# Patient Record
Sex: FEMALE | Race: WHITE | NOT HISPANIC OR LATINO | Employment: FULL TIME | URBAN - METROPOLITAN AREA
[De-identification: names, ages, dates, MRNs, and addresses within clinical notes are randomized per-mention and may not be internally consistent; named-entity substitution may affect disease eponyms.]

---

## 2018-08-01 ENCOUNTER — TRANSCRIBE ORDERS (OUTPATIENT)
Dept: OCCUPATIONAL MEDICINE | Facility: CLINIC | Age: 33
End: 2018-08-01

## 2018-08-01 ENCOUNTER — APPOINTMENT (OUTPATIENT)
Dept: LAB | Facility: HOSPITAL | Age: 33
End: 2018-08-01
Attending: PREVENTIVE MEDICINE

## 2018-08-01 DIAGNOSIS — Z02.1 PRE-EMPLOYMENT HEALTH SCREENING EXAMINATION: Primary | ICD-10-CM

## 2018-08-01 DIAGNOSIS — Z02.1 PRE-EMPLOYMENT HEALTH SCREENING EXAMINATION: ICD-10-CM

## 2018-08-01 PROCEDURE — 86706 HEP B SURFACE ANTIBODY: CPT

## 2018-08-01 PROCEDURE — 86480 TB TEST CELL IMMUN MEASURE: CPT

## 2018-08-01 PROCEDURE — 36415 COLL VENOUS BLD VENIPUNCTURE: CPT

## 2018-08-02 LAB — HBV SURFACE AB SER-ACNC: 122.75 MIU/ML

## 2018-08-03 LAB
ANNOTATION COMMENT IMP: NORMAL
GAMMA INTERFERON BACKGROUND BLD IA-ACNC: 0.06 IU/ML
M TB IFN-G BLD-IMP: NEGATIVE
M TB IFN-G CD4+ BCKGRND COR BLD-ACNC: 0.02 IU/ML
M TB IFN-G CD4+ T-CELLS BLD-ACNC: 0.08 IU/ML
MITOGEN IGNF BLD-ACNC: 7.66 IU/ML
QUANTIFERON-TB GOLD IN TUBE: NORMAL
SERVICE CMNT-IMP: NORMAL

## 2018-11-12 ENCOUNTER — HOSPITAL ENCOUNTER (EMERGENCY)
Facility: HOSPITAL | Age: 33
Discharge: HOME | End: 2018-11-12
Attending: EMERGENCY MEDICINE | Admitting: EMERGENCY MEDICINE
Payer: OTHER MISCELLANEOUS

## 2018-11-12 VITALS
HEIGHT: 66 IN | RESPIRATION RATE: 16 BRPM | OXYGEN SATURATION: 98 % | DIASTOLIC BLOOD PRESSURE: 64 MMHG | WEIGHT: 154 LBS | HEART RATE: 80 BPM | SYSTOLIC BLOOD PRESSURE: 120 MMHG | TEMPERATURE: 98.4 F | BODY MASS INDEX: 24.75 KG/M2

## 2018-11-12 DIAGNOSIS — Z77.21 EXPOSURE TO BLOOD OR BODY FLUID: Primary | ICD-10-CM

## 2018-11-12 LAB — ALT SERPL W P-5'-P-CCNC: 20 U/L (ref 12–78)

## 2018-11-12 PROCEDURE — 86803 HEPATITIS C AB TEST: CPT | Performed by: EMERGENCY MEDICINE

## 2018-11-12 PROCEDURE — 99283 EMERGENCY DEPT VISIT LOW MDM: CPT

## 2018-11-12 PROCEDURE — 87340 HEPATITIS B SURFACE AG IA: CPT | Performed by: EMERGENCY MEDICINE

## 2018-11-12 PROCEDURE — 87389 HIV-1 AG W/HIV-1&-2 AB AG IA: CPT | Performed by: EMERGENCY MEDICINE

## 2018-11-12 PROCEDURE — 36415 COLL VENOUS BLD VENIPUNCTURE: CPT | Performed by: EMERGENCY MEDICINE

## 2018-11-12 PROCEDURE — 84460 ALANINE AMINO (ALT) (SGPT): CPT | Performed by: EMERGENCY MEDICINE

## 2018-11-12 PROCEDURE — 86706 HEP B SURFACE ANTIBODY: CPT | Performed by: EMERGENCY MEDICINE

## 2018-11-13 LAB
HBV SURFACE AB SER-ACNC: 137.54 MIU/ML
HBV SURFACE AG SER QL: NORMAL
HCV AB SER QL: NORMAL

## 2018-11-13 NOTE — DISCHARGE INSTRUCTIONS
Body Substance Exposure   WHAT YOU NEED TO KNOW:   Body substance exposure is when you come in contact with another person's blood or body fluid that contains blood  Semen or vaginal fluid can also spread infection  Contact may place you at risk for hepatitis B virus (HBV), human immunodeficiency virus (HIV), or hepatitis C virus (HCV)  DISCHARGE INSTRUCTIONS:   Ways that body substance exposure can occur:   · A needle stick or a cut from a sharp object    · Contact with an open wound, such as a cut, chapped skin, or an abrasion     · Contact with the eyes or mucus membrane, such as the lining of the mouth or nose    · Human bite  What to do when exposed to a body substance:   · Clean the area immediately  Wash an open wound with soap and clean water  Flush your eyes with saline solution or water  Rinse mucus membranes with water or saline solution  · Contact your healthcare provider as soon as possible  He will ask how the exposure happened and where the blood or body fluid touched your body  If possible, tell your healthcare provider about the person's health status and history, such as vaccinations  Treatment works best if started as soon as possible  Treatment that may be given for body substance exposure:  Postexposure prophylaxis (PEP) is medical treatment that may protect a person from infection after exposure to another person's body fluids  PEP may be needed if the person whose fluids you were exposed to has a known infection  Do not donate blood, organs, tissues, or semen until your follow-up is completed at 6 months  · PEP for HBV  may include HBV vaccinations or medicine to prevent HVB  This treatment works best if started within 24 hours of exposure  · PEP for HIV  may include 2 or 3 types of medicine to prevent HIV  This treatment works best if started within 72 hours of exposure  Continue treatment for 4 weeks   Practice safe sex to prevent spreading HIV and to prevent pregnancy during the follow-up period  If you are breastfeeding, your healthcare provider may recommend that you stop  Ask your healthcare provider if you can breastfeed  · PEP for HCV  is not  available  You will need to be tested for HCV and treated if you were infected  Follow up with your healthcare provider as directed: You will need more blood tests  PEP for HIV often causes side effects  Talk with your healthcare provider about your symptoms  He will need to make sure you are taking the medicine correctly  Write down your questions so you remember to ask them during your visits  Prevent body substance exposure: If you care for another person who has HBV, HIV, or HCV, protect yourself and others from infection:  · Wash your hands thoroughly  before and after you provide medical care  · Use protective equipment  Gloves or a face mask may protect your hands, nose, and mouth from splashes of blood or body fluid  · Do not recap needles after use  Recapping needles increases your risk of a needle stick  · Throw away needles in a safe container  A hard container with a lid may prevent accidental needle sticks  Contact your healthcare provider if:   · You have a fever  · You have a rash  · You have weakness or muscle pain  · You have abdominal pain, nausea, or vomiting  · You have diarrhea  · You have a headache  · You have questions or concerns about your condition or care  © 2017 [employer]  Information is for End User's use only and may not be sold, redistributed or otherwise used for commercial purposes  All illustrations and images included in CareNotes® are the copyrighted property of [employer]  or [employer]  The above information is an  only  It is not intended as medical advice for individual conditions or treatments  Talk to your doctor, nurse or pharmacist before following any medical regimen to see if it is safe and effective for you
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
## 2018-11-14 LAB — HIV 1+2 AB+HIV1 P24 AG SERPL QL IA: NORMAL

## 2018-12-13 ENCOUNTER — TRANSCRIBE ORDERS (OUTPATIENT)
Dept: ADMINISTRATIVE | Facility: HOSPITAL | Age: 33
End: 2018-12-13

## 2018-12-13 DIAGNOSIS — R19.09 SUPRAPUBIC MASS: Primary | ICD-10-CM

## 2018-12-19 ENCOUNTER — HOSPITAL ENCOUNTER (OUTPATIENT)
Dept: ULTRASOUND IMAGING | Facility: HOSPITAL | Age: 33
Discharge: HOME | End: 2018-12-19
Payer: COMMERCIAL

## 2018-12-19 DIAGNOSIS — R19.09 SUPRAPUBIC MASS: ICD-10-CM

## 2018-12-19 PROCEDURE — 76705 ECHO EXAM OF ABDOMEN: CPT

## 2019-01-02 ENCOUNTER — APPOINTMENT (OUTPATIENT)
Dept: LAB | Facility: HOSPITAL | Age: 34
End: 2019-01-02

## 2019-01-02 ENCOUNTER — TRANSCRIBE ORDERS (OUTPATIENT)
Dept: ADMINISTRATIVE | Facility: HOSPITAL | Age: 34
End: 2019-01-02

## 2019-01-02 DIAGNOSIS — Z20.828 EXPOSURE TO SARS-ASSOCIATED CORONAVIRUS: ICD-10-CM

## 2019-01-02 DIAGNOSIS — Z20.828 EXPOSURE TO SARS-ASSOCIATED CORONAVIRUS: Primary | ICD-10-CM

## 2019-01-02 PROCEDURE — 87389 HIV-1 AG W/HIV-1&-2 AB AG IA: CPT

## 2019-01-02 PROCEDURE — 36415 COLL VENOUS BLD VENIPUNCTURE: CPT

## 2019-01-04 LAB — HIV 1+2 AB+HIV1 P24 AG SERPL QL IA: NORMAL

## 2019-02-20 ENCOUNTER — TRANSCRIBE ORDERS (OUTPATIENT)
Dept: ADMINISTRATIVE | Facility: HOSPITAL | Age: 34
End: 2019-02-20

## 2019-02-20 ENCOUNTER — APPOINTMENT (OUTPATIENT)
Dept: LAB | Facility: HOSPITAL | Age: 34
End: 2019-02-20

## 2019-02-20 DIAGNOSIS — Z20.828 CONTACT WITH OR EXPOSURE TO VIRAL DISEASE: ICD-10-CM

## 2019-02-20 DIAGNOSIS — Z20.828 CONTACT WITH OR EXPOSURE TO VIRAL DISEASE: Primary | ICD-10-CM

## 2019-02-20 PROCEDURE — 86803 HEPATITIS C AB TEST: CPT

## 2019-02-20 PROCEDURE — 36415 COLL VENOUS BLD VENIPUNCTURE: CPT

## 2019-02-20 PROCEDURE — 87389 HIV-1 AG W/HIV-1&-2 AB AG IA: CPT

## 2019-02-21 LAB
HCV AB SER QL: NORMAL
HIV 1+2 AB+HIV1 P24 AG SERPL QL IA: NORMAL

## 2019-06-24 ENCOUNTER — APPOINTMENT (OUTPATIENT)
Dept: LAB | Facility: HOSPITAL | Age: 34
End: 2019-06-24
Payer: COMMERCIAL

## 2019-06-24 ENCOUNTER — TRANSCRIBE ORDERS (OUTPATIENT)
Dept: ADMINISTRATIVE | Facility: HOSPITAL | Age: 34
End: 2019-06-24

## 2019-06-24 DIAGNOSIS — Z00.8 HEALTH EXAMINATION IN POPULATION SURVEY: ICD-10-CM

## 2019-06-24 DIAGNOSIS — Z77.21 PERSONAL HISTORY OF EXPOSURE TO POTENTIALLY HAZARDOUS BODY FLUIDS: ICD-10-CM

## 2019-06-24 DIAGNOSIS — Z00.8 HEALTH EXAMINATION IN POPULATION SURVEY: Primary | ICD-10-CM

## 2019-06-24 DIAGNOSIS — Z77.21 PERSONAL HISTORY OF EXPOSURE TO POTENTIALLY HAZARDOUS BODY FLUIDS: Primary | ICD-10-CM

## 2019-06-24 LAB
CHOLEST SERPL-MCNC: 176 MG/DL (ref 50–200)
HDLC SERPL-MCNC: 62 MG/DL (ref 40–60)
LDLC SERPL CALC-MCNC: 104 MG/DL (ref 0–100)
NONHDLC SERPL-MCNC: 114 MG/DL
TRIGL SERPL-MCNC: 52 MG/DL

## 2019-06-24 PROCEDURE — 83036 HEMOGLOBIN GLYCOSYLATED A1C: CPT

## 2019-06-24 PROCEDURE — 86803 HEPATITIS C AB TEST: CPT

## 2019-06-24 PROCEDURE — 36415 COLL VENOUS BLD VENIPUNCTURE: CPT

## 2019-06-24 PROCEDURE — 80061 LIPID PANEL: CPT

## 2019-06-24 PROCEDURE — 87389 HIV-1 AG W/HIV-1&-2 AB AG IA: CPT

## 2019-06-25 LAB
EST. AVERAGE GLUCOSE BLD GHB EST-MCNC: 105 MG/DL
HBA1C MFR BLD: 5.3 % (ref 4.2–6.3)
HCV AB SER QL: NORMAL

## 2019-06-26 LAB — HIV 1+2 AB+HIV1 P24 AG SERPL QL IA: NORMAL

## 2019-09-10 ENCOUNTER — OFFICE VISIT (OUTPATIENT)
Dept: FAMILY MEDICINE CLINIC | Facility: CLINIC | Age: 34
End: 2019-09-10
Payer: COMMERCIAL

## 2019-09-10 VITALS
BODY MASS INDEX: 25.17 KG/M2 | OXYGEN SATURATION: 96 % | DIASTOLIC BLOOD PRESSURE: 62 MMHG | RESPIRATION RATE: 18 BRPM | WEIGHT: 156.6 LBS | HEIGHT: 66 IN | SYSTOLIC BLOOD PRESSURE: 116 MMHG | TEMPERATURE: 96.2 F | HEART RATE: 74 BPM

## 2019-09-10 DIAGNOSIS — F33.1 MODERATE EPISODE OF RECURRENT MAJOR DEPRESSIVE DISORDER (HCC): Primary | ICD-10-CM

## 2019-09-10 DIAGNOSIS — N92.0 MENORRHAGIA WITH REGULAR CYCLE: ICD-10-CM

## 2019-09-10 DIAGNOSIS — Z01.419 ROUTINE GYNECOLOGICAL EXAMINATION: ICD-10-CM

## 2019-09-10 DIAGNOSIS — T14.8XXA BRUISING: ICD-10-CM

## 2019-09-10 PROCEDURE — 99203 OFFICE O/P NEW LOW 30 MIN: CPT | Performed by: NURSE PRACTITIONER

## 2019-09-10 RX ORDER — ESCITALOPRAM OXALATE 5 MG/1
5 TABLET ORAL DAILY
Qty: 30 TABLET | Refills: 1 | Status: SHIPPED | OUTPATIENT
Start: 2019-09-10 | End: 2019-10-04 | Stop reason: SDUPTHER
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
## 2019-09-16 ENCOUNTER — APPOINTMENT (OUTPATIENT)
Dept: LAB | Facility: HOSPITAL | Age: 34
End: 2019-09-16
Payer: COMMERCIAL

## 2019-09-16 DIAGNOSIS — N92.0 MENORRHAGIA WITH REGULAR CYCLE: ICD-10-CM

## 2019-09-16 DIAGNOSIS — T14.8XXA BRUISING: ICD-10-CM

## 2019-09-16 LAB
BASOPHILS # BLD AUTO: 0.05 THOUSANDS/ΜL (ref 0–0.1)
BASOPHILS NFR BLD AUTO: 1 % (ref 0–1)
EOSINOPHIL # BLD AUTO: 0.08 THOUSAND/ΜL (ref 0–0.61)
EOSINOPHIL NFR BLD AUTO: 1 % (ref 0–6)
ERYTHROCYTE [DISTWIDTH] IN BLOOD BY AUTOMATED COUNT: 12.8 % (ref 11.6–15.1)
HCT VFR BLD AUTO: 38.2 % (ref 34.8–46.1)
HGB BLD-MCNC: 12.4 G/DL (ref 11.5–15.4)
IMM GRANULOCYTES # BLD AUTO: 0.03 THOUSAND/UL (ref 0–0.2)
IMM GRANULOCYTES NFR BLD AUTO: 0 % (ref 0–2)
IRON SERPL-MCNC: 115 UG/DL (ref 50–170)
LYMPHOCYTES # BLD AUTO: 2.43 THOUSANDS/ΜL (ref 0.6–4.47)
LYMPHOCYTES NFR BLD AUTO: 26 % (ref 14–44)
MCH RBC QN AUTO: 29.9 PG (ref 26.8–34.3)
MCHC RBC AUTO-ENTMCNC: 32.5 G/DL (ref 31.4–37.4)
MCV RBC AUTO: 92 FL (ref 82–98)
MONOCYTES # BLD AUTO: 0.45 THOUSAND/ΜL (ref 0.17–1.22)
MONOCYTES NFR BLD AUTO: 5 % (ref 4–12)
NEUTROPHILS # BLD AUTO: 6.3 THOUSANDS/ΜL (ref 1.85–7.62)
NEUTS SEG NFR BLD AUTO: 67 % (ref 43–75)
NRBC BLD AUTO-RTO: 0 /100 WBCS
PLATELET # BLD AUTO: 285 THOUSANDS/UL (ref 149–390)
PMV BLD AUTO: 10.2 FL (ref 8.9–12.7)
RBC # BLD AUTO: 4.15 MILLION/UL (ref 3.81–5.12)
WBC # BLD AUTO: 9.34 THOUSAND/UL (ref 4.31–10.16)

## 2019-09-16 PROCEDURE — 85025 COMPLETE CBC W/AUTO DIFF WBC: CPT

## 2019-09-16 PROCEDURE — 36415 COLL VENOUS BLD VENIPUNCTURE: CPT

## 2019-09-16 PROCEDURE — 83540 ASSAY OF IRON: CPT

## 2019-09-17 DIAGNOSIS — R53.82 CHRONIC FATIGUE: Primary | ICD-10-CM

## 2019-10-04 ENCOUNTER — OFFICE VISIT (OUTPATIENT)
Dept: FAMILY MEDICINE CLINIC | Facility: CLINIC | Age: 34
End: 2019-10-04
Payer: COMMERCIAL

## 2019-10-04 VITALS
TEMPERATURE: 97.3 F | WEIGHT: 156.4 LBS | HEART RATE: 78 BPM | DIASTOLIC BLOOD PRESSURE: 68 MMHG | BODY MASS INDEX: 25.13 KG/M2 | SYSTOLIC BLOOD PRESSURE: 106 MMHG | HEIGHT: 66 IN | OXYGEN SATURATION: 98 %

## 2019-10-04 DIAGNOSIS — F33.1 MODERATE EPISODE OF RECURRENT MAJOR DEPRESSIVE DISORDER (HCC): Primary | ICD-10-CM

## 2019-10-04 DIAGNOSIS — F33.1 MODERATE EPISODE OF RECURRENT MAJOR DEPRESSIVE DISORDER (HCC): ICD-10-CM

## 2019-10-04 PROCEDURE — 99213 OFFICE O/P EST LOW 20 MIN: CPT | Performed by: NURSE PRACTITIONER

## 2019-10-04 PROCEDURE — 3008F BODY MASS INDEX DOCD: CPT | Performed by: NURSE PRACTITIONER

## 2019-10-04 RX ORDER — ESCITALOPRAM OXALATE 10 MG/1
10 TABLET ORAL DAILY
Qty: 30 TABLET | Refills: 2 | Status: SHIPPED | OUTPATIENT
Start: 2019-10-04 | End: 2019-11-15 | Stop reason: SDUPTHER
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
## 2019-10-17 ENCOUNTER — APPOINTMENT (OUTPATIENT)
Dept: LAB | Facility: HOSPITAL | Age: 34
End: 2019-10-17
Payer: COMMERCIAL

## 2019-10-17 DIAGNOSIS — R53.82 CHRONIC FATIGUE: ICD-10-CM

## 2019-10-17 PROCEDURE — 82306 VITAMIN D 25 HYDROXY: CPT

## 2019-10-17 PROCEDURE — 36415 COLL VENOUS BLD VENIPUNCTURE: CPT

## 2019-10-18 LAB — 25(OH)D3 SERPL-MCNC: 32.6 NG/ML (ref 30–100)

## 2019-11-11 ENCOUNTER — HOSPITAL ENCOUNTER (EMERGENCY)
Facility: HOSPITAL | Age: 34
Discharge: HOME | End: 2019-11-11
Attending: EMERGENCY MEDICINE | Admitting: EMERGENCY MEDICINE
Payer: OTHER MISCELLANEOUS

## 2019-11-11 ENCOUNTER — APPOINTMENT (EMERGENCY)
Dept: RADIOLOGY | Facility: HOSPITAL | Age: 34
End: 2019-11-11
Payer: OTHER MISCELLANEOUS

## 2019-11-11 VITALS
BODY MASS INDEX: 25.82 KG/M2 | DIASTOLIC BLOOD PRESSURE: 60 MMHG | SYSTOLIC BLOOD PRESSURE: 120 MMHG | RESPIRATION RATE: 16 BRPM | OXYGEN SATURATION: 97 % | HEART RATE: 83 BPM | WEIGHT: 160 LBS | TEMPERATURE: 98.8 F

## 2019-11-11 DIAGNOSIS — S63.92XA HAND SPRAIN, LEFT, INITIAL ENCOUNTER: Primary | ICD-10-CM

## 2019-11-11 PROCEDURE — 73130 X-RAY EXAM OF HAND: CPT

## 2019-11-11 PROCEDURE — 99283 EMERGENCY DEPT VISIT LOW MDM: CPT

## 2019-11-11 PROCEDURE — 99284 EMERGENCY DEPT VISIT MOD MDM: CPT | Performed by: EMERGENCY MEDICINE
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
## 2019-11-15 ENCOUNTER — OFFICE VISIT (OUTPATIENT)
Dept: FAMILY MEDICINE CLINIC | Facility: CLINIC | Age: 34
End: 2019-11-15
Payer: COMMERCIAL

## 2019-11-15 VITALS
HEIGHT: 66 IN | WEIGHT: 157.6 LBS | HEART RATE: 73 BPM | OXYGEN SATURATION: 97 % | DIASTOLIC BLOOD PRESSURE: 78 MMHG | TEMPERATURE: 97.8 F | BODY MASS INDEX: 25.33 KG/M2 | SYSTOLIC BLOOD PRESSURE: 122 MMHG

## 2019-11-15 DIAGNOSIS — F33.1 MODERATE EPISODE OF RECURRENT MAJOR DEPRESSIVE DISORDER (HCC): Primary | ICD-10-CM

## 2019-11-15 DIAGNOSIS — F33.1 MODERATE EPISODE OF RECURRENT MAJOR DEPRESSIVE DISORDER (HCC): ICD-10-CM

## 2019-11-15 PROCEDURE — 99213 OFFICE O/P EST LOW 20 MIN: CPT | Performed by: NURSE PRACTITIONER

## 2019-11-15 PROCEDURE — 1036F TOBACCO NON-USER: CPT | Performed by: NURSE PRACTITIONER

## 2019-11-15 RX ORDER — MELATONIN
1000 DAILY
COMMUNITY

## 2019-11-15 RX ORDER — MULTIVITAMIN
1 TABLET ORAL DAILY
COMMUNITY
End: 2021-07-30

## 2019-11-15 RX ORDER — BACLOFEN 20 MG
TABLET ORAL
COMMUNITY

## 2019-11-15 RX ORDER — ESCITALOPRAM OXALATE 20 MG/1
20 TABLET ORAL DAILY
Qty: 30 TABLET | Refills: 5 | Status: SHIPPED | OUTPATIENT
Start: 2019-11-15 | End: 2019-12-13 | Stop reason: SDUPTHER

## 2019-11-15 NOTE — PROGRESS NOTES
Assessment/Plan:    No problem-specific Assessment & Plan notes found for this encounter  Diagnoses and all orders for this visit:    Moderate episode of recurrent major depressive disorder ([facility] [address] )  Comments: Will increase the Lexapro 20 mg daily and follow up in 4 weeks  Orders:  -     escitalopram (LEXAPRO) 20 mg tablet; Take 1 tablet (20 mg total) by mouth daily    Moderate episode of recurrent major depressive disorder (HCC)  -     escitalopram (LEXAPRO) 20 mg tablet; Take 1 tablet (20 mg total) by mouth daily    Other orders  -     cholecalciferol (VITAMIN D3) 1,000 units tablet; Take 1,000 Units by mouth daily  -     Multiple Vitamin (MULTIVITAMIN) tablet; Take 1 tablet by mouth daily  -     Magnesium Oxide 500 MG TABS; Take by mouth          Subjective:      Patient ID: Kamala Way is a 29 y o  female  Patient here for follow up and reports that she is feeling more productive and more energy  Patient is still having troubles with sleep and having problems with at times not sleeping enough and being woken up during her sleep and also having problems with sleeping too much at times  Patient energy levels are low and having a hard time with getting things done  Patient appetite taking magnesium supplement and having problems with sugar craving  And also having problems with her concentration  PHQ-9 score 14       The following portions of the patient's history were reviewed and updated as appropriate:   She  has a past medical history of Asthma, Depression, Lumbar herniated disc, and UTI (urinary tract infection)  She   Patient Active Problem List    Diagnosis Date Noted    Routine gynecological examination 09/10/2019    Moderate episode of recurrent major depressive disorder ([facility] [address] ) 09/10/2019    Bruising 09/10/2019    Menorrhagia with regular cycle 09/10/2019     She  has a past surgical history that includes [place] tooth extraction    Her family history includes Breast cancer additional onset in her maternal grandmother and mother; Diabetes in her mother; Hypertension in her mother; Mental illness in her father; Prostate cancer in her maternal grandfather  She  reports that she has never smoked  She has never used smokeless tobacco  She reports that she drinks alcohol  She reports that she does not use drugs  She is allergic to sulfa antibiotics; levofloxacin; and tobramycin       Review of Systems   Constitutional: Negative for activity change, appetite change, chills, diaphoresis, fatigue, fever and unexpected weight change  HENT: Negative for congestion, ear pain, hearing loss, postnasal drip, sinus pressure, sinus pain, sneezing and sore throat  Eyes: Negative for pain, redness and visual disturbance  Respiratory: Negative for cough and shortness of breath  Cardiovascular: Negative for chest pain and leg swelling  Gastrointestinal: Negative for abdominal pain, diarrhea, nausea and vomiting  Endocrine: Negative  Genitourinary: Negative  Musculoskeletal: Negative for arthralgias  Skin: Negative  Allergic/Immunologic: Negative  Neurological: Negative for dizziness and light-headedness  Hematological: Negative  Psychiatric/Behavioral: Positive for sleep disturbance  Negative for behavioral problems and dysphoric mood  Objective:      /78   Pulse 73   Temp 97 8 °F (36 6 °C) (Tympanic)   Ht 5' 6" (1 676 m)   Wt 71 5 kg (157 lb 9 6 oz)   LMP 10/27/2019   SpO2 97%   BMI 25 44 kg/m²          Physical Exam   Constitutional: She is oriented to person, place, and time  Vital signs are normal  She appears well-developed and well-nourished  No distress  HENT:   Head: Normocephalic and atraumatic  Eyes: Pupils are equal, round, and reactive to light  Neck: Normal range of motion  No thyromegaly present  Cardiovascular: Normal rate, regular rhythm, normal heart sounds and intact distal pulses  No murmur heard    Pulmonary/Chest: Effort normal and breath sounds normal  No respiratory distress  She has no wheezes  Abdominal: Soft  Bowel sounds are normal    Musculoskeletal: Normal range of motion  Neurological: She is alert and oriented to person, place, and time  Skin: Skin is warm and dry  Psychiatric: She has a normal mood and affect  Her speech is normal and behavior is normal  Judgment and thought content normal  Cognition and memory are normal    Nursing note and vitals reviewed

## 2019-12-13 ENCOUNTER — OFFICE VISIT (OUTPATIENT)
Dept: FAMILY MEDICINE CLINIC | Facility: CLINIC | Age: 34
End: 2019-12-13
Payer: COMMERCIAL

## 2019-12-13 VITALS
HEART RATE: 58 BPM | SYSTOLIC BLOOD PRESSURE: 118 MMHG | BODY MASS INDEX: 25.58 KG/M2 | DIASTOLIC BLOOD PRESSURE: 74 MMHG | WEIGHT: 159.2 LBS | HEIGHT: 66 IN | OXYGEN SATURATION: 97 % | TEMPERATURE: 97.9 F

## 2019-12-13 DIAGNOSIS — G43.829 MENSTRUAL MIGRAINE WITHOUT STATUS MIGRAINOSUS, NOT INTRACTABLE: ICD-10-CM

## 2019-12-13 DIAGNOSIS — F33.1 MODERATE EPISODE OF RECURRENT MAJOR DEPRESSIVE DISORDER (HCC): Primary | ICD-10-CM

## 2019-12-13 DIAGNOSIS — F33.1 MODERATE EPISODE OF RECURRENT MAJOR DEPRESSIVE DISORDER (HCC): ICD-10-CM

## 2019-12-13 PROCEDURE — 3008F BODY MASS INDEX DOCD: CPT | Performed by: NURSE PRACTITIONER

## 2019-12-13 PROCEDURE — 99213 OFFICE O/P EST LOW 20 MIN: CPT | Performed by: NURSE PRACTITIONER

## 2019-12-13 PROCEDURE — 1036F TOBACCO NON-USER: CPT | Performed by: NURSE PRACTITIONER

## 2019-12-13 RX ORDER — EVENING PRIMROSE OIL 500 MG
CAPSULE ORAL
COMMUNITY
End: 2020-09-01 | Stop reason: ALTCHOICE

## 2019-12-13 RX ORDER — ESCITALOPRAM OXALATE 20 MG/1
20 TABLET ORAL DAILY
Qty: 90 TABLET | Refills: 1 | Status: SHIPPED | OUTPATIENT
Start: 2019-12-13 | End: 2020-07-31 | Stop reason: SDUPTHER

## 2019-12-13 NOTE — PROGRESS NOTES
Assessment/Plan:    No problem-specific Assessment & Plan notes found for this encounter  Diagnoses and all orders for this visit:    Moderate episode of recurrent major depressive disorder ([facility] [address] )  Comments:  Patient is doing well on the Lexapro 20 mg daily will continue with current dose   Orders:  -     escitalopram (LEXAPRO) 20 mg tablet; Take 1 tablet (20 mg total) by mouth daily    Moderate episode of recurrent major depressive disorder ([facility] [address] )  Comments: Will increase the Lexapro 20 mg daily and follow up in 4 weeks  Orders:  -     escitalopram (LEXAPRO) 20 mg tablet; Take 1 tablet (20 mg total) by mouth daily    Menstrual migraine without status migrainosus, not intractable    Other orders  -     Evening Primrose Oil 500 MG CAPS; Take by mouth          Subjective:      Patient ID: Marsha Yepez is a 29 y o  female  Patient here for follow up and reports that she has had some anxiety but is related to her holiday  Patient is doing well on the 20 mg dose of the Lexapro  Patient has tried to start doing more yoga  Patient is working well and doing better at work and home  Patient here as well and reports that she has headaches and happening primarily after her menstrual cycle and also triggers are lack of sleep alcohol and when she is dehydrated  Patient did have an aura with her last migraine and was sleepy and the following day had a right side of her head stabbing pain migraine lasting about 6 hours to > 24 hours and taking benadryl zofran tylenol and motrin caffeine and water  Patient reports that this is effective for her  Patient would like to see a headache specialist  Patient reports that her headaches have been getting more symptoms with her headaches such as photosensitivity       The following portions of the patient's history were reviewed and updated as appropriate:   She  has a past medical history of Asthma, Depression, Lumbar herniated disc, and UTI (urinary tract infection)    She Patient Active Problem List    Diagnosis Date Noted    Menstrual migraine without status migrainosus, not intractable 12/13/2019    Routine gynecological examination 09/10/2019    Moderate episode of recurrent major depressive disorder ([facility] [address] ) 09/10/2019    Bruising 09/10/2019    Menorrhagia with regular cycle 09/10/2019     She  has a past surgical history that includes [place] tooth extraction  Her family history includes Breast cancer additional onset in her maternal grandmother and mother; Diabetes in her mother; Hypertension in her mother; Mental illness in her father; Prostate cancer in her maternal grandfather  She  reports that she has never smoked  She has never used smokeless tobacco  She reports that she drinks alcohol  She reports that she does not use drugs  She is allergic to sulfa antibiotics; levofloxacin; and tobramycin       Review of Systems   Constitutional: Negative for activity change, appetite change, chills, diaphoresis, fatigue, fever and unexpected weight change  HENT: Negative for congestion, ear pain, hearing loss, postnasal drip, sinus pressure, sinus pain, sneezing and sore throat  Eyes: Negative for pain, redness and visual disturbance  Respiratory: Negative for cough and shortness of breath  Cardiovascular: Negative for chest pain and leg swelling  Gastrointestinal: Negative for abdominal pain, diarrhea, nausea and vomiting  Endocrine: Negative  Genitourinary: Negative  Musculoskeletal: Negative for arthralgias  Skin: Negative  Allergic/Immunologic: Negative  Neurological: Negative for dizziness and light-headedness  Hematological: Negative  Psychiatric/Behavioral: Negative for behavioral problems and dysphoric mood           Objective:      /74   Pulse 58   Temp 97 9 °F (36 6 °C) (Tympanic)   Ht 5' 6" (1 676 m)   Wt 72 2 kg (159 lb 3 2 oz)   SpO2 97%   BMI 25 70 kg/m²          Physical Exam   Constitutional: She is oriented to person, place, and time  Vital signs are normal  She appears well-developed and well-nourished  No distress  HENT:   Head: Normocephalic and atraumatic  Eyes: Pupils are equal, round, and reactive to light  Neck: Normal range of motion  No thyromegaly present  Cardiovascular: Normal rate, regular rhythm, normal heart sounds and intact distal pulses  No murmur heard  Pulmonary/Chest: Effort normal and breath sounds normal  No respiratory distress  She has no wheezes  Abdominal: Soft  Bowel sounds are normal    Musculoskeletal: Normal range of motion  Neurological: She is alert and oriented to person, place, and time  Skin: Skin is warm and dry  Psychiatric: She has a normal mood and affect  Nursing note and vitals reviewed

## 2020-03-17 ENCOUNTER — OFFICE VISIT (OUTPATIENT)
Dept: FAMILY MEDICINE CLINIC | Facility: CLINIC | Age: 35
End: 2020-03-17
Payer: COMMERCIAL

## 2020-03-17 VITALS
DIASTOLIC BLOOD PRESSURE: 66 MMHG | HEART RATE: 68 BPM | SYSTOLIC BLOOD PRESSURE: 120 MMHG | BODY MASS INDEX: 26.03 KG/M2 | HEIGHT: 66 IN | OXYGEN SATURATION: 98 % | WEIGHT: 162 LBS | TEMPERATURE: 96.4 F

## 2020-03-17 DIAGNOSIS — F33.1 MODERATE EPISODE OF RECURRENT MAJOR DEPRESSIVE DISORDER (HCC): Primary | ICD-10-CM

## 2020-03-17 PROBLEM — Z01.419 ROUTINE GYNECOLOGICAL EXAMINATION: Status: RESOLVED | Noted: 2019-09-10 | Resolved: 2020-03-17

## 2020-03-17 PROBLEM — T14.8XXA BRUISING: Status: RESOLVED | Noted: 2019-09-10 | Resolved: 2020-03-17

## 2020-03-17 PROCEDURE — 3008F BODY MASS INDEX DOCD: CPT | Performed by: NURSE PRACTITIONER

## 2020-03-17 PROCEDURE — 99213 OFFICE O/P EST LOW 20 MIN: CPT | Performed by: NURSE PRACTITIONER

## 2020-03-17 PROCEDURE — 1036F TOBACCO NON-USER: CPT | Performed by: NURSE PRACTITIONER

## 2020-03-17 NOTE — PROGRESS NOTES
Assessment/Plan:         Diagnoses and all orders for this visit:    Moderate episode of recurrent major depressive disorder ([facility] [address] )  Comments:  will continue with the Lexapro 20 mg daily           Subjective:      Patient ID: Byron Jefferson is a 29 y o  female  Patient here for follow up three months  Patient reports that she is under stress with COVID scares  Patient reports that she is sleeping poorly and having trouble with falling and staying asleep and taking Melatonin and just not helping  Patient is going to bed at 12 AM and waking up 10 AM and typically sleeping about 7-8 hours and just not getting restful sleep  Patient has also noticed some weight gain and has started with working out more and eating better and on a Mediterranean diet otherwise  The following portions of the patient's history were reviewed and updated as appropriate:   She  has a past medical history of Asthma, Depression, Lumbar herniated disc, and UTI (urinary tract infection)  She   Patient Active Problem List    Diagnosis Date Noted    Menstrual migraine without status migrainosus, not intractable 12/13/2019    Moderate episode of recurrent major depressive disorder ([facility] [address] ) 09/10/2019    Menorrhagia with regular cycle 09/10/2019     She  has a past surgical history that includes [place] tooth extraction  Her family history includes Breast cancer additional onset in her maternal grandmother and mother; Diabetes in her mother; Hypertension in her mother; Mental illness in her father; Prostate cancer in her maternal grandfather  She  reports that she has never smoked  She has never used smokeless tobacco  She reports that she drinks alcohol  She reports that she does not use drugs  She is allergic to sulfa antibiotics; levofloxacin; and tobramycin       Review of Systems   Constitutional: Negative for activity change, appetite change, chills, diaphoresis, fatigue, fever and unexpected weight change     HENT: Negative for congestion, ear pain, hearing loss, postnasal drip, sinus pressure, sinus pain, sneezing and sore throat  Eyes: Negative for pain, redness and visual disturbance  Respiratory: Negative for cough and shortness of breath  Cardiovascular: Negative for chest pain and leg swelling  Gastrointestinal: Negative for abdominal pain, diarrhea, nausea and vomiting  Endocrine: Negative  Genitourinary: Negative  Musculoskeletal: Negative for arthralgias  Skin: Negative  Allergic/Immunologic: Negative  Neurological: Negative for dizziness and light-headedness  Hematological: Negative  Psychiatric/Behavioral: Negative for behavioral problems and dysphoric mood  Objective:      /66   Pulse 68   Temp (!) 96 4 °F (35 8 °C)   Ht 5' 6" (1 676 m)   Wt 73 5 kg (162 lb)   SpO2 98%   BMI 26 15 kg/m²          Physical Exam   Constitutional: She is oriented to person, place, and time  Vital signs are normal  She appears well-developed and well-nourished  No distress  HENT:   Head: Normocephalic and atraumatic  Eyes: Pupils are equal, round, and reactive to light  Neck: Normal range of motion  No thyromegaly present  Cardiovascular: Normal rate, regular rhythm, normal heart sounds and intact distal pulses  No murmur heard  Pulmonary/Chest: Effort normal and breath sounds normal  No respiratory distress  She has no wheezes  Abdominal: Soft  Bowel sounds are normal    Musculoskeletal: Normal range of motion  Neurological: She is alert and oriented to person, place, and time  Skin: Skin is warm and dry  Psychiatric: She has a normal mood and affect  Nursing note and vitals reviewed

## 2020-04-12 ENCOUNTER — TELEPHONE (OUTPATIENT)
Dept: OTHER | Facility: OTHER | Age: 35
End: 2020-04-12

## 2020-04-12 ENCOUNTER — TELEMEDICINE (OUTPATIENT)
Dept: FAMILY MEDICINE CLINIC | Facility: CLINIC | Age: 35
End: 2020-04-12

## 2020-04-12 DIAGNOSIS — Z20.828 EXPOSURE TO SARS-ASSOCIATED CORONAVIRUS: Primary | ICD-10-CM

## 2020-04-12 PROCEDURE — 99442 PR PHYS/QHP TELEPHONE EVALUATION 11-20 MIN: CPT | Performed by: NURSE PRACTITIONER

## 2020-04-13 ENCOUNTER — DOCUMENTATION (OUTPATIENT)
Dept: URGENT CARE | Facility: MEDICAL CENTER | Age: 35
End: 2020-04-13

## 2020-04-13 ENCOUNTER — APPOINTMENT (OUTPATIENT)
Dept: URGENT CARE | Facility: MEDICAL CENTER | Age: 35
End: 2020-04-13
Payer: OTHER MISCELLANEOUS

## 2020-04-13 DIAGNOSIS — Z20.828 EXPOSURE TO SARS-ASSOCIATED CORONAVIRUS: Primary | ICD-10-CM

## 2020-04-13 PROCEDURE — 87635 SARS-COV-2 COVID-19 AMP PRB: CPT

## 2020-04-17 ENCOUNTER — TELEMEDICINE (OUTPATIENT)
Dept: FAMILY MEDICINE CLINIC | Facility: CLINIC | Age: 35
End: 2020-04-17
Payer: COMMERCIAL

## 2020-04-17 DIAGNOSIS — U07.1 COVID-19 VIRUS INFECTION: Primary | ICD-10-CM

## 2020-04-17 PROBLEM — Z20.828 EXPOSURE TO SARS-ASSOCIATED CORONAVIRUS: Status: RESOLVED | Noted: 2020-04-12 | Resolved: 2020-04-17

## 2020-04-17 LAB
INPATIENT: NORMAL
SARS-COV-2 RNA SPEC QL NAA+PROBE: DETECTED

## 2020-04-17 PROCEDURE — 99213 OFFICE O/P EST LOW 20 MIN: CPT | Performed by: NURSE PRACTITIONER

## 2020-04-23 ENCOUNTER — TELEMEDICINE (OUTPATIENT)
Dept: FAMILY MEDICINE CLINIC | Facility: CLINIC | Age: 35
End: 2020-04-23
Payer: COMMERCIAL

## 2020-04-23 VITALS — BODY MASS INDEX: 26.03 KG/M2 | HEIGHT: 66 IN | WEIGHT: 162 LBS

## 2020-04-23 DIAGNOSIS — U07.1 COVID-19 VIRUS INFECTION: Primary | ICD-10-CM

## 2020-04-23 PROCEDURE — 99213 OFFICE O/P EST LOW 20 MIN: CPT | Performed by: NURSE PRACTITIONER

## 2020-04-24 ENCOUNTER — APPOINTMENT (OUTPATIENT)
Dept: RADIOLOGY | Facility: CLINIC | Age: 35
End: 2020-04-24
Payer: OTHER MISCELLANEOUS

## 2020-04-24 ENCOUNTER — TELEMEDICINE (OUTPATIENT)
Dept: FAMILY MEDICINE CLINIC | Facility: CLINIC | Age: 35
End: 2020-04-24
Payer: COMMERCIAL

## 2020-04-24 DIAGNOSIS — R10.13 EPIGASTRIC ABDOMINAL PAIN: ICD-10-CM

## 2020-04-24 DIAGNOSIS — U07.1 COVID-19 VIRUS INFECTION: Primary | ICD-10-CM

## 2020-04-24 DIAGNOSIS — U07.1 COVID-19 VIRUS INFECTION: ICD-10-CM

## 2020-04-24 PROCEDURE — 99213 OFFICE O/P EST LOW 20 MIN: CPT | Performed by: PHYSICIAN ASSISTANT

## 2020-04-24 PROCEDURE — 71046 X-RAY EXAM CHEST 2 VIEWS: CPT

## 2020-04-27 ENCOUNTER — APPOINTMENT (OUTPATIENT)
Dept: LAB | Facility: CLINIC | Age: 35
End: 2020-04-27
Payer: COMMERCIAL

## 2020-04-27 DIAGNOSIS — U07.1 COVID-19 VIRUS INFECTION: ICD-10-CM

## 2020-04-27 DIAGNOSIS — R10.13 EPIGASTRIC ABDOMINAL PAIN: ICD-10-CM

## 2020-04-27 LAB
ALBUMIN SERPL BCP-MCNC: 3.8 G/DL (ref 3.5–5)
ALP SERPL-CCNC: 49 U/L (ref 46–116)
ALT SERPL W P-5'-P-CCNC: 23 U/L (ref 12–78)
ANION GAP SERPL CALCULATED.3IONS-SCNC: 4 MMOL/L (ref 4–13)
AST SERPL W P-5'-P-CCNC: 13 U/L (ref 5–45)
BASOPHILS # BLD AUTO: 0.05 THOUSANDS/ΜL (ref 0–0.1)
BASOPHILS NFR BLD AUTO: 1 % (ref 0–1)
BILIRUB SERPL-MCNC: 0.5 MG/DL (ref 0.2–1)
BUN SERPL-MCNC: 17 MG/DL (ref 5–25)
CALCIUM SERPL-MCNC: 9 MG/DL (ref 8.3–10.1)
CHLORIDE SERPL-SCNC: 108 MMOL/L (ref 100–108)
CO2 SERPL-SCNC: 28 MMOL/L (ref 21–32)
CREAT SERPL-MCNC: 0.8 MG/DL (ref 0.6–1.3)
EOSINOPHIL # BLD AUTO: 0.09 THOUSAND/ΜL (ref 0–0.61)
EOSINOPHIL NFR BLD AUTO: 1 % (ref 0–6)
ERYTHROCYTE [DISTWIDTH] IN BLOOD BY AUTOMATED COUNT: 12.6 % (ref 11.6–15.1)
GFR SERPL CREATININE-BSD FRML MDRD: 96 ML/MIN/1.73SQ M
GLUCOSE SERPL-MCNC: 86 MG/DL (ref 65–140)
HCT VFR BLD AUTO: 40.1 % (ref 34.8–46.1)
HGB BLD-MCNC: 13 G/DL (ref 11.5–15.4)
IMM GRANULOCYTES # BLD AUTO: 0.01 THOUSAND/UL (ref 0–0.2)
IMM GRANULOCYTES NFR BLD AUTO: 0 % (ref 0–2)
LYMPHOCYTES # BLD AUTO: 2.88 THOUSANDS/ΜL (ref 0.6–4.47)
LYMPHOCYTES NFR BLD AUTO: 38 % (ref 14–44)
MCH RBC QN AUTO: 30.6 PG (ref 26.8–34.3)
MCHC RBC AUTO-ENTMCNC: 32.4 G/DL (ref 31.4–37.4)
MCV RBC AUTO: 94 FL (ref 82–98)
MONOCYTES # BLD AUTO: 0.53 THOUSAND/ΜL (ref 0.17–1.22)
MONOCYTES NFR BLD AUTO: 7 % (ref 4–12)
NEUTROPHILS # BLD AUTO: 3.95 THOUSANDS/ΜL (ref 1.85–7.62)
NEUTS SEG NFR BLD AUTO: 53 % (ref 43–75)
NRBC BLD AUTO-RTO: 0 /100 WBCS
PLATELET # BLD AUTO: 296 THOUSANDS/UL (ref 149–390)
PMV BLD AUTO: 10.2 FL (ref 8.9–12.7)
POTASSIUM SERPL-SCNC: 4.6 MMOL/L (ref 3.5–5.3)
PROT SERPL-MCNC: 7 G/DL (ref 6.4–8.2)
RBC # BLD AUTO: 4.25 MILLION/UL (ref 3.81–5.12)
SODIUM SERPL-SCNC: 140 MMOL/L (ref 136–145)
WBC # BLD AUTO: 7.51 THOUSAND/UL (ref 4.31–10.16)

## 2020-04-27 PROCEDURE — 85025 COMPLETE CBC W/AUTO DIFF WBC: CPT

## 2020-04-27 PROCEDURE — 80053 COMPREHEN METABOLIC PANEL: CPT

## 2020-04-27 PROCEDURE — 36415 COLL VENOUS BLD VENIPUNCTURE: CPT

## 2020-04-28 ENCOUNTER — TELEMEDICINE (OUTPATIENT)
Dept: FAMILY MEDICINE CLINIC | Facility: CLINIC | Age: 35
End: 2020-04-28
Payer: COMMERCIAL

## 2020-04-28 DIAGNOSIS — U07.1 COVID-19 VIRUS INFECTION: Primary | ICD-10-CM

## 2020-04-28 DIAGNOSIS — R07.89 CHEST WALL PAIN: ICD-10-CM

## 2020-04-28 PROCEDURE — 99213 OFFICE O/P EST LOW 20 MIN: CPT | Performed by: NURSE PRACTITIONER

## 2020-04-28 RX ORDER — METHYLPREDNISOLONE 4 MG/1
TABLET ORAL
Qty: 21 EACH | Refills: 0 | Status: SHIPPED | OUTPATIENT
Start: 2020-04-28 | End: 2020-05-05 | Stop reason: ALTCHOICE

## 2020-05-04 ENCOUNTER — TELEPHONE (OUTPATIENT)
Dept: FAMILY MEDICINE CLINIC | Facility: CLINIC | Age: 35
End: 2020-05-04

## 2020-05-05 ENCOUNTER — TELEMEDICINE (OUTPATIENT)
Dept: FAMILY MEDICINE CLINIC | Facility: CLINIC | Age: 35
End: 2020-05-05
Payer: COMMERCIAL

## 2020-05-05 DIAGNOSIS — R05.9 COUGH: ICD-10-CM

## 2020-05-05 DIAGNOSIS — R07.89 CHEST WALL PAIN: ICD-10-CM

## 2020-05-05 DIAGNOSIS — U07.1 COVID-19 VIRUS INFECTION: Primary | ICD-10-CM

## 2020-05-05 PROBLEM — R10.13 EPIGASTRIC ABDOMINAL PAIN: Status: RESOLVED | Noted: 2020-04-24 | Resolved: 2020-05-05

## 2020-05-05 PROCEDURE — 99213 OFFICE O/P EST LOW 20 MIN: CPT | Performed by: NURSE PRACTITIONER

## 2020-05-05 RX ORDER — PREDNISONE 20 MG/1
TABLET ORAL
Qty: 18 TABLET | Refills: 0 | Status: SHIPPED | OUTPATIENT
Start: 2020-05-05 | End: 2020-07-31 | Stop reason: ALTCHOICE

## 2020-05-06 ENCOUNTER — TELEPHONE (OUTPATIENT)
Dept: FAMILY MEDICINE CLINIC | Facility: CLINIC | Age: 35
End: 2020-05-06

## 2020-05-11 ENCOUNTER — TELEMEDICINE (OUTPATIENT)
Dept: FAMILY MEDICINE CLINIC | Facility: CLINIC | Age: 35
End: 2020-05-11
Payer: COMMERCIAL

## 2020-05-11 VITALS — TEMPERATURE: 97.2 F | OXYGEN SATURATION: 97 % | RESPIRATION RATE: 14 BRPM | HEART RATE: 64 BPM

## 2020-05-11 DIAGNOSIS — R07.89 CHEST WALL PAIN: ICD-10-CM

## 2020-05-11 DIAGNOSIS — U07.1 COVID-19 VIRUS INFECTION: Primary | ICD-10-CM

## 2020-05-11 DIAGNOSIS — R05.9 COUGH: ICD-10-CM

## 2020-05-11 PROCEDURE — 99212 OFFICE O/P EST SF 10 MIN: CPT | Performed by: NURSE PRACTITIONER

## 2020-06-12 ENCOUNTER — TELEPHONE (OUTPATIENT)
Dept: FAMILY MEDICINE CLINIC | Facility: CLINIC | Age: 35
End: 2020-06-12

## 2020-06-14 DIAGNOSIS — R07.89 CHEST WALL PAIN: ICD-10-CM

## 2020-06-14 DIAGNOSIS — R05.9 COUGH: ICD-10-CM

## 2020-06-15 RX ORDER — PREDNISONE 20 MG/1
TABLET ORAL
Qty: 18 TABLET | Refills: 0 | OUTPATIENT
Start: 2020-06-15

## 2020-06-17 ENCOUNTER — TELEPHONE (OUTPATIENT)
Dept: OTHER | Facility: HOSPITAL | Age: 35
End: 2020-06-17

## 2020-07-31 ENCOUNTER — OFFICE VISIT (OUTPATIENT)
Dept: FAMILY MEDICINE CLINIC | Facility: CLINIC | Age: 35
End: 2020-07-31
Payer: COMMERCIAL

## 2020-07-31 VITALS
HEART RATE: 52 BPM | OXYGEN SATURATION: 97 % | HEIGHT: 66 IN | DIASTOLIC BLOOD PRESSURE: 74 MMHG | WEIGHT: 167 LBS | BODY MASS INDEX: 26.84 KG/M2 | SYSTOLIC BLOOD PRESSURE: 122 MMHG | TEMPERATURE: 97.8 F

## 2020-07-31 DIAGNOSIS — F33.1 MODERATE EPISODE OF RECURRENT MAJOR DEPRESSIVE DISORDER (HCC): ICD-10-CM

## 2020-07-31 DIAGNOSIS — G43.829 MENSTRUAL MIGRAINE WITHOUT STATUS MIGRAINOSUS, NOT INTRACTABLE: ICD-10-CM

## 2020-07-31 DIAGNOSIS — F33.1 MODERATE EPISODE OF RECURRENT MAJOR DEPRESSIVE DISORDER (HCC): Primary | ICD-10-CM

## 2020-07-31 PROBLEM — R05.9 COUGH: Status: RESOLVED | Noted: 2020-05-05 | Resolved: 2020-07-31

## 2020-07-31 PROBLEM — R07.89 CHEST WALL PAIN: Status: RESOLVED | Noted: 2020-04-28 | Resolved: 2020-07-31

## 2020-07-31 PROBLEM — U07.1 COVID-19 VIRUS INFECTION: Status: RESOLVED | Noted: 2020-04-17 | Resolved: 2020-07-31

## 2020-07-31 PROCEDURE — 1036F TOBACCO NON-USER: CPT | Performed by: NURSE PRACTITIONER

## 2020-07-31 PROCEDURE — 3008F BODY MASS INDEX DOCD: CPT | Performed by: NURSE PRACTITIONER

## 2020-07-31 PROCEDURE — 99213 OFFICE O/P EST LOW 20 MIN: CPT | Performed by: NURSE PRACTITIONER

## 2020-07-31 RX ORDER — PHENOL 1.4 %
600 AEROSOL, SPRAY (ML) MUCOUS MEMBRANE 2 TIMES DAILY WITH MEALS
COMMUNITY

## 2020-07-31 RX ORDER — ESCITALOPRAM OXALATE 20 MG/1
10 TABLET ORAL DAILY
Qty: 45 TABLET | Refills: 1
Start: 2020-07-31 | End: 2020-08-14 | Stop reason: ALTCHOICE

## 2020-07-31 NOTE — ASSESSMENT & PLAN NOTE
-patient has not been having migraines  Patient is currently taking 400 mg of Mg and has not had any migraines since

## 2020-07-31 NOTE — PROGRESS NOTES
Assessment/Plan:           Problem List Items Addressed This Visit        Cardiovascular and Mediastinum    Menstrual migraine without status migrainosus, not intractable     -patient has not been having migraines  Patient is currently taking 400 mg of Mg and has not had any migraines since  Relevant Medications    escitalopram (LEXAPRO) 20 mg tablet       Other    Moderate episode of recurrent major depressive disorder ([facility] [address] ) - Primary     - will decrease dose to 10 mg Lexapro, follow up in a month  - patient looking for therapist, will give referrals  Relevant Medications    escitalopram (LEXAPRO) 20 mg tablet    Other Relevant Orders    Ambulatory referral to behavioral health therapists            Subjective:      Patient ID: Rosalina Burroughs is a 28 y o  female  Patient presents to the office for a follow up  Patient states Lexapro is increasing her weight but her mood is much better  Patient states she not as irritable and is able to focus  Patient is still looking for a therapist  Her most recent one retired in march  Patient states that her eating is better and is trying to eat better  She is going for runs  Patient does have trouble falling sleep, she also works late shifts  Patient is taking melatonin and it helps sometimes  Patient looking to reduce medication  The following portions of the patient's history were reviewed and updated as appropriate: She  has a past medical history of Asthma, Depression, Exposure to SARS-associated coronavirus (4/12/2020), Lumbar herniated disc, and UTI (urinary tract infection)  She   Patient Active Problem List    Diagnosis Date Noted    Menstrual migraine without status migrainosus, not intractable 12/13/2019    Moderate episode of recurrent major depressive disorder ([facility] [address] ) 09/10/2019    Menorrhagia with regular cycle 09/10/2019     She  has a past surgical history that includes [place] tooth extraction    Her family history includes Breast cancer additional onset in her maternal grandmother and mother; Diabetes in her mother; Hypertension in her mother; Mental illness in her father; Prostate cancer in her maternal grandfather  She  reports that she has never smoked  She has never used smokeless tobacco  She reports that she drinks alcohol  She reports that she does not use drugs  Current Outpatient Medications   Medication Sig Dispense Refill    calcium carbonate (OS-BERENICE) 600 MG tablet Take 600 mg by mouth 2 (two) times a day with meals      cholecalciferol (VITAMIN D3) 1,000 units tablet Take 1,000 Units by mouth daily      escitalopram (LEXAPRO) 20 mg tablet Take 0 5 tablets (10 mg total) by mouth daily 45 tablet 1    magnesium oxide (MAG-OX) 400 mg Take 400 mg by mouth 2 (two) times a day      Multiple Vitamin (MULTIVITAMIN) tablet Take 1 tablet by mouth daily      Evening Primrose Oil 500 MG CAPS Take by mouth      Magnesium Oxide 500 MG TABS Take by mouth       No current facility-administered medications for this visit  She is allergic to sulfa antibiotics; levofloxacin; and tobramycin       Review of Systems   Constitutional: Negative for appetite change, chills, fever and unexpected weight change  HENT: Negative  Negative for congestion  Eyes: Negative  Respiratory: Negative  Cardiovascular: Negative  Gastrointestinal: Negative  Endocrine: Negative  Genitourinary: Negative  Musculoskeletal: Negative  Negative for arthralgias  Skin: Negative  Allergic/Immunologic: Negative  Neurological: Positive for headaches  Negative for dizziness and light-headedness  Hematological: Negative  Psychiatric/Behavioral: The patient is nervous/anxious  Objective:      /74   Pulse (!) 52   Temp 97 8 °F (36 6 °C) (Tympanic)   Ht 5' 6" (1 676 m)   Wt 75 8 kg (167 lb)   SpO2 97%   BMI 26 95 kg/m²          Physical Exam   Constitutional: She is oriented to person, place, and time   She appears well-developed and well-nourished  No distress  HENT:   Head: Normocephalic and atraumatic  Right Ear: External ear normal    Left Ear: External ear normal    Nose: Nose normal    Mouth/Throat: Oropharynx is clear and moist  No oropharyngeal exudate  Eyes: Conjunctivae are normal  No scleral icterus  Neck: Normal range of motion  Cardiovascular: Normal rate, regular rhythm and normal heart sounds  Exam reveals no gallop and no friction rub  No murmur heard  Pulmonary/Chest: Effort normal and breath sounds normal  No stridor  No respiratory distress  She has no wheezes  She has no rales  She exhibits no tenderness  Abdominal: Soft  Bowel sounds are normal  She exhibits no distension and no mass  There is no tenderness  There is no rebound and no guarding  No hernia  Musculoskeletal: Normal range of motion  She exhibits no edema, tenderness or deformity  Lymphadenopathy:     She has no cervical adenopathy  Neurological: She is alert and oriented to person, place, and time  Skin: Skin is warm  No rash noted  She is not diaphoretic  No erythema  No pallor  Psychiatric: She has a normal mood and affect  Vitals reviewed

## 2020-07-31 NOTE — ASSESSMENT & PLAN NOTE
- will decrease dose to 10 mg Lexapro, follow up in a month  - patient looking for therapist, will give referrals

## 2020-08-14 ENCOUNTER — TELEPHONE (OUTPATIENT)
Dept: FAMILY MEDICINE CLINIC | Facility: CLINIC | Age: 35
End: 2020-08-14

## 2020-08-14 DIAGNOSIS — F33.1 MODERATE EPISODE OF RECURRENT MAJOR DEPRESSIVE DISORDER (HCC): Primary | ICD-10-CM

## 2020-08-14 RX ORDER — SERTRALINE HYDROCHLORIDE 25 MG/1
25 TABLET, FILM COATED ORAL DAILY
Qty: 30 TABLET | Refills: 5 | Status: SHIPPED | OUTPATIENT
Start: 2020-08-14 | End: 2020-10-29 | Stop reason: ALTCHOICE

## 2020-08-14 NOTE — TELEPHONE ENCOUNTER
Pt calls ever since being on Lexapro she has noticed having restless leg at nigt  They twitch a lot  Its keeping her awake  She has already  the lexapro down to 10mg daily  Please erx something to ra/sheela      Call pt

## 2020-09-01 ENCOUNTER — OFFICE VISIT (OUTPATIENT)
Dept: FAMILY MEDICINE CLINIC | Facility: CLINIC | Age: 35
End: 2020-09-01
Payer: COMMERCIAL

## 2020-09-01 ENCOUNTER — APPOINTMENT (OUTPATIENT)
Dept: LAB | Facility: CLINIC | Age: 35
End: 2020-09-01
Payer: COMMERCIAL

## 2020-09-01 VITALS
WEIGHT: 167 LBS | SYSTOLIC BLOOD PRESSURE: 120 MMHG | DIASTOLIC BLOOD PRESSURE: 72 MMHG | TEMPERATURE: 98.1 F | BODY MASS INDEX: 26.84 KG/M2 | HEIGHT: 66 IN | OXYGEN SATURATION: 98 % | HEART RATE: 78 BPM

## 2020-09-01 DIAGNOSIS — G25.81 RESTLESS LEGS SYNDROME: ICD-10-CM

## 2020-09-01 DIAGNOSIS — F33.1 MODERATE EPISODE OF RECURRENT MAJOR DEPRESSIVE DISORDER (HCC): Primary | ICD-10-CM

## 2020-09-01 LAB
ALBUMIN SERPL BCP-MCNC: 3.8 G/DL (ref 3.5–5)
ALP SERPL-CCNC: 43 U/L (ref 46–116)
ALT SERPL W P-5'-P-CCNC: 15 U/L (ref 12–78)
ANION GAP SERPL CALCULATED.3IONS-SCNC: 4 MMOL/L (ref 4–13)
AST SERPL W P-5'-P-CCNC: 11 U/L (ref 5–45)
BASOPHILS # BLD AUTO: 0.06 THOUSANDS/ΜL (ref 0–0.1)
BASOPHILS NFR BLD AUTO: 1 % (ref 0–1)
BILIRUB SERPL-MCNC: 0.47 MG/DL (ref 0.2–1)
BUN SERPL-MCNC: 14 MG/DL (ref 5–25)
CALCIUM SERPL-MCNC: 9.1 MG/DL (ref 8.3–10.1)
CHLORIDE SERPL-SCNC: 110 MMOL/L (ref 100–108)
CO2 SERPL-SCNC: 27 MMOL/L (ref 21–32)
CREAT SERPL-MCNC: 0.9 MG/DL (ref 0.6–1.3)
EOSINOPHIL # BLD AUTO: 0.07 THOUSAND/ΜL (ref 0–0.61)
EOSINOPHIL NFR BLD AUTO: 1 % (ref 0–6)
ERYTHROCYTE [DISTWIDTH] IN BLOOD BY AUTOMATED COUNT: 13.2 % (ref 11.6–15.1)
FERRITIN SERPL-MCNC: 3 NG/ML (ref 8–388)
GFR SERPL CREATININE-BSD FRML MDRD: 83 ML/MIN/1.73SQ M
GLUCOSE SERPL-MCNC: 84 MG/DL (ref 65–140)
HCT VFR BLD AUTO: 38.7 % (ref 34.8–46.1)
HGB BLD-MCNC: 12.5 G/DL (ref 11.5–15.4)
IMM GRANULOCYTES # BLD AUTO: 0.01 THOUSAND/UL (ref 0–0.2)
IMM GRANULOCYTES NFR BLD AUTO: 0 % (ref 0–2)
IRON SATN MFR SERPL: 13 %
IRON SERPL-MCNC: 51 UG/DL (ref 50–170)
LYMPHOCYTES # BLD AUTO: 1.95 THOUSANDS/ΜL (ref 0.6–4.47)
LYMPHOCYTES NFR BLD AUTO: 35 % (ref 14–44)
MCH RBC QN AUTO: 29.8 PG (ref 26.8–34.3)
MCHC RBC AUTO-ENTMCNC: 32.3 G/DL (ref 31.4–37.4)
MCV RBC AUTO: 92 FL (ref 82–98)
MONOCYTES # BLD AUTO: 0.57 THOUSAND/ΜL (ref 0.17–1.22)
MONOCYTES NFR BLD AUTO: 10 % (ref 4–12)
NEUTROPHILS # BLD AUTO: 2.86 THOUSANDS/ΜL (ref 1.85–7.62)
NEUTS SEG NFR BLD AUTO: 53 % (ref 43–75)
NRBC BLD AUTO-RTO: 0 /100 WBCS
PLATELET # BLD AUTO: 297 THOUSANDS/UL (ref 149–390)
PMV BLD AUTO: 10 FL (ref 8.9–12.7)
POTASSIUM SERPL-SCNC: 4.8 MMOL/L (ref 3.5–5.3)
PROT SERPL-MCNC: 7 G/DL (ref 6.4–8.2)
RBC # BLD AUTO: 4.2 MILLION/UL (ref 3.81–5.12)
SODIUM SERPL-SCNC: 141 MMOL/L (ref 136–145)
TIBC SERPL-MCNC: 378 UG/DL (ref 250–450)
TSH SERPL DL<=0.05 MIU/L-ACNC: 1.08 UIU/ML (ref 0.36–3.74)
WBC # BLD AUTO: 5.52 THOUSAND/UL (ref 4.31–10.16)

## 2020-09-01 PROCEDURE — 84443 ASSAY THYROID STIM HORMONE: CPT

## 2020-09-01 PROCEDURE — 82728 ASSAY OF FERRITIN: CPT

## 2020-09-01 PROCEDURE — 83550 IRON BINDING TEST: CPT

## 2020-09-01 PROCEDURE — 83540 ASSAY OF IRON: CPT

## 2020-09-01 PROCEDURE — 80053 COMPREHEN METABOLIC PANEL: CPT

## 2020-09-01 PROCEDURE — 36415 COLL VENOUS BLD VENIPUNCTURE: CPT

## 2020-09-01 PROCEDURE — 99213 OFFICE O/P EST LOW 20 MIN: CPT | Performed by: NURSE PRACTITIONER

## 2020-09-01 PROCEDURE — 85025 COMPLETE CBC W/AUTO DIFF WBC: CPT

## 2020-09-01 NOTE — PROGRESS NOTES
Assessment/Plan:           Problem List Items Addressed This Visit        Other    Moderate episode of recurrent major depressive disorder ([facility] [address] ) - Primary    Restless legs syndrome     Patient will have her labs completed and discussed may be related to low iron vs the SSRI         Relevant Orders    Comprehensive metabolic panel    CBC and differential    Iron Panel (Includes Ferritin, Iron Sat%, Iron, and TIBC)    TSH, 3rd generation with Free T4 reflex            Subjective:      Patient ID: Carlyn English is a 28 y o  female  Patient here for follow up  Patient reports that she is currently taking the Sertraline 25 mg daily and has been on for the past two weeks  Patient reports that she is having RLS symptoms and happening when she is at night trying to sleep  Patient has noticed that the tremors are lower extremity and at night before sleep patient denies any additional tremor of upper extremities  Patient has tried changing positions and having hard time falling asleep because of the RLS  The following portions of the patient's history were reviewed and updated as appropriate:   She  has a past medical history of Asthma, Depression, Exposure to SARS-associated coronavirus (4/12/2020), Lumbar herniated disc, and UTI (urinary tract infection)  She   Patient Active Problem List    Diagnosis Date Noted    Restless legs syndrome 09/01/2020    Menstrual migraine without status migrainosus, not intractable 12/13/2019    Moderate episode of recurrent major depressive disorder ([facility] [address] ) 09/10/2019    Menorrhagia with regular cycle 09/10/2019     She  has a past surgical history that includes [place] tooth extraction  Her family history includes Breast cancer additional onset in her maternal grandmother and mother; Diabetes in her mother; Hypertension in her mother; Mental illness in her father; Prostate cancer in her maternal grandfather  She  reports that she has never smoked   She has never used smokeless tobacco  She reports current alcohol use  She reports that she does not use drugs  She is allergic to sulfa antibiotics; levofloxacin; and tobramycin       Review of Systems   Constitutional: Negative for activity change, appetite change, chills, diaphoresis, fatigue, fever and unexpected weight change  HENT: Negative for congestion, ear pain, hearing loss, postnasal drip, sinus pressure, sinus pain, sneezing and sore throat  Eyes: Negative for pain, redness and visual disturbance  Respiratory: Negative for cough and shortness of breath  Cardiovascular: Negative for chest pain and leg swelling  Gastrointestinal: Negative for abdominal pain, diarrhea, nausea and vomiting  Endocrine: Negative  Genitourinary: Negative  Musculoskeletal: Negative for arthralgias  Allergic/Immunologic: Negative  Neurological: Negative for dizziness and light-headedness  Hematological: Negative  Psychiatric/Behavioral: Positive for sleep disturbance  Negative for behavioral problems and dysphoric mood  Objective:      /72   Pulse 78   Temp 98 1 °F (36 7 °C)   Ht 5' 6" (1 676 m)   Wt 75 8 kg (167 lb)   SpO2 98%   BMI 26 95 kg/m²          Physical Exam  Vitals signs and nursing note reviewed  Constitutional:       General: She is not in acute distress  Appearance: She is well-developed  HENT:      Head: Normocephalic and atraumatic  Eyes:      Pupils: Pupils are equal, round, and reactive to light  Neck:      Musculoskeletal: Normal range of motion  Thyroid: No thyromegaly  Cardiovascular:      Rate and Rhythm: Normal rate and regular rhythm  Heart sounds: Normal heart sounds  No murmur  Pulmonary:      Effort: Pulmonary effort is normal  No respiratory distress  Breath sounds: Normal breath sounds  No wheezing  Abdominal:      General: Bowel sounds are normal       Palpations: Abdomen is soft  Musculoskeletal: Normal range of motion     Skin: General: Skin is warm and dry  Neurological:      Mental Status: She is alert and oriented to person, place, and time

## 2020-10-11 ENCOUNTER — APPOINTMENT (EMERGENCY)
Dept: CT IMAGING | Facility: HOSPITAL | Age: 35
End: 2020-10-11
Payer: COMMERCIAL

## 2020-10-11 ENCOUNTER — HOSPITAL ENCOUNTER (EMERGENCY)
Facility: HOSPITAL | Age: 35
Discharge: HOME | End: 2020-10-11
Attending: EMERGENCY MEDICINE | Admitting: EMERGENCY MEDICINE
Payer: COMMERCIAL

## 2020-10-11 VITALS
OXYGEN SATURATION: 100 % | HEART RATE: 90 BPM | SYSTOLIC BLOOD PRESSURE: 120 MMHG | DIASTOLIC BLOOD PRESSURE: 64 MMHG | RESPIRATION RATE: 16 BRPM | TEMPERATURE: 98.7 F

## 2020-10-11 DIAGNOSIS — S05.11XA PERIORBITAL CONTUSION OF RIGHT EYE: ICD-10-CM

## 2020-10-11 DIAGNOSIS — S00.81XA FACIAL ABRASION, INITIAL ENCOUNTER: Primary | ICD-10-CM

## 2020-10-11 PROCEDURE — G1004 CDSM NDSC: HCPCS

## 2020-10-11 PROCEDURE — 90471 IMMUNIZATION ADMIN: CPT

## 2020-10-11 PROCEDURE — 99283 EMERGENCY DEPT VISIT LOW MDM: CPT

## 2020-10-11 PROCEDURE — 70486 CT MAXILLOFACIAL W/O DYE: CPT

## 2020-10-11 PROCEDURE — 90715 TDAP VACCINE 7 YRS/> IM: CPT | Performed by: EMERGENCY MEDICINE

## 2020-10-11 PROCEDURE — 99284 EMERGENCY DEPT VISIT MOD MDM: CPT | Performed by: EMERGENCY MEDICINE

## 2020-10-11 RX ORDER — ONDANSETRON 4 MG/1
4 TABLET, ORALLY DISINTEGRATING ORAL EVERY 8 HOURS PRN
Qty: 14 TABLET | Refills: 0 | Status: SHIPPED | OUTPATIENT
Start: 2020-10-11 | End: 2020-10-29 | Stop reason: ALTCHOICE

## 2020-10-11 RX ADMIN — TETANUS TOXOID, REDUCED DIPHTHERIA TOXOID AND ACELLULAR PERTUSSIS VACCINE, ADSORBED 0.5 ML: 5; 2.5; 8; 8; 2.5 SUSPENSION INTRAMUSCULAR at 22:22

## 2020-10-29 ENCOUNTER — OFFICE VISIT (OUTPATIENT)
Dept: FAMILY MEDICINE CLINIC | Facility: CLINIC | Age: 35
End: 2020-10-29
Payer: COMMERCIAL

## 2020-10-29 VITALS
TEMPERATURE: 97.4 F | HEART RATE: 83 BPM | DIASTOLIC BLOOD PRESSURE: 82 MMHG | WEIGHT: 166.2 LBS | OXYGEN SATURATION: 98 % | SYSTOLIC BLOOD PRESSURE: 118 MMHG | HEIGHT: 66 IN | BODY MASS INDEX: 26.71 KG/M2

## 2020-10-29 DIAGNOSIS — N92.0 MENORRHAGIA WITH REGULAR CYCLE: ICD-10-CM

## 2020-10-29 DIAGNOSIS — F33.1 MODERATE EPISODE OF RECURRENT MAJOR DEPRESSIVE DISORDER (HCC): Primary | ICD-10-CM

## 2020-10-29 DIAGNOSIS — G25.81 RESTLESS LEGS SYNDROME: ICD-10-CM

## 2020-10-29 PROCEDURE — 99213 OFFICE O/P EST LOW 20 MIN: CPT | Performed by: NURSE PRACTITIONER

## 2020-10-29 RX ORDER — ESCITALOPRAM OXALATE 10 MG/1
10 TABLET ORAL DAILY
COMMUNITY
End: 2020-10-29 | Stop reason: SDUPTHER

## 2020-10-29 RX ORDER — ESCITALOPRAM OXALATE 10 MG/1
10 TABLET ORAL DAILY
Qty: 90 TABLET | Refills: 3 | Status: SHIPPED | OUTPATIENT
Start: 2020-10-29 | End: 2021-03-16 | Stop reason: SDUPTHER

## 2020-12-27 ENCOUNTER — IMMUNIZATIONS (OUTPATIENT)
Dept: FAMILY MEDICINE CLINIC | Facility: HOSPITAL | Age: 35
End: 2020-12-27
Payer: COMMERCIAL

## 2020-12-27 DIAGNOSIS — Z23 ENCOUNTER FOR IMMUNIZATION: ICD-10-CM

## 2020-12-27 PROCEDURE — 91301 SARS-COV-2 / COVID-19 MRNA VACCINE (MODERNA) 100 MCG: CPT

## 2020-12-27 PROCEDURE — 0011A SARS-COV-2 / COVID-19 MRNA VACCINE (MODERNA) 100 MCG: CPT

## 2021-01-25 ENCOUNTER — IMMUNIZATIONS (OUTPATIENT)
Dept: FAMILY MEDICINE CLINIC | Facility: HOSPITAL | Age: 36
End: 2021-01-25
Payer: COMMERCIAL

## 2021-01-25 DIAGNOSIS — Z23 ENCOUNTER FOR IMMUNIZATION: Primary | ICD-10-CM

## 2021-01-25 PROCEDURE — 0012A SARS-COV-2 / COVID-19 MRNA VACCINE (MODERNA) 100 MCG: CPT

## 2021-01-25 PROCEDURE — 91301 SARS-COV-2 / COVID-19 MRNA VACCINE (MODERNA) 100 MCG: CPT

## 2021-03-16 DIAGNOSIS — F33.1 MODERATE EPISODE OF RECURRENT MAJOR DEPRESSIVE DISORDER (HCC): ICD-10-CM

## 2021-03-16 RX ORDER — ESCITALOPRAM OXALATE 20 MG/1
20 TABLET ORAL DAILY
Qty: 90 TABLET | Refills: 3 | Status: SHIPPED | OUTPATIENT
Start: 2021-03-16 | End: 2021-06-23

## 2021-03-30 ENCOUNTER — HOSPITAL ENCOUNTER (EMERGENCY)
Facility: HOSPITAL | Age: 36
Discharge: HOME | End: 2021-03-30
Attending: EMERGENCY MEDICINE
Payer: OTHER MISCELLANEOUS

## 2021-03-30 VITALS
HEART RATE: 68 BPM | SYSTOLIC BLOOD PRESSURE: 125 MMHG | RESPIRATION RATE: 16 BRPM | DIASTOLIC BLOOD PRESSURE: 81 MMHG | TEMPERATURE: 98.4 F | OXYGEN SATURATION: 98 %

## 2021-03-30 DIAGNOSIS — S60.10XA SUBUNGUAL HEMATOMA OF DIGIT OF HAND, INITIAL ENCOUNTER: Primary | ICD-10-CM

## 2021-03-30 PROCEDURE — 99283 EMERGENCY DEPT VISIT LOW MDM: CPT

## 2021-03-30 PROCEDURE — 99282 EMERGENCY DEPT VISIT SF MDM: CPT | Performed by: EMERGENCY MEDICINE

## 2021-03-30 PROCEDURE — 11740 EVACUATION SUBUNGUAL HMTMA: CPT | Performed by: EMERGENCY MEDICINE

## 2021-04-01 NOTE — ED PROVIDER NOTES
History  Chief Complaint   Patient presents with    Finger Injury     Patient slammed L fourth digit into a door  44-year-old female patient presents emergency department for evaluation of subungual hematoma sustained here at work  Distal ring block was done, one adequately anesthetized a bur hole was placed an 18 gauge needle venting a large amount of blood and resolution of symptoms  History provided by:  Patient   used: No    Hand Pain  Severity:  Mild  Onset quality:  Sudden  Timing:  Constant  Chronicity:  New  Associated symptoms: no fever and no vomiting        Prior to Admission Medications   Prescriptions Last Dose Informant Patient Reported? Taking?    Ferrous Sulfate (RA IRON PO)   Yes No   Sig: Take by mouth   Magnesium Oxide 500 MG TABS   Yes No   Sig: Take by mouth   Multiple Vitamin (MULTIVITAMIN) tablet   Yes No   Sig: Take 1 tablet by mouth daily   calcium carbonate (OS-BERENICE) 600 MG tablet   Yes No   Sig: Take 600 mg by mouth 2 (two) times a day with meals   cholecalciferol (VITAMIN D3) 1,000 units tablet   Yes No   Sig: Take 1,000 Units by mouth daily   escitalopram (LEXAPRO) 20 mg tablet   No No   Sig: Take 1 tablet (20 mg total) by mouth daily   magnesium oxide (MAG-OX) 400 mg   Yes No   Sig: Take 400 mg by mouth 2 (two) times a day      Facility-Administered Medications: None       Past Medical History:   Diagnosis Date    Asthma     Depression     Exposure to SARS-associated coronavirus 4/12/2020    Lumbar herniated disc     UTI (urinary tract infection)        Past Surgical History:   Procedure Laterality Date    WISDOM TOOTH EXTRACTION         Family History   Problem Relation Age of Onset    Breast cancer additional onset Mother     Hypertension Mother     Diabetes Mother     Mental illness Father         family history    Breast cancer additional onset Maternal Grandmother     Prostate cancer Maternal Grandfather         lung     I have reviewed and agree with the history as documented  E-Cigarette/Vaping    E-Cigarette Use Never User      E-Cigarette/Vaping Substances     Social History     Tobacco Use    Smoking status: Never Smoker    Smokeless tobacco: Never Used   Substance Use Topics    Alcohol use: Yes     Comment: occasional    Drug use: No       Review of Systems   Constitutional: Negative for fever  Gastrointestinal: Negative for vomiting  All other systems reviewed and are negative  Physical Exam  Physical Exam  Vitals signs and nursing note reviewed  Constitutional:       Appearance: She is well-developed  HENT:      Head: Normocephalic and atraumatic  Right Ear: External ear normal       Left Ear: External ear normal    Eyes:      Conjunctiva/sclera: Conjunctivae normal    Neck:      Thyroid: No thyromegaly  Vascular: No JVD  Trachea: No tracheal deviation  Cardiovascular:      Rate and Rhythm: Normal rate  Pulmonary:      Effort: Pulmonary effort is normal       Breath sounds: Normal breath sounds  No stridor  Abdominal:      General: There is no distension  Palpations: Abdomen is soft  There is no mass  Tenderness: There is no abdominal tenderness  There is no guarding  Hernia: No hernia is present  Musculoskeletal: Normal range of motion  General: No tenderness or deformity  Lymphadenopathy:      Cervical: No cervical adenopathy  Skin:     General: Skin is warm  Coloration: Skin is not pale  Findings: No erythema or rash  Neurological:      Mental Status: She is alert and oriented to person, place, and time     Psychiatric:         Behavior: Behavior normal          Vital Signs  ED Triage Vitals [03/30/21 2004]   Temperature Pulse Respirations Blood Pressure SpO2   98 4 °F (36 9 °C) 68 16 125/81 98 %      Temp Source Heart Rate Source Patient Position - Orthostatic VS BP Location FiO2 (%)   Oral Monitor Lying Right arm --      Pain Score       --           Vitals: 03/30/21 2004   BP: 125/81   Pulse: 68   Patient Position - Orthostatic VS: Lying         Visual Acuity      ED Medications  Medications - No data to display    Diagnostic Studies  Results Reviewed     None                 No orders to display              Procedures  Procedures         ED Course                                           MDM  Number of Diagnoses or Management Options  Subungual hematoma of digit of hand, initial encounter: new and requires workup     Amount and/or Complexity of Data Reviewed  Decide to obtain previous medical records or to obtain history from someone other than the patient: yes  Review and summarize past medical records: yes    Patient Progress  Patient progress: stable      Disposition  Final diagnoses:   Subungual hematoma of digit of hand, initial encounter     Time reflects when diagnosis was documented in both MDM as applicable and the Disposition within this note     Time User Action Codes Description Comment    3/30/2021  8:13 PM Elfredia Ting Add [S60 10XA] Subungual hematoma of digit of hand, initial encounter       ED Disposition     ED Disposition Condition Date/Time Comment    Discharge Good Tue Mar 30, 2021  8:12 PM [address] [place] discharge to home/self care            Follow-up Information     Follow up With Specialties Details [address], [facility], Nurse Practitioner   [phone] [address] [facility] [address]  [phone]            Discharge Medication List as of 3/30/2021  8:13 PM      CONTINUE these medications which have NOT CHANGED    Details   calcium carbonate (OS-BERENICE) 600 MG tablet Take 600 mg by mouth 2 (two) times a day with meals, Historical Med      cholecalciferol (VITAMIN D3) 1,000 units tablet Take 1,000 Units by mouth daily, Historical Med      escitalopram (LEXAPRO) 20 mg tablet Take 1 tablet (20 mg total) by mouth daily, Starting Tue 3/16/2021, Until Sun 9/12/2021, Normal      Ferrous Sulfate (RA IRON PO) Take by mouth, Historical Med      !! magnesium oxide (MAG-OX) 400 mg Take 400 mg by mouth 2 (two) times a day, Historical Med      !! Magnesium Oxide 500 MG TABS Take by mouth, Historical Med      Multiple Vitamin (MULTIVITAMIN) tablet Take 1 tablet by mouth daily, Historical Med       !! - Potential duplicate medications found  Please discuss with provider  No discharge procedures on file      PDMP Review     None          ED Provider  Electronically Signed by           Cuauhtemoc Mckeon DO  04/01/21 7042

## 2021-06-23 ENCOUNTER — OFFICE VISIT (OUTPATIENT)
Dept: FAMILY MEDICINE CLINIC | Facility: CLINIC | Age: 36
End: 2021-06-23
Payer: COMMERCIAL

## 2021-06-23 VITALS
HEART RATE: 78 BPM | HEIGHT: 66 IN | TEMPERATURE: 98.4 F | SYSTOLIC BLOOD PRESSURE: 108 MMHG | BODY MASS INDEX: 28.61 KG/M2 | WEIGHT: 178 LBS | DIASTOLIC BLOOD PRESSURE: 78 MMHG | OXYGEN SATURATION: 100 %

## 2021-06-23 DIAGNOSIS — Z13.1 SCREENING FOR DIABETES MELLITUS: ICD-10-CM

## 2021-06-23 DIAGNOSIS — Z00.8 ENCOUNTER FOR BIOMETRIC SCREENING: ICD-10-CM

## 2021-06-23 DIAGNOSIS — Z13.220 SCREENING, LIPID: ICD-10-CM

## 2021-06-23 DIAGNOSIS — F33.1 MODERATE EPISODE OF RECURRENT MAJOR DEPRESSIVE DISORDER (HCC): Primary | ICD-10-CM

## 2021-06-23 PROBLEM — IMO0002 INTERVERTEBRAL DISC PROLAPSE: Status: ACTIVE | Noted: 2021-06-23

## 2021-06-23 PROCEDURE — 99214 OFFICE O/P EST MOD 30 MIN: CPT | Performed by: FAMILY MEDICINE

## 2021-06-23 RX ORDER — DULOXETIN HYDROCHLORIDE 30 MG/1
30 CAPSULE, DELAYED RELEASE ORAL DAILY
Qty: 30 CAPSULE | Refills: 1 | Status: SHIPPED | OUTPATIENT
Start: 2021-06-23 | End: 2021-07-30 | Stop reason: SDUPTHER

## 2021-06-23 NOTE — PROGRESS NOTES
Madelyn Rodriguez 1985 female MRN: [id number]      ASSESSMENT/PLAN  Problem List Items Addressed This Visit        Other    Moderate episode of recurrent major depressive disorder ([facility] [address] ) - Primary    Relevant Medications    DULoxetine (CYMBALTA) 30 mg delayed release capsule      Other Visit Diagnoses     Encounter for biometric screening        Relevant Orders    HEMOGLOBIN A1C W/ EAG ESTIMATION    Lipid panel    Screening for diabetes mellitus        Relevant Orders    HEMOGLOBIN A1C W/ EAG ESTIMATION    Screening, lipid        Relevant Orders    Lipid panel        Reviewed various options for management including therapy and medication  Pt agreeable to both  Refer to in office LCSW  Can stop Lexapro and start Cymbalta 30 mg daily  F/u in 4 weeks to monitor, to call if not tolerating prior  A1c and lipid panel ordered, pt insurance requirements  Future Appointments   Date Time Provider Adalberto Dhillon   7/16/2021  9:00 AM Quinton Nur   7/30/2021 11:20 AM DO JES[facility]          SUBJECTIVE  CC: Depression      HPI:  Madelyn Rodriguez is a 39 y o  female who presents for follow up of depression       Initiated on Lexapro in 2019 -- has previously tried to wean but it was a "disaster"   Over the past 1-2 months, she has had increased anxiety, irritability, PMDD (irritable or physical symptoms)   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 -- was "an absolute mess" during this   Zoned out, can't concentrate on conversations/tasks, can get stuff done but not as "grounded"   Having trouble falling asleep, wakes up with jaw pain due to clenching at night  Has gained 20-25 pounds -- has changed her diet, cut out alcohol significantly, exercises regularly, drinking plenty of water -- weighing on her confidence   Decreased libido   Has gone to therapy in the past, last in 03/2020 -- is looking for a new provider  Has done some research and is thinking about a trial of SNRI     Review of Systems   Constitutional: Positive for fatigue and unexpected weight change  Musculoskeletal: Positive for arthralgias (jaw)  Psychiatric/Behavioral: Positive for decreased concentration and sleep disturbance  The patient is nervous/anxious          Historical Information   The patient history was reviewed and updated as follows:    Past Medical History:   Diagnosis Date    Asthma     Depression     Exposure to SARS-associated coronavirus 4/12/2020    Lumbar herniated disc     UTI (urinary tract infection)      Past Surgical History:   Procedure Laterality Date    WISDOM TOOTH EXTRACTION       Family History   Problem Relation Age of Onset    Breast cancer additional onset Mother     Hypertension Mother     Diabetes Mother     Mental illness Father         family history    Breast cancer additional onset Maternal Grandmother     Prostate cancer Maternal Grandfather         lung      Social History   Social History     Substance and Sexual Activity   Alcohol Use Yes    Comment: occasional     Social History     Substance and Sexual Activity   Drug Use No     Social History     Tobacco Use   Smoking Status Never Smoker   Smokeless Tobacco Never Used       Medications:     Current Outpatient Medications:     calcium carbonate (OS-BERENICE) 600 MG tablet, Take 600 mg by mouth 2 (two) times a day with meals, Disp: , Rfl:     cholecalciferol (VITAMIN D3) 1,000 units tablet, Take 1,000 Units by mouth daily, Disp: , Rfl:     DULoxetine (CYMBALTA) 30 mg delayed release capsule, Take 1 capsule (30 mg total) by mouth daily, Disp: 30 capsule, Rfl: 1    Ferrous Sulfate (RA IRON PO), Take by mouth, Disp: , Rfl:     magnesium oxide (MAG-OX) 400 mg, Take 400 mg by mouth 2 (two) times a day, Disp: , Rfl:     Magnesium Oxide 500 MG TABS, Take by mouth, Disp: , Rfl:     Multiple Vitamin (MULTIVITAMIN) tablet, Take 1 tablet by mouth daily, Disp: , Rfl:   Allergies   Allergen Reactions    Sulfa Antibiotics Anaphylaxis    Levofloxacin      Other reaction(s): sensative    Tobramycin      Other reaction(s): sensative       OBJECTIVE    Vitals:   Vitals:    06/23/21 1332   BP: 108/78   Pulse: 78   Temp: 98 4 °F (36 9 °C)   SpO2: 100%   Weight: 80 7 kg (178 lb)   Height: 5' 6" (1 676 m)           Physical Exam  Vitals and nursing note reviewed  Constitutional:       General: She is not in acute distress  Appearance: Normal appearance  HENT:      Head: Normocephalic and atraumatic  Pulmonary:      Effort: Pulmonary effort is normal  No respiratory distress  Neurological:      General: No focal deficit present  Mental Status: She is alert     Psychiatric:         Mood and Affect: Mood normal                     Renee Anderson DO  [facility]   6/23/2021  3:14 PM

## 2021-06-28 ENCOUNTER — APPOINTMENT (OUTPATIENT)
Dept: LAB | Facility: HOSPITAL | Age: 36
End: 2021-06-28
Payer: COMMERCIAL

## 2021-06-28 DIAGNOSIS — Z00.8 ENCOUNTER FOR BIOMETRIC SCREENING: ICD-10-CM

## 2021-06-28 DIAGNOSIS — Z13.220 SCREENING, LIPID: ICD-10-CM

## 2021-06-28 DIAGNOSIS — Z13.1 SCREENING FOR DIABETES MELLITUS: ICD-10-CM

## 2021-06-28 LAB
CHOLEST SERPL-MCNC: 182 MG/DL (ref 50–200)
EST. AVERAGE GLUCOSE BLD GHB EST-MCNC: 94 MG/DL
HBA1C MFR BLD: 4.9 %
HDLC SERPL-MCNC: 59 MG/DL
LDLC SERPL CALC-MCNC: 111 MG/DL (ref 0–100)
NONHDLC SERPL-MCNC: 123 MG/DL
TRIGL SERPL-MCNC: 59 MG/DL

## 2021-06-28 PROCEDURE — 80061 LIPID PANEL: CPT

## 2021-06-28 PROCEDURE — 83036 HEMOGLOBIN GLYCOSYLATED A1C: CPT

## 2021-06-28 PROCEDURE — 36415 COLL VENOUS BLD VENIPUNCTURE: CPT

## 2021-07-07 ENCOUNTER — OFFICE VISIT (OUTPATIENT)
Dept: URGENT CARE | Facility: CLINIC | Age: 36
End: 2021-07-07
Payer: COMMERCIAL

## 2021-07-07 ENCOUNTER — TELEPHONE (OUTPATIENT)
Dept: DERMATOLOGY | Facility: CLINIC | Age: 36
End: 2021-07-07

## 2021-07-07 VITALS
HEIGHT: 66 IN | HEART RATE: 86 BPM | TEMPERATURE: 98.1 F | BODY MASS INDEX: 27.8 KG/M2 | WEIGHT: 173 LBS | RESPIRATION RATE: 18 BRPM | DIASTOLIC BLOOD PRESSURE: 75 MMHG | OXYGEN SATURATION: 98 % | SYSTOLIC BLOOD PRESSURE: 113 MMHG

## 2021-07-07 DIAGNOSIS — H92.02 LEFT EAR PAIN: Primary | ICD-10-CM

## 2021-07-07 PROCEDURE — G0382 LEV 3 HOSP TYPE B ED VISIT: HCPCS | Performed by: PHYSICIAN ASSISTANT

## 2021-07-07 RX ORDER — AMOXICILLIN 500 MG/1
500 TABLET, FILM COATED ORAL 2 TIMES DAILY
Qty: 14 TABLET | Refills: 0 | Status: SHIPPED | OUTPATIENT
Start: 2021-07-07 | End: 2021-07-14

## 2021-07-07 RX ORDER — PREDNISONE 10 MG/1
TABLET ORAL
Qty: 18 TABLET | Refills: 0 | Status: SHIPPED | OUTPATIENT
Start: 2021-07-07 | End: 2021-07-30

## 2021-07-07 NOTE — PROGRESS NOTES
[facility]        NAME: Radha Barakat is a 39 y o  female  : 1985    MRN: [id number]  DATE: 2021  TIME: 1:17 PM    Assessment and Plan   Left ear pain [H92 02]  1  Left ear pain  predniSONE 10 mg tablet    amoxicillin (AMOXIL) 500 MG tablet         Patient Instructions   Patient Instructions     Take the steroid as directed   antibiotic if not improved in 48 hours   Follow up with your PCP     Eustachian Tube Dysfunction   AMBULATORY CARE:   Eustachian tube dysfunction (ETD)  is a condition that prevents your eustachian tubes from opening properly  It can also cause them to become blocked  Eustachian tubes connect your middle ear to the back of your nose and throat  These tubes open and allow air to flow in and out when you sneeze, swallow, or yawn  Common signs and symptoms include the following:   · Fullness or pressure in your ears    · Muffled hearing, or a feeling you are hearing under water or have clogged ears    · Pain in one or both ears    · Ringing in your ears    · Popping, crackling, or clicking feeling in your ears    · Trouble keeping your balance    Call your doctor or otolaryngologist if:   · Your symptoms do not improve or get worse  · You have a fever  · You have any hearing loss  · You have questions or concerns about your condition or care  Treatment:  ETD may get better on its own without any treatment  If it continues, you may need any of the following:  · Swallow, yawn, or chew gum  to help open your eustachian tubes  Your healthcare provider may also recommend you blow with your mouth shut and your nostrils pinched closed  · Air pressure devices  push air into your nose and eustachian tubes to help relieve air pressure in your ear  · Treatment for allergies  such as decongestants, antihistamines, and nasal steroids may improve ETD  They may help decrease swelling of the eustachian tubes      · A myringotomy  is surgery to make a hole in your eardrum  The hole relieves pressure and lets fluid drain from your ear  A pressure equalizing (PE) tube may be used to keep the hole open and to help drain fluid  · Tuboplasty  is a procedure to widen your eustachian tubes  Follow up with your doctor or otolaryngologist as directed:  Write down your questions so you remember to ask them during your visits  © Copyright [address] Information is for End User's use only and may not be sold, redistributed or otherwise used for commercial purposes  All illustrations and images included in CareNotes® are the copyrighted property of A D A M , Inc  or [place]   The above information is an  only  It is not intended as medical advice for individual conditions or treatments  Talk to your doctor, nurse or pharmacist before following any medical regimen to see if it is safe and effective for you  Follow up with PCP in 3-5 days  Proceed to  ER if symptoms worsen  Chief Complaint     Chief Complaint   Patient presents with    Earache     Left ear pain started yesterdat afternoon         History of Present Illness       The pt is a 80-year-old female presenting with Left ear pain x 1 day  Admits it radiating down her neck  No prior episodes  Does have b/ TMJ  Review of Systems   Review of Systems   Constitutional: Negative for activity change, appetite change, chills, fatigue and fever  HENT: Positive for ear pain  Negative for congestion, rhinorrhea, sinus pressure, sinus pain and sore throat  Respiratory: Negative for cough, chest tightness and shortness of breath  Cardiovascular: Negative for chest pain and palpitations  Gastrointestinal: Negative for diarrhea, nausea and vomiting  Musculoskeletal: Negative for arthralgias and myalgias  Skin: Negative for color change and pallor  Neurological: Negative for headaches           Current Medications       Current Outpatient Medications:     calcium carbonate (OS-BERENICE) 600 MG tablet, Take 600 mg by mouth 2 (two) times a day with meals, Disp: , Rfl:     cholecalciferol (VITAMIN D3) 1,000 units tablet, Take 1,000 Units by mouth daily, Disp: , Rfl:     DULoxetine (CYMBALTA) 30 mg delayed release capsule, Take 1 capsule (30 mg total) by mouth daily, Disp: 30 capsule, Rfl: 1    Ferrous Sulfate (RA IRON PO), Take by mouth, Disp: , Rfl:     Magnesium Oxide 500 MG TABS, Take by mouth, Disp: , Rfl:     amoxicillin (AMOXIL) 500 MG tablet, Take 1 tablet (500 mg total) by mouth 2 (two) times a day for 7 days, Disp: 14 tablet, Rfl: 0    magnesium oxide (MAG-OX) 400 mg, Take 400 mg by mouth 2 (two) times a day, Disp: , Rfl:     Multiple Vitamin (MULTIVITAMIN) tablet, Take 1 tablet by mouth daily, Disp: , Rfl:     predniSONE 10 mg tablet, 3 tabs daily for 3 days, 2 tabs daily for 3 days, 1 tab daily for 3 days, Disp: 18 tablet, Rfl: 0    Current Allergies     Allergies as of 07/07/2021 - Reviewed 07/07/2021   Allergen Reaction Noted    Sulfa antibiotics Anaphylaxis 08/10/2017    Levofloxacin  08/10/2017    Tobramycin  08/10/2017            The following portions of the patient's history were reviewed and updated as appropriate: allergies, current medications, past family history, past medical history, past social history, past surgical history and problem list      Past Medical History:   Diagnosis Date    Asthma     Depression     Exposure to SARS-associated coronavirus 4/12/2020    Lumbar herniated disc     UTI (urinary tract infection)        Past Surgical History:   Procedure Laterality Date    WISDOM TOOTH EXTRACTION         Family History   Problem Relation Age of Onset    Breast cancer additional onset Mother     Hypertension Mother     Diabetes Mother     Mental illness Father         family history    Breast cancer additional onset Maternal Grandmother     Prostate cancer Maternal Grandfather         lung         Medications have been verified  Objective   /75   Pulse 86   Temp 98 1 °F (36 7 °C)   Resp 18   Ht 5' 6" (1 676 m)   Wt 78 5 kg (173 lb)   SpO2 98%   BMI 27 92 kg/m²        Physical Exam     Physical Exam  Vitals reviewed  Constitutional:       General: She is not in acute distress  Appearance: Normal appearance  She is normal weight  She is not ill-appearing, toxic-appearing or diaphoretic  HENT:      Head: Normocephalic and atraumatic  Right Ear: Tympanic membrane, ear canal and external ear normal  There is no impacted cerumen  Left Ear: Tympanic membrane, ear canal and external ear normal  There is no impacted cerumen  Nose: Nose normal  No congestion or rhinorrhea  Mouth/Throat:      Mouth: Mucous membranes are moist       Pharynx: Oropharynx is clear  No oropharyngeal exudate or posterior oropharyngeal erythema  Eyes:      General: No scleral icterus  Right eye: No discharge  Left eye: No discharge  Extraocular Movements: Extraocular movements intact  Conjunctiva/sclera: Conjunctivae normal       Pupils: Pupils are equal, round, and reactive to light  Cardiovascular:      Rate and Rhythm: Normal rate and regular rhythm  Heart sounds: Normal heart sounds  No murmur heard  No friction rub  No gallop  Pulmonary:      Effort: Pulmonary effort is normal  No respiratory distress  Breath sounds: Normal breath sounds  No stridor  No wheezing, rhonchi or rales  Chest:      Chest wall: No tenderness  Skin:     General: Skin is warm and dry  Capillary Refill: Capillary refill takes less than 2 seconds  Neurological:      Mental Status: She is alert

## 2021-07-07 NOTE — PATIENT INSTRUCTIONS
Take the steroid as directed   antibiotic if not improved in 48 hours   Follow up with your PCP     Eustachian Tube Dysfunction   AMBULATORY CARE:   Eustachian tube dysfunction (ETD)  is a condition that prevents your eustachian tubes from opening properly  It can also cause them to become blocked  Eustachian tubes connect your middle ear to the back of your nose and throat  These tubes open and allow air to flow in and out when you sneeze, swallow, or yawn  Common signs and symptoms include the following:   · Fullness or pressure in your ears    · Muffled hearing, or a feeling you are hearing under water or have clogged ears    · Pain in one or both ears    · Ringing in your ears    · Popping, crackling, or clicking feeling in your ears    · Trouble keeping your balance    Call your doctor or otolaryngologist if:   · Your symptoms do not improve or get worse  · You have a fever  · You have any hearing loss  · You have questions or concerns about your condition or care  Treatment:  ETD may get better on its own without any treatment  If it continues, you may need any of the following:  · Swallow, yawn, or chew gum  to help open your eustachian tubes  Your healthcare provider may also recommend you blow with your mouth shut and your nostrils pinched closed  · Air pressure devices  push air into your nose and eustachian tubes to help relieve air pressure in your ear  · Treatment for allergies  such as decongestants, antihistamines, and nasal steroids may improve ETD  They may help decrease swelling of the eustachian tubes  · A myringotomy  is surgery to make a hole in your eardrum  The hole relieves pressure and lets fluid drain from your ear  A pressure equalizing (PE) tube may be used to keep the hole open and to help drain fluid  · Tuboplasty  is a procedure to widen your eustachian tubes      Follow up with your doctor or otolaryngologist as directed:  Write down your questions so you remember to ask them during your visits  © Copyright [address] Information is for End User's use only and may not be sold, redistributed or otherwise used for commercial purposes  All illustrations and images included in CareNotes® are the copyrighted property of A D A M , Inc  or Mandeep Whitley  The above information is an  only  It is not intended as medical advice for individual conditions or treatments  Talk to your doctor, nurse or pharmacist before following any medical regimen to see if it is safe and effective for you

## 2021-07-15 ENCOUNTER — APPOINTMENT (OUTPATIENT)
Dept: PHYSICAL THERAPY | Facility: CLINIC | Age: 36
End: 2021-07-15

## 2021-07-15 PROCEDURE — 97530 THERAPEUTIC ACTIVITIES: CPT

## 2021-07-16 ENCOUNTER — SOCIAL WORK (OUTPATIENT)
Dept: BEHAVIORAL/MENTAL HEALTH CLINIC | Facility: CLINIC | Age: 36
End: 2021-07-16
Payer: COMMERCIAL

## 2021-07-16 DIAGNOSIS — F41.1 GAD (GENERALIZED ANXIETY DISORDER): Primary | ICD-10-CM

## 2021-07-16 DIAGNOSIS — F33.1 MDD (MAJOR DEPRESSIVE DISORDER), RECURRENT EPISODE, MODERATE (HCC): ICD-10-CM

## 2021-07-16 PROCEDURE — 90834 PSYTX W PT 45 MINUTES: CPT | Performed by: SOCIAL WORKER
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
## 2021-07-30 ENCOUNTER — SOCIAL WORK (OUTPATIENT)
Dept: BEHAVIORAL/MENTAL HEALTH CLINIC | Facility: CLINIC | Age: 36
End: 2021-07-30
Payer: COMMERCIAL

## 2021-07-30 ENCOUNTER — OFFICE VISIT (OUTPATIENT)
Dept: FAMILY MEDICINE CLINIC | Facility: CLINIC | Age: 36
End: 2021-07-30
Payer: COMMERCIAL

## 2021-07-30 VITALS
SYSTOLIC BLOOD PRESSURE: 120 MMHG | HEART RATE: 88 BPM | BODY MASS INDEX: 28.61 KG/M2 | WEIGHT: 178 LBS | OXYGEN SATURATION: 97 % | DIASTOLIC BLOOD PRESSURE: 78 MMHG | TEMPERATURE: 98.7 F | HEIGHT: 66 IN

## 2021-07-30 DIAGNOSIS — F33.1 MDD (MAJOR DEPRESSIVE DISORDER), RECURRENT EPISODE, MODERATE (HCC): Primary | ICD-10-CM

## 2021-07-30 DIAGNOSIS — F33.1 MODERATE EPISODE OF RECURRENT MAJOR DEPRESSIVE DISORDER (HCC): Primary | ICD-10-CM

## 2021-07-30 PROCEDURE — 90834 PSYTX W PT 45 MINUTES: CPT | Performed by: SOCIAL WORKER

## 2021-07-30 PROCEDURE — 99214 OFFICE O/P EST MOD 30 MIN: CPT | Performed by: FAMILY MEDICINE

## 2021-07-30 RX ORDER — DULOXETIN HYDROCHLORIDE 60 MG/1
60 CAPSULE, DELAYED RELEASE ORAL DAILY
Qty: 30 CAPSULE | Refills: 1 | Status: SHIPPED | OUTPATIENT
Start: 2021-07-30 | End: 2021-09-23 | Stop reason: SDUPTHER
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
## 2021-07-30 NOTE — PROGRESS NOTES
Carson Johnson 1985 female MRN: [id number]      ASSESSMENT/PLAN  Problem List Items Addressed This Visit        Other    Moderate episode of recurrent major depressive disorder ([facility] [address] ) - Primary    Relevant Medications    DULoxetine (CYMBALTA) 60 mg delayed release capsule        Will trial increase of Cymbalta to 60 mg daily  Encouraged continued self care activities  Continue follow up with in office LCSW  F/u in 4 weeks to monitor  Future Appointments   Date Time Provider Adalberto Dhillon   8/26/2021  9:20 AM DO JESSE Cheung  [facility]          SUBJECTIVE  CC: Follow-up (medication check patient unsatisfied with medication states it is not helping her )      HPI:  Carson Johnson is a 39 y o  female who presents for follow up of depression  Switched to Cymbalta -- did have restless legs when in combination with phenylephrine, but once she stopped that so did the symptoms  She does not feel much relief with new medication -- Getting more "scatter brained" "floaty" "irritable" has low energy and anxiety   Did got out with her friends one day and felt great afterwards but is struggling to find the motivation to do much of anything that she enjoys     Review of Systems   Gastrointestinal: Positive for abdominal pain (sometimes has an ache after eating, but tolerable)  Psychiatric/Behavioral: Positive for decreased concentration  Negative for sleep disturbance  The patient is nervous/anxious          Historical Information   The patient history was reviewed and updated as follows:    Past Medical History:   Diagnosis Date    Allergic     Anemia     Anxiety     Arthritis     Asthma     Depression     Exposure to SARS-associated coronavirus 4/12/2020    Headache(784 0)     Lumbar herniated disc     Pneumonia     Urinary tract infection     UTI (urinary tract infection)     Visual impairment      Past Surgical History:   Procedure Laterality Date    WISDOM TOOTH EXTRACTION       Family History   Problem Relation Age of Onset    Breast cancer additional onset Mother     Hypertension Mother     Diabetes Mother     Mental illness Father         family history    Alcohol abuse Father     Hypertension Father     Hyperlipidemia Father     Diabetes Father     Breast cancer additional onset Maternal Grandmother     Diabetes Maternal Grandmother     Prostate cancer Maternal Grandfather         lung    Cancer Maternal Grandfather     Completed Suicide  Maternal Grandfather       Social History   Social History     Substance and Sexual Activity   Alcohol Use Yes    Alcohol/week: 0 0 standard drinks    Comment: occasional     Social History     Substance and Sexual Activity   Drug Use No     Social History     Tobacco Use   Smoking Status Never Smoker   Smokeless Tobacco Never Used       Medications:     Current Outpatient Medications:     calcium carbonate (OS-BERENICE) 600 MG tablet, Take 600 mg by mouth 2 (two) times a day with meals, Disp: , Rfl:     cholecalciferol (VITAMIN D3) 1,000 units tablet, Take 1,000 Units by mouth daily, Disp: , Rfl:     DULoxetine (CYMBALTA) 60 mg delayed release capsule, Take 1 capsule (60 mg total) by mouth daily, Disp: 30 capsule, Rfl: 1    Ferrous Sulfate (RA IRON PO), Take by mouth, Disp: , Rfl:     Magnesium Oxide 500 MG TABS, Take by mouth, Disp: , Rfl:   Allergies   Allergen Reactions    Sulfa Antibiotics Anaphylaxis    Levofloxacin      Other reaction(s): sensative    Tobramycin      Other reaction(s): sensative       OBJECTIVE    Vitals:   Vitals:    07/30/21 1114   BP: 120/78   Pulse: 88   Temp: 98 7 °F (37 1 °C)   SpO2: 97%   Weight: 80 7 kg (178 lb)   Height: 5' 6" (1 676 m)           Physical Exam  Vitals and nursing note reviewed  Constitutional:       General: She is not in acute distress  Appearance: Normal appearance  HENT:      Head: Normocephalic and atraumatic     Pulmonary:      Effort: Pulmonary effort is normal  No respiratory distress  Neurological:      General: No focal deficit present  Mental Status: She is alert     Psychiatric:         Mood and Affect: Mood normal                     DO [facility]  [address] [facility]   7/30/2021  11:48 AM

## 2021-08-18 ENCOUNTER — SOCIAL WORK (OUTPATIENT)
Dept: BEHAVIORAL/MENTAL HEALTH CLINIC | Facility: CLINIC | Age: 36
End: 2021-08-18
Payer: COMMERCIAL

## 2021-08-18 DIAGNOSIS — F33.1 MDD (MAJOR DEPRESSIVE DISORDER), RECURRENT EPISODE, MODERATE (HCC): Primary | ICD-10-CM

## 2021-08-18 PROCEDURE — 90834 PSYTX W PT 45 MINUTES: CPT | Performed by: SOCIAL WORKER

## 2021-08-18 NOTE — PSYCH
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 for glaucoma  She states that her boyfriend is finally officially   She is hoping that they will move in together and she would like to have children  She went to bed yesterday when she received the diagnosis that she may have the glaucoma  Her depression is lessened and she is not as isolative  She states that her medication was increased and her focus is improved  She has lost two pounds which makes her happy and she is trying to lose weight, she gained 20 when she was on Lexapro and reports that she already needed to lose 10lbs  MICKI-7= 12 indicative of moderate anxiety          PHQ-2=  5      PHQ-9=14  She states that she had a depressive day yesterday and does not feel like she presented as well as she could have  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  The Cymbalta is making her tired during the day  She is appropriately dressed and adequately groomed  Her mood is euthymic and affect cannot be assessed due to Covid masking  Her thought process is logical and her speech is coherent  CBt was used to challenge the thought process which was dysfunctional about her anxiety and her worry and what she worries about  Patient ID: Guillermo Pacheco is a 39 y o  female  HPI    Review of Mario Sainz reports that she is taking her medications as prescribed  Objective:  Jovanni Rodriguez appears to be making progress as evidenced by her making small changes which are leading to positive outcomes  Her family and boyfriend is supportive       Physical Exam  Mental Health: Jovanni Rodriguez denies any SI/HI or AH/VH

## 2021-08-24 PROCEDURE — U0005 INFEC AGEN DETEC AMPLI PROBE: HCPCS | Performed by: FAMILY MEDICINE

## 2021-08-24 PROCEDURE — U0003 INFECTIOUS AGENT DETECTION BY NUCLEIC ACID (DNA OR RNA); SEVERE ACUTE RESPIRATORY SYNDROME CORONAVIRUS 2 (SARS-COV-2) (CORONAVIRUS DISEASE [COVID-19]), AMPLIFIED PROBE TECHNIQUE, MAKING USE OF HIGH THROUGHPUT TECHNOLOGIES AS DESCRIBED BY CMS-2020-01-R: HCPCS | Performed by: FAMILY MEDICINE

## 2021-08-26 ENCOUNTER — OFFICE VISIT (OUTPATIENT)
Dept: FAMILY MEDICINE CLINIC | Facility: CLINIC | Age: 36
End: 2021-08-26
Payer: COMMERCIAL

## 2021-08-26 VITALS
TEMPERATURE: 98.8 F | HEART RATE: 78 BPM | WEIGHT: 172 LBS | DIASTOLIC BLOOD PRESSURE: 82 MMHG | BODY MASS INDEX: 27.64 KG/M2 | OXYGEN SATURATION: 96 % | HEIGHT: 66 IN | SYSTOLIC BLOOD PRESSURE: 120 MMHG

## 2021-08-26 DIAGNOSIS — F33.1 MODERATE EPISODE OF RECURRENT MAJOR DEPRESSIVE DISORDER (HCC): Primary | ICD-10-CM

## 2021-08-26 PROBLEM — H40.002 GLAUCOMA SUSPECT OF LEFT EYE: Status: ACTIVE | Noted: 2021-08-17

## 2021-08-26 PROCEDURE — 99213 OFFICE O/P EST LOW 20 MIN: CPT | Performed by: FAMILY MEDICINE

## 2021-08-26 NOTE — PROGRESS NOTES
Zhanna Escalante 1985 female MRN: [id number]      ASSESSMENT/PLAN  Problem List Items Addressed This Visit        Other    Moderate episode of recurrent major depressive disorder ([facility] [address] ) - Primary        Continue current dosing and f/u in 4 weeks to re-evaluate  Could consider augmentation if symptoms persistent  Future Appointments   Date Time Provider Adalberto Dhillon   8/26/2021  9:20 AM DO JESSE Josehp  [facility]   9/7/2021  9:00 AM En[facility]          SUBJECTIVE  CC: Follow-up (cymbalta - patient states she noticed some improvement )      HPI:  Zhanna Ecsalante is a 39 y o  female who presents for follow up of depression  Increased Cymbalta to 60 mg daily  Improved concentration, her boyfriend states she is not as "snippy"  Anxiety is still a bit elevated, but she is finishing up a course and gotten some bad news recently as well  Still not sleeping well  Review of Systems   Psychiatric/Behavioral: Positive for sleep disturbance  The patient is nervous/anxious (improving)          Historical Information   The patient history was reviewed and updated as follows:    Past Medical History:   Diagnosis Date    Allergic     Anemia     Anxiety     Arthritis     Asthma     Depression     Exposure to SARS-associated coronavirus 4/12/2020    Headache(784 0)     Lumbar herniated disc     Pneumonia     Urinary tract infection     UTI (urinary tract infection)     Visual impairment      Past Surgical History:   Procedure Laterality Date    WISDOM TOOTH EXTRACTION       Family History   Problem Relation Age of Onset    Breast cancer additional onset Mother     Hypertension Mother     Diabetes Mother     Mental illness Father         family history    Alcohol abuse Father     Hypertension Father     Hyperlipidemia Father     Diabetes Father     Breast cancer additional onset Maternal Grandmother     Diabetes Maternal Grandmother     Prostate cancer Maternal Grandfather         lung    Cancer Maternal Grandfather     Completed Suicide  Maternal Grandfather       Social History   Social History     Substance and Sexual Activity   Alcohol Use Yes    Alcohol/week: 0 0 standard drinks    Comment: occasional     Social History     Substance and Sexual Activity   Drug Use No     Social History     Tobacco Use   Smoking Status Never Smoker   Smokeless Tobacco Never Used       Medications:     Current Outpatient Medications:     calcium carbonate (OS-BERENICE) 600 MG tablet, Take 600 mg by mouth 2 (two) times a day with meals, Disp: , Rfl:     cholecalciferol (VITAMIN D3) 1,000 units tablet, Take 1,000 Units by mouth daily, Disp: , Rfl:     DULoxetine (CYMBALTA) 60 mg delayed release capsule, Take 1 capsule (60 mg total) by mouth daily, Disp: 30 capsule, Rfl: 1    Ferrous Sulfate (RA IRON PO), Take by mouth, Disp: , Rfl:     Magnesium Oxide 500 MG TABS, Take by mouth, Disp: , Rfl:   Allergies   Allergen Reactions    Sulfa Antibiotics Anaphylaxis    Levofloxacin      Other reaction(s): sensative    Tobramycin      Other reaction(s): sensative       OBJECTIVE    Vitals:   Vitals:    08/26/21 0916   BP: 120/82   Pulse: 78   Temp: 98 8 °F (37 1 °C)   SpO2: 96%   Weight: 78 kg (172 lb)   Height: 5' 6" (1 676 m)           Physical Exam  Vitals and nursing note reviewed  Constitutional:       General: She is not in acute distress  Appearance: Normal appearance  HENT:      Head: Normocephalic and atraumatic  Pulmonary:      Effort: Pulmonary effort is normal  No respiratory distress  Neurological:      General: No focal deficit present  Mental Status: She is alert     Psychiatric:         Mood and Affect: Mood normal                     Renee Anderson DO  [facility]   8/26/2021  9:19 AM

## 2021-09-07 ENCOUNTER — TELEPHONE (OUTPATIENT)
Dept: BEHAVIORAL/MENTAL HEALTH CLINIC | Facility: CLINIC | Age: 36
End: 2021-09-07

## 2021-09-07 NOTE — TELEPHONE ENCOUNTER
Attempted contact with patient and no answer  Message was left inviting the client to call back and reschedule

## 2021-09-13 ENCOUNTER — TELEPHONE (OUTPATIENT)
Dept: BEHAVIORAL/MENTAL HEALTH CLINIC | Age: 36
End: 2021-09-13

## 2021-09-13 NOTE — TELEPHONE ENCOUNTER
Call placed to the client, no answer and message was left for her to please call any of the practices to reschedule  as this writer received a message that she wanted to reschedule

## 2021-09-23 ENCOUNTER — OFFICE VISIT (OUTPATIENT)
Dept: FAMILY MEDICINE CLINIC | Facility: CLINIC | Age: 36
End: 2021-09-23
Payer: COMMERCIAL

## 2021-09-23 VITALS
DIASTOLIC BLOOD PRESSURE: 74 MMHG | HEART RATE: 87 BPM | HEIGHT: 66 IN | SYSTOLIC BLOOD PRESSURE: 122 MMHG | TEMPERATURE: 97.5 F | WEIGHT: 172 LBS | OXYGEN SATURATION: 97 % | BODY MASS INDEX: 27.64 KG/M2

## 2021-09-23 DIAGNOSIS — F33.1 MODERATE EPISODE OF RECURRENT MAJOR DEPRESSIVE DISORDER (HCC): ICD-10-CM

## 2021-09-23 DIAGNOSIS — Z00.00 ANNUAL PHYSICAL EXAM: Primary | ICD-10-CM

## 2021-09-23 PROCEDURE — 99395 PREV VISIT EST AGE 18-39: CPT | Performed by: NURSE PRACTITIONER

## 2021-09-23 RX ORDER — DULOXETIN HYDROCHLORIDE 60 MG/1
60 CAPSULE, DELAYED RELEASE ORAL DAILY
Qty: 30 CAPSULE | Refills: 5 | Status: SHIPPED | OUTPATIENT
Start: 2021-09-23 | End: 2022-04-11 | Stop reason: SDUPTHER

## 2021-09-23 NOTE — PATIENT INSTRUCTIONS
Wellness Visit for Adults   AMBULATORY CARE:   A wellness visit  is when you see your healthcare provider to get screened for health problems  Your healthcare provider will also give you advice on how to stay healthy  Write down your questions so you remember to ask them  Ask your healthcare provider how often you should have a wellness visit  What happens at a wellness visit:  Your healthcare provider will ask about your health, and your family history of health problems  This includes high blood pressure, heart disease, and cancer  He or she will ask if you have symptoms that concern you, if you smoke, and about your mood  You may also be asked about your intake of medicines, supplements, food, and alcohol  Any of the following may be done:  · Your weight  will be checked  Your height may also be checked so your body mass index (BMI) can be calculated  Your BMI shows if you are at a healthy weight  · Your blood pressure  and heart rate will be checked  Your temperature may also be checked  · Blood and urine tests  may be done  Blood tests may be done to check your cholesterol levels  Abnormal cholesterol levels increase your risk for heart disease and stroke  You may also need a blood or urine test to check for diabetes if you are at increased risk  Urine tests may be done to look for signs of an infection or kidney disease  · A physical exam  includes checking your heartbeat and lungs with a stethoscope  Your healthcare provider may also check your skin to look for sun damage  · Screening tests  may be recommended  A screening test is done to check for diseases that may not cause symptoms  The screening tests you may need depend on your age, gender, family history, and lifestyle habits  For example, colorectal screening may be recommended if you are 48years old or older  Screening tests you need if you are a woman:   · A Pap smear  is used to screen for cervical cancer   Pap smears are usually who had chickenpox or have been exposed to the virus  How to eat healthy:  My Plate is a model for planning healthy meals  It shows the types and amounts of foods that should go on your plate  Fruits and vegetables make up about half of your plate, and grains and protein make up the other half  A serving of dairy is included on the side of your plate  The amount of calories and serving sizes you need depends on your age, gender, weight, and height  Examples of healthy foods are listed below:  · Eat a variety of vegetables  such as dark green, red, and orange vegetables  You can also include canned vegetables low in sodium (salt) and frozen vegetables without added butter or sauces  · Eat a variety of fresh fruits , canned fruit in 100% juice, frozen fruit, and dried fruit  · Include whole grains  At least half of the grains you eat should be whole grains  Examples include whole-wheat bread, wheat pasta, brown rice, and whole-grain cereals such as oatmeal     · Eat a variety of protein foods such as seafood (fish and shellfish), lean meat, and poultry without skin (turkey and chicken)  Examples of lean meats include pork leg, shoulder, or tenderloin, and beef round, sirloin, tenderloin, and extra lean ground beef  Other protein foods include eggs and egg substitutes, beans, peas, soy products, nuts, and seeds  · Choose low-fat dairy products such as skim or 1% milk or low-fat yogurt, cheese, and cottage cheese  · Limit unhealthy fats  such as butter, hard margarine, and shortening  Exercise:  Exercise at least 30 minutes per day on most days of the week  Some examples of exercise include walking, biking, dancing, and swimming  You can also fit in more physical activity by taking the stairs instead of the elevator or parking farther away from stores  Include muscle strengthening activities 2 days each week  Regular exercise provides many health benefits   It helps you manage your weight, and decreases your risk for type 2 diabetes, heart disease, stroke, and high blood pressure  Exercise can also help improve your mood  Ask your healthcare provider about the best exercise plan for you  General health and safety guidelines:   · Do not smoke  Nicotine and other chemicals in cigarettes and cigars can cause lung damage  Ask your healthcare provider for information if you currently smoke and need help to quit  E-cigarettes or smokeless tobacco still contain nicotine  Talk to your healthcare provider before you use these products  · Limit alcohol  A drink of alcohol is 12 ounces of beer, 5 ounces of wine, or 1½ ounces of liquor  · Lose weight, if needed  Being overweight increases your risk of certain health conditions  These include heart disease, high blood pressure, type 2 diabetes, and certain types of cancer  · Protect your skin  Do not sunbathe or use tanning beds  Use sunscreen with a SPF 15 or higher  Apply sunscreen at least 15 minutes before you go outside  Reapply sunscreen every 2 hours  Wear protective clothing, hats, and sunglasses when you are outside  · Drive safely  Always wear your seatbelt  Make sure everyone in your car wears a seatbelt  A seatbelt can save your life if you are in an accident  Do not use your cell phone when you are driving  This could distract you and cause an accident  Pull over if you need to make a call or send a text message  · Practice safe sex  Use latex condoms if are sexually active and have more than one partner  Your healthcare provider may recommend screening tests for sexually transmitted infections (STIs)  · Wear helmets, lifejackets, and protective gear  Always wear a helmet when you ride a bike or motorcycle, go skiing, or play sports that could cause a head injury  Wear protective equipment when you play sports  Wear a lifejacket when you are on a boat or doing water sports      © Copyright [employer] 2021 Information is for End User's use only and may not be sold, redistributed or otherwise used for commercial purposes  All illustrations and images included in CareNotes® are the copyrighted property of A D A M [employer] Inc  or Mandeep Whitley  The above information is an  only  It is not intended as medical advice for individual conditions or treatments  Talk to your doctor, nurse or pharmacist before following any medical regimen to see if it is safe and effective for you

## 2021-09-23 NOTE — PROGRESS NOTES
ADULT ANNUAL Kody Henderson    NAME: Minh Tsang  AGE: 39 y o  SEX: female  : 1985     DATE: 2021     Assessment and Plan:     Problem List Items Addressed This Visit        Other    Moderate episode of recurrent major depressive disorder ([facility] [address] )     Patient is taking the Cymbalta 60 mg daily and is doing well          Relevant Medications    DULoxetine (CYMBALTA) 60 mg delayed release capsule    Annual physical exam - Primary    Relevant Orders    Ambulatory referral to Gynecology          Immunizations and preventive care screenings were discussed with patient today  Appropriate education was printed on patient's after visit summary  Counseling:  Injury prevention: discussed safety/seat belts, safety helmets, smoke detectors, carbon dioxide detectors, and smoking near bedding or upholstery  · Exercise: the importance of regular exercise/physical activity was discussed  Recommend exercise 3-5 times per week for at least 30 minutes  BMI Counseling: Body mass index is 27 76 kg/m²  The BMI is above normal  Nutrition recommendations include decreasing portion sizes, encouraging healthy choices of fruits and vegetables, decreasing fast food intake, consuming healthier snacks, limiting drinks that contain sugar, moderation in carbohydrate intake, increasing intake of lean protein, reducing intake of saturated and trans fat and reducing intake of cholesterol  Exercise recommendations include moderate physical activity 150 minutes/week  Patient referred to PCP  Rationale for BMI follow-up plan is due to patient being overweight or obese  Return in 1 year (on 2022)  Chief Complaint:     Chief Complaint   Patient presents with    Follow-up     4 weeks      History of Present Illness:     Adult Annual Physical   Patient here for a comprehensive physical exam  The patient reports check up on her medications   Patient here for follow up on the medication and reports that she stopped working on the Lexapro and started the Cymbalta 60 mg for her anxiety patient has noticed improvement in her mood but at times feeling just under stress  Diet and Physical Activity  · Diet/Nutrition: well balanced diet  · Exercise: vigorous cardiovascular exercise, 3-4 times a week on average and 30-60 minutes on average  Depression Screening  PHQ-9 Depression Screening    PHQ-9:   Frequency of the following problems over the past two weeks:      Little interest or pleasure in doing things: 0 - not at all  Feeling down, depressed, or hopeless: 0 - not at all  Trouble falling or staying asleep, or sleeping too much: 0 - not at all  Feeling tired or having little energy: 0 - not at all  Poor appetite or overeatin - not at all  Feeling bad about yourself - or that you are a failure or have let yourself or your family down: 1 - several days  Trouble concentrating on things, such as reading the newspaper or watching television: 0 - not at all  Moving or speaking so slowly that other people could have noticed  Or the opposite - being so fidgety or restless that you have been moving around a lot more than usual: 0 - not at all  Thoughts that you would be better off dead, or of hurting yourself in some way: 0 - not at all  PHQ-2 Score: 0  PHQ-9 Score: 1     Depression Screening Follow-up Plan: Patient's depression screening was positive with a PHQ-2 score of 0  Their PHQ-9 score was 1  Patient assessed for underlying major depression  They have no active suicidal ideations  Brief counseling provided and recommend additional follow-up/re-evaluation next office visit  General Health  · Sleep: sleeps poorly and gets 4-6 hours of sleep on average   Melatonin 10 mg nightly   · Hearing: normal - bilateral   · Vision: goes for regular eye exams, most recent eye exam <1 year ago, wears glasses and enlarged optic nerve has an appointment with [facility] on 1/2022  · Dental: regular dental visits and brushes teeth twice daily  /GYN Health  · Last menstrual period: 9/22/2021  · Contraceptive method: none  · History of STDs?: no      Review of Systems:     Review of Systems   Constitutional: Negative for activity change, appetite change, chills, diaphoresis, fatigue, fever and unexpected weight change  HENT: Negative for congestion, ear pain, hearing loss, postnasal drip, sinus pressure, sinus pain, sneezing and sore throat  Eyes: Negative for pain, redness and visual disturbance  Respiratory: Negative for cough and shortness of breath  Cardiovascular: Negative for chest pain and leg swelling  Gastrointestinal: Negative for abdominal pain, diarrhea, nausea and vomiting  Musculoskeletal: Negative for arthralgias  Neurological: Negative for dizziness and light-headedness  Psychiatric/Behavioral: Negative for behavioral problems and dysphoric mood  Past Medical History:     Past Medical History:   Diagnosis Date    Allergic     Anemia     Anxiety     Arthritis     Asthma     Depression     Exposure to SARS-associated coronavirus 4/12/2020    Headache(784 0)     Lumbar herniated disc     Pneumonia     Urinary tract infection     UTI (urinary tract infection)     Visual impairment       Past Surgical History:     Past Surgical History:   Procedure Laterality Date    WISDOM TOOTH EXTRACTION        Social History:     Social History     Socioeconomic History    Marital status: Single     Spouse name: None    Number of children: None    Years of education: None    Highest education level: None   Occupational History    None   Tobacco Use    Smoking status: Never Smoker    Smokeless tobacco: Never Used   Vaping Use    Vaping Use: Never used   Substance and Sexual Activity    Alcohol use:  Yes     Alcohol/week: 0 0 standard drinks     Comment: occasional    Drug use: No    Sexual activity: Not Currently     Partners: Male     Birth control/protection: Condom Male   Other Topics Concern    None   Social History Narrative    None     Social Determinants of Health     Financial Resource Strain:     Difficulty of Paying Living Expenses:    Food Insecurity:     Worried About Running Out of Food in the Last Year:     Ran Out of Food in the Last Year:    Transportation Needs:     Lack of Transportation (Medical):      Lack of Transportation (Non-Medical):    Physical Activity:     Days of Exercise per Week:     Minutes of Exercise per Session:    Stress:     Feeling of Stress :    Social Connections:     Frequency of Communication with Friends and Family:     Frequency of Social Gatherings with Friends and Family:     Attends Restoration Services:     Active Member of Clubs or Organizations:     Attends Club or Organization Meetings:     Marital Status:    Intimate Partner Violence:     Fear of Current or Ex-Partner:     Emotionally Abused:     Physically Abused:     Sexually Abused:       Family History:     Family History   Problem Relation Age of Onset    Breast cancer additional onset Mother     Hypertension Mother     Diabetes Mother     Mental illness Father         family history    Alcohol abuse Father     Hypertension Father     Hyperlipidemia Father     Diabetes Father     Breast cancer additional onset Maternal Grandmother     Diabetes Maternal Grandmother     Prostate cancer Maternal Grandfather         lung    Cancer Maternal Grandfather     Completed Suicide  Maternal Grandfather       Current Medications:     Current Outpatient Medications   Medication Sig Dispense Refill    calcium carbonate (OS-BERENICE) 600 MG tablet Take 600 mg by mouth 2 (two) times a day with meals      cholecalciferol (VITAMIN D3) 1,000 units tablet Take 1,000 Units by mouth daily      DULoxetine (CYMBALTA) 60 mg delayed release capsule Take 1 capsule (60 mg total) by mouth daily 30 capsule 5    Ferrous Sulfate (RA IRON PO) Take by mouth      Magnesium Oxide 500 MG TABS Take by mouth       No current facility-administered medications for this visit  Allergies: Allergies   Allergen Reactions    Sulfa Antibiotics Anaphylaxis    Levofloxacin      Other reaction(s): sensative    Tobramycin      Other reaction(s): sensative      Physical Exam:     /74 (BP Location: Right arm, Patient Position: Sitting)   Pulse 87   Temp 97 5 °F (36 4 °C) (Temporal)   Ht 5' 6" (1 676 m)   Wt 78 kg (172 lb)   SpO2 97%   BMI 27 76 kg/m²     Physical Exam  Vitals and nursing note reviewed  Constitutional:       General: She is not in acute distress  Appearance: She is well-developed  HENT:      Head: Normocephalic and atraumatic  Eyes:      Conjunctiva/sclera: Conjunctivae normal    Cardiovascular:      Rate and Rhythm: Normal rate and regular rhythm  Heart sounds: No murmur heard  Pulmonary:      Effort: Pulmonary effort is normal  No respiratory distress  Breath sounds: Normal breath sounds  Abdominal:      Palpations: Abdomen is soft  Tenderness: There is no abdominal tenderness  Musculoskeletal:      Cervical back: Neck supple  Skin:     General: Skin is warm and dry  Neurological:      Mental Status: She is alert            Elbert Ortega

## 2021-10-22 ENCOUNTER — SOCIAL WORK (OUTPATIENT)
Dept: BEHAVIORAL/MENTAL HEALTH CLINIC | Facility: CLINIC | Age: 36
End: 2021-10-22
Payer: COMMERCIAL

## 2021-10-22 DIAGNOSIS — F33.1 MDD (MAJOR DEPRESSIVE DISORDER), RECURRENT EPISODE, MODERATE (HCC): Primary | ICD-10-CM

## 2021-10-22 PROCEDURE — 90834 PSYTX W PT 45 MINUTES: CPT | Performed by: SOCIAL WORKER

## 2021-11-03 ENCOUNTER — HOSPITAL ENCOUNTER (EMERGENCY)
Facility: HOSPITAL | Age: 36
Discharge: HOME | End: 2021-11-03
Attending: EMERGENCY MEDICINE
Payer: OTHER MISCELLANEOUS

## 2021-11-03 VITALS
TEMPERATURE: 98.2 F | HEART RATE: 78 BPM | RESPIRATION RATE: 16 BRPM | SYSTOLIC BLOOD PRESSURE: 128 MMHG | DIASTOLIC BLOOD PRESSURE: 67 MMHG | OXYGEN SATURATION: 98 %

## 2021-11-03 DIAGNOSIS — S89.92XA INJURY OF LEFT KNEE, INITIAL ENCOUNTER: Primary | ICD-10-CM

## 2021-11-03 DIAGNOSIS — S83.249A MEDIAL MENISCUS TEAR: ICD-10-CM

## 2021-11-03 PROCEDURE — 99283 EMERGENCY DEPT VISIT LOW MDM: CPT

## 2021-11-03 PROCEDURE — 99282 EMERGENCY DEPT VISIT SF MDM: CPT | Performed by: EMERGENCY MEDICINE

## 2021-11-04 ENCOUNTER — OCCMED (OUTPATIENT)
Dept: URGENT CARE | Facility: CLINIC | Age: 36
End: 2021-11-04
Payer: OTHER MISCELLANEOUS

## 2021-11-04 ENCOUNTER — APPOINTMENT (OUTPATIENT)
Dept: RADIOLOGY | Facility: CLINIC | Age: 36
End: 2021-11-04
Payer: OTHER MISCELLANEOUS

## 2021-11-04 DIAGNOSIS — T14.90XA OCCUPATIONAL INJURY: Primary | ICD-10-CM

## 2021-11-04 DIAGNOSIS — T14.90XA OCCUPATIONAL INJURY: ICD-10-CM

## 2021-11-04 PROCEDURE — 73564 X-RAY EXAM KNEE 4 OR MORE: CPT

## 2021-11-04 PROCEDURE — 99283 EMERGENCY DEPT VISIT LOW MDM: CPT | Performed by: PHYSICIAN ASSISTANT

## 2021-11-04 PROCEDURE — G0382 LEV 3 HOSP TYPE B ED VISIT: HCPCS | Performed by: PHYSICIAN ASSISTANT

## 2021-11-12 ENCOUNTER — APPOINTMENT (OUTPATIENT)
Dept: URGENT CARE | Facility: CLINIC | Age: 36
End: 2021-11-12
Payer: OTHER MISCELLANEOUS

## 2021-11-12 PROCEDURE — 99214 OFFICE O/P EST MOD 30 MIN: CPT

## 2021-11-18 ENCOUNTER — EVALUATION (OUTPATIENT)
Dept: PHYSICAL THERAPY | Facility: CLINIC | Age: 36
End: 2021-11-18
Payer: OTHER MISCELLANEOUS

## 2021-11-18 ENCOUNTER — SOCIAL WORK (OUTPATIENT)
Dept: BEHAVIORAL/MENTAL HEALTH CLINIC | Facility: CLINIC | Age: 36
End: 2021-11-18
Payer: COMMERCIAL

## 2021-11-18 DIAGNOSIS — F33.1 MDD (MAJOR DEPRESSIVE DISORDER), RECURRENT EPISODE, MODERATE (HCC): ICD-10-CM

## 2021-11-18 DIAGNOSIS — S83.412A SPRAIN OF MEDIAL COLLATERAL LIGAMENT OF LEFT KNEE, INITIAL ENCOUNTER: Primary | ICD-10-CM

## 2021-11-18 DIAGNOSIS — F41.9 ANXIETY: Primary | ICD-10-CM

## 2021-11-18 PROCEDURE — 97116 GAIT TRAINING THERAPY: CPT | Performed by: PHYSICAL THERAPIST

## 2021-11-18 PROCEDURE — 97161 PT EVAL LOW COMPLEX 20 MIN: CPT | Performed by: PHYSICAL THERAPIST

## 2021-11-18 PROCEDURE — 90834 PSYTX W PT 45 MINUTES: CPT | Performed by: SOCIAL WORKER

## 2021-11-18 PROCEDURE — 97140 MANUAL THERAPY 1/> REGIONS: CPT | Performed by: PHYSICAL THERAPIST

## 2021-11-18 PROCEDURE — 97110 THERAPEUTIC EXERCISES: CPT | Performed by: PHYSICAL THERAPIST

## 2021-11-19 ENCOUNTER — HOSPITAL ENCOUNTER (OUTPATIENT)
Dept: MRI IMAGING | Facility: CLINIC | Age: 36
Discharge: HOME | End: 2021-11-19
Payer: OTHER MISCELLANEOUS

## 2021-11-19 DIAGNOSIS — R52 PAIN: ICD-10-CM

## 2021-11-19 PROCEDURE — G1004 CDSM NDSC: HCPCS

## 2021-11-19 PROCEDURE — 73721 MRI JNT OF LWR EXTRE W/O DYE: CPT

## 2021-11-24 ENCOUNTER — OFFICE VISIT (OUTPATIENT)
Dept: PHYSICAL THERAPY | Facility: CLINIC | Age: 36
End: 2021-11-24
Payer: OTHER MISCELLANEOUS

## 2021-11-24 DIAGNOSIS — S83.412A SPRAIN OF MEDIAL COLLATERAL LIGAMENT OF LEFT KNEE, INITIAL ENCOUNTER: Primary | ICD-10-CM

## 2021-11-24 PROCEDURE — 97110 THERAPEUTIC EXERCISES: CPT | Performed by: PHYSICAL THERAPIST

## 2021-11-24 PROCEDURE — 97112 NEUROMUSCULAR REEDUCATION: CPT | Performed by: PHYSICAL THERAPIST

## 2021-11-24 PROCEDURE — 97140 MANUAL THERAPY 1/> REGIONS: CPT | Performed by: PHYSICAL THERAPIST

## 2021-11-26 ENCOUNTER — OFFICE VISIT (OUTPATIENT)
Dept: PHYSICAL THERAPY | Facility: CLINIC | Age: 36
End: 2021-11-26
Payer: OTHER MISCELLANEOUS

## 2021-11-26 DIAGNOSIS — S83.412A SPRAIN OF MEDIAL COLLATERAL LIGAMENT OF LEFT KNEE, INITIAL ENCOUNTER: Primary | ICD-10-CM

## 2021-11-26 PROCEDURE — 97112 NEUROMUSCULAR REEDUCATION: CPT

## 2021-11-26 PROCEDURE — 97110 THERAPEUTIC EXERCISES: CPT

## 2021-11-30 ENCOUNTER — OFFICE VISIT (OUTPATIENT)
Dept: PHYSICAL THERAPY | Facility: CLINIC | Age: 36
End: 2021-11-30
Payer: OTHER MISCELLANEOUS

## 2021-11-30 DIAGNOSIS — S83.412A SPRAIN OF MEDIAL COLLATERAL LIGAMENT OF LEFT KNEE, INITIAL ENCOUNTER: Primary | ICD-10-CM

## 2021-11-30 PROCEDURE — 97112 NEUROMUSCULAR REEDUCATION: CPT | Performed by: PHYSICAL THERAPIST

## 2021-11-30 PROCEDURE — 97110 THERAPEUTIC EXERCISES: CPT | Performed by: PHYSICAL THERAPIST

## 2021-11-30 PROCEDURE — 97140 MANUAL THERAPY 1/> REGIONS: CPT | Performed by: PHYSICAL THERAPIST

## 2021-12-02 ENCOUNTER — OFFICE VISIT (OUTPATIENT)
Dept: PHYSICAL THERAPY | Facility: CLINIC | Age: 36
End: 2021-12-02
Payer: OTHER MISCELLANEOUS

## 2021-12-02 DIAGNOSIS — S83.412A SPRAIN OF MEDIAL COLLATERAL LIGAMENT OF LEFT KNEE, INITIAL ENCOUNTER: Primary | ICD-10-CM

## 2021-12-02 PROCEDURE — 97110 THERAPEUTIC EXERCISES: CPT | Performed by: PHYSICAL THERAPIST

## 2021-12-02 PROCEDURE — 97140 MANUAL THERAPY 1/> REGIONS: CPT | Performed by: PHYSICAL THERAPIST

## 2021-12-03 ENCOUNTER — APPOINTMENT (OUTPATIENT)
Dept: URGENT CARE | Facility: CLINIC | Age: 36
End: 2021-12-03
Payer: OTHER MISCELLANEOUS

## 2021-12-03 PROCEDURE — 99213 OFFICE O/P EST LOW 20 MIN: CPT

## 2021-12-07 ENCOUNTER — OFFICE VISIT (OUTPATIENT)
Dept: PHYSICAL THERAPY | Facility: CLINIC | Age: 36
End: 2021-12-07
Payer: OTHER MISCELLANEOUS

## 2021-12-07 DIAGNOSIS — S83.412A SPRAIN OF MEDIAL COLLATERAL LIGAMENT OF LEFT KNEE, INITIAL ENCOUNTER: Primary | ICD-10-CM

## 2021-12-07 PROCEDURE — 97112 NEUROMUSCULAR REEDUCATION: CPT | Performed by: PHYSICAL THERAPIST

## 2021-12-07 PROCEDURE — 97110 THERAPEUTIC EXERCISES: CPT | Performed by: PHYSICAL THERAPIST

## 2021-12-07 PROCEDURE — 97140 MANUAL THERAPY 1/> REGIONS: CPT | Performed by: PHYSICAL THERAPIST

## 2021-12-09 ENCOUNTER — OFFICE VISIT (OUTPATIENT)
Dept: PHYSICAL THERAPY | Facility: CLINIC | Age: 36
End: 2021-12-09
Payer: OTHER MISCELLANEOUS

## 2021-12-09 DIAGNOSIS — S83.412A SPRAIN OF MEDIAL COLLATERAL LIGAMENT OF LEFT KNEE, INITIAL ENCOUNTER: Primary | ICD-10-CM

## 2021-12-09 PROCEDURE — 97140 MANUAL THERAPY 1/> REGIONS: CPT | Performed by: PHYSICAL THERAPIST

## 2021-12-10 ENCOUNTER — APPOINTMENT (OUTPATIENT)
Dept: URGENT CARE | Facility: CLINIC | Age: 36
End: 2021-12-10
Payer: OTHER MISCELLANEOUS

## 2021-12-10 PROCEDURE — 99214 OFFICE O/P EST MOD 30 MIN: CPT

## 2021-12-14 ENCOUNTER — OFFICE VISIT (OUTPATIENT)
Dept: PHYSICAL THERAPY | Facility: CLINIC | Age: 36
End: 2021-12-14
Payer: OTHER MISCELLANEOUS

## 2021-12-14 DIAGNOSIS — S83.412A SPRAIN OF MEDIAL COLLATERAL LIGAMENT OF LEFT KNEE, INITIAL ENCOUNTER: Primary | ICD-10-CM

## 2021-12-14 PROCEDURE — 97112 NEUROMUSCULAR REEDUCATION: CPT | Performed by: PHYSICAL THERAPIST

## 2021-12-14 PROCEDURE — 97110 THERAPEUTIC EXERCISES: CPT | Performed by: PHYSICAL THERAPIST

## 2021-12-14 PROCEDURE — 97140 MANUAL THERAPY 1/> REGIONS: CPT | Performed by: PHYSICAL THERAPIST

## 2021-12-16 ENCOUNTER — EVALUATION (OUTPATIENT)
Dept: PHYSICAL THERAPY | Facility: CLINIC | Age: 36
End: 2021-12-16
Payer: OTHER MISCELLANEOUS

## 2021-12-16 DIAGNOSIS — S83.412A SPRAIN OF MEDIAL COLLATERAL LIGAMENT OF LEFT KNEE, INITIAL ENCOUNTER: Primary | ICD-10-CM

## 2021-12-16 PROCEDURE — 97140 MANUAL THERAPY 1/> REGIONS: CPT | Performed by: PHYSICAL THERAPIST

## 2021-12-16 PROCEDURE — 97112 NEUROMUSCULAR REEDUCATION: CPT | Performed by: PHYSICAL THERAPIST

## 2021-12-16 PROCEDURE — 97110 THERAPEUTIC EXERCISES: CPT | Performed by: PHYSICAL THERAPIST

## 2021-12-21 ENCOUNTER — OFFICE VISIT (OUTPATIENT)
Dept: PHYSICAL THERAPY | Facility: CLINIC | Age: 36
End: 2021-12-21
Payer: OTHER MISCELLANEOUS

## 2021-12-21 DIAGNOSIS — S83.412A SPRAIN OF MEDIAL COLLATERAL LIGAMENT OF LEFT KNEE, INITIAL ENCOUNTER: Primary | ICD-10-CM

## 2021-12-21 PROCEDURE — 97140 MANUAL THERAPY 1/> REGIONS: CPT | Performed by: PHYSICAL THERAPIST

## 2021-12-21 PROCEDURE — 97110 THERAPEUTIC EXERCISES: CPT | Performed by: PHYSICAL THERAPIST

## 2021-12-21 PROCEDURE — 97112 NEUROMUSCULAR REEDUCATION: CPT | Performed by: PHYSICAL THERAPIST

## 2021-12-23 ENCOUNTER — OFFICE VISIT (OUTPATIENT)
Dept: PHYSICAL THERAPY | Facility: CLINIC | Age: 36
End: 2021-12-23
Payer: OTHER MISCELLANEOUS

## 2021-12-23 ENCOUNTER — SOCIAL WORK (OUTPATIENT)
Dept: BEHAVIORAL/MENTAL HEALTH CLINIC | Facility: CLINIC | Age: 36
End: 2021-12-23
Payer: COMMERCIAL

## 2021-12-23 ENCOUNTER — OFFICE VISIT (OUTPATIENT)
Dept: FAMILY MEDICINE CLINIC | Facility: CLINIC | Age: 36
End: 2021-12-23
Payer: COMMERCIAL

## 2021-12-23 VITALS
TEMPERATURE: 97.8 F | DIASTOLIC BLOOD PRESSURE: 78 MMHG | SYSTOLIC BLOOD PRESSURE: 124 MMHG | HEIGHT: 66 IN | BODY MASS INDEX: 27.83 KG/M2 | WEIGHT: 173.2 LBS | OXYGEN SATURATION: 98 % | HEART RATE: 80 BPM

## 2021-12-23 DIAGNOSIS — F33.1 MODERATE EPISODE OF RECURRENT MAJOR DEPRESSIVE DISORDER (HCC): Primary | ICD-10-CM

## 2021-12-23 DIAGNOSIS — S83.412A SPRAIN OF MEDIAL COLLATERAL LIGAMENT OF LEFT KNEE, INITIAL ENCOUNTER: Primary | ICD-10-CM

## 2021-12-23 DIAGNOSIS — F33.1 MDD (MAJOR DEPRESSIVE DISORDER), RECURRENT EPISODE, MODERATE (HCC): Primary | ICD-10-CM

## 2021-12-23 PROCEDURE — 97112 NEUROMUSCULAR REEDUCATION: CPT | Performed by: PHYSICAL THERAPIST

## 2021-12-23 PROCEDURE — 90834 PSYTX W PT 45 MINUTES: CPT | Performed by: SOCIAL WORKER

## 2021-12-23 PROCEDURE — 97140 MANUAL THERAPY 1/> REGIONS: CPT | Performed by: PHYSICAL THERAPIST

## 2021-12-23 PROCEDURE — 99213 OFFICE O/P EST LOW 20 MIN: CPT | Performed by: NURSE PRACTITIONER

## 2021-12-23 PROCEDURE — 97110 THERAPEUTIC EXERCISES: CPT | Performed by: PHYSICAL THERAPIST

## 2021-12-23 RX ORDER — BUPROPION HYDROCHLORIDE 150 MG/1
150 TABLET ORAL EVERY MORNING
Qty: 30 TABLET | Refills: 5 | Status: SHIPPED | OUTPATIENT
Start: 2021-12-23 | End: 2022-04-11 | Stop reason: SDUPTHER

## 2021-12-28 ENCOUNTER — APPOINTMENT (OUTPATIENT)
Dept: PHYSICAL THERAPY | Facility: CLINIC | Age: 36
End: 2021-12-28
Payer: OTHER MISCELLANEOUS

## 2021-12-28 ENCOUNTER — OFFICE VISIT (OUTPATIENT)
Dept: OBGYN CLINIC | Facility: CLINIC | Age: 36
End: 2021-12-28
Payer: OTHER MISCELLANEOUS

## 2021-12-28 VITALS
HEART RATE: 86 BPM | HEIGHT: 66 IN | SYSTOLIC BLOOD PRESSURE: 142 MMHG | WEIGHT: 174 LBS | BODY MASS INDEX: 27.97 KG/M2 | DIASTOLIC BLOOD PRESSURE: 82 MMHG

## 2021-12-28 DIAGNOSIS — S89.92XA INJURY OF LEFT KNEE, INITIAL ENCOUNTER: ICD-10-CM

## 2021-12-28 DIAGNOSIS — S83.412A SPRAIN OF MEDIAL COLLATERAL LIGAMENT OF LEFT KNEE, INITIAL ENCOUNTER: Primary | ICD-10-CM

## 2021-12-28 PROCEDURE — 20610 DRAIN/INJ JOINT/BURSA W/O US: CPT | Performed by: ORTHOPAEDIC SURGERY

## 2021-12-28 PROCEDURE — 99203 OFFICE O/P NEW LOW 30 MIN: CPT | Performed by: ORTHOPAEDIC SURGERY

## 2021-12-28 RX ADMIN — BUPIVACAINE HYDROCHLORIDE 2 ML: 2.5 INJECTION, SOLUTION INFILTRATION; PERINEURAL at 10:28

## 2021-12-28 RX ADMIN — METHYLPREDNISOLONE ACETATE 1 ML: 80 INJECTION, SUSPENSION INTRA-ARTICULAR; INTRALESIONAL; INTRAMUSCULAR; SOFT TISSUE at 10:28

## 2021-12-29 PROBLEM — S83.412A SPRAIN OF MEDIAL COLLATERAL LIGAMENT OF LEFT KNEE: Status: ACTIVE | Noted: 2021-12-29

## 2021-12-29 RX ORDER — BUPIVACAINE HYDROCHLORIDE 2.5 MG/ML
2 INJECTION, SOLUTION INFILTRATION; PERINEURAL
Status: COMPLETED | OUTPATIENT
Start: 2021-12-28 | End: 2021-12-28

## 2021-12-29 RX ORDER — METHYLPREDNISOLONE ACETATE 80 MG/ML
1 INJECTION, SUSPENSION INTRA-ARTICULAR; INTRALESIONAL; INTRAMUSCULAR; SOFT TISSUE
Status: COMPLETED | OUTPATIENT
Start: 2021-12-28 | End: 2021-12-28

## 2021-12-30 ENCOUNTER — OFFICE VISIT (OUTPATIENT)
Dept: PHYSICAL THERAPY | Facility: CLINIC | Age: 36
End: 2021-12-30
Payer: OTHER MISCELLANEOUS

## 2021-12-30 DIAGNOSIS — S83.412A SPRAIN OF MEDIAL COLLATERAL LIGAMENT OF LEFT KNEE, INITIAL ENCOUNTER: Primary | ICD-10-CM

## 2021-12-30 PROCEDURE — 97110 THERAPEUTIC EXERCISES: CPT | Performed by: PHYSICAL THERAPIST

## 2021-12-30 PROCEDURE — 97140 MANUAL THERAPY 1/> REGIONS: CPT | Performed by: PHYSICAL THERAPIST

## 2021-12-30 PROCEDURE — 97112 NEUROMUSCULAR REEDUCATION: CPT | Performed by: PHYSICAL THERAPIST

## 2022-01-04 ENCOUNTER — OFFICE VISIT (OUTPATIENT)
Dept: PHYSICAL THERAPY | Facility: CLINIC | Age: 37
End: 2022-01-04
Payer: OTHER MISCELLANEOUS

## 2022-01-04 DIAGNOSIS — S83.412A SPRAIN OF MEDIAL COLLATERAL LIGAMENT OF LEFT KNEE, INITIAL ENCOUNTER: Primary | ICD-10-CM

## 2022-01-04 PROCEDURE — 97110 THERAPEUTIC EXERCISES: CPT

## 2022-01-04 PROCEDURE — 97140 MANUAL THERAPY 1/> REGIONS: CPT

## 2022-01-04 PROCEDURE — 97112 NEUROMUSCULAR REEDUCATION: CPT

## 2022-01-04 NOTE — PROGRESS NOTES
Daily Note     Today's date: 2022  Patient name: Rosalina Burroughs  :   MRN: [id number]  Referring provider: Terrell Sheehan PA-C  Dx:   Encounter Diagnosis     ICD-10-CM    1  Sprain of medial collateral ligament of left knee, initial encounter  S83 197A                   Subjective: Pt reports mild improvement in pain levels since lv, but not much and she spent most of the weekend off of her feet  She didn't feel McConnel tape helped too much  She reports her brace gives her some increased stability and less pain  Objective: See treatment diary below      Assessment: Pt better able to straighten knee and able to resume some TE this visit with fair tolerance  Most irritability with quad set and SLR flexion, able to complete other exercises without issue  Good tolerance to Graston with anterior and posterior tissue restriction noted  Will plan to trial BFR nv  Pt would benefit from continued PT to improve current deficits and maximize function  Plan: Continue per plan of care  Progress treatment as tolerated  Precautions: none       Manuals    Patellar mobility  KB    KB        Ktape 3 I strips coming from tib tub  McConnel KB  KB          A/P & ER/IR mobs distraction KB distraction KB KB  KB pain        Meniscus MWM     KB  KB KB KB     graston ant & post knee  KB  SC    KB KB  KB   Re-assessment         KB    Neuro Re-Ed             Quad sets c holds 3s x15 3s x15 3s x15 HEP    3s x15 3s x15 3s x15   SAQ c holds  3s 2x10 P!    3s 2x10  3s 2x10   3s 2x10 3s 2x10   LAQ c holds  3x10    x15  x10   3x10   Bridges c holds  5" 2x 10 5"  2x15 3s 2x10    5" 2x 10     Clamshells holds     5" 2x 10   5" 2x 10   5" 2x 10 5" 2x 10 NV                             Ther Ex             Pt education on HEP & POC  15 min   x8 min x8 min    x10 min   Bike     x8 min         Heel slides  x20   x15 active  x20 c strap  x5 x10     SLR flexion  3x10  2x10 2x10  2x10  2x10 2x10 3x10   SLR abduction 3x10  3x10 2x10  2x10  2x10 2x10 3x10   SLR extension  3x10  3x10 2x10    2x10 2x10 3x10   Bridge c ham curl on PB           x10                Ther Activity             Heel toe gait education                           Gait Training                                       Modalities

## 2022-01-06 ENCOUNTER — OFFICE VISIT (OUTPATIENT)
Dept: PHYSICAL THERAPY | Facility: CLINIC | Age: 37
End: 2022-01-06
Payer: OTHER MISCELLANEOUS

## 2022-01-06 DIAGNOSIS — S83.412A SPRAIN OF MEDIAL COLLATERAL LIGAMENT OF LEFT KNEE, INITIAL ENCOUNTER: Primary | ICD-10-CM

## 2022-01-06 PROCEDURE — 97112 NEUROMUSCULAR REEDUCATION: CPT | Performed by: PHYSICAL THERAPIST

## 2022-01-06 NOTE — PROGRESS NOTES
Daily Note     Today's date: 2022  Patient name: Nancy Ellison  :   MRN: [id number]  Referring provider: Kristan Roberts PA-C  Dx:   Encounter Diagnosis     ICD-10-CM    1  Sprain of medial collateral ligament of left knee, initial encounter  S83 412A                   Subjective: Pt reports that that 90% sharp pain that she previously has been getting has decreased  Objective: See treatment diary below      Assessment: Pt was informed of risks and benefits of BFR and pt has given informed consent to perform manual therapy  She was able to integrate BFR this visit without increases in sx's  She was able to perform quad sets, SAQ, and LAQs without reproduction of pain this visit compared to previous session which LAQs/SAQ would reproduce her pain  Plan: Continue per plan of care  Progress treatment as tolerated  Precautions: none       Manuals    Patellar mobility  KB            Ktape 3 I strips coming from tib tub  McConnel KB           A/P & ER/IR mobs distraction KB distraction KB KB          Meniscus MWM      KB KB KB     graston ant & post knee  KB  SC    KB KB  KB   Re-assessment         KB    Neuro Re-Ed             Quad sets c holds 3s x15 3s x15 3s x15 BFR    3s x15 3s x15 3s x15   SAQ c holds  3s 2x10 P!    BFR  3s 2x10   3s 2x10 3s 2x10   LAQ c holds  3x10   BFR   x10   3x10   Bridges c holds  5" 2x 10 5"  2x15     5" 2x 10     Clamshells holds       5" 2x 10   5" 2x 10 5" 2x 10 NV                             Ther Ex             Pt education on HEP & POC  15 min    x8 min    x10 min   Bike              Heel slides  x20     x20 c strap  x5 x10     SLR flexion  3x10  2x10   2x10  2x10 2x10 3x10   SLR abduction 3x10  3x10   2x10  2x10 2x10 3x10   SLR extension  3x10  3x10     2x10 2x10 3x10   Bridge c ham curl on PB           x10                Ther Activity             Heel toe gait education                           Gait Training                                       Modalities

## 2022-01-11 ENCOUNTER — OFFICE VISIT (OUTPATIENT)
Dept: PHYSICAL THERAPY | Facility: CLINIC | Age: 37
End: 2022-01-11
Payer: OTHER MISCELLANEOUS

## 2022-01-11 DIAGNOSIS — S83.412A SPRAIN OF MEDIAL COLLATERAL LIGAMENT OF LEFT KNEE, INITIAL ENCOUNTER: Primary | ICD-10-CM

## 2022-01-11 PROCEDURE — 97112 NEUROMUSCULAR REEDUCATION: CPT | Performed by: PHYSICAL THERAPIST

## 2022-01-11 NOTE — PROGRESS NOTES
Daily Note     Today's date: 2022  Patient name: Fernando Howell  :   MRN: [id number]  Referring provider: Skyler Mendez PA-C  Dx:   Encounter Diagnosis     ICD-10-CM    1  Sprain of medial collateral ligament of left knee, initial encounter  S83 412A                   Subjective: Pt reports that she had medial pain after last session but the widespread pain she was having is decreased  She reports that she was able to bend over to  a kettle bell without any pain so she feels this is an improvement  Objective: See treatment diary below      Assessment: Pt was able to complete BFR exercises this visit with more fatigue during session compared to previous session  Despite fatigue she was able to complete additional exercises without major increases in sx's  Will continue to progress BFR exercises as able  Plan: Continue per plan of care  Progress treatment as tolerated  Precautions: none       Manuals    Patellar mobility  KB            Ktape 3 I strips coming from tib tub  McConnel KB           A/P & ER/IR mobs distraction KB distraction KB KB          Meniscus MWM        KB     graston ant & post knee  KB  SC     KB  KB   Re-assessment         KB    Neuro Re-Ed             Quad sets c holds 3s x15 3s x15 3s x15 BFR BFR   3s x15 3s x15 3s x15   SAQ c holds  3s 2x10 P!    BFR BFR    3s 2x10 3s 2x10   LAQ c holds  3x10   BFR BFR     3x10   Bridges c holds  5" 2x 10 5"  2x15  5"  2x15   5" 2x 10     Clamshells holds         5" 2x 10 5" 2x 10 NV                             Ther Ex             Pt education on HEP & POC  15 min        x10 min   Bike              Heel slides  x20       x10     SLR flexion  3x10  2x10  3x10   2x10 2x10 3x10   SLR abduction 3x10  3x10     2x10 2x10 3x10   SLR extension  3x10  3x10     2x10 2x10 3x10   Bridge c ham curl on PB           x10                Ther Activity             Heel toe gait education Gait Training                                       Modalities

## 2022-01-13 ENCOUNTER — OFFICE VISIT (OUTPATIENT)
Dept: PHYSICAL THERAPY | Facility: CLINIC | Age: 37
End: 2022-01-13
Payer: OTHER MISCELLANEOUS

## 2022-01-13 DIAGNOSIS — S83.412A SPRAIN OF MEDIAL COLLATERAL LIGAMENT OF LEFT KNEE, INITIAL ENCOUNTER: Primary | ICD-10-CM

## 2022-01-13 PROCEDURE — 97110 THERAPEUTIC EXERCISES: CPT | Performed by: PHYSICAL THERAPIST

## 2022-01-13 PROCEDURE — 97112 NEUROMUSCULAR REEDUCATION: CPT | Performed by: PHYSICAL THERAPIST

## 2022-01-13 NOTE — PROGRESS NOTES
Daily Note     Today's date: 2022  Patient name: Nancy Ellison  : [id number]  MRN: [id number]  Referring provider: Kristan Roberts PA-C  Dx:   Encounter Diagnosis     ICD-10-CM    1  Sprain of medial collateral ligament of left knee, initial encounter  S83 568A                   Subjective: Pt reports that she wasn't as sore after this last session of BFR as she was the last time  Objective: See treatment diary below      Assessment: Pt was able to intergrade SLR into BFR program this visit, this is an improvement as she has not been able to complete this amount into her program previously  Plan: Continue per plan of care  Progress treatment as tolerated  Precautions: none       Manuals    Patellar mobility  KB            Ktape 3 I strips coming from tib tub  McConnel KB           A/P & ER/IR mobs distraction KB distraction KB KB          Meniscus MWM        KB     graston ant & post knee  KB  SC     KB  KB   Re-assessment         KB    Neuro Re-Ed             Quad sets c holds 3s x15 3s x15 3s x15 BFR BFR BFR  3s x15 3s x15 3s x15   SAQ c holds  3s 2x10 P!    BFR BFR BFR   3s 2x10 3s 2x10   LAQ c holds  3x10   BFR BFR BFR    3x10   Bridges c holds  5" 2x 10 5"  2x15  5"  2x15   5" 2x 10     Clamshells holds         5" 2x 10 5" 2x 10 NV                             Ther Ex             Pt education on HEP & POC  15 min        x10 min   Bike              Heel slides  x20       x10     SLR flexion  3x10  2x10  3x10 BFR  2x10 2x10 3x10   SLR abduction 3x10  3x10     2x10 2x10 3x10   SLR extension  3x10  3x10     2x10 2x10 3x10   Bridge c ham curl on PB           x10                Ther Activity             Heel toe gait education                           Gait Training                                       Modalities

## 2022-01-18 ENCOUNTER — OFFICE VISIT (OUTPATIENT)
Dept: PHYSICAL THERAPY | Facility: CLINIC | Age: 37
End: 2022-01-18
Payer: OTHER MISCELLANEOUS

## 2022-01-18 DIAGNOSIS — S83.412A SPRAIN OF MEDIAL COLLATERAL LIGAMENT OF LEFT KNEE, INITIAL ENCOUNTER: Primary | ICD-10-CM

## 2022-01-18 PROCEDURE — 97112 NEUROMUSCULAR REEDUCATION: CPT | Performed by: PHYSICAL THERAPIST

## 2022-01-18 PROCEDURE — 97110 THERAPEUTIC EXERCISES: CPT | Performed by: PHYSICAL THERAPIST

## 2022-01-18 NOTE — PROGRESS NOTES
Daily Note     Today's date: 2022  Patient name: Trevor Irving  : [id number]  MRN: [id number]  Referring provider: Leida Medina PA-C  Dx:   Encounter Diagnosis     ICD-10-CM    1  Sprain of medial collateral ligament of left knee, initial encounter  S83 460A                   Subjective: Pt reports that she is getting more stabbing pain again in her knee  Objective: See treatment diary below      Assessment: Regressed exercises with BFR this visit as she got increased pain since last visit  [place] of gapping medial side with ktape to decrease sensitivity in the medial part of the knee  Plan: Continue per plan of care  Progress treatment as tolerated  Precautions: none       Manuals    Patellar mobility  KB            Ktape 3 I strips coming from tib tub  McConnel KB     gapping med knee KB      A/P & ER/IR mobs distraction KB distraction KB KB          Meniscus MWM             graston ant & post knee  KB  SC       KB   Re-assessment             Neuro Re-Ed             Quad sets c holds 3s x15 3s x15 3s x15 BFR BFR BFR BFR    3s x15   SAQ c holds  3s 2x10 P!    BFR BFR BFR    3s 2x10   LAQ c holds  3x10   BFR BFR BFR BFR   3x10   Bridges c holds  5" 2x 10 5"  2x15  5"  2x15        Clamshells holds           NV                             Ther Ex             Pt education on HEP & POC  15 min        x10 min   Bike              Heel slides  x20            SLR flexion  3x10  2x10  3x10 BFR    3x10   SLR abduction 3x10  3x10       3x10   SLR extension  3x10  3x10       3x10   Bridge c ham curl on PB           x10                Ther Activity             Heel toe gait education                           Gait Training                                       Modalities

## 2022-01-19 ENCOUNTER — TELEPHONE (OUTPATIENT)
Dept: FAMILY MEDICINE CLINIC | Facility: CLINIC | Age: 37
End: 2022-01-19

## 2022-01-19 DIAGNOSIS — Z11.59 SCREENING FOR VIRAL DISEASE: Primary | ICD-10-CM

## 2022-01-19 DIAGNOSIS — B34.9 VIRAL INFECTION, UNSPECIFIED: ICD-10-CM

## 2022-01-19 DIAGNOSIS — Z20.822 EXPOSURE TO COVID-19 VIRUS: ICD-10-CM

## 2022-01-19 PROCEDURE — U0003 INFECTIOUS AGENT DETECTION BY NUCLEIC ACID (DNA OR RNA); SEVERE ACUTE RESPIRATORY SYNDROME CORONAVIRUS 2 (SARS-COV-2) (CORONAVIRUS DISEASE [COVID-19]), AMPLIFIED PROBE TECHNIQUE, MAKING USE OF HIGH THROUGHPUT TECHNOLOGIES AS DESCRIBED BY CMS-2020-01-R: HCPCS | Performed by: NURSE PRACTITIONER

## 2022-01-19 PROCEDURE — U0005 INFEC AGEN DETEC AMPLI PROBE: HCPCS | Performed by: NURSE PRACTITIONER

## 2022-01-19 NOTE — TELEPHONE ENCOUNTER
Pt calls with c/o sore throat, dry cough, fatigue, congestion, post nasal drip, headaches, sneezing  Pt was exposed to covid at work  She works for [employer]  Had a rapid test done at work yesterday that was negative but she would like a PCR to confirm  Please order and pt will come to the tent this afternoon  No need to call, aware of hours  Thanks!

## 2022-01-20 ENCOUNTER — TELEMEDICINE (OUTPATIENT)
Dept: FAMILY MEDICINE CLINIC | Facility: CLINIC | Age: 37
End: 2022-01-20
Payer: COMMERCIAL

## 2022-01-20 ENCOUNTER — APPOINTMENT (OUTPATIENT)
Dept: PHYSICAL THERAPY | Facility: CLINIC | Age: 37
End: 2022-01-20
Payer: OTHER MISCELLANEOUS

## 2022-01-20 VITALS — TEMPERATURE: 98.6 F | HEIGHT: 66 IN | WEIGHT: 174 LBS | BODY MASS INDEX: 27.97 KG/M2

## 2022-01-20 DIAGNOSIS — U07.1 COVID-19: Primary | ICD-10-CM

## 2022-01-20 PROCEDURE — 99213 OFFICE O/P EST LOW 20 MIN: CPT | Performed by: NURSE PRACTITIONER

## 2022-01-20 NOTE — ASSESSMENT & PLAN NOTE
DW patient she is improving from her symptoms and is planning to quarantine 5 days and mask following

## 2022-01-20 NOTE — PROGRESS NOTES
COVID-19 Outpatient Progress Note    Assessment/Plan:    Problem List Items Addressed This Visit        Other    COVID-19 - Primary     DW patient she is improving from her symptoms and is planning to quarantine 5 days and mask following               Disposition:     Patient is fully vaccinated and I recommended self quarantine for 5 days followed by strict mask use for an additional 5 days  If patient were to develop symptoms, they should immediately self isolate and call our office for further guidance  I have spent 15 minutes directly with the patient  Greater than 50% of this time was spent in counseling/coordination of care regarding: diagnostic results, prognosis, risks and benefits of treatment options, instructions for management, patient and family education, importance of treatment compliance, risk factor reductions and impressions  Encounter provider JULIEN Wooten    Provider located at [place] [address]  [address] [place] [id number]    Recent Visits  Date Type Provider Dept   01/19/22 Telephone Jolly Mims Pg [address] [place] [address] recent visits within past 7 days and meeting all other requirements  Today's Visits  Date Type Provider Dept   01/20/22 Telemedicine JULIEN Wooten [facility]   Showing today's visits and meeting all other requirements  Future Appointments  No visits were found meeting these conditions  Showing future appointments within next 150 days and meeting all other requirements       Patient agrees to participate in a virtual check in via telephone or video visit instead of presenting to the office to address urgent/immediate medical needs  Patient is aware this is a billable service  After connecting through [facility], the patient was identified by name and date of birth   Sidney Tan was informed that this was a telemedicine visit and that the exam was being conducted confidentially over secure lines  My office door was closed  No one else was in the room  Audie Cam acknowledged consent and understanding of privacy and security of the telemedicine visit  I informed the patient that I have reviewed her record in Epic and presented the opportunity for her to ask any questions regarding the visit today  The patient agreed to participate  Verification of patient location:  Patient is located in the following state in which I hold an active license: [place]    Subjective:   Audie Cam is a 39 y o  female who is concerned about COVID-19  Patient's symptoms include fatigue, malaise, nasal congestion, sore throat, cough and headache  Patient denies fever, chills, rhinorrhea, anosmia, loss of taste, shortness of breath, chest tightness, abdominal pain, nausea, vomiting, diarrhea and myalgias  - Date of symptom onset: 1/17/2022  - Date of exposure: 1/15/2022      COVID-19 vaccination status: Fully vaccinated (primary series)    Exposure:   Contact with a person who is under investigation (PUI) for or who is positive for COVID-19 within the last 14 days?: Yes    Hospitalized recently for fever and/or lower respiratory symptoms?: No      Currently a healthcare worker that is involved in direct patient care?: Yes      Works in a special setting where the risk of COVID-19 transmission may be high? (this may include long-term care, correctional and assisted facilities; homeless shelters; assisted-living facilities and group homes ): No      Resident in a special setting where the risk of COVID-19 transmission may be high? 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 and did have her primary series and had covid back in April 2021       Lab Results   Component Value Date    SARSCOV2 Positive (A) 01/19/2022    SARSCOV2 Detected (A) 04/13/2020     Past Medical History:   Diagnosis Date    Allergic     Anemia     Anxiety     Arthritis     Asthma     Depression     Exposure to SARS-associated coronavirus 4/12/2020    Headache(784 0)     Lumbar herniated disc     Pneumonia     Urinary tract infection     UTI (urinary tract infection)     Visual impairment      Past Surgical History:   Procedure Laterality Date    WISDOM TOOTH EXTRACTION       Current Outpatient Medications   Medication Sig Dispense Refill    buPROPion (Wellbutrin XL) 150 mg 24 hr tablet Take 1 tablet (150 mg total) by mouth every morning 30 tablet 5    calcium carbonate (OS-BERENICE) 600 MG tablet Take 600 mg by mouth 2 (two) times a day with meals      cholecalciferol (VITAMIN D3) 1,000 units tablet Take 1,000 Units by mouth daily      DULoxetine (CYMBALTA) 60 mg delayed release capsule Take 1 capsule (60 mg total) by mouth daily 30 capsule 5    Ferrous Sulfate (RA IRON PO) Take by mouth      Magnesium Oxide 500 MG TABS Take by mouth       No current facility-administered medications for this visit  Allergies   Allergen Reactions    Sulfa Antibiotics Anaphylaxis    Levofloxacin      Other reaction(s): sensative    Tobramycin      Other reaction(s): sensative       Review of Systems   Constitutional: Positive for fatigue  Negative for chills and fever  HENT: Positive for congestion and sore throat  Negative for rhinorrhea  Respiratory: Positive for cough  Negative for chest tightness and shortness of breath  Gastrointestinal: Negative for abdominal pain, diarrhea, nausea and vomiting  Musculoskeletal: Negative for myalgias  Neurological: Positive for headaches       Objective:    Vitals:    01/20/22 1341   Temp: 98 6 °F (37 °C)   Weight: 78 9 kg (174 lb)   Height: 5' 6" (1 676 m)       Physical Exam  HENT:      Nose: Nose normal       Mouth/Throat:      Mouth: Mucous membranes are moist    Pulmonary:      Effort: Pulmonary effort is normal       Breath sounds: Normal breath sounds  Neurological:      Mental Status: She is alert and oriented to person, place, and time  Psychiatric:         Mood and Affect: Mood normal          Behavior: Behavior normal          Thought Content: Thought content normal          Judgment: Judgment normal          VIRTUAL VISIT DISCLAIMER    Michell Rc verbally agrees to participate in [facility]  Pt is aware that [facility] could be limited without vital signs or the ability to perform a full hands-on physical Shraddha Menjivar understands she or the provider may request at any time to terminate the video visit and request the patient to seek care or treatment in person

## 2022-01-24 ENCOUNTER — OFFICE VISIT (OUTPATIENT)
Dept: FAMILY MEDICINE CLINIC | Facility: CLINIC | Age: 37
End: 2022-01-24
Payer: COMMERCIAL

## 2022-01-24 VITALS
DIASTOLIC BLOOD PRESSURE: 82 MMHG | WEIGHT: 172 LBS | HEIGHT: 66 IN | SYSTOLIC BLOOD PRESSURE: 122 MMHG | BODY MASS INDEX: 27.64 KG/M2 | OXYGEN SATURATION: 98 % | HEART RATE: 86 BPM | TEMPERATURE: 97.4 F

## 2022-01-24 DIAGNOSIS — F33.1 MODERATE EPISODE OF RECURRENT MAJOR DEPRESSIVE DISORDER (HCC): Primary | ICD-10-CM

## 2022-01-24 DIAGNOSIS — U07.1 COVID-19: ICD-10-CM

## 2022-01-24 PROCEDURE — 99213 OFFICE O/P EST LOW 20 MIN: CPT | Performed by: NURSE PRACTITIONER

## 2022-01-24 NOTE — PROGRESS NOTES
Assessment/Plan:           Problem List Items Addressed This Visit        Other    Moderate episode of recurrent major depressive disorder ([address] ) - Primary     Patient will continue with Cymbalta 60 mg and Wellbutrin 150 mg daily          COVID-19     Patient is recovering and is continuing to improve                  Subjective:      Patient ID: Sachi Yepez is a 39 y o  female  Patient here for follow up and is 7 days out from her COVID infection and is still having some fatigue and is planning to RTW tomorrow  Patient here about her mental health and reports that she has been anxious lately and admits she is doing this to herself and has not noticed a difference with the wellbutrin Patient had been started on the Wellbutrin in 12/2021 added on to the Cymbalta 60 mg and reports that her depression is not worse  The following portions of the patient's history were reviewed and updated as appropriate:   She  has a past medical history of Allergic, Anemia, Anxiety, Arthritis, Asthma, Depression, Exposure to SARS-associated coronavirus (4/12/2020), Headache(784 0), Lumbar herniated disc, Pneumonia, Urinary tract infection, UTI (urinary tract infection), and Visual impairment  She   Patient Active Problem List    Diagnosis Date Noted    COVID-19 01/20/2022    Sprain of medial collateral ligament of left knee 12/29/2021    Annual physical exam 09/23/2021    Glaucoma suspect of left eye 08/17/2021    Intervertebral disc prolapse 06/23/2021    Restless legs syndrome 09/01/2020    Menstrual migraine without status migrainosus, not intractable 12/13/2019    Moderate episode of recurrent major depressive disorder ([facility] [address] ) 09/10/2019    Menorrhagia with regular cycle 09/10/2019     She  has a past surgical history that includes [place] tooth extraction    Her family history includes Alcohol abuse in her father; Breast cancer additional onset in her maternal grandmother and mother; Cancer in her maternal grandfather; Completed Suicide  in her maternal grandfather; Diabetes in her father, maternal grandmother, and mother; Hyperlipidemia in her father; Hypertension in her father and mother; Mental illness in her father; Prostate cancer in her maternal grandfather  She  reports that she has never smoked  She has never used smokeless tobacco  She reports current alcohol use  She reports that she does not use drugs  She is allergic to sulfa antibiotics, levofloxacin, prednisone, and tobramycin       Review of Systems   Constitutional: Negative for activity change, appetite change, chills, diaphoresis, fatigue, fever and unexpected weight change  HENT: Negative for congestion, ear pain, hearing loss, postnasal drip, sinus pressure, sinus pain, sneezing and sore throat  Eyes: Negative for pain, redness and visual disturbance  Respiratory: Negative for cough and shortness of breath  Cardiovascular: Negative for chest pain and leg swelling  Gastrointestinal: Negative for abdominal pain, diarrhea, nausea and vomiting  Endocrine: Negative  Genitourinary: Negative  Musculoskeletal: Positive for arthralgias  Skin: Negative  Allergic/Immunologic: Negative  Neurological: Negative for dizziness and light-headedness  Hematological: Negative  Psychiatric/Behavioral: Negative for behavioral problems and dysphoric mood  The patient is nervous/anxious  Objective:      /82   Pulse 86   Temp (!) 97 4 °F (36 3 °C)   Ht 5' 6" (1 676 m)   Wt 78 kg (172 lb)   SpO2 98%   BMI 27 76 kg/m²          Physical Exam  Vitals and nursing note reviewed  Constitutional:       General: She is not in acute distress  Appearance: She is well-developed  HENT:      Head: Normocephalic and atraumatic        Right Ear: Tympanic membrane normal       Left Ear: Tympanic membrane normal       Nose: Nose normal       Mouth/Throat:      Mouth: Mucous membranes are moist    Eyes:      Pupils: Pupils are equal, round, and reactive to light  Neck:      Thyroid: No thyromegaly  Cardiovascular:      Rate and Rhythm: Normal rate and regular rhythm  Heart sounds: Normal heart sounds  No murmur heard  Pulmonary:      Effort: Pulmonary effort is normal  No respiratory distress  Breath sounds: Normal breath sounds  No wheezing  Abdominal:      General: Bowel sounds are normal       Palpations: Abdomen is soft  Musculoskeletal:         General: Normal range of motion  Cervical back: Normal range of motion  Skin:     General: Skin is warm and dry  Neurological:      General: No focal deficit present  Mental Status: She is alert and oriented to person, place, and time  Psychiatric:         Mood and Affect: Mood normal          Behavior: Behavior normal          Thought Content: Thought content normal          Judgment: Judgment normal          PHQ-2/9 Depression Screening    Little interest or pleasure in doing things: 1 - several days  Feeling down, depressed, or hopeless: 0 - not at all  Trouble falling or staying asleep, or sleeping too much: 1 - several days  Feeling tired or having little energy: 0 - not at all  Poor appetite or overeatin - not at all  Feeling bad about yourself - or that you are a failure or have let yourself or your family down: 1 - several days  Trouble concentrating on things, such as reading the newspaper or watching television: 1 - several days  Moving or speaking so slowly that other people could have noticed   Or the opposite - being so fidgety or restless that you have been moving around a lot more than usual: 0 - not at all  Thoughts that you would be better off dead, or of hurting yourself in some way: 0 - not at all  PHQ-9 Score: 4   PHQ-9 Interpretation: No or Minimal depression

## 2022-01-25 ENCOUNTER — OFFICE VISIT (OUTPATIENT)
Dept: PHYSICAL THERAPY | Facility: CLINIC | Age: 37
End: 2022-01-25
Payer: OTHER MISCELLANEOUS

## 2022-01-25 DIAGNOSIS — S83.412A SPRAIN OF MEDIAL COLLATERAL LIGAMENT OF LEFT KNEE, INITIAL ENCOUNTER: Primary | ICD-10-CM

## 2022-01-25 PROCEDURE — 97110 THERAPEUTIC EXERCISES: CPT | Performed by: PHYSICAL THERAPIST

## 2022-01-25 PROCEDURE — 97112 NEUROMUSCULAR REEDUCATION: CPT | Performed by: PHYSICAL THERAPIST

## 2022-01-25 NOTE — PROGRESS NOTES
Daily Note     Today's date: 2022  Patient name: Nancy Ellison  :   MRN: [id number]  Referring provider: Kristan Roberts PA-C  Dx:   Encounter Diagnosis     ICD-10-CM    1  Sprain of medial collateral ligament of left knee, initial encounter  S83 412A                   Subjective: Pt reports that she had covid and was unable to attend sessions  She reports that she has remained compliant with her HEP  But she did some kneeling and walking on the TM and her pain has been increased since  She is continuing to have stabbing pain which concerns her  She also notes that she pivoted at her knee this weekend and that brought on a lot of pain  Objective: See treatment diary below      Assessment: Trial of performing BFR with the Nustep this visit to integrate gentle resistive motion  She was able to perform exercises with the BFR despite mild pain  She continues to have joint line tenderness and pain with rotational movements of the knee  At this point I will contact her primary W/C as she is continuing to have high levels of pain despite increasing strength and motion with additional conservative management of an injection  Plan: Continue per plan of care  Progress treatment as tolerated  Precautions: none       Manuals    Patellar mobility  KB            Ktape 3 I strips coming from tib tub  McConnel KB     gapping med knee KB      A/P & ER/IR mobs distraction KB distraction KB KB          Meniscus MWM             graston ant & post knee  KB  SC       KB   Re-assessment             Neuro Re-Ed             Quad sets c holds 3s x15 3s x15 3s x15 BFR BFR BFR BFR  BFR  3s x15   SAQ c holds  3s 2x10 P!    BFR BFR BFR    3s 2x10   LAQ c holds  3x10   BFR BFR BFR BFR BFR  3x10   Bridges c holds  5" 2x 10 5"  2x15  5"  2x15        Clamshells holds           NV                             Ther Ex             Pt education on HEP & POC  15 min        x10 min   Bike         Nustep BFR     Heel slides  x20            SLR flexion  3x10  2x10  3x10 BFR    3x10   SLR abduction 3x10  3x10       3x10   SLR extension  3x10  3x10       3x10   Bridge c ham curl on PB           x10                Ther Activity             Heel toe gait education                           Gait Training                                       Modalities

## 2022-01-27 ENCOUNTER — OFFICE VISIT (OUTPATIENT)
Dept: PHYSICAL THERAPY | Facility: CLINIC | Age: 37
End: 2022-01-27
Payer: OTHER MISCELLANEOUS

## 2022-01-27 DIAGNOSIS — S83.412A SPRAIN OF MEDIAL COLLATERAL LIGAMENT OF LEFT KNEE, INITIAL ENCOUNTER: Primary | ICD-10-CM

## 2022-01-27 PROCEDURE — 97110 THERAPEUTIC EXERCISES: CPT

## 2022-01-27 PROCEDURE — 97112 NEUROMUSCULAR REEDUCATION: CPT

## 2022-01-27 NOTE — PROGRESS NOTES
Daily Note     Today's date: 2022  Patient name: Trevor Irving  :   MRN: [id number]  Referring provider: Leida Medina PA-C  Dx:   Encounter Diagnosis     ICD-10-CM    1  Sprain of medial collateral ligament of left knee, initial encounter  S83 412A        Start Time: 482  Stop Time: 173  Total time in clinic (min): 40 minutes    Subjective: Pt reports she is feeling sore today  Has been trying to get in touch with her  as to which step is the next step for her course of care  Objective: See treatment diary below      Assessment: Tolerated treatment well with slight exacerbation of pain from 4/10 to 6/10 by end of session  Patient demonstrated fatigue post treatment, exhibited good technique with therapeutic exercises and would benefit from continued PT      Plan: Continue per plan of care  Precautions: none       Manuals    Patellar mobility  KB            Ktape 3 I strips coming from tib tub  McConnel KB     gapping med knee KB      A/P & ER/IR mobs distraction KB distraction KB KB          Meniscus MWM             graston ant & post knee  KB  SC       KB   Re-assessment             Neuro Re-Ed             Quad sets c holds 3s x15 3s x15 3s x15 BFR BFR BFR BFR  BFR BFR 3s x15   SAQ c holds  3s 2x10 P!    BFR BFR BFR    3s 2x10   LAQ c holds  3x10   BFR BFR BFR BFR BFR BFR 3x10   Bridges c holds  5" 2x 10 5"  2x15  5"  2x15        Clamshells holds           NV                             Ther Ex             Pt education on HEP & POC  15 min        x10 min   Bike         Nustep BFR Nustep BFR    Heel slides  x20            SLR flexion  3x10  2x10  3x10 BFR   BFR 3x10   SLR abduction 3x10  3x10       3x10   SLR extension  3x10  3x10       3x10   Bridge c ham curl on PB           x10                Ther Activity             Heel toe gait education                           Gait Training Modalities

## 2022-02-02 ENCOUNTER — APPOINTMENT (OUTPATIENT)
Dept: URGENT CARE | Facility: CLINIC | Age: 37
End: 2022-02-02
Payer: OTHER MISCELLANEOUS

## 2022-02-02 PROCEDURE — 99214 OFFICE O/P EST MOD 30 MIN: CPT

## 2022-02-03 ENCOUNTER — OFFICE VISIT (OUTPATIENT)
Dept: PHYSICAL THERAPY | Facility: CLINIC | Age: 37
End: 2022-02-03
Payer: OTHER MISCELLANEOUS

## 2022-02-03 DIAGNOSIS — S83.412A SPRAIN OF MEDIAL COLLATERAL LIGAMENT OF LEFT KNEE, INITIAL ENCOUNTER: Primary | ICD-10-CM

## 2022-02-03 PROCEDURE — 97112 NEUROMUSCULAR REEDUCATION: CPT

## 2022-02-03 PROCEDURE — 97110 THERAPEUTIC EXERCISES: CPT

## 2022-02-03 NOTE — PROGRESS NOTES
Daily Note     Today's date: 2/3/2022  Patient name: Daisha Cohen  :   MRN: [id number]  Referring provider: Cortez Thomas PA-C  Dx:   Encounter Diagnosis     ICD-10-CM    1  Sprain of medial collateral ligament of left knee, initial encounter  S83 412A                   Subjective: Had a FU with ortho yesterday, waiting for approval from [facility] on arthrogram        Objective: See treatment diary below      Assessment: Tolerated treatment fair  Minimal antalgic gait noted  Implemented program as below, with focus on LLE strengthening in open chain to avoid exacerbation of symptoms  She continues to have joint line tenderness  Slight exacerbation of symptoms at the end of completing Nu-step  Continued PT would be beneficial to improve function           Plan: Continue per plan of care  Precautions: none       Manuals 12/23 12/30 1/4 1/6 1/11 1/13 1/18 1/25 1/27 2/3   Patellar mobility  KB            Ktape 3 I strips coming from tib tub  McConnel KB     gapping med knee KB      A/P & ER/IR mobs distraction KB distraction KB KB          Meniscus MWM             graston ant & post knee  KB  SC          Re-assessment             Neuro Re-Ed             Quad sets c holds 3s x15 3s x15 3s x15 BFR BFR BFR BFR  BFR BFR BFR   SAQ c holds  3s 2x10 P!    BFR BFR BFR       LAQ c holds  3x10   BFR BFR BFR BFR BFR BFR BFR   Bridges c holds  5" 2x 10 5"  2x15  5"  2x15        Clamshells holds                                        Ther Ex             Pt education on HEP & POC  15 min           Bike         Nustep BFR Nustep BFR Nustep BFR   Heel slides  x20            SLR flexion  3x10  2x10  3x10 BFR   BFR BFR   SLR abduction 3x10  3x10          SLR extension  3x10  3x10          Bridge c ham curl on PB                           Ther Activity             Heel toe gait education                           Gait Training                                       Modalities

## 2022-02-08 ENCOUNTER — OFFICE VISIT (OUTPATIENT)
Dept: PHYSICAL THERAPY | Facility: CLINIC | Age: 37
End: 2022-02-08
Payer: OTHER MISCELLANEOUS

## 2022-02-08 DIAGNOSIS — S83.412A SPRAIN OF MEDIAL COLLATERAL LIGAMENT OF LEFT KNEE, INITIAL ENCOUNTER: Primary | ICD-10-CM

## 2022-02-08 PROCEDURE — 97110 THERAPEUTIC EXERCISES: CPT

## 2022-02-08 PROCEDURE — 97112 NEUROMUSCULAR REEDUCATION: CPT

## 2022-02-08 NOTE — PROGRESS NOTES
Daily Note     Today's date: 2022  Patient name: Sidney Tan  : [birth year]  MRN: [id number]  Referring provider: Daina Downs PA-C  Dx:   Encounter Diagnosis     ICD-10-CM    1  Sprain of medial collateral ligament of left knee, initial encounter  K43 [id number]                   Subjective: Patient stated that her knee felt good for a couple of days after LV  Onset of lateral knee pain over the weekend, no specific aggravating factor  She is approved for the arthrogram, it is scheduled for , but she will try get it sooner  Objective: See treatment diary below      Assessment: Tolerated treatment fair  Patient is able to TE with BFR without exacerbation  Progress as able  Continued PT would be beneficial to improve function           Plan: Continue per plan of care         Precautions: none       Manuals 1/4 1/6 1/11 1/13 1/18 1/25 1/27 2/3 2/8    Patellar mobility              Ktape 3 I strips coming from tib tub     gapping med knee KB        A/P & ER/IR mobs KB            Meniscus MWM             graston ant & post knee  SC            Re-assessment             Neuro Re-Ed             Quad sets c holds 3s x15 BFR BFR BFR BFR  BFR BFR BFR BFR    SAQ c holds   BFR BFR BFR         LAQ c holds   BFR BFR BFR BFR BFR BFR BFR BFR    Bridges c holds 5"  2x15  5"  2x15          Clamshells holds                                        Ther Ex             Pt education on HEP & POC             Bike       Nustep BFR Nustep BFR Nustep BFR BFR    Heel slides              SLR flexion  2x10  3x10 BFR   BFR BFR BFR    SLR abduction 3x10            SLR extension  3x10            Bridge c ham curl on PB                           Ther Activity             Heel toe gait education                           Gait Training                                       Modalities

## 2022-02-10 ENCOUNTER — OFFICE VISIT (OUTPATIENT)
Dept: PHYSICAL THERAPY | Facility: CLINIC | Age: 37
End: 2022-02-10
Payer: OTHER MISCELLANEOUS

## 2022-02-10 DIAGNOSIS — S83.412A SPRAIN OF MEDIAL COLLATERAL LIGAMENT OF LEFT KNEE, INITIAL ENCOUNTER: Primary | ICD-10-CM

## 2022-02-10 PROCEDURE — 97112 NEUROMUSCULAR REEDUCATION: CPT

## 2022-02-10 NOTE — PROGRESS NOTES
Daily Note     Today's date: 2/10/2022  Patient name: Yumiko Pink  :   MRN: [id number]  Referring provider: Kim Feliciano PA-C  Dx:   Encounter Diagnosis     ICD-10-CM    1  Sprain of medial collateral ligament of left knee, initial encounter  S83 792A                   Subjective: Pt reports her L knee has been "angry" at her today with pain along both medial and lateral joint line  Notes she was standing for roughly 7 hrs at work yesterday and thinks this may be the cause for her increased symptoms today  Objective: See treatment diary below      Assessment: Tolerated treatment well  Continued with program as outlined below  No progressions made d/t subjective comments made at start of treatment  Pain reported in L knee throughout entirety of SLR intervention with BFR, but was able to complete all assigned reps/sets  Patient would benefit from continued PT  Plan: Continue per plan of care        Precautions: none       Manuals 1/4 1/6 1/11 1/13 1/18 1/25 1/27 2/3 2/8 2/10   Patellar mobility              Ktape 3 I strips coming from tib tub     gapping med knee KB        A/P & ER/IR mobs KB            Meniscus MWM             graston ant & post knee  SC            Re-assessment             Neuro Re-Ed             Quad sets c holds 3s x15 BFR BFR BFR BFR  BFR BFR BFR BFR BFR   SAQ c holds   BFR BFR BFR         LAQ c holds   BFR BFR BFR BFR BFR BFR BFR BFR BFR   Bridges c holds 5"  2x15  5"  2x15          Clamshells holds                                        Ther Ex             Pt education on HEP & POC             Bike       Nustep BFR Nustep BFR Nustep BFR BFR BFR   Heel slides              SLR flexion  2x10  3x10 BFR   BFR BFR BFR BFR   SLR abduction 3x10            SLR extension  3x10            Bridge c ham curl on PB                           Ther Activity             Heel toe gait education                           Gait Training                                       Modalities

## 2022-02-15 ENCOUNTER — OFFICE VISIT (OUTPATIENT)
Dept: PHYSICAL THERAPY | Facility: CLINIC | Age: 37
End: 2022-02-15
Payer: OTHER MISCELLANEOUS

## 2022-02-15 DIAGNOSIS — S83.412A SPRAIN OF MEDIAL COLLATERAL LIGAMENT OF LEFT KNEE, INITIAL ENCOUNTER: Primary | ICD-10-CM

## 2022-02-15 PROCEDURE — 97140 MANUAL THERAPY 1/> REGIONS: CPT | Performed by: PHYSICAL THERAPIST

## 2022-02-15 PROCEDURE — 97110 THERAPEUTIC EXERCISES: CPT | Performed by: PHYSICAL THERAPIST

## 2022-02-15 PROCEDURE — 97112 NEUROMUSCULAR REEDUCATION: CPT | Performed by: PHYSICAL THERAPIST

## 2022-02-15 NOTE — PROGRESS NOTES
Daily Note     Today's date: 2/15/2022  Patient name: Gilda Dumont  :   MRN: [id number]  Referring provider: Gabe Heredia PA-C  Dx:   Encounter Diagnosis     ICD-10-CM    1  Sprain of medial collateral ligament of left knee, initial encounter  S83 178A                   Subjective: Pt reports that the last couple days have been flared up  Objective: See treatment diary below      Assessment: Quick assessment of the hip and low back did not produce any sx's, nor did peripheral n testing brought on sx's  Trail of standing knee flexion MWM which decreased pain with standing knee flexion however did not have carry over with ambulation  Pt was not able to perform most of BFR exercises secondary to pain  Will continue to work on strengthening within tolerance limits until arthrogram        Plan: Continue per plan of care  Progress treatment as tolerated         Precautions: none       Manuals 2/15   1/13 1/18 1/25 1/27 2/3 2/8 2/10   Patellar mobility              Ktape 3 I strips coming from tib tub     gapping med knee KB        A/P & ER/IR mobs             Meniscus MWM standing knee flexion MWM            graston ant & post knee              Hip and back assess KB             Re-assessment             Neuro Re-Ed             Quad sets c holds BFR   BFR BFR  BFR BFR BFR BFR BFR   SAQ c holds     BFR         LAQ c holds  BFR   BFR BFR BFR BFR BFR BFR BFR   Bridges c holds             Clamshells holds                                        Ther Ex             Pt education on HEP & POC x10 min            Bike       Nustep BFR Nustep BFR Nustep BFR BFR BFR   Heel slides              SLR flexion     BFR   BFR BFR BFR BFR   SLR abduction             SLR extension              Bridge c ham curl on PB                           Ther Activity             Heel toe gait education                           Gait Training                                       Modalities

## 2022-02-17 ENCOUNTER — OFFICE VISIT (OUTPATIENT)
Dept: PHYSICAL THERAPY | Facility: CLINIC | Age: 37
End: 2022-02-17
Payer: OTHER MISCELLANEOUS

## 2022-02-17 DIAGNOSIS — S83.412A SPRAIN OF MEDIAL COLLATERAL LIGAMENT OF LEFT KNEE, INITIAL ENCOUNTER: Primary | ICD-10-CM

## 2022-02-17 PROCEDURE — 97112 NEUROMUSCULAR REEDUCATION: CPT | Performed by: PHYSICAL THERAPIST

## 2022-02-17 PROCEDURE — 97140 MANUAL THERAPY 1/> REGIONS: CPT | Performed by: PHYSICAL THERAPIST

## 2022-02-17 NOTE — PROGRESS NOTES
Daily Note     Today's date: 2022  Patient name: Courtney Mcgrath  : [birth year]  MRN: [id number]  Referring provider: Neelam Willis PA-C  Dx:   Encounter Diagnosis     ICD-10-CM    1  Sprain of medial collateral ligament of left knee, initial encounter  [id number]                   Subjective: Pt reports that she felt good after her last session but yesterday       Objective: See treatment diary below      Assessment: Pt responded with decreased pain in the knee with extension MWM decreased pain  She was able to get through un-weighted quad strengthening with mild reproduction of pain with LAQs  Continued to maintain strength until MRI  Plan: Continue per plan of care  Progress treatment as tolerated         Precautions: none       Manuals 2/15 2/17  1/13 1/18 1/25 1/27 2/3 2/8 2/10   Patellar mobility              Ktape 3 I strips coming from tib tub  mcconel tape    gapping med knee KB        A/P & ER/IR mobs             Meniscus MWM standing knee flexion MWM LAQ MWM ext            graston ant & post knee              Hip and back assess KB             Re-assessment             Neuro Re-Ed             Quad sets c holds BFR BFR  BFR BFR  BFR BFR BFR BFR BFR   SAQ c holds     BFR         LAQ c holds  BFR BFR  BFR BFR BFR BFR BFR BFR BFR   Bridges c holds             Clamshells holds                                        Ther Ex             Pt education on HEP & POC x10 min            Bike       Nustep BFR Nustep BFR Nustep BFR BFR BFR   Heel slides              SLR flexion     BFR   BFR BFR BFR BFR   SLR abduction             SLR extension              Bridge c ham curl on PB                           Ther Activity             Heel toe gait education                           Gait Training                                       Modalities

## 2022-02-22 ENCOUNTER — OFFICE VISIT (OUTPATIENT)
Dept: PHYSICAL THERAPY | Facility: CLINIC | Age: 37
End: 2022-02-22
Payer: OTHER MISCELLANEOUS

## 2022-02-22 DIAGNOSIS — S83.412A SPRAIN OF MEDIAL COLLATERAL LIGAMENT OF LEFT KNEE, INITIAL ENCOUNTER: Primary | ICD-10-CM

## 2022-02-22 NOTE — NURSING NOTE
Call placed to patient to discuss upcoming left knee MRI arthrogram at [address] [place] [address] Radiology  Allergies reviewed and verified patient does not currently take any anticoagulant medications  Pre procedure instructions including diet, taking own medications and need for  discussed with patient  Patient instructed that she may eat normally and take medications as usual before the procedure  Procedure and post procedure expectations and instructions reviewed with the patient  Patient verbalizes understanding and denies any questions at this time  Reminded of the location, date and time of the expected procedure

## 2022-02-22 NOTE — PROGRESS NOTES
Pt came in for her appointment on 2/22/22 in a lot of pain  At this time patient, primary PT along with myself (Physical therapist Assistant) discussed care  Pt is scheduled for MRI on Monday 2/28/22  At this time it was discussed with patient to be placed on hold  Pt agreed and acknowledged that she agreed to be placed on hold until she received MRI results of her L knee  Subjective: Pt noted that she has pain under the patella and it  has been on bilateral sides of the patella  Strong throbbing more consistent in the mornings when she wakes up

## 2022-02-24 ENCOUNTER — APPOINTMENT (OUTPATIENT)
Dept: PHYSICAL THERAPY | Facility: CLINIC | Age: 37
End: 2022-02-24
Payer: OTHER MISCELLANEOUS

## 2022-02-25 ENCOUNTER — APPOINTMENT (OUTPATIENT)
Dept: PHYSICAL THERAPY | Facility: CLINIC | Age: 37
End: 2022-02-25
Payer: OTHER MISCELLANEOUS

## 2022-02-25 ENCOUNTER — OFFICE VISIT (OUTPATIENT)
Dept: FAMILY MEDICINE CLINIC | Facility: CLINIC | Age: 37
End: 2022-02-25
Payer: COMMERCIAL

## 2022-02-25 VITALS
HEART RATE: 93 BPM | WEIGHT: 173 LBS | DIASTOLIC BLOOD PRESSURE: 82 MMHG | SYSTOLIC BLOOD PRESSURE: 124 MMHG | OXYGEN SATURATION: 97 % | HEIGHT: 66 IN | BODY MASS INDEX: 27.8 KG/M2 | TEMPERATURE: 97.6 F

## 2022-02-25 DIAGNOSIS — N92.0 MENORRHAGIA WITH REGULAR CYCLE: ICD-10-CM

## 2022-02-25 DIAGNOSIS — R53.82 CHRONIC FATIGUE: Primary | ICD-10-CM

## 2022-02-25 DIAGNOSIS — F33.1 MODERATE EPISODE OF RECURRENT MAJOR DEPRESSIVE DISORDER (HCC): ICD-10-CM

## 2022-02-25 PROCEDURE — 99213 OFFICE O/P EST LOW 20 MIN: CPT | Performed by: NURSE PRACTITIONER

## 2022-02-25 RX ORDER — MULTIVITAMIN WITH IRON
1 TABLET ORAL DAILY
COMMUNITY

## 2022-02-25 NOTE — PROGRESS NOTES
Assessment/Plan:           Problem List Items Addressed This Visit        Other    Moderate episode of recurrent major depressive disorder ([address] )     Patient taking the Cymbalta 60 mg and Wellbutrin 150 mg          Menorrhagia with regular cycle    Relevant Orders    Comprehensive metabolic panel    CBC and differential    Iron Panel (Includes Ferritin, Iron Sat%, Iron, and TIBC)    TSH, 3rd generation with Free T4 reflex    US pelvis complete non OB      Other Visit Diagnoses     Chronic fatigue    -  Primary    Relevant Orders    Comprehensive metabolic panel    CBC and differential    Iron Panel (Includes Ferritin, Iron Sat%, Iron, and TIBC)    TSH, 3rd generation with Free T4 reflex            Subjective:      Patient ID: Sidney Tan is a 39 y o  female  Patient here for follow up and reports that she is taking Melatonin 10 mg daily at night and is helping with her sleep  Patient was started on Wellbutrin in 12/2021 for exacerbation in her anxiety    Patient here about her mental health and reports that she has been anxious lately and admits she is doing this to herself and has not noticed a difference with the wellbutrin Patient had been started on the Wellbutrin in 12/2021 added on to the Cymbalta 60 mg and reports that her depression is not worse  Patient has noticed troubles with her mental fogginess and forgetful and also very fatigued and tired and reports that she is having fatigue and sleeping more  She is also on work restrictions with her knee injury  Patient had covid in 1/19/2022 about 5 weeks since her infection  She is also feeling very scattered in her thinking         The following portions of the patient's history were reviewed and updated as appropriate:   She  has a past medical history of Allergic, Anemia, Anxiety, Arthritis, Asthma, Depression, Exposure to SARS-associated coronavirus (4/12/2020), Headache(784 0), Lumbar herniated disc, Pneumonia, Urinary tract infection, UTI (urinary tract infection), and Visual impairment  She   Patient Active Problem List    Diagnosis Date Noted    COVID-19 01/20/2022    Sprain of medial collateral ligament of left knee 12/29/2021    Annual physical exam 09/23/2021    Glaucoma suspect of left eye 08/17/2021    Intervertebral disc prolapse 06/23/2021    Restless legs syndrome 09/01/2020    Menstrual migraine without status migrainosus, not intractable 12/13/2019    Moderate episode of recurrent major depressive disorder ([facility] [address] ) 09/10/2019    Menorrhagia with regular cycle 09/10/2019     She  has a past surgical history that includes [place] tooth extraction  Her family history includes Alcohol abuse in her father; Breast cancer additional onset in her maternal grandmother and mother; Cancer in her maternal grandfather; Completed Suicide  in her maternal grandfather; Diabetes in her father, maternal grandmother, and mother; Hyperlipidemia in her father; Hypertension in her father and mother; Mental illness in her father; Prostate cancer in her maternal grandfather  She  reports that she has never smoked  She has never used smokeless tobacco  She reports current alcohol use  She reports that she does not use drugs  Current Outpatient Medications   Medication Sig Dispense Refill    B Complex-C (B-complex with vitamin C) tablet Take 1 tablet by mouth daily      buPROPion (Wellbutrin XL) 150 mg 24 hr tablet Take 1 tablet (150 mg total) by mouth every morning 30 tablet 5    calcium carbonate (OS-BERENICE) 600 MG tablet Take 600 mg by mouth 2 (two) times a day with meals      cholecalciferol (VITAMIN D3) 1,000 units tablet Take 1,000 Units by mouth daily      DULoxetine (CYMBALTA) 60 mg delayed release capsule Take 1 capsule (60 mg total) by mouth daily 30 capsule 5    Ferrous Sulfate (RA IRON PO) Take by mouth      Magnesium Oxide 500 MG TABS Take by mouth       No current facility-administered medications for this visit       She is allergic to sulfa antibiotics, levofloxacin, prednisone, and tobramycin       Review of Systems   Constitutional: Positive for fever  Negative for activity change, appetite change, chills, diaphoresis, fatigue and unexpected weight change  HENT: Negative for congestion, ear pain, hearing loss, postnasal drip, sinus pressure, sinus pain, sneezing and sore throat  Eyes: Negative for pain, redness and visual disturbance  Respiratory: Negative for cough and shortness of breath  Cardiovascular: Negative for chest pain and leg swelling  Gastrointestinal: Negative for abdominal pain, diarrhea, nausea and vomiting  Endocrine: Negative  Genitourinary: Negative  Musculoskeletal: Positive for arthralgias  Allergic/Immunologic: Negative  Neurological: Negative for dizziness and light-headedness  Hematological: Negative  Psychiatric/Behavioral: Positive for dysphoric mood  Negative for behavioral problems  Objective:  PHQ-2/9 Depression Screening    Little interest or pleasure in doing things: 0 - not at all  Feeling down, depressed, or hopeless: 1 - several days  Trouble falling or staying asleep, or sleeping too much: 3 - nearly every day  Feeling tired or having little energy: 3 - nearly every day  Poor appetite or overeatin - not at all  Feeling bad about yourself - or that you are a failure or have let yourself or your family down: 2 - more than half the days  Trouble concentrating on things, such as reading the newspaper or watching television: 2 - more than half the days  Moving or speaking so slowly that other people could have noticed   Or the opposite - being so fidgety or restless that you have been moving around a lot more than usual: 0 - not at all  Thoughts that you would be better off dead, or of hurting yourself in some way: 0 - not at all  PHQ-9 Score: 11   PHQ-9 Interpretation: Moderate depression          Depression Screening Follow-up Plan: Patient's depression screening was positive with a PHQ-2 score of   Their PHQ-9 score was 11  Patient assessed for underlying major depression  They have no active suicidal ideations  Brief counseling provided and recommend additional follow-up/re-evaluation next office visit  /82 (BP Location: Right arm, Patient Position: Sitting)   Pulse 93   Temp 97 6 °F (36 4 °C) (Temporal)   Ht 5' 6" (1 676 m)   Wt 78 5 kg (173 lb)   SpO2 97%   BMI 27 92 kg/m²          Physical Exam  Vitals and nursing note reviewed  Constitutional:       General: She is not in acute distress  Appearance: She is well-developed  HENT:      Head: Normocephalic and atraumatic  Eyes:      Pupils: Pupils are equal, round, and reactive to light  Neck:      Thyroid: No thyromegaly  Cardiovascular:      Rate and Rhythm: Normal rate and regular rhythm  Heart sounds: Normal heart sounds  No murmur heard  Pulmonary:      Effort: Pulmonary effort is normal  No respiratory distress  Breath sounds: Normal breath sounds  No wheezing  Abdominal:      General: Bowel sounds are normal       Palpations: Abdomen is soft  Musculoskeletal:         General: Normal range of motion  Cervical back: Normal range of motion  Skin:     General: Skin is warm and dry  Neurological:      General: No focal deficit present  Mental Status: She is alert and oriented to person, place, and time  Psychiatric:         Mood and Affect: Mood normal          Behavior: Behavior normal          Thought Content:  Thought content normal          Judgment: Judgment normal

## 2022-02-28 ENCOUNTER — HOSPITAL ENCOUNTER (OUTPATIENT)
Dept: RADIOLOGY | Facility: HOSPITAL | Age: 37
Discharge: HOME | End: 2022-02-28
Attending: FAMILY MEDICINE
Payer: OTHER MISCELLANEOUS

## 2022-02-28 ENCOUNTER — HOSPITAL ENCOUNTER (OUTPATIENT)
Dept: MRI IMAGING | Facility: HOSPITAL | Age: 37
Discharge: HOME | End: 2022-02-28
Attending: FAMILY MEDICINE
Payer: OTHER MISCELLANEOUS

## 2022-02-28 DIAGNOSIS — S83.412D SPRAIN OF MEDIAL COLLATERAL LIGAMENT OF LEFT KNEE, SUBSEQUENT ENCOUNTER: ICD-10-CM

## 2022-02-28 DIAGNOSIS — M23.92 UNSPECIFIED INTERNAL DERANGEMENT OF LEFT KNEE: ICD-10-CM

## 2022-02-28 PROCEDURE — A9585 GADOBUTROL INJECTION: HCPCS | Performed by: FAMILY MEDICINE

## 2022-02-28 PROCEDURE — 77002 NEEDLE LOCALIZATION BY XRAY: CPT

## 2022-02-28 PROCEDURE — 27369 NJX CNTRST KNE ARTHG/CT/MRI: CPT

## 2022-02-28 PROCEDURE — G1004 CDSM NDSC: HCPCS

## 2022-02-28 PROCEDURE — 73722 MRI JOINT OF LWR EXTR W/DYE: CPT

## 2022-02-28 RX ORDER — SODIUM CHLORIDE 9 MG/ML
50 INJECTION INTRAVENOUS
Status: COMPLETED | OUTPATIENT
Start: 2022-02-28 | End: 2022-02-28

## 2022-02-28 RX ORDER — LIDOCAINE HYDROCHLORIDE 10 MG/ML
5 INJECTION, SOLUTION EPIDURAL; INFILTRATION; INTRACAUDAL; PERINEURAL
Status: COMPLETED | OUTPATIENT
Start: 2022-02-28 | End: 2022-02-28

## 2022-02-28 RX ADMIN — LIDOCAINE HYDROCHLORIDE 2 ML: 10 INJECTION, SOLUTION EPIDURAL; INFILTRATION; INTRACAUDAL at 11:50

## 2022-02-28 RX ADMIN — GADOBUTROL 2 ML: 604.72 INJECTION INTRAVENOUS at 12:25

## 2022-02-28 RX ADMIN — IOHEXOL 5 ML: 300 INJECTION, SOLUTION INTRAVENOUS at 12:00

## 2022-02-28 RX ADMIN — SODIUM CHLORIDE 3 ML: 9 INJECTION, SOLUTION INTRAMUSCULAR; INTRAVENOUS; SUBCUTANEOUS at 11:50

## 2022-03-04 ENCOUNTER — APPOINTMENT (OUTPATIENT)
Dept: URGENT CARE | Facility: CLINIC | Age: 37
End: 2022-03-04
Payer: OTHER MISCELLANEOUS

## 2022-03-04 ENCOUNTER — APPOINTMENT (OUTPATIENT)
Dept: LAB | Facility: HOSPITAL | Age: 37
End: 2022-03-04
Payer: COMMERCIAL

## 2022-03-04 DIAGNOSIS — R53.82 CHRONIC FATIGUE: ICD-10-CM

## 2022-03-04 DIAGNOSIS — R73.9 ELEVATED BLOOD SUGAR: ICD-10-CM

## 2022-03-04 DIAGNOSIS — N92.0 MENORRHAGIA WITH REGULAR CYCLE: ICD-10-CM

## 2022-03-04 LAB
ALBUMIN SERPL BCP-MCNC: 4 G/DL (ref 3.5–5)
ALP SERPL-CCNC: 69 U/L (ref 46–116)
ALT SERPL W P-5'-P-CCNC: 23 U/L (ref 12–78)
ANION GAP SERPL CALCULATED.3IONS-SCNC: 9 MMOL/L (ref 4–13)
AST SERPL W P-5'-P-CCNC: 15 U/L (ref 5–45)
BASOPHILS # BLD AUTO: 0.05 THOUSANDS/ΜL (ref 0–0.1)
BASOPHILS NFR BLD AUTO: 1 % (ref 0–1)
BILIRUB SERPL-MCNC: 0.38 MG/DL (ref 0.2–1)
BUN SERPL-MCNC: 13 MG/DL (ref 5–25)
CALCIUM SERPL-MCNC: 9.4 MG/DL (ref 8.3–10.1)
CHLORIDE SERPL-SCNC: 101 MMOL/L (ref 100–108)
CO2 SERPL-SCNC: 27 MMOL/L (ref 21–32)
CREAT SERPL-MCNC: 1.04 MG/DL (ref 0.6–1.3)
EOSINOPHIL # BLD AUTO: 0.09 THOUSAND/ΜL (ref 0–0.61)
EOSINOPHIL NFR BLD AUTO: 1 % (ref 0–6)
ERYTHROCYTE [DISTWIDTH] IN BLOOD BY AUTOMATED COUNT: 12.7 % (ref 11.6–15.1)
FERRITIN SERPL-MCNC: 10 NG/ML (ref 8–388)
GFR SERPL CREATININE-BSD FRML MDRD: 69 ML/MIN/1.73SQ M
GLUCOSE SERPL-MCNC: 170 MG/DL (ref 65–140)
HCT VFR BLD AUTO: 41 % (ref 34.8–46.1)
HGB BLD-MCNC: 13.9 G/DL (ref 11.5–15.4)
IMM GRANULOCYTES # BLD AUTO: 0.02 THOUSAND/UL (ref 0–0.2)
IMM GRANULOCYTES NFR BLD AUTO: 0 % (ref 0–2)
IRON SATN MFR SERPL: 31 % (ref 15–50)
IRON SERPL-MCNC: 108 UG/DL (ref 50–170)
LYMPHOCYTES # BLD AUTO: 2.34 THOUSANDS/ΜL (ref 0.6–4.47)
LYMPHOCYTES NFR BLD AUTO: 28 % (ref 14–44)
MCH RBC QN AUTO: 30.3 PG (ref 26.8–34.3)
MCHC RBC AUTO-ENTMCNC: 33.9 G/DL (ref 31.4–37.4)
MCV RBC AUTO: 89 FL (ref 82–98)
MONOCYTES # BLD AUTO: 0.46 THOUSAND/ΜL (ref 0.17–1.22)
MONOCYTES NFR BLD AUTO: 6 % (ref 4–12)
NEUTROPHILS # BLD AUTO: 5.37 THOUSANDS/ΜL (ref 1.85–7.62)
NEUTS SEG NFR BLD AUTO: 64 % (ref 43–75)
NRBC BLD AUTO-RTO: 0 /100 WBCS
PLATELET # BLD AUTO: 322 THOUSANDS/UL (ref 149–390)
PMV BLD AUTO: 9.5 FL (ref 8.9–12.7)
POTASSIUM SERPL-SCNC: 3.9 MMOL/L (ref 3.5–5.3)
PROT SERPL-MCNC: 7.2 G/DL (ref 6.4–8.2)
RBC # BLD AUTO: 4.59 MILLION/UL (ref 3.81–5.12)
SODIUM SERPL-SCNC: 137 MMOL/L (ref 136–145)
TIBC SERPL-MCNC: 346 UG/DL (ref 250–450)
TSH SERPL DL<=0.05 MIU/L-ACNC: 2.72 UIU/ML (ref 0.36–3.74)
WBC # BLD AUTO: 8.33 THOUSAND/UL (ref 4.31–10.16)

## 2022-03-04 PROCEDURE — 83550 IRON BINDING TEST: CPT

## 2022-03-04 PROCEDURE — 83036 HEMOGLOBIN GLYCOSYLATED A1C: CPT

## 2022-03-04 PROCEDURE — 85025 COMPLETE CBC W/AUTO DIFF WBC: CPT

## 2022-03-04 PROCEDURE — 36415 COLL VENOUS BLD VENIPUNCTURE: CPT

## 2022-03-04 PROCEDURE — 80053 COMPREHEN METABOLIC PANEL: CPT

## 2022-03-04 PROCEDURE — 99214 OFFICE O/P EST MOD 30 MIN: CPT

## 2022-03-04 PROCEDURE — 82728 ASSAY OF FERRITIN: CPT

## 2022-03-04 PROCEDURE — 84443 ASSAY THYROID STIM HORMONE: CPT

## 2022-03-04 PROCEDURE — 83540 ASSAY OF IRON: CPT

## 2022-03-06 DIAGNOSIS — R73.9 ELEVATED BLOOD SUGAR: Primary | ICD-10-CM

## 2022-03-07 LAB
EST. AVERAGE GLUCOSE BLD GHB EST-MCNC: 94 MG/DL
HBA1C MFR BLD: 4.9 %

## 2022-03-08 ENCOUNTER — APPOINTMENT (OUTPATIENT)
Dept: RADIOLOGY | Facility: CLINIC | Age: 37
End: 2022-03-08
Payer: COMMERCIAL

## 2022-03-08 ENCOUNTER — OFFICE VISIT (OUTPATIENT)
Dept: OBGYN CLINIC | Facility: CLINIC | Age: 37
End: 2022-03-08
Payer: COMMERCIAL

## 2022-03-08 VITALS
DIASTOLIC BLOOD PRESSURE: 92 MMHG | HEIGHT: 66 IN | BODY MASS INDEX: 27.8 KG/M2 | WEIGHT: 173 LBS | SYSTOLIC BLOOD PRESSURE: 142 MMHG | HEART RATE: 137 BPM

## 2022-03-08 DIAGNOSIS — M25.562 LEFT KNEE PAIN, UNSPECIFIED CHRONICITY: Primary | ICD-10-CM

## 2022-03-08 DIAGNOSIS — M25.562 LEFT KNEE PAIN, UNSPECIFIED CHRONICITY: ICD-10-CM

## 2022-03-08 PROCEDURE — 73564 X-RAY EXAM KNEE 4 OR MORE: CPT

## 2022-03-08 PROCEDURE — 99213 OFFICE O/P EST LOW 20 MIN: CPT | Performed by: ORTHOPAEDIC SURGERY

## 2022-03-08 PROCEDURE — 73560 X-RAY EXAM OF KNEE 1 OR 2: CPT

## 2022-03-08 NOTE — PROGRESS NOTES
Patient Name:  Courtney Mcgrath  MRN:  [id number]    Assessment & Plan     1  Left knee pain, unspecified chronicity  -     XR knee 1 or 2 vw right; Future; Expected date: 03/08/2022  -     XR knee 4+ vw left injury; Future; Expected date: 03/08/2022        39 y o  female with Left knee patellofemoral pain and weakness  X-rays and MRI reviewed in office today with patient  In depth conversation had with patient regarding likely patellofemoral etiology of pain; also discussed referred pain from other areas, but no obvious lumbar or hip etiology at this time  Strongly suggested continued strengthening of hip abductors, quads, and hamstrings, occasional administration of oral/topical OTC analgesics/NSAIDs  Advised patient she may transition back into work and monitor symptoms  Will hold off on injection therapy at this time to monitor symptoms going back to work  She does not need to use brace at this time as there are no signs of instability with special testing  Advised patient we cannot determine definite time of improvement of symptoms, but with return to work and adhering to strengthening exercises, possible decreased symptoms over the next few weeks  Verbalized understanding of the above and will follow up in office on an as needed basis  Chief Complaint     Left knee pain    History of the Present Illness     Courtney Mcgrath is a 39 y o  female with Left knee pain ongoing for 4 months  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; 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, twisted and noted immediate medial sided Left knee pain  She did follow up in office with Dr Lena Trevino and was diagnosed with MCL sprain, provided corticosteroid injection and TROM brace for ambulation  She admits injection did not provide any pain relief   She did also seek out care from Dr Eliu Lyles whom ordered two specific MRI tests; the first demonstrated MCL sprain and second was ordered secondary to normal exam with continued Left knee symptoms  Today, patient locates her pain to anteromedial and anterolateral aspects of Left knee  Pain is exacerbated with ambulation, long distance driving, and extending knee with "stabbing" and "pulling pain"  Review of Systems     Review of Systems   Constitutional: Negative for chills and fever  HENT: Negative for ear pain and sore throat  Eyes: Negative for pain and visual disturbance  Respiratory: Negative for cough and shortness of breath  Cardiovascular: Negative for chest pain and palpitations  Gastrointestinal: Negative for abdominal pain and vomiting  Genitourinary: Negative for dysuria and hematuria  Musculoskeletal: Negative for arthralgias and back pain  Skin: Negative for color change and rash  Neurological: Negative for seizures and syncope  All other systems reviewed and are negative  Physical Exam     /92   Pulse (!) 137   Ht 5' 6" (1 676 m)   Wt 78 5 kg (173 lb)   BMI 27 92 kg/m²     Left Knee  Range of motion from 0 to 120-130  There is no crepitus with range of motion  There is no effusion  There is tenderness over the medial femoral condyle, anteromedial and anterolateral joint line tenderness  There is 5/5 quadriceps strength and preserved tone  The patient is ableperform a straight leg raise  Minimal dynamic left knee valgus with single leg and body squat   positive  patellar grind test   Anterior drawer tests is negative  negative Lachman Test    Posterior drawer test is   negative   Varus stress testing reveals no pain or laxity at 0 and 30 degrees   Valgus stress testing reveals no pain or laxity at 0 and 30 degrees  Nichol's testing is negative   Negative Thessaly test  The patient is neurovascular intact distally  Eyes:  Anicteric sclerae  Neck:  Supple  Lungs:  Normal respiratory effort  Cardiovascular:  Capillary refill is less than 2 seconds  Skin:  Intact without erythema    Neurologic:  Sensation grossly intact to light touch  Psychiatric:  Mood and affect are appropriate  Data Review     I have personally reviewed pertinent films in PACS, and my interpretation follows:    X-rays taken 03/08/2022 of Left knee demonstrates well maintained joint spaces without signs of acute fracture  Previous MRI arthrogram performed of Left knee demonstrates no acute ligamentous, meniscus, or cartilage pathology  Past Medical History:   Diagnosis Date    Allergic     Anemia     Anxiety     Arthritis     Asthma     Depression     Exposure to SARS-associated coronavirus 4/12/2020    Headache(784 0)     Lumbar herniated disc     Pneumonia     Urinary tract infection     UTI (urinary tract infection)     Visual impairment        Past Surgical History:   Procedure Laterality Date    FL INJECTION LEFT KNEE (ARTHROGRAM)  2/28/2022    WISDOM TOOTH EXTRACTION         Allergies   Allergen Reactions    Sulfa Antibiotics Anaphylaxis    Levofloxacin      Other reaction(s): sensative    Prednisone Other (See Comments)    Tobramycin      Other reaction(s): sensative       Current Outpatient Medications on File Prior to Visit   Medication Sig Dispense Refill    B Complex-C (B-complex with vitamin C) tablet Take 1 tablet by mouth daily      buPROPion (Wellbutrin XL) 150 mg 24 hr tablet Take 1 tablet (150 mg total) by mouth every morning 30 tablet 5    calcium carbonate (OS-BERENICE) 600 MG tablet Take 600 mg by mouth 2 (two) times a day with meals      cholecalciferol (VITAMIN D3) 1,000 units tablet Take 1,000 Units by mouth daily      DULoxetine (CYMBALTA) 60 mg delayed release capsule Take 1 capsule (60 mg total) by mouth daily 30 capsule 5    Ferrous Sulfate (RA IRON PO) Take by mouth      Magnesium Oxide 500 MG TABS Take by mouth       No current facility-administered medications on file prior to visit         Social History     Tobacco Use    Smoking status: Never Smoker    Smokeless tobacco: Never Used   Vaping Use    Vaping Use: Never used   Substance Use Topics    Alcohol use:  Yes     Alcohol/week: 0 0 standard drinks     Comment: occasional    Drug use: No       Family History   Problem Relation Age of Onset    Breast cancer additional onset Mother     Hypertension Mother     Diabetes Mother     Mental illness Father         family history    Alcohol abuse Father     Hypertension Father     Hyperlipidemia Father     Diabetes Father     Breast cancer additional onset Maternal Grandmother     Diabetes Maternal Grandmother     Prostate cancer Maternal Grandfather         lung    Cancer Maternal Grandfather     Completed Suicide  Maternal Grandfather              Procedures Performed     Procedures  None      Charanjit Cordova DO

## 2022-03-17 ENCOUNTER — OFFICE VISIT (OUTPATIENT)
Dept: OBGYN CLINIC | Age: 37
End: 2022-03-17
Payer: COMMERCIAL

## 2022-03-17 VITALS
BODY MASS INDEX: 28.61 KG/M2 | DIASTOLIC BLOOD PRESSURE: 78 MMHG | HEIGHT: 66 IN | WEIGHT: 178 LBS | SYSTOLIC BLOOD PRESSURE: 118 MMHG

## 2022-03-17 DIAGNOSIS — Z11.3 SCREEN FOR STD (SEXUALLY TRANSMITTED DISEASE): ICD-10-CM

## 2022-03-17 DIAGNOSIS — Z80.3 FAMILY HISTORY OF BREAST CANCER IN MOTHER: ICD-10-CM

## 2022-03-17 DIAGNOSIS — Z01.419 ENCOUNTER FOR GYNECOLOGICAL EXAMINATION WITHOUT ABNORMAL FINDING: Primary | ICD-10-CM

## 2022-03-17 DIAGNOSIS — Z12.31 BREAST CANCER SCREENING BY MAMMOGRAM: ICD-10-CM

## 2022-03-17 PROBLEM — H40.002 GLAUCOMA SUSPECT OF LEFT EYE: Status: RESOLVED | Noted: 2021-08-17 | Resolved: 2022-03-17

## 2022-03-17 PROBLEM — U07.1 COVID-19: Status: RESOLVED | Noted: 2022-01-20 | Resolved: 2022-03-17

## 2022-03-17 PROBLEM — H40.003 GLAUCOMA SUSPECT OF BOTH EYES: Status: ACTIVE | Noted: 2021-08-17

## 2022-03-17 PROBLEM — Z00.00 ANNUAL PHYSICAL EXAM: Status: RESOLVED | Noted: 2021-09-23 | Resolved: 2022-03-17

## 2022-03-17 PROBLEM — N92.0 MENORRHAGIA WITH REGULAR CYCLE: Status: RESOLVED | Noted: 2019-09-10 | Resolved: 2022-03-17

## 2022-03-17 PROBLEM — G25.81 RESTLESS LEGS SYNDROME: Status: RESOLVED | Noted: 2020-09-01 | Resolved: 2022-03-17

## 2022-03-17 PROCEDURE — G0476 HPV COMBO ASSAY CA SCREEN: HCPCS | Performed by: NURSE PRACTITIONER

## 2022-03-17 PROCEDURE — 87591 N.GONORRHOEAE DNA AMP PROB: CPT | Performed by: NURSE PRACTITIONER

## 2022-03-17 PROCEDURE — S0610 ANNUAL GYNECOLOGICAL EXAMINA: HCPCS | Performed by: NURSE PRACTITIONER

## 2022-03-17 PROCEDURE — 87491 CHLMYD TRACH DNA AMP PROBE: CPT | Performed by: NURSE PRACTITIONER

## 2022-03-17 PROCEDURE — G0145 SCR C/V CYTO,THINLAYER,RESCR: HCPCS | Performed by: NURSE PRACTITIONER

## 2022-03-17 NOTE — PATIENT INSTRUCTIONS
Breast Self Exam for Women   AMBULATORY CARE:   A breast self-exam (BSE)  is a way to check your breasts for lumps and other changes  Regular BSEs can help you know how your breasts normally look and feel  Most breast lumps or changes are not cancer, but you should always have them checked by a healthcare provider  Why you should do a BSE:  Breast cancer is the most common type of cancer in women  Even if you have mammograms, you may still want to do a BSE regularly  If you know how your breasts normally feel and look, it may help you know when to contact your healthcare provider  Mammograms can miss some cancers  You may find a lump during a BSE that did not show up on a mammogram   When you should do a BSE:  If you have periods, you may want to do your BSE 1 week after your period ends  This is the time when your breasts may be the least swollen, lumpy, or tender  You can do regular BSEs even if you are breastfeeding or have breast implants  Call your doctor if:   · You find any lumps or changes in your breasts  · You have breast pain or fluid coming from your nipples  · You have questions or concerns about your condition or care  How to do a BSE:       · Look at your breasts in a mirror  Look at the size and shape of each breast and nipple  Check for swelling, lumps, dimpling, scaly skin, or other skin changes  Look for nipple changes, such as a nipple that is painful or beginning to pull inward  Gently squeeze both nipples and check to see if fluid (that is not breast milk) comes out of them  If you find any of these or other breast changes, contact your healthcare provider  Check your breasts while you sit or  the following 3 positions:    ? Hang your arms down at your sides  ? Raise your hands and join them behind your head  ? Put firm pressure with your hands on your hips  Bend slightly forward while you look at your breasts in the mirror  · Lie down and feel your breasts    When you lie down, your breast tissue spreads out evenly over your chest  This makes it easier for you to feel for lumps and anything that may not be normal for your breasts  Do a BSE on one breast at a time  ? Place a small pillow or towel under your left shoulder  Put your left arm behind your head  ? Use the 3 middle fingers of your right hand  Use your fingertip pads, on the top of your fingers  Your fingertip pad is the most sensitive part of your finger  ? Use small circles to feel your breast tissue  Use your fingertip pads to make dime-sized, overlapping circles on your breast and armpits  Use light, medium, and firm pressure  First, press lightly  Second, press with medium pressure to feel a little deeper into the breast  Last, use firm pressure to feel deep within your breast     ? Examine your entire breast area  Examine the breast area from above the breast to below the breast where you feel only ribs  Make small circles with your fingertips, starting in the middle of your armpit  Make circles going up and down the breast area  Continue toward your breast and all the way across it  Examine the area from your armpit all the way over to the middle of your chest (breastbone)  Stop at the middle of your chest     ? Move the pillow or towel to your right shoulder, and put your right arm behind your head  Use the 3 fingertip pads of your left hand, and repeat the above steps to do a BSE on your right breast     What else you can do to check for breast problems or cancer:  Talk to your healthcare provider about mammograms  A mammogram is an x-ray of your breasts to screen for breast cancer or other problems  Your provider can tell you the benefits and risks of mammograms  The first mammogram is usually at age 39 or 48  Your provider may recommend you start at 36 or younger if your risk for breast cancer is high  Mammograms usually continue every 1 to 2 years until age 76         Follow up with your doctor as directed:  Write down your questions so you remember to ask them during your visits  © Copyright [employer] 2022 Information is for End User's use only and may not be sold, redistributed or otherwise used for commercial purposes  All illustrations and images included in CareNotes® are the copyrighted property of A [employer] M [employer]  or Mandeep Whitley  The above information is an  only  It is not intended as medical advice for individual conditions or treatments  Talk to your doctor, nurse or pharmacist before following any medical regimen to see if it is safe and effective for you

## 2022-03-17 NOTE — PROGRESS NOTES
Diagnoses and all orders for this visit:    Encounter for gynecological examination without abnormal finding  -     Liquid-based pap, screening    Family history of breast cancer in mother  -     Mammo screening bilateral w 3d & cad; Future    Breast cancer screening by mammogram  -     Mammo screening bilateral w 3d & cad; Future        Calcium/vit d inclusion in the diet discussed, call with any issues, SBE reinforced, all concerns addressed  Pleasant 39 y o  NP premenopausal female here for annual exam  She denies any issues with heavy bleeding or her menses  She reports regular monthly cycles every 4-5 weeks  They last 5-6 days  Denies history of abnormal pap smears  Last Pap done on 04/05/2016 was neg, pap with cotest done today  She denies vaginal issues  She denies pelvic pain  She denies dyspareunia  She declines a BCM  She is NOT sexually active for the past year  Mom was diagnosed with breast cancer at age 58  She states it was estrogen driven  She agrees to STD testing due to having a new partner in the past  She is thinking about possible pregnancy versus ovarian protection  A birth control method handout was given for her to further peruse  History of migraines without aura      Past Medical History:   Diagnosis Date    Allergic     Anemia     Anxiety     Arthritis     Asthma     Depression     Exposure to SARS-associated coronavirus 4/12/2020    Headache(784 0)     Lumbar herniated disc     Pneumonia     Urinary tract infection     UTI (urinary tract infection)     Visual impairment      Past Surgical History:   Procedure Laterality Date    FL INJECTION LEFT KNEE (ARTHROGRAM)  2/28/2022    WISDOM TOOTH EXTRACTION       Family History   Problem Relation Age of Onset    Breast cancer additional onset Mother         Left [de-identified]    Hypertension Mother     Diabetes Mother     Mental illness Father         family history    Alcohol abuse Father     Hypertension Father  Hyperlipidemia Father     Diabetes Father     Breast cancer additional onset Maternal Grandmother     Diabetes Maternal Grandmother     Prostate cancer Maternal Grandfather         lung    Cancer Maternal Grandfather     Completed Suicide  Maternal Grandfather      Social History     Tobacco Use    Smoking status: Never Smoker    Smokeless tobacco: Never Used   Vaping Use    Vaping Use: Never used   Substance Use Topics    Alcohol use:  Yes     Alcohol/week: 0 0 standard drinks     Comment: occasional    Drug use: No       Current Outpatient Medications:     B Complex-C (B-complex with vitamin C) tablet, Take 1 tablet by mouth daily, Disp: , Rfl:     buPROPion (Wellbutrin XL) 150 mg 24 hr tablet, Take 1 tablet (150 mg total) by mouth every morning, Disp: 30 tablet, Rfl: 5    calcium carbonate (OS-BERENICE) 600 MG tablet, Take 600 mg by mouth 2 (two) times a day with meals, Disp: , Rfl:     cholecalciferol (VITAMIN D3) 1,000 units tablet, Take 1,000 Units by mouth daily, Disp: , Rfl:     DULoxetine (CYMBALTA) 60 mg delayed release capsule, Take 1 capsule (60 mg total) by mouth daily, Disp: 30 capsule, Rfl: 5    Ferrous Sulfate (RA IRON PO), Take by mouth, Disp: , Rfl:     Magnesium Oxide 500 MG TABS, Take by mouth, Disp: , Rfl:   Patient Active Problem List    Diagnosis Date Noted    Sprain of medial collateral ligament of left knee 2021    Glaucoma suspect of both eyes 2021    Intervertebral disc prolapse 2021    Menstrual migraine without status migrainosus, not intractable 2019    Moderate episode of recurrent major depressive disorder (HCC) 09/10/2019       Allergies   Allergen Reactions    Sulfa Antibiotics Anaphylaxis    Levofloxacin      Other reaction(s): sensative    Prednisone Other (See Comments)    Tobramycin      Other reaction(s): sensative       OB History    Para Term  AB Living   0 0 0 0 0 0   SAB IAB Ectopic Multiple Live Births   0 0 0 0 0     Works day shift at [employer]  She has a boyfriend for the past 3 years, not recently intimate with him  Vitals:    03/17/22 1036   BP: 118/78   BP Location: Left arm   Patient Position: Standing   Cuff Size: Large   Weight: 80 7 kg (178 lb)   Height: 5' 6" (1 676 m)     Body mass index is 28 73 kg/m²  Patient's last menstrual period was 03/10/2022 (exact date)  Review of Systems   Constitutional: Negative for chills, fatigue, fever and unexpected weight change  Respiratory: Negative for shortness of breath  Gastrointestinal: Negative for anal bleeding, blood in stool, constipation and diarrhea  Genitourinary: Negative for difficulty urinating, dysuria and hematuria  Physical Exam   Constitutional: She appears well-developed and well-nourished  No distress  HENT: atraumatic  Head: Normocephalic  Neck: Normal range of motion  Neck supple  Pulmonary: Effort normal   Breasts: bilateral without masses, skin changes or nipple discharge  Bilaterally soft and warm to touch  No areas of erythema or pain  Abdominal: Soft  Pelvic exam was performed with patient supine  No labial fusion  There is no rash, tenderness, lesion or injury on the right labia  There is no rash, tenderness, lesion or injury on the left labia  Urethral meatus does not show any tenderness, inflammation or discharge  Palpation of midline bladder without pain or discomfort  Uterus is not deviated, not enlarged, not fixed and not tender  Cervix exhibits no motion tenderness, no discharge and no friability  Right adnexum displays no mass, no tenderness and no fullness  Left adnexum displays no mass, no tenderness and no fullness  No erythema or tenderness in the vagina  No foreign body in the vagina  No signs of injury around the vagina  No vaginal discharge found  No signs of injury around the vagina or anus  Perineum without lesions, signs of injury, erythema or swelling    Lymphadenopathy: Right: No inguinal adenopathy present  Left: No inguinal adenopathy present

## 2022-03-18 LAB
HPV HR 12 DNA CVX QL NAA+PROBE: NEGATIVE
HPV16 DNA CVX QL NAA+PROBE: NEGATIVE
HPV18 DNA CVX QL NAA+PROBE: NEGATIVE

## 2022-03-20 LAB
C TRACH DNA SPEC QL NAA+PROBE: NEGATIVE
N GONORRHOEA DNA SPEC QL NAA+PROBE: NEGATIVE

## 2022-03-22 LAB
LAB AP GYN PRIMARY INTERPRETATION: NORMAL
Lab: NORMAL

## 2022-03-25 ENCOUNTER — APPOINTMENT (OUTPATIENT)
Dept: LAB | Facility: HOSPITAL | Age: 37
End: 2022-03-25

## 2022-03-25 DIAGNOSIS — Z00.8 HEALTH EXAMINATION IN POPULATION SURVEY: ICD-10-CM

## 2022-03-25 LAB
CHOLEST SERPL-MCNC: 153 MG/DL
EST. AVERAGE GLUCOSE BLD GHB EST-MCNC: 100 MG/DL
HBA1C MFR BLD: 5.1 %
HDLC SERPL-MCNC: 49 MG/DL
LDLC SERPL CALC-MCNC: 88 MG/DL (ref 0–100)
NONHDLC SERPL-MCNC: 104 MG/DL
TRIGL SERPL-MCNC: 78 MG/DL

## 2022-03-25 PROCEDURE — 83036 HEMOGLOBIN GLYCOSYLATED A1C: CPT

## 2022-03-25 PROCEDURE — 80061 LIPID PANEL: CPT

## 2022-03-25 PROCEDURE — 36415 COLL VENOUS BLD VENIPUNCTURE: CPT

## 2022-04-07 ENCOUNTER — HOSPITAL ENCOUNTER (OUTPATIENT)
Dept: RADIOLOGY | Facility: MEDICAL CENTER | Age: 37
Discharge: HOME | End: 2022-04-07
Payer: COMMERCIAL

## 2022-04-07 DIAGNOSIS — N92.0 MENORRHAGIA WITH REGULAR CYCLE: ICD-10-CM

## 2022-04-07 PROCEDURE — 76830 TRANSVAGINAL US NON-OB: CPT

## 2022-04-07 PROCEDURE — 76856 US EXAM PELVIC COMPLETE: CPT

## 2022-04-11 ENCOUNTER — OFFICE VISIT (OUTPATIENT)
Dept: FAMILY MEDICINE CLINIC | Facility: CLINIC | Age: 37
End: 2022-04-11
Payer: COMMERCIAL

## 2022-04-11 VITALS
DIASTOLIC BLOOD PRESSURE: 78 MMHG | SYSTOLIC BLOOD PRESSURE: 126 MMHG | TEMPERATURE: 97.7 F | OXYGEN SATURATION: 98 % | HEART RATE: 88 BPM | HEIGHT: 66 IN | WEIGHT: 178 LBS | BODY MASS INDEX: 28.61 KG/M2

## 2022-04-11 DIAGNOSIS — H40.003 GLAUCOMA SUSPECT OF BOTH EYES: ICD-10-CM

## 2022-04-11 DIAGNOSIS — S83.412D SPRAIN OF MEDIAL COLLATERAL LIGAMENT OF LEFT KNEE, SUBSEQUENT ENCOUNTER: ICD-10-CM

## 2022-04-11 DIAGNOSIS — G43.829 MENSTRUAL MIGRAINE WITHOUT STATUS MIGRAINOSUS, NOT INTRACTABLE: ICD-10-CM

## 2022-04-11 DIAGNOSIS — F33.1 MODERATE EPISODE OF RECURRENT MAJOR DEPRESSIVE DISORDER (HCC): ICD-10-CM

## 2022-04-11 PROCEDURE — 99213 OFFICE O/P EST LOW 20 MIN: CPT | Performed by: NURSE PRACTITIONER

## 2022-04-11 RX ORDER — BUPROPION HYDROCHLORIDE 300 MG/1
300 TABLET ORAL EVERY MORNING
Qty: 30 TABLET | Refills: 6 | Status: SHIPPED | OUTPATIENT
Start: 2022-04-11

## 2022-04-11 RX ORDER — DULOXETIN HYDROCHLORIDE 30 MG/1
30 CAPSULE, DELAYED RELEASE ORAL DAILY
Qty: 30 CAPSULE | Refills: 5 | Status: SHIPPED | OUTPATIENT
Start: 2022-04-11 | End: 2022-10-08

## 2022-04-11 NOTE — PROGRESS NOTES
Assessment/Plan:           Problem List Items Addressed This Visit        Cardiovascular and Mediastinum    Menstrual migraine without status migrainosus, not intractable     Helping with taking the OTC meds and migraines          Relevant Medications    DULoxetine (CYMBALTA) 30 mg delayed release capsule    buPROPion (Wellbutrin XL) 300 mg 24 hr tablet       Musculoskeletal and Integument    Sprain of medial collateral ligament of left knee     Patient is able to return to biking and is walking and has returned to work             Other    Moderate episode of recurrent major depressive disorder ([address] )     Patient is doing well with her mood will decrease the cymbalta to 30 and increase the wellbutrin to 300 mg          Relevant Medications    DULoxetine (CYMBALTA) 30 mg delayed release capsule    buPROPion (Wellbutrin XL) 300 mg 24 hr tablet    Glaucoma suspect of both eyes     [facility] f/u with [facility]          Body mass index (BMI) 28 0-28 9, adult - Primary            Subjective:      Patient ID: Shamika Downey is a 39 y o  female  Patient here today for her check up and reports that she has been taking the Wellbutrin and Duloxetine and having problems with fatigue and feeling scattered  Patient had recent labs completed showing normal cbc, cmp, lipid panel and HA1C was 4 9 % and thyroid was normal     BMI Counseling: Body mass index is 28 73 kg/m²  The BMI is above normal  Nutrition recommendations include decreasing portion sizes, encouraging healthy choices of fruits and vegetables, decreasing fast food intake, consuming healthier snacks, limiting drinks that contain sugar, moderation in carbohydrate intake, increasing intake of lean protein, reducing intake of saturated and trans fat and reducing intake of cholesterol  Exercise recommendations include moderate physical activity 150 minutes/week  No pharmacotherapy was ordered  Patient referred to PCP   Rationale for BMI follow-up plan is due to patient being overweight or obese  The following portions of the patient's history were reviewed and updated as appropriate:   She  has a past medical history of Allergic, Anemia, Anxiety, Arthritis, Asthma, Depression, Exposure to SARS-associated coronavirus (4/12/2020), Headache(784 0), Lumbar herniated disc, Pneumonia, Urinary tract infection, UTI (urinary tract infection), and Visual impairment  She   Patient Active Problem List    Diagnosis Date Noted    Body mass index (BMI) 28 0-28 9, adult 04/11/2022    Sprain of medial collateral ligament of left knee 12/29/2021    Glaucoma suspect of both eyes 08/17/2021    Intervertebral disc prolapse 06/23/2021    Menstrual migraine without status migrainosus, not intractable 12/13/2019    Moderate episode of recurrent major depressive disorder ([facility] [address] ) 09/10/2019     She  has a past surgical history that includes [place] tooth extraction and FL injection left knee (arthrogram) (2/28/2022)  Her family history includes Alcohol abuse in her father; Breast cancer additional onset in her maternal grandmother and mother; Cancer in her maternal grandfather; Completed Suicide  in her maternal grandfather; Diabetes in her father, maternal grandmother, and mother; Hyperlipidemia in her father; Hypertension in her father and mother; Mental illness in her father; Prostate cancer in her maternal grandfather  She  reports that she has never smoked  She has never used smokeless tobacco  She reports current alcohol use  She reports that she does not use drugs    Current Outpatient Medications   Medication Sig Dispense Refill    B Complex-C (B-complex with vitamin C) tablet Take 1 tablet by mouth daily      buPROPion (Wellbutrin XL) 300 mg 24 hr tablet Take 1 tablet (300 mg total) by mouth every morning 30 tablet 6    calcium carbonate (OS-BERENICE) 600 MG tablet Take 600 mg by mouth 2 (two) times a day with meals      cholecalciferol (VITAMIN D3) 1,000 units tablet Take 1,000 Units by mouth daily      DULoxetine (CYMBALTA) 30 mg delayed release capsule Take 1 capsule (30 mg total) by mouth daily 30 capsule 5    Ferrous Sulfate (RA IRON PO) Take by mouth      Magnesium Oxide 500 MG TABS Take by mouth       No current facility-administered medications for this visit  She is allergic to sulfa antibiotics, levofloxacin, prednisone, and tobramycin       Review of Systems   Constitutional: Positive for fatigue  Negative for activity change, appetite change, chills, diaphoresis, fever and unexpected weight change  HENT: Negative for congestion, ear pain, hearing loss, postnasal drip, sinus pressure, sinus pain, sneezing and sore throat  Eyes: Negative for pain, redness and visual disturbance  Respiratory: Negative for cough and shortness of breath  Cardiovascular: Negative for chest pain and leg swelling  Gastrointestinal: Negative for abdominal pain, diarrhea, nausea and vomiting  Endocrine: Negative  Genitourinary: Negative  Musculoskeletal: Negative for arthralgias  Allergic/Immunologic: Negative  Neurological: Negative for dizziness and light-headedness  Hematological: Negative  Psychiatric/Behavioral: Positive for decreased concentration  Negative for behavioral problems and dysphoric mood  Objective:      /78   Pulse 88   Temp 97 7 °F (36 5 °C)   Ht 5' 6" (1 676 m)   Wt 80 7 kg (178 lb)   SpO2 98%   BMI 28 73 kg/m²          Physical Exam  Vitals reviewed  Constitutional:       General: She is not in acute distress  Appearance: She is well-developed  HENT:      Head: Normocephalic and atraumatic  Right Ear: Tympanic membrane normal       Left Ear: Tympanic membrane normal       Nose: Nose normal       Mouth/Throat:      Mouth: Mucous membranes are moist    Eyes:      Pupils: Pupils are equal, round, and reactive to light  Neck:      Thyroid: No thyromegaly     Cardiovascular:      Rate and Rhythm: Normal rate and regular rhythm  Heart sounds: Normal heart sounds  No murmur heard  Pulmonary:      Effort: Pulmonary effort is normal  No respiratory distress  Breath sounds: Normal breath sounds  No wheezing  Abdominal:      General: Bowel sounds are normal       Palpations: Abdomen is soft  Musculoskeletal:         General: Normal range of motion  Cervical back: Normal range of motion  Skin:     General: Skin is warm and dry  Neurological:      Mental Status: She is alert and oriented to person, place, and time  Psychiatric:         Mood and Affect: Mood normal          Behavior: Behavior normal          Thought Content: Thought content normal          Judgment: Judgment normal          PHQ-2/9 Depression Screening    Little interest or pleasure in doing things: 0 - not at all  Feeling down, depressed, or hopeless: 0 - not at all  Trouble falling or staying asleep, or sleeping too much: 0 - not at all  Feeling tired or having little energy: 2 - more than half the days  Poor appetite or overeatin - not at all  Feeling bad about yourself - or that you are a failure or have let yourself or your family down: 0 - not at all  Trouble concentrating on things, such as reading the newspaper or watching television: 2 - more than half the days  Moving or speaking so slowly that other people could have noticed   Or the opposite - being so fidgety or restless that you have been moving around a lot more than usual: 0 - not at all  Thoughts that you would be better off dead, or of hurting yourself in some way: 0 - not at all  PHQ-9 Score: 4   PHQ-9 Interpretation: No or Minimal depression

## 2022-04-11 NOTE — ASSESSMENT & PLAN NOTE
Patient is doing well with her mood will decrease the cymbalta to 30 and increase the wellbutrin to 300 mg

## 2022-05-06 ENCOUNTER — TELEPHONE (OUTPATIENT)
Dept: PSYCHIATRY | Facility: CLINIC | Age: 37
End: 2022-05-06

## 2022-05-12 ENCOUNTER — OFFICE VISIT (OUTPATIENT)
Dept: FAMILY MEDICINE CLINIC | Facility: CLINIC | Age: 37
End: 2022-05-12
Payer: COMMERCIAL

## 2022-05-12 VITALS — HEIGHT: 66 IN | BODY MASS INDEX: 28.61 KG/M2 | WEIGHT: 178 LBS

## 2022-05-12 DIAGNOSIS — F33.1 MODERATE EPISODE OF RECURRENT MAJOR DEPRESSIVE DISORDER (HCC): Primary | ICD-10-CM

## 2022-05-12 PROCEDURE — 99213 OFFICE O/P EST LOW 20 MIN: CPT | Performed by: NURSE PRACTITIONER

## 2022-05-12 NOTE — ASSESSMENT & PLAN NOTE
Patients depression is improved with the addition of wellbutrin at 300 mg will continue and f/u in 3 months

## 2022-05-12 NOTE — PROGRESS NOTES
Virtual Regular Visit    Verification of patient location:    Patient is located in the following state in which I hold an active license [place]      Assessment/Plan:    Problem List Items Addressed This Visit        Other    Moderate episode of recurrent major depressive disorder ([facility] [address] ) - Primary     Patients depression is improved with the addition of wellbutrin at 300 mg will continue and f/u in 3 months                       Reason for visit is   Chief Complaint   Patient presents with    Virtual Brief Visit     Review meds    Virtual Regular Visit        Encounter provider JULIEN Romero    Provider located at [place] [address]  [address] [place] [id number]      Recent Visits  No visits were found meeting these conditions  Showing recent visits within past 7 days and meeting all other requirements  Today's Visits  Date Type Provider Dept   05/12/22 Office Visit JULIEN Romero [facility]   Showing today's visits and meeting all other requirements  Future Appointments  No visits were found meeting these conditions  Showing future appointments within next 150 days and meeting all other requirements       The patient was identified by name and date of birth  Perri Crigler was informed that this is a telemedicine visit and that the visit is being conducted through Telephone  My office door was closed  No one else was in the room  She acknowledged consent and understanding of privacy and security of the video platform  The patient has agreed to participate and understands they can discontinue the visit at any time  Patient is aware this is a billable service  Subjective  Perri Crigler is a 40 y o  female   Patient visit today regarding her increase in the Wellbutrin from 150-300 mg daily   For the first few weeks she was having mental fogginess and stopping mid conversation and fatigue but over the past two weeks this has cleared up and she is noticing that she is much clearer and having more energy and motivation to complete tasks and is doing well at the gym working out and losing weight  Overall she is feeling improvement in her depressive symptoms        Past Medical History:   Diagnosis Date    Allergic     Anemia     Anxiety     Arthritis     Asthma     Depression     Exposure to SARS-associated coronavirus 2020    Headache(784 0)     Lumbar herniated disc     Pneumonia     Urinary tract infection     UTI (urinary tract infection)     Visual impairment        Past Surgical History:   Procedure Laterality Date    FL INJECTION LEFT KNEE (ARTHROGRAM)  2022    WISDOM TOOTH EXTRACTION         Current Outpatient Medications   Medication Sig Dispense Refill    B Complex-C (B-complex with vitamin C) tablet Take 1 tablet by mouth daily      buPROPion (Wellbutrin XL) 300 mg 24 hr tablet Take 1 tablet (300 mg total) by mouth every morning 30 tablet 6    calcium carbonate (OS-BERENICE) 600 MG tablet Take 600 mg by mouth 2 (two) times a day with meals      cholecalciferol (VITAMIN D3) 1,000 units tablet Take 1,000 Units by mouth daily      DULoxetine (CYMBALTA) 30 mg delayed release capsule Take 1 capsule (30 mg total) by mouth daily 30 capsule 5    Ferrous Sulfate (RA IRON PO) Take by mouth      Magnesium Oxide 500 MG TABS Take by mouth       No current facility-administered medications for this visit          Allergies   Allergen Reactions    Sulfa Antibiotics Anaphylaxis    Levofloxacin      Other reaction(s): sensative    Prednisone Other (See Comments)    Tobramycin      Other reaction(s): sensative     PHQ-2/9 Depression Screening    Little interest or pleasure in doing things: 0 - not at all  Feeling down, depressed, or hopeless: 0 - not at all  Trouble falling or staying asleep, or sleeping too much: 0 - not at all  Feeling tired or having little energy: 1 - several days  Poor appetite or overeatin - not at all  Feeling bad about yourself - or that you are a failure or have let yourself or your family down: 0 - not at all  Trouble concentrating on things, such as reading the newspaper or watching television: 0 - not at all  Moving or speaking so slowly that other people could have noticed  Or the opposite - being so fidgety or restless that you have been moving around a lot more than usual: 0 - not at all  Thoughts that you would be better off dead, or of hurting yourself in some way: 0 - not at all  PHQ-9 Score: 1   PHQ-9 Interpretation: No or Minimal depression        Review of Systems   Constitutional: Negative for activity change, appetite change, chills, diaphoresis, fatigue, fever and unexpected weight change  HENT: Negative for congestion, ear pain, hearing loss, postnasal drip, sinus pressure, sinus pain, sneezing and sore throat  Eyes: Negative for pain, redness and visual disturbance  Respiratory: Negative for cough and shortness of breath  Cardiovascular: Negative for chest pain and leg swelling  Gastrointestinal: Negative for abdominal pain, diarrhea, nausea and vomiting  Musculoskeletal: Negative for arthralgias  Neurological: Negative for dizziness and light-headedness  Psychiatric/Behavioral: Negative for behavioral problems and dysphoric mood         Video Exam    Vitals:    22 0951   Weight: 80 7 kg (178 lb)   Height: 5' 6" (1 676 m)     PHQ-2/9 Depression Screening    Little interest or pleasure in doing things: 0 - not at all  Feeling down, depressed, or hopeless: 0 - not at all  Trouble falling or staying asleep, or sleeping too much: 0 - not at all  Feeling tired or having little energy: 1 - several days  Poor appetite or overeatin - not at all  Feeling bad about yourself - or that you are a failure or have let yourself or your family down: 0 - not at all  Trouble concentrating on things, such as reading the newspaper or watching television: 0 - not at all  Moving or speaking so slowly that other people could have noticed  Or the opposite - being so fidgety or restless that you have been moving around a lot more than usual: 0 - not at all  Thoughts that you would be better off dead, or of hurting yourself in some way: 0 - not at all  PHQ-9 Score: 1   PHQ-9 Interpretation: No or Minimal depression        Physical Exam  Pulmonary:      Effort: Pulmonary effort is normal    Abdominal:      Palpations: Abdomen is soft  Skin:     General: Skin is warm  Neurological:      Mental Status: She is alert and oriented to person, place, and time  Psychiatric:         Mood and Affect: Mood normal          Behavior: Behavior normal          Thought Content: Thought content normal          Judgment: Judgment normal           I spent 20 minutes directly with the patient during this visit    VIRTUAL VISIT [address] [facility] verbally agrees to participate in [employer]  Pt is aware that [employer] could be limited without vital signs or the ability to perform a full hands-on physical Zac Shepherd understands she or the provider may request at any time to terminate the video visit and request the patient to seek care or treatment in person

## 2022-05-16 ENCOUNTER — TELEPHONE (OUTPATIENT)
Dept: PSYCHIATRY | Facility: CLINIC | Age: 37
End: 2022-05-16

## 2022-05-16 NOTE — TELEPHONE ENCOUNTER
Behavorial Health Outpatient Intake Questions    Referred by:PCP    Please advised interviewee that they need to answer all questions truthfully to allow for best care and any misrepresentations of information may affect their ability to be seen at this clinic   => Was this discussed? Yes     [facility]     Presenting Problem (in patient's words): Anxiety and depression, Family issue,Working on connections,Trauma from relationship   Are there any developmental disabilities? ? If yes, can they speak to you on the phone? If they are too limited to speak to you on phone, refer out No    Are you taking any psychiatric medications? Yes    => If yes, who prescribes? If yes, are they injectable medications? Breanna Mart  Does the patient have a language barrier or hearing impairment? No    Have you been treated at [address] [place] [address] by a therapist or a doctor in the past? If yes, who? Yes  Parth Ho  Has the patient been hospitalized for mental health? No   If yes, how long ago was last hospitalization and where was it? Do you actively use alcohol or marijuana or illegal substances? If yes, what and how much - refer out to Drug and alcohol treatment if use is excessive or daily use of illegal substances No concerns of substance abuse are reported  Do you have a community treatment team or ? No    Legal History-     Does the patient have any history of arrests, longterm/longterm time, or DUIs? No  If Yes-  1) What types of charges? 2) When were they last incarcerated? 3) Are they currently on parole or probation? Minor Child-    Who has custody of the child? Is there a custody agreement? If there is a custody agreement remind parent that they must bring a copy to the first appt or they will not be seen       Intake Team, please check with provider before scheduling if flags come up such as:  - complex case  - legal history (other than DUI)  - communication barrier concerns are present  - if, in your judgment, this needs further review    ACCEPTED as a patient Yes  => Appointment Date: 6/2/2022 at [facility]  Referred Elsewhere? No    Name of Marsha Rachid PACE#[id number]  Insurance Phone #  If ins is primary or secondary:primary  If patient is a minor, parents information such as Name, D  O B of guarantor

## 2022-05-26 ENCOUNTER — HOSPITAL ENCOUNTER (OUTPATIENT)
Dept: MAMMOGRAPHY | Facility: CLINIC | Age: 37
Discharge: HOME | End: 2022-05-26
Payer: COMMERCIAL

## 2022-05-26 VITALS — WEIGHT: 177.91 LBS | BODY MASS INDEX: 28.59 KG/M2 | HEIGHT: 66 IN

## 2022-05-26 DIAGNOSIS — Z12.31 BREAST CANCER SCREENING BY MAMMOGRAM: ICD-10-CM

## 2022-05-26 DIAGNOSIS — Z80.3 FAMILY HISTORY OF BREAST CANCER IN MOTHER: ICD-10-CM

## 2022-05-26 PROCEDURE — 77063 BREAST TOMOSYNTHESIS BI: CPT

## 2022-05-26 PROCEDURE — 77067 SCR MAMMO BI INCL CAD: CPT

## 2022-06-02 ENCOUNTER — TELEPHONE (OUTPATIENT)
Dept: BEHAVIORAL/MENTAL HEALTH CLINIC | Facility: CLINIC | Age: 37
End: 2022-06-02

## 2022-06-02 NOTE — TELEPHONE ENCOUNTER
Hi, this patient called leave a  stating that for today's virtual appt  6/2/22, she was waiting in the waiting room and was on hold for 30 mins until it was  disconnected,however she get an after visit summary even though she didn't talk to someone   Please give a call @ [phone]

## 2022-06-16 ENCOUNTER — TELEPHONE (OUTPATIENT)
Dept: PSYCHIATRY | Facility: CLINIC | Age: 37
End: 2022-06-16

## 2022-06-16 ENCOUNTER — TELEMEDICINE (OUTPATIENT)
Dept: PSYCHIATRY | Facility: CLINIC | Age: 37
End: 2022-06-16

## 2022-06-16 DIAGNOSIS — F33.1 MODERATE EPISODE OF RECURRENT MAJOR DEPRESSIVE DISORDER (HCC): Primary | ICD-10-CM
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
## 2022-06-30 ENCOUNTER — TELEMEDICINE (OUTPATIENT)
Dept: PSYCHIATRY | Facility: CLINIC | Age: 37
End: 2022-06-30

## 2022-06-30 DIAGNOSIS — F33.1 MODERATE EPISODE OF RECURRENT MAJOR DEPRESSIVE DISORDER (HCC): Primary | ICD-10-CM
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
## 2022-06-30 NOTE — BH TREATMENT PLAN
Marcelo Hunt  1985       Date of Initial Treatment Plan: 6/30/2022  Date of Current Treatment Plan: 06/30/22    Treatment Plan Number 1    Strengths/Personal Resources for Self Care:  Open minded, persistent, empathetic, caring, passionate, self-motivated  Self-care - take medication, exercising, kayaking, bicycling, eat healthy, photography as a creative outlet  Diagnosis:   1  Moderate episode of recurrent major depressive disorder ([facility] [address] )         Area of Needs:  Reduce anxiety, improve insecurities so I can feel good enough  Would like to be less insecure in my relationship and not feel abandoned  Imposter syndrome as well  Sometimes I feel that I am reliving some things from the past   A lot of my thoughts and emotions are invalidated  Not sure what I'm supposed to be  Tired of reacting to things that are not here anymore - reacting to triggers from the past         Long Term Goal 1: To feel like I don't need other people to validate my emotions all the time and to feel more secure with myself as a person, and that the trauma in the past is not as much of an issue  Target Date: 6/30/2022  Completion Date: to be determined         Short Term Objectives for Goal 1: Client will learn DBT concepts and skills including n, wise mind, core mindfulness, lovingkindness, middle path",ISRA, GIVE FAST, building/ending relationships, behavior chain analysis, opposite action, understanding and naming emotions, Pros and cons, PLEASE skills, TIP skills, Radical acceptance, ACCEPTS, Turning the Mind and IMPROVE the moment  Client will practice skills and assess effectiveness with therapist biweekly and adjust skills acquisition as needed  Client will demonstrate successful use of DBT skills in at least 8 out of 10 challenging situations    Client will engage in cognitive behavioral therapy including n, identifying emotions, learning about ANTs, identifying situations, Identifying thoughts, learning about cognitive distortions and challenging cognitive distortions  Client will report success and challenges in using CBT methods  Therapist will adjust therapy interventions as needed  Long Term Goal 2: N/A    Target Date: N/A  Completion Date: N/A    Short Term Objectives for Goal 2: N/A         Long Term Goal # 3: N/A     Target Date: N/A  Completion Date: N/A    Short Term Objectives for Goal 3: N/A    GOAL 1: Modality: Individual 2x per month   Completion Date to be determined    GOAL 2: Modality:n/a     GOAL 3: Modality: n/a       Behavioral Health Treatment Plan [facility]: Diagnosis and Treatment Plan explained to Kim Perez relates understanding diagnosis and is agreeable to Treatment Plan  Client Comments : Please share your thoughts, feelings, need and/or experiences regarding your treatment plan:   Girish Hill, 1985, actively participated in the creation of this treatment plan during a virtual session, using the [employer] platform  Girish Hill  provided verbal consent on 6/30/2022 at 11:41 AM  The treatment plan was transcribed into the [employer] 99 Record at a later time

## 2022-07-14 ENCOUNTER — TELEMEDICINE (OUTPATIENT)
Dept: PSYCHIATRY | Facility: CLINIC | Age: 37
End: 2022-07-14
Payer: COMMERCIAL

## 2022-07-14 DIAGNOSIS — F33.1 MODERATE EPISODE OF RECURRENT MAJOR DEPRESSIVE DISORDER (HCC): Primary | ICD-10-CM

## 2022-07-14 PROCEDURE — 90834 PSYTX W PT 45 MINUTES: CPT
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
## 2022-07-28 ENCOUNTER — TELEMEDICINE (OUTPATIENT)
Dept: PSYCHIATRY | Facility: CLINIC | Age: 37
End: 2022-07-28
Payer: COMMERCIAL

## 2022-07-28 DIAGNOSIS — F33.1 MODERATE EPISODE OF RECURRENT MAJOR DEPRESSIVE DISORDER (HCC): Primary | ICD-10-CM

## 2022-07-28 PROCEDURE — 90834 PSYTX W PT 45 MINUTES: CPT
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
## 2022-08-09 ENCOUNTER — OFFICE VISIT (OUTPATIENT)
Dept: PSYCHIATRY | Facility: CLINIC | Age: 37
End: 2022-08-09

## 2022-08-09 DIAGNOSIS — F33.0 MILD EPISODE OF RECURRENT MAJOR DEPRESSIVE DISORDER (HCC): Primary | ICD-10-CM

## 2022-08-09 DIAGNOSIS — F41.1 GENERALIZED ANXIETY DISORDER: ICD-10-CM

## 2022-08-09 PROCEDURE — NC001 PR NO CHARGE: Performed by: PSYCHIATRY & NEUROLOGY
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
## 2022-08-09 NOTE — BH TREATMENT PLAN
TREATMENT PLAN (Medication Management Only)        [facility]    Name and Date of Birth:  Tyrel Pro  1985  Date of Treatment Plan: August 9, 2022  Diagnosis/Diagnoses:    1  Mild episode of recurrent major depressive disorder ([address] )    2  Generalized anxiety disorder      Strengths/Personal Resources for Self-Care: supportive friends, ability to communicate needs, ability to communicate well, ability to listen, average or above intelligence, good physical health, being resoureceful, self-reliance, stable employment, well educated  Area/Areas of need (in own words): anxiety symptoms  1  Long Term Goal: continue improvement in anxiety  Target Date:6 months - 2/9/2023  Person/Persons responsible for completion of goal: Karol Baker  2  Short Term Objective (s) - How will we reach this goal?:   A  Provider new recommended medication/dosage changes and/or continue medication(s): continue current medications as prescribed  B  Get to bed at the same time every night  C  Try relaxation techniques  Target Date:2 months - 10/9/2022  Person/Persons Responsible for Completion of Goal: Karol Baker  Progress Towards Goals: initiating treatment  Treatment Modality: medication management every 4 weeks  Review due 180 days from date of this plan: 6 months - 2/9/2023  Expected length of service: ongoing treatment  My Physician/PA/NP and I have developed this plan together and I agree to work on the goals and objectives  I understand the treatment goals that were developed for my treatment  The treatment plan was created between Brennan Clay MD and Jeannette Cruz on 08/09/22 at 10:52 AM but not signed at the time of the visit due to [facility]  The plan was reviewed, and verbal consent was given

## 2022-08-11 ENCOUNTER — TELEMEDICINE (OUTPATIENT)
Dept: PSYCHIATRY | Facility: CLINIC | Age: 37
End: 2022-08-11
Payer: COMMERCIAL

## 2022-08-11 ENCOUNTER — TELEPHONE (OUTPATIENT)
Dept: PSYCHIATRY | Facility: CLINIC | Age: 37
End: 2022-08-11

## 2022-08-11 DIAGNOSIS — F33.1 MODERATE EPISODE OF RECURRENT MAJOR DEPRESSIVE DISORDER (HCC): Primary | ICD-10-CM

## 2022-08-11 PROCEDURE — NC001 PR NO CHARGE: Performed by: STUDENT IN AN ORGANIZED HEALTH CARE EDUCATION/TRAINING PROGRAM

## 2022-08-11 PROCEDURE — 90834 PSYTX W PT 45 MINUTES: CPT

## 2022-08-11 RX ORDER — FLUOXETINE HYDROCHLORIDE 20 MG/1
20 CAPSULE ORAL DAILY
Qty: 30 CAPSULE | Refills: 0 | Status: SHIPPED | OUTPATIENT
Start: 2022-08-11 | End: 2022-09-06 | Stop reason: SDUPTHER

## 2022-08-11 NOTE — PSYCH
Psychotherapy Provided: Individual Psychotherapy 50 minutes     Length of time in session: 50 minutes, follow up in 2 week    Encounter Diagnosis     ICD-10-CM    1  Moderate episode of recurrent major depressive disorder ([facility] [address] )  F33 1        Goals addressed in session: Goal 1     Pain:      none    1    Current suicide risk : Low     Risk of harm to self or others low    Behavioral Health Treatment Plan [facility]: Diagnosis and Treatment Plan explained to Priti Gipson relates understanding diagnosis and is agreeable to Treatment Plan  Yes     Virtual Regular Visit    Verification of patient location:    Patient is located in the following state in which I hold an active license [place]      Assessment/Plan:    Problem List Items Addressed This Visit        Other    Moderate episode of recurrent major depressive disorder ([facility] [address] ) - Primary          Goals addressed in session: Goal 1          Reason for visit is   Chief Complaint   Patient presents with    Virtual Regular Visit        Encounter provider Vanna Husain LCSW    Provider located at [address] [place] [id number] [phone]      Recent Visits  No visits were found meeting these conditions  Showing recent visits within past 7 days and meeting all other requirements  Future Appointments  No visits were found meeting these conditions  Showing future appointments within next 150 days and meeting all other requirements       The patient was identified by name and date of birth  Cornel McCausland was informed that this is a telemedicine visit and that the visit is being conducted through[facility]ILICASE [facility] and patient was informed that this is a secure, HIPAA-compliant platform  She agrees to proceed     My office door was closed  No one else was in the room  She acknowledged consent and understanding of privacy and security of the video platform   The patient has agreed to participate and understands they can discontinue the visit at any time  Patient is aware this is a billable service  Subjective  Cornel Sanborn is a 40 y o  female   D:  Nicho Shah expressed that some people have  or are very sick recently both coworkers and patients  Therapist conducted a SELF with Nicho Shah to talk about safety concerns, emotions, loss and future hopes  Nicho Shah talked about a safety concern of people who are just orienting having patients but having to cover those patients they were seeing too  Also safety concerns about a patient who coded because she has a nagging feeling something could have been done to prevent his death  She talked to her manager about it and so they might have some negligence  Emotions include tired, helpless, defeated, tired of going to funerals and  homes, numbness, not having emotion I should feel, worried if it might happen to someone else who is young  Says she is not feeling hypervigilance like she used to about other family members  Loss - feeling that others will always be around - you might not have tomorrow with others  Missed her niece's surgery and feels bad about it  Misses her coworkers who  or are dying, that they will always be there  In terms of future, try to be diligent with friendships, do some breathing techniques, go to bed early to get some sleep, go to some parties  Therapist taught some heart breathing for calming as well as container exercise  A:  Nicho Shah appeared calm and cooperative during this session  Nicho Shah was able to participate in a SELF as well as learn container exercise and heart breathing for relaxation  P:  Nicho Shah will utliize container exercise and heart breathing as needed and meet with therapist to continue her timeline and learn other coping skills for anxiety        HPI     Past Medical History:   Diagnosis Date    Allergic     Anemia     Anxiety     Arthritis     Asthma     Depression     Exposure to SARS-associated coronavirus 4/12/2020    Headache(784 0)     Lumbar herniated disc     Pneumonia     Urinary tract infection     UTI (urinary tract infection)     Visual impairment        Past Surgical History:   Procedure Laterality Date    FL INJECTION LEFT KNEE (ARTHROGRAM)  2/28/2022    WISDOM TOOTH EXTRACTION         Current Outpatient Medications   Medication Sig Dispense Refill    B Complex-C (B-complex with vitamin C) tablet Take 1 tablet by mouth daily      buPROPion (Wellbutrin XL) 300 mg 24 hr tablet Take 1 tablet (300 mg total) by mouth every morning 30 tablet 6    calcium carbonate (OS-BERENICE) 600 MG tablet Take 600 mg by mouth 2 (two) times a day with meals      cholecalciferol (VITAMIN D3) 1,000 units tablet Take 1,000 Units by mouth daily      Ferrous Sulfate (RA IRON PO) Take by mouth      FLUoxetine (PROzac) 20 mg capsule Take 1 capsule (20 mg total) by mouth daily 30 capsule 0    Magnesium Oxide 500 MG TABS Take by mouth       No current facility-administered medications for this visit  Allergies   Allergen Reactions    Sulfa Antibiotics Anaphylaxis    Levofloxacin      Other reaction(s): sensative    Prednisone Other (See Comments)    Tobramycin      Other reaction(s): sensative       Review of Systems    Video Exam    There were no vitals filed for this visit      Physical Exam     I spent 50 minutes directly with the patient during this visit

## 2022-08-11 NOTE — TELEPHONE ENCOUNTER
Called patient to inform her that I spoke with her PCP and we are in agreement to discontinue patient's Cymbalta and start Prozac 20 mg daily for mood, anxiety, and symptoms of PTSD as discussed during initial evaluation

## 2022-08-30 ENCOUNTER — TELEMEDICINE (OUTPATIENT)
Dept: PSYCHIATRY | Facility: CLINIC | Age: 37
End: 2022-08-30
Payer: COMMERCIAL

## 2022-08-30 DIAGNOSIS — F33.1 MODERATE EPISODE OF RECURRENT MAJOR DEPRESSIVE DISORDER (HCC): Primary | ICD-10-CM

## 2022-08-30 PROCEDURE — 90834 PSYTX W PT 45 MINUTES: CPT
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
## 2022-09-06 ENCOUNTER — OFFICE VISIT (OUTPATIENT)
Dept: PSYCHIATRY | Facility: CLINIC | Age: 37
End: 2022-09-06
Payer: COMMERCIAL

## 2022-09-06 DIAGNOSIS — F43.12 CHRONIC POST-TRAUMATIC STRESS DISORDER (PTSD): ICD-10-CM

## 2022-09-06 DIAGNOSIS — F41.9 ANXIETY: ICD-10-CM

## 2022-09-06 DIAGNOSIS — F33.0 MILD EPISODE OF RECURRENT MAJOR DEPRESSIVE DISORDER (HCC): Primary | ICD-10-CM

## 2022-09-06 DIAGNOSIS — R53.83 FATIGUE, UNSPECIFIED TYPE: ICD-10-CM

## 2022-09-06 PROCEDURE — 99214 OFFICE O/P EST MOD 30 MIN: CPT | Performed by: PSYCHIATRY & NEUROLOGY

## 2022-09-06 RX ORDER — FLUOXETINE HYDROCHLORIDE 20 MG/1
20 CAPSULE ORAL DAILY
Qty: 30 CAPSULE | Refills: 1 | Status: SHIPPED | OUTPATIENT
Start: 2022-09-06 | End: 2022-10-04 | Stop reason: SDUPTHER
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
## 2022-09-13 ENCOUNTER — TELEMEDICINE (OUTPATIENT)
Dept: PSYCHIATRY | Facility: CLINIC | Age: 37
End: 2022-09-13
Payer: COMMERCIAL

## 2022-09-13 DIAGNOSIS — F33.1 MODERATE EPISODE OF RECURRENT MAJOR DEPRESSIVE DISORDER (HCC): Primary | ICD-10-CM

## 2022-09-13 PROCEDURE — 90834 PSYTX W PT 45 MINUTES: CPT

## 2022-09-13 NOTE — PSYCH
Psychotherapy Provided: Individual Psychotherapy 50 minutes     Length of time in session: 50 minutes, follow up in 2 week    Encounter Diagnosis     ICD-10-CM    1  Moderate episode of recurrent major depressive disorder ([facility] )  F33 1        Goals addressed in session: Goal 1     Pain:      none    0    Current suicide risk : Low         Behavioral Health Treatment Plan [facility]: Diagnosis and Treatment Plan explained to Catherine Mike relates understanding diagnosis and is agreeable to Treatment Plan  Yes     Virtual Regular Visit    Verification of patient location:    Patient is located in the following state in which I hold an active license [place]      Assessment/Plan:    Problem List Items Addressed This Visit        Other    Moderate episode of recurrent major depressive disorder ([facility] ) - Primary          Goals addressed in session: Goal 1          Reason for visit is   Chief Complaint   Patient presents with    Virtual Regular Visit        Encounter provider Magi Ayala LCSW    Provider located at [address] [place] [id number] [phone]      Recent Visits  No visits were found meeting these conditions  Showing recent visits within past 7 days and meeting all other requirements  Future Appointments  No visits were found meeting these conditions  Showing future appointments within next 150 days and meeting all other requirements       The patient was identified by name and date of birth  Beth Feeling was informed that this is a telemedicine visit and [facility] and patient was informed that this is a secure, HIPAA-compliant platform  She agrees to proceed     My office door was closed  No one else was in the room  She acknowledged consent and understanding of privacy and security of the video platform   The patient has agreed to participate and understands they can discontinue the visit at any time  Patient is aware this is a billable service  Subjective  Jase Tong is a 40 y o  female   D:  Aliya Fleming said she is feeling tired and has been working a lot  She continued to do her timeline  She covered ages [age] and talked about her middle school, high school and college experiences including a relationship with a friend that had a falling out, her grandmother dying, and going to college and getting to go to [place] for international studies  Therapist inquired about the quality of her sleep, and she said she had been practicing some things that she learned in the last session with some success  Therapist taught her some vagal toning exercises with ear manipulation for her to try  She said she would do so  A:  Aliyajacoby Fleming appeared calm and cooperative during this session  Aliya Fleming openly communicated thoughts and feelings about her life and was able to learn some techniques for better sleep  P:  Aliya Fleming will utilize sleep improvement techniques as well as continue her timeline with therapist in 2 weeks        HPI     Past Medical History:   Diagnosis Date    Allergic     Anemia     Anxiety     Arthritis     Asthma     Depression     Exposure to SARS-associated coronavirus 4/12/2020    Headache(784 0)     Lumbar herniated disc     Pneumonia     Urinary tract infection     UTI (urinary tract infection)     Visual impairment        Past Surgical History:   Procedure Laterality Date    FL INJECTION LEFT KNEE (ARTHROGRAM)  2/28/2022    WISDOM TOOTH EXTRACTION         Current Outpatient Medications   Medication Sig Dispense Refill    B Complex-C (B-complex with vitamin C) tablet Take 1 tablet by mouth daily      buPROPion (Wellbutrin XL) 300 mg 24 hr tablet Take 1 tablet (300 mg total) by mouth every morning 30 tablet 6    calcium carbonate (OS-BERENICE) 600 MG tablet Take 600 mg by mouth 2 (two) times a day with meals      cholecalciferol (VITAMIN D3) 1,000 units tablet Take 1,000 Units by mouth daily      Ferrous Sulfate (RA IRON PO) Take by mouth      FLUoxetine (PROzac) 20 mg capsule Take 1 capsule (20 mg total) by mouth daily 30 capsule 1    Magnesium Oxide 500 MG TABS Take by mouth       No current facility-administered medications for this visit  Allergies   Allergen Reactions    Sulfa Antibiotics Anaphylaxis    Levofloxacin      Other reaction(s): sensative    Prednisone Other (See Comments)    Tobramycin      Other reaction(s): sensative       Review of Systems    Video Exam    There were no vitals filed for this visit      Physical Exam     I spent 45 minutes directly with the patient during this visit

## 2022-09-27 ENCOUNTER — TELEMEDICINE (OUTPATIENT)
Dept: PSYCHIATRY | Facility: CLINIC | Age: 37
End: 2022-09-27
Payer: COMMERCIAL

## 2022-09-27 DIAGNOSIS — F33.1 MODERATE EPISODE OF RECURRENT MAJOR DEPRESSIVE DISORDER (HCC): Primary | ICD-10-CM

## 2022-09-27 PROCEDURE — 90834 PSYTX W PT 45 MINUTES: CPT

## 2022-09-27 NOTE — PSYCH
Psychotherapy Provided: Individual Psychotherapy 52 minutes   Session start time:  1000  Session end time:  1052    Length of time in session: 52 minutes, follow up in 2 week    No diagnosis found  Goals addressed in session: Goal 1     Pain:      none    0    Current suicide risk : Low    D:  Clair Mcgraw talked about her boyfriend being depressed and needing therapy  Also said she has a friend whose wife passed suddenly 3 years ago and Silver Crowells telling her he had cheated on his girlfriend with one of Linda's coworkers  Clair Mcgraw feels conflicted because she is still friends with him and is conflicted about continuing to be friends with him  She said that this friend was consumed by grief which is why she cheated so his girlfriend is staying with him  Therapist and Clair Mcgraw discussed some conflicting emotions that Clair Mcgraw was having about continuing a friendship with someone who cheats, and discussed factors involved in her thinking in terms of her continued friendship with this friend  Clair Mcgraw continued her timeline talking about moving to [place], finishing college, having dysfunctional relationships, going to school and describing friendship situations  Clair Mcgraw stopped at the top of the hour at age 22 and agreed to continue the timeline to her current age at the next session  A:  Clair Mcgraw appeared calm and cooperative during this session  Clair Mcgraw was able to openly communicate thoughts and feelings about her life in talking about her timeline and processing current situations and their resulting thoughts and feelings  P:  Clair Mcgraw will meet with therapist in 2 weeks to continue her timeline and to openly communicate thoughts and feelings about her life  Behavioral Health Treatment Plan [facility]: Diagnosis and Treatment Plan explained to Whitney Key relates understanding diagnosis and is agreeable to Treatment Plan   Yes

## 2022-09-29 ENCOUNTER — APPOINTMENT (OUTPATIENT)
Dept: LAB | Facility: CLINIC | Age: 37
End: 2022-09-29
Payer: COMMERCIAL

## 2022-09-29 DIAGNOSIS — R53.83 FATIGUE, UNSPECIFIED TYPE: ICD-10-CM

## 2022-09-29 LAB
25(OH)D3 SERPL-MCNC: 49 NG/ML (ref 30–100)
ALBUMIN SERPL BCP-MCNC: 4.1 G/DL (ref 3.5–5)
ALP SERPL-CCNC: 52 U/L (ref 46–116)
ALT SERPL W P-5'-P-CCNC: 25 U/L (ref 12–78)
ANION GAP SERPL CALCULATED.3IONS-SCNC: 5 MMOL/L (ref 4–13)
AST SERPL W P-5'-P-CCNC: 21 U/L (ref 5–45)
BASOPHILS # BLD AUTO: 0.06 THOUSANDS/ΜL (ref 0–0.1)
BASOPHILS NFR BLD AUTO: 1 % (ref 0–1)
BILIRUB SERPL-MCNC: 0.39 MG/DL (ref 0.2–1)
BUN SERPL-MCNC: 16 MG/DL (ref 5–25)
CALCIUM SERPL-MCNC: 8.9 MG/DL (ref 8.3–10.1)
CHLORIDE SERPL-SCNC: 106 MMOL/L (ref 96–108)
CO2 SERPL-SCNC: 24 MMOL/L (ref 21–32)
CREAT SERPL-MCNC: 1.1 MG/DL (ref 0.6–1.3)
EOSINOPHIL # BLD AUTO: 0.12 THOUSAND/ΜL (ref 0–0.61)
EOSINOPHIL NFR BLD AUTO: 2 % (ref 0–6)
ERYTHROCYTE [DISTWIDTH] IN BLOOD BY AUTOMATED COUNT: 12.4 % (ref 11.6–15.1)
GFR SERPL CREATININE-BSD FRML MDRD: 64 ML/MIN/1.73SQ M
GLUCOSE SERPL-MCNC: 131 MG/DL (ref 65–140)
HCT VFR BLD AUTO: 40.6 % (ref 34.8–46.1)
HGB BLD-MCNC: 13.4 G/DL (ref 11.5–15.4)
IMM GRANULOCYTES # BLD AUTO: 0.02 THOUSAND/UL (ref 0–0.2)
IMM GRANULOCYTES NFR BLD AUTO: 0 % (ref 0–2)
LYMPHOCYTES # BLD AUTO: 2.41 THOUSANDS/ΜL (ref 0.6–4.47)
LYMPHOCYTES NFR BLD AUTO: 31 % (ref 14–44)
MCH RBC QN AUTO: 30.8 PG (ref 26.8–34.3)
MCHC RBC AUTO-ENTMCNC: 33 G/DL (ref 31.4–37.4)
MCV RBC AUTO: 93 FL (ref 82–98)
MONOCYTES # BLD AUTO: 0.44 THOUSAND/ΜL (ref 0.17–1.22)
MONOCYTES NFR BLD AUTO: 6 % (ref 4–12)
NEUTROPHILS # BLD AUTO: 4.74 THOUSANDS/ΜL (ref 1.85–7.62)
NEUTS SEG NFR BLD AUTO: 60 % (ref 43–75)
NRBC BLD AUTO-RTO: 0 /100 WBCS
PLATELET # BLD AUTO: 348 THOUSANDS/UL (ref 149–390)
PMV BLD AUTO: 9.6 FL (ref 8.9–12.7)
POTASSIUM SERPL-SCNC: 4.3 MMOL/L (ref 3.5–5.3)
PROT SERPL-MCNC: 7.8 G/DL (ref 6.4–8.4)
RBC # BLD AUTO: 4.35 MILLION/UL (ref 3.81–5.12)
SODIUM SERPL-SCNC: 135 MMOL/L (ref 135–147)
WBC # BLD AUTO: 7.79 THOUSAND/UL (ref 4.31–10.16)

## 2022-09-29 PROCEDURE — 36415 COLL VENOUS BLD VENIPUNCTURE: CPT

## 2022-09-29 PROCEDURE — 82306 VITAMIN D 25 HYDROXY: CPT

## 2022-09-29 PROCEDURE — 85025 COMPLETE CBC W/AUTO DIFF WBC: CPT

## 2022-09-29 PROCEDURE — 80053 COMPREHEN METABOLIC PANEL: CPT

## 2022-10-04 ENCOUNTER — OFFICE VISIT (OUTPATIENT)
Dept: PSYCHIATRY | Facility: CLINIC | Age: 37
End: 2022-10-04
Payer: COMMERCIAL

## 2022-10-04 DIAGNOSIS — F41.9 ANXIETY: ICD-10-CM

## 2022-10-04 DIAGNOSIS — F33.1 MODERATE EPISODE OF RECURRENT MAJOR DEPRESSIVE DISORDER (HCC): Primary | ICD-10-CM

## 2022-10-04 DIAGNOSIS — F43.10 PTSD (POST-TRAUMATIC STRESS DISORDER): ICD-10-CM

## 2022-10-04 PROCEDURE — 99213 OFFICE O/P EST LOW 20 MIN: CPT | Performed by: PSYCHIATRY & NEUROLOGY

## 2022-10-04 RX ORDER — FLUOXETINE HYDROCHLORIDE 20 MG/1
20 CAPSULE ORAL DAILY
Qty: 30 CAPSULE | Refills: 1 | Status: SHIPPED | OUTPATIENT
Start: 2022-10-04 | End: 2022-11-03
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
## 2022-10-11 ENCOUNTER — TELEMEDICINE (OUTPATIENT)
Dept: PSYCHIATRY | Facility: CLINIC | Age: 37
End: 2022-10-11
Payer: COMMERCIAL

## 2022-10-11 DIAGNOSIS — F33.1 MODERATE EPISODE OF RECURRENT MAJOR DEPRESSIVE DISORDER (HCC): Primary | ICD-10-CM

## 2022-10-11 PROCEDURE — 90834 PSYTX W PT 45 MINUTES: CPT
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
## 2022-10-25 ENCOUNTER — TELEPHONE (OUTPATIENT)
Dept: PSYCHIATRY | Facility: CLINIC | Age: 37
End: 2022-10-25

## 2022-10-26 ENCOUNTER — TELEPHONE (OUTPATIENT)
Dept: PSYCHIATRY | Facility: CLINIC | Age: 37
End: 2022-10-26

## 2022-11-04 ENCOUNTER — TELEMEDICINE (OUTPATIENT)
Dept: PSYCHIATRY | Facility: CLINIC | Age: 37
End: 2022-11-04

## 2022-11-04 DIAGNOSIS — F33.1 MODERATE EPISODE OF RECURRENT MAJOR DEPRESSIVE DISORDER (HCC): Primary | ICD-10-CM
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
## 2022-11-08 ENCOUNTER — OFFICE VISIT (OUTPATIENT)
Dept: PSYCHIATRY | Facility: CLINIC | Age: 37
End: 2022-11-08

## 2022-11-08 DIAGNOSIS — F33.1 MODERATE EPISODE OF RECURRENT MAJOR DEPRESSIVE DISORDER (HCC): ICD-10-CM

## 2022-11-08 DIAGNOSIS — F41.9 ANXIETY: ICD-10-CM

## 2022-11-08 RX ORDER — FLUOXETINE HYDROCHLORIDE 40 MG/1
40 CAPSULE ORAL DAILY
Qty: 30 CAPSULE | Refills: 0 | Status: SHIPPED | OUTPATIENT
Start: 2022-11-08 | End: 2022-12-08
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
## 2022-11-10 ENCOUNTER — TELEMEDICINE (OUTPATIENT)
Dept: PSYCHIATRY | Facility: CLINIC | Age: 37
End: 2022-11-10

## 2022-11-10 DIAGNOSIS — F33.1 MODERATE EPISODE OF RECURRENT MAJOR DEPRESSIVE DISORDER (HCC): Primary | ICD-10-CM
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
## 2022-11-15 ENCOUNTER — TELEMEDICINE (OUTPATIENT)
Dept: PSYCHIATRY | Facility: CLINIC | Age: 37
End: 2022-11-15

## 2022-11-15 DIAGNOSIS — F33.1 MODERATE EPISODE OF RECURRENT MAJOR DEPRESSIVE DISORDER (HCC): Primary | ICD-10-CM

## 2022-11-15 NOTE — PSYCH
Psychotherapy Provided: Individual Psychotherapy 52 minutes     Length of time in session: 50 minutes, follow up in 2 week    Encounter Diagnosis     ICD-10-CM    1  Moderate episode of recurrent major depressive disorder ([facility] [address] )  F33 1        Goals addressed in session: Goal 1     Pain:      none    0    Current suicide risk : Low     If client experiences suicidal ideation and feels in danger of acting on urges, they will call [phone] or go to the nearest emergency room  Behavioral Health Treatment Plan [facility]: Diagnosis and Treatment Plan explained to Jaida Hayden relates understanding diagnosis and is agreeable to Treatment Plan  Yes     Visit start and stop times:    11/15/22       Virtual Regular Visit    Verification of patient location:    Patient is located in the following state in which I hold an active license [place]      Assessment/Plan:    Problem List Items Addressed This Visit        Other    Moderate episode of recurrent major depressive disorder ([facility] [address] ) - Primary          Goals addressed in session: Goal 1          Reason for visit is   Chief Complaint   Patient presents with   • Virtual Regular Visit        Encounter provider Rojelio Manzanares LCSW    Provider located at [address] [place] [id number] [phone]      Recent Visits  Date Type Provider Dept   11/10/22 Telemedicine Rojelio Manzanares LCSW [facility]   Showing recent visits within past 7 days and meeting all other requirements  Future Appointments  No visits were found meeting these conditions  Showing future appointments within next 150 days and meeting all other requirements       The patient was identified by name and date of birth  Miriam Urrutia was informed that this is a telemedicine visit and [facility]  She agrees to proceed     My office door was closed  No one else was in the room  She acknowledged consent and understanding of privacy and security of the video platform  The patient has agreed to participate and understands they can discontinue the visit at any time  Patient is aware this is a billable service  Subjective  Renita Figueroa is a 40 y o  female   D:  Cally Davis shared she was feeling "frustrated" over a course that she needs but can't find to sign up for  Cally Davis shared she has been feeling better overall and has been using butterfly taps to improve her mood and calm down  Therapist shared a grounding exercise with Cally Davis called RAIN for recognizing and feeling emotions  Cally Davis participated saying she felt frustrated, worried and annoyed, felt it in her shoulders and arms, and that she was judging herself as well  Therapist encouraged non judgment in the practice  Cally Davis shared she was not feeling up to continuing trauma work today, and that she was feeling tired  Therapist offered that the session did not have to last he entire time but could be a check in today  Cally Davis agreed and said she was going to do some self care  A:  Cally Davis appeared tired today and shared verbally she was feeling not up to doing emotional work today  P:  Cally Davis will utilize RAIN and butterfly taps as needed        HPI     Past Medical History:   Diagnosis Date   • Allergic    • Anemia    • Anxiety    • Arthritis    • Asthma    • Depression    • Exposure to SARS-associated coronavirus 4/12/2020   • Headache(784 0)    • Lumbar herniated disc    • Pneumonia    • Urinary tract infection    • UTI (urinary tract infection)    • Visual impairment        Past Surgical History:   Procedure Laterality Date   • FL INJECTION LEFT KNEE (ARTHROGRAM)  2/28/2022   • WISDOM TOOTH EXTRACTION         Current Outpatient Medications   Medication Sig Dispense Refill   • B Complex-C (B-complex with vitamin C) tablet Take 1 tablet by mouth daily     • buPROPion (Wellbutrin XL) 300 mg 24 hr tablet Take 1 tablet (300 mg total) by mouth every morning 30 tablet 6   • calcium carbonate (OS-BERENICE) 600 MG tablet Take 600 mg by mouth 2 (two) times a day with meals     • cholecalciferol (VITAMIN D3) 1,000 units tablet Take 1,000 Units by mouth daily     • Ferrous Sulfate (RA IRON PO) Take by mouth     • FLUoxetine (PROzac) 40 MG capsule Take 1 capsule (40 mg total) by mouth daily 30 capsule 0   • Magnesium Oxide 500 MG TABS Take by mouth       No current facility-administered medications for this visit  Allergies   Allergen Reactions   • Sulfa Antibiotics Anaphylaxis   • Levofloxacin      Other reaction(s): sensative   • Prednisone Other (See Comments)   • Tobramycin      Other reaction(s): sensative       Review of Systems    Video Exam    There were no vitals filed for this visit      Physical Exam     Visit Time    Visit Start Time: 2:00PM  Visit Stop Time: 2:20PM  Total Visit Duration: 20 minutes

## 2022-11-28 DIAGNOSIS — F33.1 MODERATE EPISODE OF RECURRENT MAJOR DEPRESSIVE DISORDER (HCC): ICD-10-CM

## 2022-11-28 RX ORDER — BUPROPION HYDROCHLORIDE 300 MG/1
300 TABLET ORAL EVERY MORNING
Qty: 30 TABLET | Refills: 0 | Status: SHIPPED | OUTPATIENT
Start: 2022-11-28

## 2022-11-29 ENCOUNTER — TELEMEDICINE (OUTPATIENT)
Dept: PSYCHIATRY | Facility: CLINIC | Age: 37
End: 2022-11-29

## 2022-11-29 DIAGNOSIS — F33.1 MODERATE EPISODE OF RECURRENT MAJOR DEPRESSIVE DISORDER (HCC): Primary | ICD-10-CM

## 2022-11-29 NOTE — PSYCH
Psychotherapy Provided: Individual Psychotherapy 50 minutes     Length of time in session: 50 minutes, follow up in 2week    Encounter Diagnosis     ICD-10-CM    1  Moderate episode of [facility]  F33 1           Goals addressed in session: Goal 1     Pain:      none    0    Current suicide risk : Low     If client experiences suicidal ideation and feels in danger of acting on urges, they will call [phone] or go to the nearest emergency room  Behavioral Health Treatment Plan [facility]: Diagnosis and Treatment Plan explained to Mercedez Alcaraz relates understanding diagnosis and is agreeable to Treatment Plan  Yes     Visit start and stop times:    11/29/22      Psychotherapy Provided: Individual Psychotherapy 50 minutes     Length of time in session: 50 minutes, follow up in 2 week    Encounter Diagnosis     ICD-10-CM    1  Moderate episode of recurrent major depressive disorder ([facility]  F33 1           Goals addressed in session: Goal 1     Pain:      none    0    Current suicide risk : Low     If client experiences suicidal ideation and feels in danger of acting on urges, they will call [phone] or go to the nearest emergency room  Behavioral Health Treatment Plan [facility]: Diagnosis and Treatment Plan explained to Mercedez Alcaraz relates understanding diagnosis and is agreeable to Treatment Plan   Yes     Visit start and stop times:    11/29/22       Virtual Regular Visit    Verification of patient location:    Patient is located in the following state in which I hold an active license [place]      Assessment/Plan:    Problem List Items Addressed This Visit        Other    Moderate episode of recurrent major depressive disorder ([facility] ) - Primary       Goals addressed in session: Goal 1          Reason for visit is   Chief Complaint   Patient presents with   • Virtual Regular Visit        Encounter provider Siddharth Grant LCSW    Provider located at [facility] [address] [id number] [phone]      Recent Visits  No visits were found meeting these conditions  Showing recent visits within past 7 days and meeting all other requirements  Future Appointments  No visits were found meeting these conditions  Showing future appointments within next 150 days and meeting all other requirements       The patient was identified by name and date of birth  Jeanella Lesch was informed that this is a telemedicine visit and [facility]  She agrees to proceed     My office door was closed  No one else was in the room  She acknowledged consent and understanding of privacy and security of the video platform  The patient has agreed to participate and understands they can discontinue the visit at any time  Patient is aware this is a billable service  Subjective  Jeanella Lesch is a 40 y o  female   D:  Sona Boateng talked about feeling awkward with others in talking about different passionate topics such as politics and photography, and she feels that she reserves her trust until she knows someone better  She talked about "clinging" to people in the past and that she forms "instant" attachments at times  She talked about being triggered by situations with others that remind her of being hurt in the past   Therapist went through flashback management with Sona Boateng, identifying how she is triggered and what is similar to the original situation, then what is different and how she can feel safe in the moment  Sona Boateng talked about her background in terms of feeling different from others and what she has experienced in life  Sona Boateng talked about wanting to help others and wants to do so  Sona Boateng and therapist updated her treatment plan today during session  A:  Sona Boateng appeared calm and cooperative during this session    Sona Boateng was able to learn flashback management today as well  P: Kayley Howard will utilize flashback management as needed  HPI     Past Medical History:   Diagnosis Date   • Allergic    • Anemia    • Anxiety    • Arthritis    • Asthma    • Depression    • Exposure to SARS-associated coronavirus 4/12/2020   • Headache(784 0)    • Lumbar herniated disc    • Pneumonia    • Urinary tract infection    • UTI (urinary tract infection)    • Visual impairment        Past Surgical History:   Procedure Laterality Date   • FL INJECTION LEFT KNEE (ARTHROGRAM)  2/28/2022   • WISDOM TOOTH EXTRACTION         Current Outpatient Medications   Medication Sig Dispense Refill   • B Complex-C (B-complex with vitamin C) tablet Take 1 tablet by mouth daily     • buPROPion (Wellbutrin XL) 300 mg 24 hr tablet Take 1 tablet (300 mg total) by mouth every morning 30 tablet 0   • calcium carbonate (OS-BERENICE) 600 MG tablet Take 600 mg by mouth 2 (two) times a day with meals     • cholecalciferol (VITAMIN D3) 1,000 units tablet Take 1,000 Units by mouth daily     • Ferrous Sulfate (RA IRON PO) Take by mouth     • FLUoxetine (PROzac) 40 MG capsule Take 1 capsule (40 mg total) by mouth daily 30 capsule 0   • Magnesium Oxide 500 MG TABS Take by mouth       No current facility-administered medications for this visit  Allergies   Allergen Reactions   • Sulfa Antibiotics Anaphylaxis   • Levofloxacin      Other reaction(s): sensative   • Prednisone Other (See Comments)   • Tobramycin      Other reaction(s): sensative       Review of Systems    Video Exam    There were no vitals filed for this visit      Physical Exam     Visit Time    Visit Start Time: 1100AM  Visit Stop Time: 1152AM  Total Visit Duration: 52 minutes

## 2022-11-29 NOTE — BH TREATMENT PLAN
Sterlingrenard Bass  1985       Date of Initial Treatment Plan: 6/30/2022  Date of Current Treatment Plan: 11/29/22    Treatment Plan Number 1    Strengths/Personal Resources for Self Care:  Open minded, persistent, empathetic, caring, passionate, self-motivated  Self-care - take medication, exercising, kayaking, bicycling, eat healthy, photography as a creative outlet  Diagnosis:   1  Moderate episode of recurrent major depressive disorder ([facility] [address] )            Area of Needs:  Reduce anxiety, improve insecurities so I can feel good enough  Would like to be less insecure in my relationship and not feel abandoned  Imposter syndrome as well  Sometimes I feel that I am reliving some things from the past   A lot of my thoughts and emotions are invalidated  Not sure what I'm supposed to be  Tired of reacting to things that are not here anymore - reacting to triggers from the past         Long Term Goal 1:  Updated 11/29/2022:   I want to improve my sense of self and self-confidence and trust myself better and validate myself    Target Date: 11/29/2023  Completion Date: to be determined         Short Term Objectives for Goal 1:     1  Client will improve her self confidence, be more grounded and learn to validate herself by learning  DBT concepts and skills including n, wise mind, core mindfulness, lovingkindness, middle path",ISRA, GIVE FAST, building/ending relationships, behavior chain analysis, opposite action, understanding and naming emotions, Pros and cons, PLEASE skills, TIP skills, Radical acceptance, ACCEPTS, Turning the Mind and IMPROVE the moment  Client will practice skills and assess effectiveness with therapist biweekly and adjust skills acquisition as needed  Client will demonstrate successful use of DBT skills in at least 8 out of 10 challenging situations  Update 11/29/22:  Feliberto Lynn has successfully learned mindfulness activities for better grounding and improved sense of self  Kendra Walton has been able to utilize mindfulness in at least 5 out of 10 situations  Kendra Walton will continue to learn mindfulness and other DBT concepts to improve her sense of self, confidence and self esteem  2   Client will engage in cognitive behavioral therapy in order to challenge negative thinking and old message from her past,  including n, identifying emotions, learning about ANTs, identifying situations, Identifying thoughts, learning about cognitive distortions and challenging cognitive distortions  Client will report success and challenges in using CBT methods  Therapist will adjust therapy interventions as needed  Update 11/29/2022:  Kendra Walton has been able to successfully identify negative self thoughts and challenge them in 5 out of 10 situations  Kendra Walton will continue to learn how to identify negative thinking and challenge thinking to be more balanced  3   Kendra Walton will work through past traumas that contribute to negative self concept by engaging in EMDR treatment including resourcing and grounding exercises dissociative experiences scale assessment, safe place, container exercise, restoration team exercise, walking a path, beam of light, timeline of positive and negative experiences and mindfulness exercises Client will demonstrate effective use of grounding and resourcing in at least 4 out of 5 situations  Client will engage in EMDR protocol development, desensitization using bilateral stimulation, reprocessing, body scan, reassessment and cognitive interweaves  Client and therapist will assess effectiveness of treatment and adjust treatment as needed      Long Term Goal 2: N/A    Target Date: N/A  Completion Date: N/A    Short Term Objectives for Goal 2: N/A         Long Term Goal # 3: N/A     Target Date: N/A  Completion Date: N/A    Short Term Objectives for Goal 3: N/A    GOAL 1: Modality: Individual 2x per month   Completion Date to be determined    GOAL 2: Modality:n/a     GOAL 3: Modality: n/a Behavioral Health Treatment Plan [facility]: Diagnosis and Treatment Plan explained to Juan Lama relates understanding diagnosis and is agreeable to Treatment Plan  Client Comments : Please share your thoughts, feelings, need and/or experiences regarding your treatment plan:   Jackie Farrell, 1985, actively participated in the creation of this treatment plan during a virtual session, using the [employer] platform  Jackie Farrell  provided verbal consent on 11/29/2022 at 11:41 AM  The treatment plan was transcribed into the [employer] Record at a later time                                                              1985

## 2022-12-14 ENCOUNTER — TELEPHONE (OUTPATIENT)
Dept: BEHAVIORAL/MENTAL HEALTH CLINIC | Facility: CLINIC | Age: 37
End: 2022-12-14

## 2022-12-14 NOTE — TELEPHONE ENCOUNTER
NO-SHOW LETTER MAILED TO [facility]    ADDRESS: [place] [phone]

## 2022-12-19 ENCOUNTER — OFFICE VISIT (OUTPATIENT)
Dept: PSYCHIATRY | Facility: CLINIC | Age: 37
End: 2022-12-19

## 2022-12-19 DIAGNOSIS — F41.9 ANXIETY: ICD-10-CM

## 2022-12-19 DIAGNOSIS — F33.1 MODERATE EPISODE OF RECURRENT MAJOR DEPRESSIVE DISORDER (HCC): ICD-10-CM

## 2022-12-19 DIAGNOSIS — F43.10 PTSD (POST-TRAUMATIC STRESS DISORDER): Primary | ICD-10-CM

## 2022-12-19 RX ORDER — FLUOXETINE HYDROCHLORIDE 40 MG/1
40 CAPSULE ORAL DAILY
Qty: 30 CAPSULE | Refills: 0 | Status: SHIPPED | OUTPATIENT
Start: 2022-12-19 | End: 2023-01-18
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
## 2023-01-03 ENCOUNTER — TELEMEDICINE (OUTPATIENT)
Dept: PSYCHIATRY | Facility: CLINIC | Age: 38
End: 2023-01-03

## 2023-01-03 DIAGNOSIS — F33.1 MODERATE EPISODE OF RECURRENT MAJOR DEPRESSIVE DISORDER (HCC): ICD-10-CM

## 2023-01-03 DIAGNOSIS — F43.10 PTSD (POST-TRAUMATIC STRESS DISORDER): Primary | ICD-10-CM
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
## 2023-01-07 DIAGNOSIS — F33.1 MODERATE EPISODE OF RECURRENT MAJOR DEPRESSIVE DISORDER (HCC): ICD-10-CM

## 2023-01-09 RX ORDER — BUPROPION HYDROCHLORIDE 300 MG/1
300 TABLET ORAL EVERY MORNING
Qty: 30 TABLET | Refills: 0 | Status: SHIPPED | OUTPATIENT
Start: 2023-01-09

## 2023-01-16 ENCOUNTER — TELEPHONE (OUTPATIENT)
Dept: PSYCHIATRY | Facility: CLINIC | Age: 38
End: 2023-01-16

## 2023-01-16 ENCOUNTER — HOSPITAL ENCOUNTER (EMERGENCY)
Facility: HOSPITAL | Age: 38
Discharge: HOME | End: 2023-01-17
Attending: EMERGENCY MEDICINE

## 2023-01-16 ENCOUNTER — APPOINTMENT (EMERGENCY)
Dept: RADIOLOGY | Facility: HOSPITAL | Age: 38
End: 2023-01-16

## 2023-01-16 DIAGNOSIS — F41.9 ANXIETY: ICD-10-CM

## 2023-01-16 DIAGNOSIS — J45.901 ASTHMA EXACERBATION: Primary | ICD-10-CM

## 2023-01-16 DIAGNOSIS — F33.1 MODERATE EPISODE OF RECURRENT MAJOR DEPRESSIVE DISORDER (HCC): ICD-10-CM

## 2023-01-16 LAB
ALBUMIN SERPL BCP-MCNC: 4.2 G/DL (ref 3.5–5)
ALP SERPL-CCNC: 69 U/L (ref 46–116)
ALT SERPL W P-5'-P-CCNC: 18 U/L (ref 12–78)
ANION GAP SERPL CALCULATED.3IONS-SCNC: 14 MMOL/L (ref 4–13)
AST SERPL W P-5'-P-CCNC: 20 U/L (ref 5–45)
BASE EX.OXY STD BLDV CALC-SCNC: 73 % (ref 60–80)
BASE EXCESS BLDV CALC-SCNC: 2.1 MMOL/L
BASOPHILS # BLD AUTO: 0.04 THOUSANDS/ÂΜL (ref 0–0.1)
BASOPHILS NFR BLD AUTO: 1 % (ref 0–1)
BILIRUB SERPL-MCNC: 0.28 MG/DL (ref 0.2–1)
BUN SERPL-MCNC: 12 MG/DL (ref 5–25)
CALCIUM SERPL-MCNC: 9.2 MG/DL (ref 8.3–10.1)
CARDIAC TROPONIN I PNL SERPL HS: <2 NG/L
CHLORIDE SERPL-SCNC: 101 MMOL/L (ref 96–108)
CO2 SERPL-SCNC: 23 MMOL/L (ref 21–32)
CREAT SERPL-MCNC: 1.08 MG/DL (ref 0.6–1.3)
D DIMER PPP FEU-MCNC: 0.97 UG/ML FEU
EOSINOPHIL # BLD AUTO: 0.06 THOUSAND/ÂΜL (ref 0–0.61)
EOSINOPHIL NFR BLD AUTO: 1 % (ref 0–6)
ERYTHROCYTE [DISTWIDTH] IN BLOOD BY AUTOMATED COUNT: 12 % (ref 11.6–15.1)
FLUAV RNA RESP QL NAA+PROBE: NEGATIVE
FLUBV RNA RESP QL NAA+PROBE: NEGATIVE
GFR SERPL CREATININE-BSD FRML MDRD: 65 ML/MIN/1.73SQ M
GLUCOSE SERPL-MCNC: 106 MG/DL (ref 65–140)
HCO3 BLDV-SCNC: 22.8 MMOL/L (ref 24–30)
HCT VFR BLD AUTO: 39.4 % (ref 34.8–46.1)
HGB BLD-MCNC: 13.6 G/DL (ref 11.5–15.4)
IMM GRANULOCYTES # BLD AUTO: 0.03 THOUSAND/UL (ref 0–0.2)
IMM GRANULOCYTES NFR BLD AUTO: 0 % (ref 0–2)
LYMPHOCYTES # BLD AUTO: 2.13 THOUSANDS/ÂΜL (ref 0.6–4.47)
LYMPHOCYTES NFR BLD AUTO: 25 % (ref 14–44)
MCH RBC QN AUTO: 31.1 PG (ref 26.8–34.3)
MCHC RBC AUTO-ENTMCNC: 34.5 G/DL (ref 31.4–37.4)
MCV RBC AUTO: 90 FL (ref 82–98)
MONOCYTES # BLD AUTO: 0.47 THOUSAND/ÂΜL (ref 0.17–1.22)
MONOCYTES NFR BLD AUTO: 6 % (ref 4–12)
NEUTROPHILS # BLD AUTO: 5.64 THOUSANDS/ÂΜL (ref 1.85–7.62)
NEUTS SEG NFR BLD AUTO: 67 % (ref 43–75)
NRBC BLD AUTO-RTO: 0 /100 WBCS
O2 CT BLDV-SCNC: 14.9 ML/DL
PCO2 BLDV: 25.8 MM HG (ref 42–50)
PH BLDV: 7.56 [PH] (ref 7.3–7.4)
PLATELET # BLD AUTO: 424 THOUSANDS/UL (ref 149–390)
PMV BLD AUTO: 9 FL (ref 8.9–12.7)
PO2 BLDV: 32.4 MM HG (ref 35–45)
POTASSIUM SERPL-SCNC: 3.3 MMOL/L (ref 3.5–5.3)
PROT SERPL-MCNC: 7.7 G/DL (ref 6.4–8.4)
RBC # BLD AUTO: 4.38 MILLION/UL (ref 3.81–5.12)
RSV RNA RESP QL NAA+PROBE: NEGATIVE
SARS-COV-2 RNA RESP QL NAA+PROBE: NEGATIVE
SODIUM SERPL-SCNC: 138 MMOL/L (ref 135–147)
WBC # BLD AUTO: 8.37 THOUSAND/UL (ref 4.31–10.16)

## 2023-01-16 RX ORDER — ALBUTEROL SULFATE 2.5 MG/3ML
5 SOLUTION RESPIRATORY (INHALATION) ONCE
Status: COMPLETED | OUTPATIENT
Start: 2023-01-16 | End: 2023-01-16

## 2023-01-16 RX ORDER — FLUOXETINE HYDROCHLORIDE 40 MG/1
40 CAPSULE ORAL DAILY
Qty: 30 CAPSULE | Refills: 0 | Status: SHIPPED | OUTPATIENT
Start: 2023-01-16 | End: 2023-01-24 | Stop reason: SDUPTHER

## 2023-01-16 RX ORDER — FLUOXETINE HYDROCHLORIDE 40 MG/1
40 CAPSULE ORAL DAILY
Qty: 30 CAPSULE | Refills: 0 | Status: SHIPPED | OUTPATIENT
Start: 2023-01-16 | End: 2023-01-16 | Stop reason: SDUPTHER

## 2023-01-16 RX ADMIN — ALBUTEROL SULFATE 5 MG: 2.5 SOLUTION RESPIRATORY (INHALATION) at 22:58

## 2023-01-16 RX ADMIN — IPRATROPIUM BROMIDE 0.5 MG: 0.5 SOLUTION RESPIRATORY (INHALATION) at 22:59

## 2023-01-16 NOTE — Clinical Note
Jenn Ramos was seen and treated in our emergency department on 1/16/2023  Diagnosis:     Conchita Bermudez  may return to work on return date  She may return on this date: 01/23/2023         If you have any questions or concerns, please don't hesitate to call        Eliane Garcia MD    ______________________________           _______________          _______________  Hospital Representative                              Date                                Time

## 2023-01-16 NOTE — TELEPHONE ENCOUNTER
Pt called and stated that the current prescription that was sent to [address] [place] [address] has no now be sent to the [address] [place] [address] on file because [facility]   Pts ph# is [phone]

## 2023-01-16 NOTE — TELEPHONE ENCOUNTER
Medication Refill Request     Name of Medication Fluoxetine   Dose/Frequency 40 mg/ Take 1 capsule(40 mg total) by mouth daily  Quantity 30  Verified pharmacy   [x]  Verified ordering Provider   [x]  Does patient have enough for the next 3 days? Yes [] No [x]  Does patient have a follow-up appointment scheduled?  Yes [x] No []   If so when is appointment: 1/24/2023

## 2023-01-17 ENCOUNTER — APPOINTMENT (EMERGENCY)
Dept: CT IMAGING | Facility: HOSPITAL | Age: 38
End: 2023-01-17

## 2023-01-17 ENCOUNTER — TELEMEDICINE (OUTPATIENT)
Dept: PSYCHIATRY | Facility: CLINIC | Age: 38
End: 2023-01-17

## 2023-01-17 VITALS
OXYGEN SATURATION: 99 % | HEART RATE: 81 BPM | TEMPERATURE: 98.7 F | SYSTOLIC BLOOD PRESSURE: 127 MMHG | RESPIRATION RATE: 38 BRPM | DIASTOLIC BLOOD PRESSURE: 80 MMHG

## 2023-01-17 DIAGNOSIS — F33.1 MODERATE EPISODE OF RECURRENT MAJOR DEPRESSIVE DISORDER (HCC): ICD-10-CM

## 2023-01-17 DIAGNOSIS — F43.10 PTSD (POST-TRAUMATIC STRESS DISORDER): Primary | ICD-10-CM

## 2023-01-17 LAB
ATRIAL RATE: 86 BPM
P AXIS: 64 DEGREES
PR INTERVAL: 156 MS
QRS AXIS: 17 DEGREES
QRSD INTERVAL: 98 MS
QT INTERVAL: 392 MS
QTC INTERVAL: 469 MS
T WAVE AXIS: 54 DEGREES
VENTRICULAR RATE: 86 BPM

## 2023-01-17 RX ORDER — MAGNESIUM SULFATE HEPTAHYDRATE 40 MG/ML
2 INJECTION, SOLUTION INTRAVENOUS ONCE
Status: COMPLETED | OUTPATIENT
Start: 2023-01-17 | End: 2023-01-17

## 2023-01-17 RX ORDER — KETOROLAC TROMETHAMINE 30 MG/ML
15 INJECTION, SOLUTION INTRAMUSCULAR; INTRAVENOUS ONCE
Status: DISCONTINUED | OUTPATIENT
Start: 2023-01-17 | End: 2023-01-17

## 2023-01-17 RX ORDER — PREDNISONE 20 MG/1
40 TABLET ORAL DAILY
Qty: 10 TABLET | Refills: 0 | Status: SHIPPED | OUTPATIENT
Start: 2023-01-17 | End: 2023-01-22

## 2023-01-17 RX ORDER — METHYLPREDNISOLONE SODIUM SUCCINATE 125 MG/2ML
125 INJECTION, POWDER, LYOPHILIZED, FOR SOLUTION INTRAMUSCULAR; INTRAVENOUS ONCE
Status: COMPLETED | OUTPATIENT
Start: 2023-01-17 | End: 2023-01-17

## 2023-01-17 RX ORDER — KETOROLAC TROMETHAMINE 30 MG/ML
15 INJECTION, SOLUTION INTRAMUSCULAR; INTRAVENOUS ONCE
Status: COMPLETED | OUTPATIENT
Start: 2023-01-17 | End: 2023-01-17

## 2023-01-17 RX ADMIN — METHYLPREDNISOLONE SODIUM SUCCINATE 125 MG: 125 INJECTION, POWDER, FOR SOLUTION INTRAMUSCULAR; INTRAVENOUS at 01:19

## 2023-01-17 RX ADMIN — KETOROLAC TROMETHAMINE 15 MG: 30 INJECTION, SOLUTION INTRAMUSCULAR at 01:41

## 2023-01-17 RX ADMIN — IOHEXOL 85 ML: 350 INJECTION, SOLUTION INTRAVENOUS at 00:23

## 2023-01-17 RX ADMIN — MAGNESIUM SULFATE HEPTAHYDRATE 2 G: 40 INJECTION, SOLUTION INTRAVENOUS at 01:20
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
## 2023-01-21 DIAGNOSIS — U07.1 COVID-19: Primary | ICD-10-CM

## 2023-01-21 DIAGNOSIS — J40 BRONCHITIS: ICD-10-CM

## 2023-01-21 RX ORDER — DOXYCYCLINE HYCLATE 100 MG/1
100 CAPSULE ORAL EVERY 12 HOURS SCHEDULED
Qty: 14 CAPSULE | Refills: 0 | Status: SHIPPED | OUTPATIENT
Start: 2023-01-21 | End: 2023-01-27 | Stop reason: ALTCHOICE

## 2023-01-21 RX ORDER — FLUTICASONE PROPIONATE 110 UG/1
4 AEROSOL, METERED RESPIRATORY (INHALATION) 2 TIMES DAILY
Qty: 12 G | Refills: 0 | Status: SHIPPED | OUTPATIENT
Start: 2023-01-21 | End: 2023-01-27 | Stop reason: SDUPTHER

## 2023-01-21 RX ORDER — BUDESONIDE 180 UG/1
4 AEROSOL, POWDER RESPIRATORY (INHALATION) 2 TIMES DAILY
Qty: 1 EACH | Refills: 0 | Status: SHIPPED | OUTPATIENT
Start: 2023-01-21 | End: 2023-01-21 | Stop reason: ALTCHOICE

## 2023-01-24 ENCOUNTER — OFFICE VISIT (OUTPATIENT)
Dept: PSYCHIATRY | Facility: CLINIC | Age: 38
End: 2023-01-24

## 2023-01-24 DIAGNOSIS — F41.9 ANXIETY: ICD-10-CM

## 2023-01-24 DIAGNOSIS — F43.10 PTSD (POST-TRAUMATIC STRESS DISORDER): ICD-10-CM

## 2023-01-24 DIAGNOSIS — F33.1 MODERATE EPISODE OF RECURRENT MAJOR DEPRESSIVE DISORDER (HCC): Primary | ICD-10-CM

## 2023-01-24 RX ORDER — FLUOXETINE HYDROCHLORIDE 40 MG/1
40 CAPSULE ORAL DAILY
Qty: 30 CAPSULE | Refills: 0 | Status: SHIPPED | OUTPATIENT
Start: 2023-01-24 | End: 2023-02-23

## 2023-01-24 RX ORDER — ALBUTEROL SULFATE 90 UG/1
2 AEROSOL, METERED RESPIRATORY (INHALATION) EVERY 6 HOURS PRN
COMMUNITY
End: 2023-01-27 | Stop reason: SDUPTHER

## 2023-01-24 RX ORDER — MONTELUKAST SODIUM 10 MG/1
10 TABLET ORAL DAILY
COMMUNITY
Start: 2023-01-13

## 2023-01-24 RX ORDER — BUPROPION HYDROCHLORIDE 300 MG/1
300 TABLET ORAL EVERY MORNING
Qty: 30 TABLET | Refills: 0 | Status: SHIPPED | OUTPATIENT
Start: 2023-01-24
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
## 2023-01-25 NOTE — ED PROVIDER NOTES
History  Chief Complaint   Patient presents with   • Shortness of Breath     COVID+ since 1/04, worsening sxs  PALOMARES, dizziness, cough  26-year-old female presents emergency department for evaluation of shortness of breath  Patient states that she was diagnosed COVID-positive on 1 4, and has had worsening chest pain wheeze cough shortness of breath, has history of asthma, feels her symptoms are exacerbated  Had a very high heart rate tonight, was concerned for possible blood clot, came to the emergency department for evaluation  Denies fever chills abdominal pain nausea or vomiting  Prior to Admission Medications   Prescriptions Last Dose Informant Patient Reported? Taking?    B Complex-C (B-complex with vitamin C) tablet   Yes No   Sig: Take 1 tablet by mouth daily   Ferrous Sulfate (RA IRON PO)   Yes No   Sig: Take by mouth   Magnesium Oxide 500 MG TABS   Yes No   Sig: Take by mouth   calcium carbonate (OS-BERENICE) 600 MG tablet   Yes No   Sig: Take 600 mg by mouth 2 (two) times a day with meals   cholecalciferol (VITAMIN D3) 1,000 units tablet   Yes No   Sig: Take 1,000 Units by mouth daily      Facility-Administered Medications: None       Past Medical History:   Diagnosis Date   • Allergic    • Anemia    • Anxiety    • Arthritis    • Asthma    • Depression    • Exposure to SARS-associated coronavirus 4/12/2020   • Headache(784 0)    • Lumbar herniated disc    • Pneumonia    • Urinary tract infection    • UTI (urinary tract infection)    • Visual impairment        Past Surgical History:   Procedure Laterality Date   • FL INJECTION LEFT KNEE (ARTHROGRAM)  2/28/2022   • WISDOM TOOTH EXTRACTION         Family History   Problem Relation Age of Onset   • Breast cancer additional onset Mother         Left Masectomy   • Hypertension Mother    • Diabetes Mother    • Mental illness Father         family history   • Alcohol abuse Father    • Hypertension Father    • Hyperlipidemia Father    • Diabetes Father • Colon cancer Maternal Grandmother    • Breast cancer additional onset Maternal Grandmother    • Diabetes Maternal Grandmother    • Prostate cancer Maternal Grandfather         lung   • Cancer Maternal Grandfather    • Completed Suicide  Maternal Grandfather      I have reviewed and agree with the history as documented  E-Cigarette/Vaping   • E-Cigarette Use Never User      E-Cigarette/Vaping Substances   • Nicotine No    • THC No    • CBD No    • Flavoring No    • Other No    • Unknown No      Social History     Tobacco Use   • Smoking status: Never   • Smokeless tobacco: Never   Vaping Use   • Vaping Use: Never used   Substance Use Topics   • Alcohol use: Yes     Alcohol/week: 0 0 standard drinks     Comment: occasional   • Drug use: No       Review of Systems   Constitutional: Negative for appetite change, chills, fatigue and fever  HENT: Negative for sneezing and sore throat  Eyes: Negative for visual disturbance  Respiratory: Positive for chest tightness, shortness of breath and wheezing  Negative for cough and choking  Cardiovascular: Negative for chest pain and palpitations  Gastrointestinal: Negative for abdominal pain, constipation, diarrhea, nausea and vomiting  Genitourinary: Negative for difficulty urinating and dysuria  Neurological: Negative for dizziness, weakness, light-headedness, numbness and headaches  All other systems reviewed and are negative  Physical Exam  Physical Exam  Constitutional:       General: She is not in acute distress  Appearance: She is well-developed  She is not diaphoretic  HENT:      Head: Normocephalic and atraumatic  Eyes:      Pupils: Pupils are equal, round, and reactive to light  Neck:      Vascular: No JVD  Trachea: No tracheal deviation  Cardiovascular:      Rate and Rhythm: Normal rate and regular rhythm  Heart sounds: Normal heart sounds  No murmur heard  No friction rub  No gallop     Pulmonary:      Effort: Pulmonary effort is normal  No respiratory distress  Breath sounds: Wheezing present  No rales  Abdominal:      General: Bowel sounds are normal  There is no distension  Palpations: Abdomen is soft  Tenderness: There is no abdominal tenderness  There is no guarding or rebound  Skin:     General: Skin is warm and dry  Coloration: Skin is not pale  Neurological:      Mental Status: She is alert and oriented to person, place, and time  Cranial Nerves: No cranial nerve deficit  Motor: No abnormal muscle tone     Psychiatric:         Behavior: Behavior normal          Vital Signs  ED Triage Vitals   Temperature Pulse Respirations Blood Pressure SpO2   01/16/23 2241 01/16/23 2236 01/16/23 2236 01/16/23 2236 01/16/23 2236   98 7 °F (37 1 °C) 93 (!) 24 139/84 100 %      Temp Source Heart Rate Source Patient Position - Orthostatic VS BP Location FiO2 (%)   01/16/23 2241 01/16/23 2236 01/16/23 2236 01/16/23 2236 --   Temporal Monitor Lying Right arm       Pain Score       01/16/23 2300       4           Vitals:    01/17/23 0000 01/17/23 0030 01/17/23 0100 01/17/23 0130   BP: 115/75 114/68 122/82 127/80   Pulse: 79 76 81 81   Patient Position - Orthostatic VS: Sitting Sitting Sitting Sitting         Visual Acuity  Visual Acuity    Flowsheet Row Most Recent Value   L Pupil Size (mm) 4   R Pupil Size (mm) 4          ED Medications  Medications   albuterol inhalation solution 5 mg (5 mg Nebulization Given 1/16/23 2258)   ipratropium (ATROVENT) 0 02 % inhalation solution 0 5 mg (0 5 mg Nebulization Given 1/16/23 2259)   methylPREDNISolone sodium succinate (Solu-MEDROL) injection 125 mg (125 mg Intravenous Given 1/17/23 0119)   magnesium sulfate 2 g/50 mL IVPB (premix) 2 g (0 g Intravenous Stopped 1/17/23 0140)   iohexol (OMNIPAQUE) 350 MG/ML injection (SINGLE-DOSE) 85 mL (85 mL Intravenous Given 1/17/23 0023)   ketorolac (TORADOL) injection 15 mg (15 mg Intravenous Given 1/17/23 0141) Diagnostic Studies  Results Reviewed     Procedure Component Value Units Date/Time    FLU/RSV/COVID - if FLU/RSV clinically relevant [[id number]]  (Normal) Collected: 01/16/23 2252    Lab Status: Final result Specimen: Nares from Nose Updated: 01/16/23 2341     SARS-CoV-2 Negative     INFLUENZA A PCR Negative     INFLUENZA B PCR Negative     RSV PCR Negative    Narrative:      FOR PEDIATRIC PATIENTS - copy/paste COVID Guidelines URL to browser: [email or web address]  [employer]    SARS-CoV-2 assay is a Nucleic Acid Amplification assay intended for the  qualitative detection of nucleic acid from SARS-CoV-2 in nasopharyngeal  swabs  Results are for the presumptive identification of SARS-CoV-2 RNA  Positive results are indicative of infection with SARS-CoV-2, the virus  causing COVID-19, but do not rule out bacterial infection or co-infection  with other viruses  Laboratories within the [place] and its  territories are required to report all positive results to the appropriate  public health authorities  Negative results do not preclude SARS-CoV-2  infection and should not be used as the sole basis for treatment or other  patient management decisions  Negative results must be combined with  clinical observations, patient history, and epidemiological information  This test has not been FDA cleared or approved  This test has been authorized by FDA under an Emergency Use Authorization  (EUA)  This test is only authorized for the duration of time the  declaration that circumstances exist justifying the authorization of the  emergency use of an in vitro diagnostic tests for detection of SARS-CoV-2  virus and/or diagnosis of COVID-19 infection under section 564(b)(1) of  the Act, 21 U  S C  078EDW-3(I)(8), unless the authorization is terminated  or revoked sooner  The test has been validated but independent review by FDA  and CLIA is pending      Test performed using [employer]: This RT-PCR assay targets N2,  a region unique to SARS-CoV-2  A conserved region in the E-gene was chosen  for pan-Sarbecovirus detection which includes SARS-CoV-2  According to CMS-2020-01-R, this platform meets the definition of high-throughput technology      D-dimer, quantitative [[id number]]  (Abnormal) Collected: 01/16/23 2252    Lab Status: Final result Specimen: Blood from Arm, Right Updated: 01/16/23 2338     D-Dimer, Quant 0 97 ug/ml FEU     HS Troponin 0hr (reflex protocol) [[id number]]  (Normal) Collected: 01/16/23 2252    Lab Status: Final result Specimen: Blood from Arm, Right Updated: 01/16/23 2333     hs TnI 0hr <2 ng/L     Comprehensive metabolic panel [[id number]]  (Abnormal) Collected: 01/16/23 2252    Lab Status: Final result Specimen: Blood from Arm, Right Updated: 01/16/23 2327     Sodium 138 mmol/L      Potassium 3 3 mmol/L      Chloride 101 mmol/L      CO2 23 mmol/L      ANION GAP 14 mmol/L      BUN 12 mg/dL      Creatinine 1 08 mg/dL      Glucose 106 mg/dL      Calcium 9 2 mg/dL      AST 20 U/L      ALT 18 U/L      Alkaline Phosphatase 69 U/L      Total Protein 7 7 g/dL      Albumin 4 2 g/dL      Total Bilirubin 0 28 mg/dL      eGFR 65 ml/min/1 73sq m     Narrative:      [facility] guidelines for Chronic Kidney Disease (CKD):   •  Stage 1 with normal or high GFR (GFR > 90 mL/min/1 73 square meters)  •  Stage 2 Mild CKD (GFR = 60-89 mL/min/1 73 square meters)  •  Stage 3A Moderate CKD (GFR = 45-59 mL/min/1 73 square meters)  •  Stage 3B Moderate CKD (GFR = 30-44 mL/min/1 73 square meters)  •  Stage 4 Severe CKD (GFR = 15-29 mL/min/1 73 square meters)  •  Stage 5 End Stage CKD (GFR <15 mL/min/1 73 square meters)  Note: GFR calculation is accurate only with a steady state creatinine    Blood gas, venous [[id number]]  (Abnormal) Collected: 01/16/23 2252    Lab Status: Final result Specimen: Blood from Arm, Right Updated: 01/16/23 2310     pH, Sen 7 564 pCO2, Sen 25 8 mm Hg      pO2, Sen 32 4 mm Hg      HCO3, Sen 22 8 mmol/L      Base Excess, Sen 2 1 mmol/L      O2 Content, Sen 14 9 ml/dL      O2 HGB, VENOUS 73 0 %     CBC and differential [[id number]]  (Abnormal) Collected: 01/16/23 2252    Lab Status: Final result Specimen: Blood from Arm, Right Updated: 01/16/23 2302     WBC 8 37 Thousand/uL      RBC 4 38 Million/uL      Hemoglobin 13 6 g/dL      Hematocrit 39 4 %      MCV 90 fL      MCH 31 1 pg      MCHC 34 5 g/dL      RDW 12 0 %      MPV 9 0 fL      Platelets 061 Thousands/uL      nRBC 0 /100 WBCs      Neutrophils Relative 67 %      Immat GRANS % 0 %      Lymphocytes Relative 25 %      Monocytes Relative 6 %      Eosinophils Relative 1 %      Basophils Relative 1 %      Neutrophils Absolute 5 64 Thousands/µL      Immature Grans Absolute 0 03 Thousand/uL      Lymphocytes Absolute 2 13 Thousands/µL      Monocytes Absolute 0 47 Thousand/µL      Eosinophils Absolute 0 06 Thousand/µL      Basophils Absolute 0 04 Thousands/µL                  CTA ED chest PE Study   Final Result by Onelia Garcia MD (01/17 0045)      No evidence of acute pulmonary embolus, thoracic aortic aneurysm or dissection  No acute cardiopulmonary process  Workstation performed: [id number]         XR chest 1 view portable   Final Result by Joe Mcclain MD (01/17 8005)      No acute cardiopulmonary disease  Findings are stable            Workstation performed: [id number]                    Procedures  Procedures         ED Course                               SBIRT 22yo+    Flowsheet Row Most Recent Value   SBIRT (25 yo +)    In order to provide better care to our patients, we are screening all of our patients for alcohol and drug use  Would it be okay to ask you these screening questions? Yes Filed at: 01/17/2023 0137   Initial Alcohol Screen: US AUDIT-C     1  How often do you have a drink containing alcohol? 1 Filed at: 01/17/2023 0137   2   How many drinks containing alcohol do you have on a typical day you are drinking? 1 Filed at: 01/17/2023 0137   3b  FEMALE Any Age, or MALE 65+: How often do you have 4 or more drinks on one occassion? 0 Filed at: 01/17/2023 0137   Audit-C Score 2 Filed at: 01/17/2023 7165   MARY: How many times in the past year have you    Used an illegal drug or used a prescription medication for non-medical reasons? Never Filed at: 01/17/2023 8038                    Medical Decision Making  44-year-old female with likely asthma exacerbation, will check labs EKG troponin, D-dimer, reassess  D-dimer negative, patient symptomatic gave mag, recommended steroids, patient discussed this with her ophthalmologist that she has understands this may worsen her glaucoma but in the setting of an acute asthma exacerbation the benefits are outweighed by the risks, he received steroids, additional nebs, mag, feels better, offered admission for asthma exacerbation, versus trial of discharge home with steroids, but this is reasonable, will discharge with return precautions if symptoms worsen  Asthma exacerbation: complicated acute illness or injury  Amount and/or Complexity of Data Reviewed  Labs: ordered  Radiology: ordered  Risk  Prescription drug management  Disposition  Final diagnoses:   Asthma exacerbation     Time reflects when diagnosis was documented in both MDM as applicable and the Disposition within this note     Time User Action Codes Description Comment    1/17/2023  1:44 AM Maryellen Garrison Add [[id number]] Asthma exacerbation       ED Disposition     ED Disposition   Discharge    Condition   Stable    Date/Time   Tue Jan 17, 2023  1:43 AM    Comment   [address] discharge to home/self care                 Follow-up Information     Follow up With Specialties Details Why Contact Info    Christen Hercules, [address], Nurse Practitioner   21 [phone] [address] [facility]  [address] [phone]            Discharge Medication List as of 1/17/2023  1:46 AM      START taking these medications    Details   predniSONE 20 mg tablet Take 2 tablets (40 mg total) by mouth daily for 5 days, Starting Tue 1/17/2023, Until Sun 1/22/2023, Normal         CONTINUE these medications which have NOT CHANGED    Details   B Complex-C (B-complex with vitamin C) tablet Take 1 tablet by mouth daily, Historical Med      calcium carbonate (OS-BERENICE) 600 MG tablet Take 600 mg by mouth 2 (two) times a day with meals, Historical Med      cholecalciferol (VITAMIN D3) 1,000 units tablet Take 1,000 Units by mouth daily, Historical Med      Ferrous Sulfate (RA IRON PO) Take by mouth, Historical Med      Magnesium Oxide 500 MG TABS Take by mouth, Historical Med      buPROPion (Wellbutrin XL) 300 mg 24 hr tablet Take 1 tablet (300 mg total) by mouth every morning, Starting Mon 1/9/2023, Normal      FLUoxetine (PROzac) 40 MG capsule Take 1 capsule (40 mg total) by mouth daily, Starting Mon 1/16/2023, Until Wed 2/15/2023, Normal             No discharge procedures on file      PDMP Review       Value Time User    PDMP Reviewed  Yes 12/19/2022 12:48 PM Nam Gross MD          ED Provider  Electronically Signed by           Codi Ware MD  01/24/23 2011

## 2023-01-27 ENCOUNTER — OFFICE VISIT (OUTPATIENT)
Dept: FAMILY MEDICINE CLINIC | Facility: CLINIC | Age: 38
End: 2023-01-27

## 2023-01-27 ENCOUNTER — TELEPHONE (OUTPATIENT)
Dept: PULMONOLOGY | Facility: CLINIC | Age: 38
End: 2023-01-27

## 2023-01-27 VITALS
BODY MASS INDEX: 25.36 KG/M2 | HEART RATE: 83 BPM | OXYGEN SATURATION: 99 % | DIASTOLIC BLOOD PRESSURE: 82 MMHG | WEIGHT: 157.8 LBS | HEIGHT: 66 IN | TEMPERATURE: 97.8 F | SYSTOLIC BLOOD PRESSURE: 124 MMHG

## 2023-01-27 DIAGNOSIS — U07.1 COVID-19: ICD-10-CM

## 2023-01-27 DIAGNOSIS — R06.02 SHORTNESS OF BREATH: ICD-10-CM

## 2023-01-27 DIAGNOSIS — Z00.00 ANNUAL PHYSICAL EXAM: Primary | ICD-10-CM

## 2023-01-27 RX ORDER — ALBUTEROL SULFATE 90 UG/1
2 AEROSOL, METERED RESPIRATORY (INHALATION) EVERY 6 HOURS PRN
Qty: 18 G | Refills: 0 | Status: SHIPPED | OUTPATIENT
Start: 2023-01-27 | End: 2023-03-09 | Stop reason: SDUPTHER

## 2023-01-27 RX ORDER — FLUTICASONE PROPIONATE 110 UG/1
4 AEROSOL, METERED RESPIRATORY (INHALATION) 2 TIMES DAILY
Qty: 12 G | Refills: 0 | Status: SHIPPED | OUTPATIENT
Start: 2023-01-27 | End: 2023-03-08

## 2023-01-27 NOTE — PATIENT INSTRUCTIONS

## 2023-01-27 NOTE — ASSESSMENT & PLAN NOTE
Patient with shortness of breath with minimal activity including talking having coughing and shortness of breath and feeling like she is having problems with exhaling concerns for ILD from COVID will check spirometry and CT chest high resolution and referral to pulmonary

## 2023-01-27 NOTE — PROGRESS NOTES
ADULT ANNUAL [facility]    NAME: Darling Garcia  AGE: 40 y o  SEX: female  : 1985     DATE: 2023     Assessment and Plan:     Problem List Items Addressed This Visit        Other    Annual physical exam - Primary    COVID-19     Patient with shortness of breath with minimal activity including talking having coughing and shortness of breath and feeling like she is having problems with exhaling concerns for ILD from [facility] will check spirometry and CT chest high resolution and referral to pulmonary          Relevant Orders    CT chest high resolution    Complete PFT with post bronchodilators    Ambulatory referral to Pulmonology    Ambulatory Referral to Pulmonary Rehabilitation    Shortness of breath    Relevant Orders    CT chest high resolution    Complete PFT with post bronchodilators    Ambulatory referral to Pulmonology    Ambulatory Referral to Pulmonary Rehabilitation       Immunizations and preventive care screenings were discussed with patient today  Appropriate education was printed on patient's after visit summary  Counseling:  Dental Health: discussed importance of regular tooth brushing, flossing, and dental visits  Exercise: the importance of regular exercise/physical activity was discussed  Recommend exercise 3-5 times per week for at least 30 minutes  Return in 1 year (on 2024)  Chief Complaint:     Chief Complaint   Patient presents with   • Follow-up     Medication refill      History of Present Illness:     Adult Annual Physical   Patient here for a comprehensive physical exam  The patient reports problems - covid  Patient here today and reports that she had covid for the fouth time was diagnosed on 1/10/2023 and had been sick since 2023  She has completed her primary series   Patient reports that she has been out of work for three weeks and is having  shortness of breath with her speech and having dizziness when going up and down the stairs and also having issues with going outside in the cold and any activity is causing her to feel lightheaded and shortness of breath  Patient presented to the hospital 1/16/2023 for the SOB and near syncope and had a CTA and was negative for a PE  Diet and Physical Activity  Diet/Nutrition: well balanced diet  Exercise: moderate cardiovascular exercise  Depression Screening  PHQ-2/9 Depression Screening         General Health  Sleep: sleeps well  Hearing: normal - bilateral   Vision: goes for regular eye exams  Dental: regular dental visits  /GYN Health  Last menstrual period: 12/27/2022  Contraceptive method: none  History of STDs?: no      Review of Systems:     Review of Systems   Constitutional: Positive for fatigue  Negative for activity change, appetite change, chills, diaphoresis, fever and unexpected weight change  HENT: Negative for congestion, ear pain, hearing loss, postnasal drip, sinus pressure, sinus pain, sneezing and sore throat  Eyes: Negative for pain, redness and visual disturbance  Respiratory: Positive for choking and shortness of breath  Negative for cough  Cardiovascular: Negative for chest pain and leg swelling  Gastrointestinal: Negative for abdominal pain, diarrhea, nausea and vomiting  Endocrine: Negative  Genitourinary: Negative  Musculoskeletal: Negative for arthralgias  Skin: Negative  Allergic/Immunologic: Negative  Neurological: Negative for dizziness and light-headedness  Hematological: Negative  Psychiatric/Behavioral: Negative for behavioral problems and dysphoric mood        Past Medical History:     Past Medical History:   Diagnosis Date   • Allergic    • Anemia    • Anxiety    • Arthritis    • Asthma    • Depression    • Exposure to SARS-associated coronavirus 4/12/2020   • Headache(784 0)    • Lumbar herniated disc    • Pneumonia    • Urinary tract infection    • UTI (urinary tract infection)    • Visual impairment       Past Surgical History:     Past Surgical History:   Procedure Laterality Date   • FL INJECTION LEFT KNEE (ARTHROGRAM)  2/28/2022   • WISDOM TOOTH EXTRACTION        Social History:     Social History     Socioeconomic History   • Marital status: Single     Spouse name: None   • Number of children: None   • Years of education: None   • Highest education level: None   Occupational History   • None   Tobacco Use   • Smoking status: Never   • Smokeless tobacco: Never   Vaping Use   • Vaping Use: Never used   Substance and Sexual Activity   • Alcohol use:  Yes     Alcohol/week: 0 0 standard drinks     Comment: occasional   • Drug use: No   • Sexual activity: Not Currently     Partners: Male     Birth control/protection: Condom Male   Other Topics Concern   • None   Social History Narrative   • None     Social Determinants of Health     Financial Resource Strain: Not on file   Food Insecurity: Not on file   Transportation Needs: Not on file   Physical Activity: Not on file   Stress: Not on file   Social Connections: Not on file   Intimate Partner Violence: Not on file   Housing Stability: Not on file      Family History:     Family History   Problem Relation Age of Onset   • Breast cancer additional onset Mother         Left Masectomy   • Hypertension Mother    • Diabetes Mother    • Mental illness Father         family history   • Alcohol abuse Father    • Hypertension Father    • Hyperlipidemia Father    • Diabetes Father    • Colon cancer Maternal Grandmother    • Breast cancer additional onset Maternal Grandmother    • Diabetes Maternal Grandmother    • Prostate cancer Maternal Grandfather         lung   • Cancer Maternal Grandfather    • Completed Suicide  Maternal Grandfather       Current Medications:     Current Outpatient Medications   Medication Sig Dispense Refill   • albuterol (PROVENTIL HFA,VENTOLIN HFA) 90 mcg/act inhaler Inhale 2 puffs every 6 (six) hours as needed for wheezing     • B Complex-C (B-complex with vitamin C) tablet Take 1 tablet by mouth daily     • buPROPion (Wellbutrin XL) 300 mg 24 hr tablet Take 1 tablet (300 mg total) by mouth every morning 30 tablet 0   • calcium carbonate (OS-BERENICE) 600 MG tablet Take 600 mg by mouth 2 (two) times a day with meals     • cholecalciferol (VITAMIN D3) 1,000 units tablet Take 1,000 Units by mouth daily     • Ferrous Sulfate (RA IRON PO) Take by mouth     • FLUoxetine (PROzac) 40 MG capsule Take 1 capsule (40 mg total) by mouth daily 30 capsule 0   • fluticasone (FLOVENT HFA) 110 MCG/ACT inhaler Inhale 4 puffs 2 (two) times a day for 14 days Rinse mouth after use  12 g 0   • Magnesium Oxide 500 MG TABS Take by mouth     • montelukast (SINGULAIR) 10 mg tablet Take 10 mg by mouth daily     • PREDNISONE PO Take 30 mg by mouth       No current facility-administered medications for this visit  Allergies: Allergies   Allergen Reactions   • Sulfa Antibiotics Anaphylaxis   • Levofloxacin      Other reaction(s): sensative   • Prednisone Other (See Comments)   • Tobramycin      Other reaction(s): sensative      Physical Exam:     /82 (BP Location: Left arm, Patient Position: Sitting)   Pulse 83   Temp 97 8 °F (36 6 °C) (Temporal)   Ht 5' 6" (1 676 m)   Wt 71 6 kg (157 lb 12 8 oz)   SpO2 99%   BMI 25 47 kg/m²     Physical Exam  Vitals and nursing note reviewed  Constitutional:       General: She is not in acute distress  Appearance: She is well-developed  HENT:      Head: Normocephalic and atraumatic  Eyes:      Conjunctiva/sclera: Conjunctivae normal    Cardiovascular:      Rate and Rhythm: Normal rate and regular rhythm  Heart sounds: No murmur heard  Pulmonary:      Effort: Pulmonary effort is normal  No respiratory distress  Breath sounds: Normal breath sounds  Abdominal:      Palpations: Abdomen is soft  Tenderness: There is no abdominal tenderness     Musculoskeletal: General: No swelling  Cervical back: Neck supple  Skin:     General: Skin is warm and dry  Capillary Refill: Capillary refill takes less than 2 seconds  Neurological:      Mental Status: She is alert     Psychiatric:         Mood and Affect: Mood normal           Elbert Pereira

## 2023-01-30 ENCOUNTER — HOSPITAL ENCOUNTER (OUTPATIENT)
Dept: PULMONOLOGY | Facility: HOSPITAL | Age: 38
Discharge: HOME | End: 2023-01-30

## 2023-01-30 DIAGNOSIS — R06.02 SHORTNESS OF BREATH: ICD-10-CM

## 2023-01-30 DIAGNOSIS — U07.1 COVID-19: ICD-10-CM

## 2023-01-30 RX ORDER — ALBUTEROL SULFATE 2.5 MG/3ML
2.5 SOLUTION RESPIRATORY (INHALATION) ONCE AS NEEDED
Status: COMPLETED | OUTPATIENT
Start: 2023-01-30 | End: 2023-01-30

## 2023-01-30 RX ADMIN — ALBUTEROL SULFATE 2.5 MG: 2.5 SOLUTION RESPIRATORY (INHALATION) at 09:38

## 2023-01-31 ENCOUNTER — TELEPHONE (OUTPATIENT)
Dept: CARDIAC REHAB | Facility: HOSPITAL | Age: 38
End: 2023-01-31

## 2023-02-01 ENCOUNTER — HOSPITAL ENCOUNTER (OUTPATIENT)
Dept: CT IMAGING | Facility: HOSPITAL | Age: 38
Discharge: HOME | End: 2023-02-01

## 2023-02-01 DIAGNOSIS — U07.1 COVID-19: ICD-10-CM

## 2023-02-01 DIAGNOSIS — R06.02 SHORTNESS OF BREATH: ICD-10-CM

## 2023-02-08 DIAGNOSIS — K76.9 LIVER LESION: Primary | ICD-10-CM

## 2023-02-13 ENCOUNTER — TELEMEDICINE (OUTPATIENT)
Dept: PSYCHIATRY | Facility: CLINIC | Age: 38
End: 2023-02-13

## 2023-02-13 DIAGNOSIS — F33.1 MODERATE EPISODE OF RECURRENT MAJOR DEPRESSIVE DISORDER (HCC): Primary | ICD-10-CM

## 2023-02-13 DIAGNOSIS — F43.10 PTSD (POST-TRAUMATIC STRESS DISORDER): ICD-10-CM

## 2023-02-13 DIAGNOSIS — J40 BRONCHITIS: Primary | ICD-10-CM

## 2023-02-13 RX ORDER — MONTELUKAST SODIUM 10 MG/1
10 TABLET ORAL DAILY
Qty: 30 TABLET | Refills: 2 | Status: SHIPPED | OUTPATIENT
Start: 2023-02-13 | End: 2023-05-14

## 2023-02-13 NOTE — PSYCH
Behavioral Health Psychotherapy Progress Note    Psychotherapy Provided: Individual Psychotherapy     1  Moderate episode of recurrent major depressive disorder ([address] )        2  PTSD (post-traumatic stress disorder)            Goals addressed in session: Goal 1     DATA:   Harley Long shared that she has difficulty with working because of Covid and having difficulty concentrating  She shared she has a hard time resting because she feels she has to be productive because she feels she wasted time in her life  She balanced this thought in terms of how past experiences have been beneficial for her in terms of her life she lives now in terms of mindset and different languages  She shared she is still angry with her ex's mistress and also that she has been having dreams about her ex boyfriend Anna Sanchez possibly because she "wasted time" when she was in her 25s  Therapist and Harley Long did a thought record of her automatic negative thought, "I'm a baby and being dramatic" about her being sick and not feeling okay to rest   Harley Long shared she compares herself with a friend who got better quickly but that the friend doesn't have asthma  Harley Long also shared that she feels she was raised to not have time to rest or take care of herself  Therapist and Harley Long came up with the balanced thought, "I don't have to earn the right to rest" and reminded Harley Long that she would tell a friend to rest as well  During this session, this clinician used the following therapeutic modalities: Cognitive Behavioral Therapy and Supportive Psychotherapy    Substance Abuse was not addressed during this session  If the client is diagnosed with a co-occurring substance use disorder, please indicate any changes in the frequency or amount of use:   Stage of change for addressing substance use diagnoses: No substance use/Not applicable    ASSESSMENT:  Avi Rao presents with a Euthymic/ normal mood       her affect is Normal range and intensity, which is congruent, with her mood and the content of the session  The client has made progress on their goals  Jorden Khoury presents with a none risk of suicide, none risk of self-harm, and none risk of harm to others  For any risk assessment that surpasses a "low" rating, a safety plan must be developed  A safety plan was indicated: no  If yes, describe in detail     PLAN: Between sessions, Jorden Khoury will remind herself of balanced thought  At the next session, the therapist will use Cognitive Behavioral Therapy and Supportive Psychotherapy to address negative self thoughts  Behavioral Health Treatment Plan and Discharge Planning: Jorden Khoury is aware of and agrees to continue to work on their treatment plan  They have identified and are working toward their discharge goals  yes    Visit start and stop times:    02/13/23       Virtual Regular Visit    Verification of patient location:    Patient is located in the following state in which I hold an active license [place]      Assessment/Plan:    Problem List Items Addressed This Visit        Other    Moderate episode of recurrent major depressive disorder ([facility] [address] ) - Primary    PTSD (post-traumatic stress disorder)       Goals addressed in session: Goal 1          Reason for visit is   Chief Complaint   Patient presents with   • Virtual Regular Visit        Encounter provider Pato Watters LCSW    Provider located at [address] [place] [id number] [phone]      Recent Visits  No visits were found meeting these conditions  Showing recent visits within past 7 days and meeting all other requirements  Future Appointments  No visits were found meeting these conditions  Showing future appointments within next 150 days and meeting all other requirements       The patient was identified by name and date of birth   Jorden Khoury was informed that this is a telemedicine visit and that the visit is being conducted throughthe [employer] platform  She agrees to proceed     My office door was closed  No one else was in the room  She acknowledged consent and understanding of privacy and security of the video platform  The patient has agreed to participate and understands they can discontinue the visit at any time  Patient is aware this is a billable service  Subjective  Samy Nguyen is a 40 y o  female    HPI     Past Medical History:   Diagnosis Date   • Allergic    • Anemia    • Anxiety    • Arthritis    • Asthma    • Depression    • Exposure to SARS-associated coronavirus 4/12/2020   • Headache(784 0)    • Lumbar herniated disc    • Pneumonia    • Urinary tract infection    • UTI (urinary tract infection)    • Visual impairment        Past Surgical History:   Procedure Laterality Date   • FL INJECTION LEFT KNEE (ARTHROGRAM)  2/28/2022   • WISDOM TOOTH EXTRACTION         Current Outpatient Medications   Medication Sig Dispense Refill   • albuterol (PROVENTIL HFA,VENTOLIN HFA) 90 mcg/act inhaler Inhale 2 puffs every 6 (six) hours as needed for wheezing 18 g 0   • B Complex-C (B-complex with vitamin C) tablet Take 1 tablet by mouth daily     • buPROPion (Wellbutrin XL) 300 mg 24 hr tablet Take 1 tablet (300 mg total) by mouth every morning 30 tablet 0   • calcium carbonate (OS-BERENICE) 600 MG tablet Take 600 mg by mouth 2 (two) times a day with meals     • cholecalciferol (VITAMIN D3) 1,000 units tablet Take 1,000 Units by mouth daily     • Ferrous Sulfate (RA IRON PO) Take by mouth     • FLUoxetine (PROzac) 40 MG capsule Take 1 capsule (40 mg total) by mouth daily 30 capsule 0   • fluticasone (FLOVENT HFA) 110 MCG/ACT inhaler Inhale 4 puffs 2 (two) times a day for 14 days Rinse mouth after use   12 g 0   • Magnesium Oxide 500 MG TABS Take by mouth     • montelukast (SINGULAIR) 10 mg tablet Take 10 mg by mouth daily       No current facility-administered medications for this visit  Allergies   Allergen Reactions   • Sulfa Antibiotics Anaphylaxis   • Levofloxacin      Other reaction(s): sensative   • Prednisone Other (See Comments)   • Tobramycin      Other reaction(s): sensative       Review of Systems    Video Exam    There were no vitals filed for this visit      Physical Exam     Visit Time    Visit Start Time: 1:00pm  Visit Stop Time: 1:52pm  Total Visit Duration: 52 minutes

## 2023-02-14 ENCOUNTER — CONSULT (OUTPATIENT)
Dept: PULMONOLOGY | Facility: CLINIC | Age: 38
End: 2023-02-14

## 2023-02-14 VITALS
TEMPERATURE: 98.2 F | SYSTOLIC BLOOD PRESSURE: 110 MMHG | OXYGEN SATURATION: 98 % | WEIGHT: 162.6 LBS | BODY MASS INDEX: 26.13 KG/M2 | RESPIRATION RATE: 14 BRPM | HEART RATE: 84 BPM | HEIGHT: 66 IN | DIASTOLIC BLOOD PRESSURE: 64 MMHG

## 2023-02-14 DIAGNOSIS — U07.1 COVID-19: ICD-10-CM

## 2023-02-14 DIAGNOSIS — R06.02 SHORTNESS OF BREATH: ICD-10-CM

## 2023-02-14 DIAGNOSIS — J45.40 MODERATE PERSISTENT ASTHMA WITHOUT COMPLICATION: Primary | ICD-10-CM

## 2023-02-14 RX ORDER — ALBUTEROL SULFATE 2.5 MG/3ML
SOLUTION RESPIRATORY (INHALATION)
COMMUNITY
Start: 2023-02-11

## 2023-02-14 RX ORDER — IPRATROPIUM BROMIDE AND ALBUTEROL SULFATE 2.5; .5 MG/3ML; MG/3ML
SOLUTION RESPIRATORY (INHALATION)
COMMUNITY
Start: 2023-02-11

## 2023-02-14 RX ORDER — CLINDAMYCIN HYDROCHLORIDE 300 MG/1
CAPSULE ORAL
COMMUNITY
Start: 2023-02-11

## 2023-02-14 NOTE — PROGRESS NOTES
Pulmonary Consultation   João Millard 40 y o  female MRN: [id number]  2/14/2023      Assessment:    Moderate persistent asthma without complication  Patient is a 41 y/o woman with a history of mild intermittent asthma (moslty exercise induced) that was previously well controlled  However, she has had a persistence of sob, chest tightness, wheezing and cough since her most recent covid-19 infection (reports being diagnosed 4x)  She has not had complete relief despite use of flovent  Recent High resolution CT and previous CTA were unremarkable and did not show evidence of PE or ILD  On exam today, her voice is a bit raspy, but she does not have any wheezes or respiratory distress  Plan   Given samples of breo ellipta and trelegy ellipta to try in step wise fashion  Stop flovent during this time   Cont albuterol, duonebs prn   Cont self paced exercise  Will start pulmonary rehab this week   F/u in approximately 1 month      Plan:    Diagnoses and all orders for this visit:    Moderate persistent asthma without complication    [id number]  -     Ambulatory referral to Pulmonology    Shortness of breath  -     Ambulatory referral to Pulmonology    Other orders  -     clindamycin (CLEOCIN) 300 MG capsule; take 1 capsule by mouth every 6 hours for 10 days  -     ipratropium-albuterol (DUO-NEB) 0 5-2 5 mg/3 mL nebulizer solution; inhale contents of 1 vial ( 3 milliliters ) in nebulizer by mouth and INTO THE LUNGS every 6 hours  -     albuterol (2 5 mg/3 mL) 0 083 % nebulizer solution; inhale contents of 1 vial ( 3 milliliters ) in nebulizer by mouth and INTO THE LUNGS every 6 hours        No follow-ups on file  History of Present Illness   HPI:  João Millard is a 40 y o  female with a history of asthma presents to the pulmonary office for evaluation of shortness of breath related to previous COVID infection in January 2022    Patient was seen in the emergency room on January 17 due to continued chest pain, wheezing, cough and shortness of breath  She was diagnosed with asthma as a child but it was previously well controlled prior to her episodes of COVID-19 infection  Previously never required hospitalization or intubation her asthma was historically induced by exercise  In the emergency room influenza/RSV/COVID PCR were negative  D-dimer was mildly elevated 0 97 mcg/mL  VBG showed respiratory alkalosis  CT a with PE study was performed that showed no evidence of pulmonary embolism aneurysm or dissection  Since that time continues to have sob, wheezing, chest tightness despite use of flovent twice daily  She also uses duonebs and albuterol nebs  States that she has had covid-19 four times  Current medications: flovent, singulair,      Triggers exertion: exertion, cold dry air      Review of Systems   Constitutional: Negative for chills, fatigue and fever  HENT: Negative for congestion, nosebleeds, postnasal drip, rhinorrhea, sinus pressure and sore throat  Eyes: Negative for discharge, redness and itching  Respiratory: Positive for cough, chest tightness, shortness of breath and wheezing  Negative for choking and stridor  Cardiovascular: Negative for chest pain, palpitations and leg swelling  Gastrointestinal: Negative for blood in stool  Genitourinary: Negative for difficulty urinating and dysuria  Musculoskeletal: Negative for arthralgias, joint swelling and myalgias  Skin: Negative for color change and rash  Neurological: Negative for light-headedness and headaches  Hematological: Negative for adenopathy         Historical Information   Past Medical History:   Diagnosis Date   • Allergic    • Anemia    • Anxiety    • Arthritis    • Asthma    • Depression    • Exposure to SARS-associated coronavirus 04/12/2020   • GERD (gastroesophageal reflux disease) 2010   • Headache(784 0)    • Lumbar herniated disc    • Pneumonia    • Urinary tract infection    • UTI (urinary tract infection)    • Visual impairment      Past Surgical History:   Procedure Laterality Date   • FL INJECTION LEFT KNEE (ARTHROGRAM)  2/28/2022   • WISDOM TOOTH EXTRACTION       Family History   Problem Relation Age of Onset   • Breast cancer additional onset Mother         Left Masectomy   • Hypertension Mother    • Diabetes Mother    • Asthma Mother    • Cancer Mother         breast   • Mental illness Father         family history   • Alcohol abuse Father    • Hypertension Father    • Hyperlipidemia Father    • Diabetes Father    • Colon cancer Maternal Grandmother    • Breast cancer additional onset Maternal Grandmother    • Diabetes Maternal Grandmother    • Cancer Maternal Grandmother         breast, colon   • Prostate cancer Maternal Grandfather         lung   • Cancer Maternal Grandfather         lung (smoker)   • Completed Suicide  Maternal Grandfather    • Lung cancer Maternal Grandfather    • Asthma Brother    • Asthma Brother        Social History   Places lived: [place]  Occupation: ER nurse     Tobacco: none smoker  Illicits:  None   Hobbies:   Pets: dogs       Meds/Allergies     Current Outpatient Medications:   •  albuterol (2 5 mg/3 mL) 0 083 % nebulizer solution, inhale contents of 1 vial ( 3 milliliters ) in nebulizer by mouth and INTO THE LUNGS every 6 hours, Disp: , Rfl:   •  albuterol (PROVENTIL HFA,VENTOLIN HFA) 90 mcg/act inhaler, Inhale 2 puffs every 6 (six) hours as needed for wheezing, Disp: 18 g, Rfl: 0  •  B Complex-C (B-complex with vitamin C) tablet, Take 1 tablet by mouth daily, Disp: , Rfl:   •  buPROPion (Wellbutrin XL) 300 mg 24 hr tablet, Take 1 tablet (300 mg total) by mouth every morning, Disp: 30 tablet, Rfl: 0  •  calcium carbonate (OS-BERENICE) 600 MG tablet, Take 600 mg by mouth 2 (two) times a day with meals, Disp: , Rfl:   •  cholecalciferol (VITAMIN D3) 1,000 units tablet, Take 1,000 Units by mouth daily, Disp: , Rfl:   •  clindamycin (CLEOCIN) 300 MG capsule, take 1 capsule by mouth every 6 hours for 10 days, Disp: , Rfl:   •  Ferrous Sulfate (RA IRON PO), Take by mouth, Disp: , Rfl:   •  FLUoxetine (PROzac) 40 MG capsule, Take 1 capsule (40 mg total) by mouth daily, Disp: 30 capsule, Rfl: 0  •  ipratropium-albuterol (DUO-NEB) 0 5-2 5 mg/3 mL nebulizer solution, inhale contents of 1 vial ( 3 milliliters ) in nebulizer by mouth and INTO THE LUNGS every 6 hours, Disp: , Rfl:   •  Magnesium Oxide 500 MG TABS, Take by mouth, Disp: , Rfl:   •  montelukast (SINGULAIR) 10 mg tablet, Take 1 tablet (10 mg total) by mouth daily, Disp: 30 tablet, Rfl: 2  •  fluticasone (FLOVENT HFA) 110 MCG/ACT inhaler, Inhale 4 puffs 2 (two) times a day for 14 days Rinse mouth after use , Disp: 12 g, Rfl: 0  Allergies   Allergen Reactions   • Sulfa Antibiotics Anaphylaxis   • Levofloxacin      Other reaction(s): sensative   • Prednisone Other (See Comments)   • Tobramycin      Other reaction(s): sensative       Vitals: Blood pressure 110/64, pulse 84, temperature 98 2 °F (36 8 °C), temperature source Tympanic, resp  rate 14, height 5' 6" (1 676 m), weight 73 8 kg (162 lb 9 6 oz), SpO2 98 %, not currently breastfeeding  Body mass index is 26 24 kg/m²  Oxygen Therapy  SpO2: 98 %  Oxygen Therapy: None (Room air)      Physical Exam  Physical Exam  Vitals reviewed  Constitutional:       General: She is not in acute distress  Appearance: She is well-developed  She is not ill-appearing, toxic-appearing or diaphoretic  HENT:      Head: Normocephalic and atraumatic  Right Ear: External ear normal       Left Ear: External ear normal       Nose: Nose normal  No congestion or rhinorrhea  Mouth/Throat:      Pharynx: No oropharyngeal exudate or posterior oropharyngeal erythema  Eyes:      General: No scleral icterus  Right eye: No discharge  Left eye: No discharge  Conjunctiva/sclera: Conjunctivae normal       Pupils: Pupils are equal, round, and reactive to light     Neck:      Trachea: No tracheal deviation  Cardiovascular:      Rate and Rhythm: Normal rate and regular rhythm  Heart sounds: Normal heart sounds  No murmur heard  No friction rub  No gallop  Pulmonary:      Effort: Pulmonary effort is normal       Breath sounds: Normal breath sounds  No stridor  No wheezing or rales  Musculoskeletal:      Cervical back: Normal range of motion  Right lower leg: No edema  Left lower leg: No edema  Lymphadenopathy:      Head:      Right side of head: No submental, submandibular, preauricular or posterior auricular adenopathy  Left side of head: No submental, submandibular, preauricular or posterior auricular adenopathy  Cervical: No cervical adenopathy  Skin:     General: Skin is warm and dry  Findings: No lesion or rash  Neurological:      Mental Status: She is alert and oriented to person, place, and time  Labs: I have personally reviewed pertinent lab results  Lab Results   Component Value Date    WBC 8 37 01/16/2023    HGB 13 6 01/16/2023    HCT 39 4 01/16/2023    MCV 90 01/16/2023     (H) 01/16/2023     Lab Results   Component Value Date    CALCIUM 9 2 01/16/2023    K 3 3 (L) 01/16/2023    CO2 23 01/16/2023     01/16/2023    BUN 12 01/16/2023    CREATININE 1 08 01/16/2023     No results found for: IGE  Lab Results   Component Value Date    ALT 18 01/16/2023    AST 20 01/16/2023    ALKPHOS 69 01/16/2023       Imaging and other studies: I have personally reviewed pertinent reports  and I have personally reviewed pertinent films in PACS      Lung 2/1/2023   LUNGS:  Nothing to suggest interstitial lung disease with no reticulation, traction bronchiectasis/bronchiolectasis, groundglass, or honeycombing    No significant air trapping on expiration      AIRWAYS: No significant filling defects      PLEURA:  Unremarkable      HEART/GREAT VESSELS:  Normal for age      MEDIASTINUM AND IGNACIO:  Unremarkable      CHEST WALL AND LOWER NECK: Unremarkable      UPPER ABDOMEN:  2 3 cm low-attenuation lesion in the dome of the liver, 30 Hounsfield units, poorly demonstrated on the CTA of the chest 2 weeks ago due to streak artifact from contrast in the heart      OSSEOUS STRUCTURES: Normal for age      IMPRESSION:     Nothing to indicate interstitial lung disease with no change from the chest CT 2 weeks ago      2 3 cm low-attenuation lesion in the dome of the liver  This may be a benign hemangioma but recommend further evaluation with a contrast-enhanced abdomen CT using the hemangioma protocol  Pulmonary function testing:     Jan 30, 2023     Study Interpretation:   • Normal lung volumes  • Mild airflow limitation  • Significant improvement in the airflow following the administration of bronchodilators  • Normal diffusion capacity  • Normal airway resistance as indicated by the specific airway conductance  EKG, Pathology, and Other Studies: I have personally reviewed pertinent reports  and I have personally reviewed pertinent films in PACS    Normal sinus rhythm  Incomplete right bundle branch block  No previous ECGs available    [facility]

## 2023-02-14 NOTE — ASSESSMENT & PLAN NOTE
Patient is a 39 y/o woman with a history of mild intermittent asthma (moslty exercise induced) that was previously well controlled  However, she has had a persistence of sob, chest tightness, wheezing and cough since her most recent covid-19 infection (reports being diagnosed 4x)  She has not had complete relief despite use of flovent  Recent High resolution CT and previous CTA were unremarkable and did not show evidence of PE or ILD  On exam today, her voice is a bit raspy, but she does not have any wheezes or respiratory distress  Plan   Given samples of breo ellipta and trelegy ellipta to try in step wise fashion   Stop flovent during this time   Cont albuterol, duonebs prn   Cont self paced exercise  Will start pulmonary rehab this week   F/u in approximately 1 month

## 2023-02-15 DIAGNOSIS — U07.1 COVID-19: Primary | ICD-10-CM

## 2023-02-15 DIAGNOSIS — R06.02 SHORTNESS OF BREATH: ICD-10-CM

## 2023-02-16 ENCOUNTER — HOSPITAL ENCOUNTER (OUTPATIENT)
Dept: RADIOLOGY | Facility: MEDICAL CENTER | Age: 38
Discharge: HOME | End: 2023-02-16

## 2023-02-16 ENCOUNTER — CLINICAL SUPPORT (OUTPATIENT)
Dept: PULMONOLOGY | Facility: HOSPITAL | Age: 38
End: 2023-02-16

## 2023-02-16 DIAGNOSIS — U09.9 PERSISTENT DYSPNEA AFTER COVID-19: Primary | ICD-10-CM

## 2023-02-16 DIAGNOSIS — R06.02 SHORTNESS OF BREATH: ICD-10-CM

## 2023-02-16 DIAGNOSIS — U07.1 COVID-19: ICD-10-CM

## 2023-02-16 DIAGNOSIS — R06.00 PERSISTENT DYSPNEA AFTER COVID-19: Primary | ICD-10-CM

## 2023-02-16 DIAGNOSIS — K76.9 LIVER LESION: ICD-10-CM

## 2023-02-16 RX ADMIN — IOHEXOL 100 ML: 350 INJECTION, SOLUTION INTRAVENOUS at 11:24

## 2023-02-16 NOTE — PROGRESS NOTES
Pulmonary Rehabilitation Plan of Care   Initial Care Plan      Today's date: 2023   # of Exercise Sessions Completed: Initial Visit  Patient name: Fredis Espana      : 1985  Age: 40 y o  MRN: [id number]  Referring Physician: Abi Ortiz MD  Pulmonologist: Dr Sintia Mclaughlin MD  Provider: [facility]  Clinician: Dejah Castillo, MPT, CCRRP    Dx:   Encounter Diagnoses   Name Primary? • COVID-19    • Shortness of breath    Hx Asthma    Date of onset: 2023      SUMMARY OF PROGRESS:  Today is Linda's initial evaluation to begin Pulmonary Rehab for the diagnosis of COVID 19 w/persistent PALOMARES  Patient does have a PFT on file, revealing an FEV1/FVC ratio of 86% and an FEV1 of 86 and FVC 99    This is suggestive of mild obstruction  Since their diagnosis, the patient has been experiencing increased dyspnea, increased sitting time, decreased physical activity and weakness  They report dyspnea, weakness, fatigue and dizziness when completing ADLs   The patient currently does follow a formal exercise program at home, including cycling but has been unable to do so due to PALOMARES  Pt reports the following physical limitations: PALOMARES, weakness, fatigue and overall functional decline  Depression screening using the PHQ-9 interprets the patient's score of 13 10-14 = Moderate Depression  Anxiety screening using the MICKI-7 interprets the patients score of 8 5-9 = Mild anxiety  When addressed, the patient admits to having depression/anxiety  Patient reports excellent social/emotional support  Information to begin using Julio [address] was provided as well as contact information for counseling through [employer]  PHQ-9 score will be reassessed in 30 days  The patient is a non-smoker  Patient admits to 100% medication compliance  At rest, the patient rated dyspnea 3-4/10 with SpO2 94% on room air  She completed a Sub Max TM ETT and was able to attain a 3 1 MET Level  on room air   The patient’s rating of perceived dyspnea during the 6MWT was 5-6/10 with SpO2 99%  Patient took no rest breaks  Telemetry revealed NSR  Resting  /66 with appropriate hemodynamic response to exercise reaching 136/64  She rated the test a 6/10 on a 1-10 RPE Scale  She had correct hemodynamic responses to the activity  Patient will exercise on room air  Education on oxygen use, breathing techniques, pulmonary anatomy, exercise for the pulmonary patient, healthy eating, stress, and relaxation will be provided  Patient goals include: upgrade HEP, reduced PALOMARES, improve strength, tolerate moderate to heavy ADLS w/out symptoms, ability to ambulate long distances on levels/elevation, return to work full time/duty and attain her maximal level of function  Meghna Membreno January will attend 24 exercise sessions, 2x/wk for 12-18 weeks beginning 2/20/2023  Will progress patient as tolerated over the next 30 days        Medication compliance: Yes   Comments: Pt reports to be compliant with medications  Fall Risk: Low   Comments: Patient uses walking assist device (walker/cane/rollator) and Denies a fall in the past 6 months    Smoking: Never used    RPD at Rest: 3-4/10  RPD with Exercise:  5-6/10    Assessment of progression of lung disease and functional status:  CAT: 27/40  Shortness of breath questionnaire: 55/120      EXERCISE ASSESSMENT and PLAN    Current Exercise Program in Rehab:       Frequency: 2 days/week   Supplement with home exercise 2+ days/wk as tolerated        Minutes: 35-40         METS: 2 5 to 4              SpO2: > 93% on RA              RPD: 3-5/10                     HR: 110-120   RPE: 4-6/10         Modalities: Treadmill, UBE, NuStep and Recumbent bike      Exercise Progression 30 Day Goals :    Frequency: 2 days/week    Supplement with home exercise 3+ days/wk as tolerated       Minutes: 40-45        METS: 4 to 5              SpO2: > 95% on RA              RPD: 3-4/10                    HR: 110-120   RPE: 4-5/10        Modalities: Treadmill, UBE, NuStep and Recumbent bike     Strength trainin-3 days / week  12-15 repetitions  1-2 sets per modality    Modalities: Leg Press and UE PREs    Oxygen Needs: on room air at rest and on room air with exercise  Oxygen Goal: Maintain SpO2>90% during exercise    Home Exercise: Bicycling  Education: pursed lipped breathing, diaphragmatic breathing, relaxation breathing, home exercise, benefits of exercise for pulmonary disease, RPE scale, RPD scale, O2 saturation monitoring and appropriate O2 response to exercise    Goals: reduced score on  USCD Shortness of Breath Questionnaire, Improved 6MW distance by 10%, reduced dyspnea during exercise (0-3/10), improved exercise tolerance (max METs tolerated in pulmonary rehab), SpO2 >90% during exercise, reduced score on CAT, reduced number of COPD exacerbations, reduced RPD at rest, attend pulmonary rehab regularly and decrease sitting time at home  Progressing:  Reviewed Pt goals and determined plan of care    Plan: practice breathing techniques 3x/day and reduce time sitting at home    Readiness to change: Preparation:  (Getting ready to change)       NUTRITION ASSESSMENT AND PLAN    Weight control:    Starting weight: 162   Current weight:   162    Diabetes: N/A    Goals:2 5-5%  wt loss, eliminate processed meats, reduce portion sizes of meat to 3oz or less, increase intake of fish, shellfish, cook without added fat or use vegetable oil/spray, increase intake of meatless meals, eat 3 or more servings of whole grains a day, Eat 4-5 cups of fruits and vegetables daily and daily saturated fat intake <7%/13g  Education: heart healthy eating  low sodium diet  hydration  wt  loss   education class:  Label Reading  education class: healthy choices for managing pulmonary illness  Progressing:Reviewed Pt goals and determined plan of care  Plan: Education class: Reading Food Labels, switch to low fat cheeses, replace butter with soft spreads made with olive oil, canola or yogurt, replace refined grain bread with whole grain bread, replace unhealthy snacks with fruits & vegs, reduce portion sizes, avoid processed foods, remove salt shaker from table, will try new grains like brown rice, quinoa, farro, will replace sugar sweetened cereals with whole grain or oatmeal, drink more water and keep added daily sugar <25g/day  Readiness to change: Preparation:  (Getting ready to change)       PSYCHOSOCIAL ASSESSMENT AND PLAN    Emotional:  Depression assessment:  PHQ-9 = 13; 10-14 = Moderate Depression            Anxiety measure:  MICKI-7 = 8;  5-9 = Mild anxiety  Self-reported stress level: 10   Social support: Excellent    Goals:  Reduce perceived stress to 1-3/10, improved [facility] QOL < 27, Physical Fitness in [facility] Score < 3, Social Activities in [facility] Score < 3, Overall Health in [facility] Score < 3, Quality of Life in [facility] Score < 3 , Increased interest in doing things and improved sleep  Education: signs/sxs of depression, benefits of a positive support system, class:  Relaxation and class:  Stress and Pulmonary Disease    Progressing:Reviewed Pt goals and determined plan of care  Plan: Class: Stress and Your Health, Class: Relaxation, Practice relaxation techniques, Exercise, Spend time outdoors and Enjoy a hobby  Readiness to change: Preparation:  (Getting ready to change)       OTHER CORE COMPONENTS     Tobacco:   Social History     Tobacco Use   Smoking Status Never   Smokeless Tobacco Never       Tobacco Use Intervention: Referral to tobacco expert:   N/A:  Patient is a non-smoker     Blood pressure:    Restin/66   Exercise: 136/64   Recovery:120/58    Goals: consistent BP < 130/80, reduced dietary sodium <2300mg, moderate intensity exercise >150 mins/wk and medication compliance  Education:  pathophysiology of pulmonary disease, preventing infections, control coughing, inspiratory muscle training, nebulizer use, bronchodilators and bronchial hygiene  Progressing:Reviewed Pt goals and determined plan of care  Plan: avoid places with second hand smoke, Avoid Processed foods and engage in regular exercise  Readiness to change: Preparation:  (Getting ready to change)             PULMONARY REHAB ASSESSMENT    Today's date: 2023  Patient name: Rosibel Cross     : 1985       MRN: [id number]  PCP: JULIEN Guillaume  Referring Physician: Easton Shore MD  Pulmonologist: Dr Rosemarie Grant MD    Dx: [facility] w/persistent PALOMARES  Hx Asthma        Date of onset: 2023  Cultural needs: None    Weight:    Wt Readings from Last 1 Encounters:   23 73 8 kg (162 lb 9 6 oz)      Height:   Ht Readings from Last 1 Encounters:   23 5' 6" (1 676 m)     Medical History:   Past Medical History:   Diagnosis Date   • Allergic    • Anemia    • Anxiety    • Arthritis    • Asthma    • Depression    • Exposure to SARS-associated coronavirus 2020   • GERD (gastroesophageal reflux disease)    • Headache(784 0)    • Lumbar herniated disc    • Pneumonia    • Urinary tract infection    • UTI (urinary tract infection)    • Visual impairment          Physical Limitations: None    Oxygen needs: None    History of Toxic Exposure:  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    Risk Factors   Cholesterol: No  Smoking: Never used  HTN: No  DM: No  Obesity: No   Inactivity: No  Stress:  perceived  stress: 10/10   Stressors: Current health issues, employment and finances   Goals for Stress Management: Reduce stress level to 5/10 or less    Family History:  Family History   Problem Relation Age of Onset   • Breast cancer additional onset Mother         Left [de-identified]   • Hypertension Mother    • Diabetes Mother    • Asthma Mother    • Cancer Mother         breast   • Mental illness Father         family history   • Alcohol abuse Father    • Hypertension Father    • Hyperlipidemia Father    • Diabetes Father    • Colon cancer Maternal Grandmother    • Breast cancer additional onset Maternal Grandmother    • Diabetes Maternal Grandmother    • Cancer Maternal Grandmother         breast, colon   • Prostate cancer Maternal Grandfather         lung   • Cancer Maternal Grandfather         lung (smoker)   • Completed Suicide  Maternal Grandfather    • Lung cancer Maternal Grandfather    • Asthma Brother    • Asthma Brother        Allergies: Sulfa antibiotics, Levofloxacin, Prednisone, and Tobramycin  ETOH:   Social History     Substance and Sexual Activity   Alcohol Use Yes    Comment: ~1 drinks Twice a month         Current Medications:   Current Outpatient Medications   Medication Sig Dispense Refill   • albuterol (2 5 mg/3 mL) 0 083 % nebulizer solution inhale contents of 1 vial ( 3 milliliters ) in nebulizer by mouth and INTO THE LUNGS every 6 hours     • albuterol (PROVENTIL HFA,VENTOLIN HFA) 90 mcg/act inhaler Inhale 2 puffs every 6 (six) hours as needed for wheezing 18 g 0   • B Complex-C (B-complex with vitamin C) tablet Take 1 tablet by mouth daily     • buPROPion (Wellbutrin XL) 300 mg 24 hr tablet Take 1 tablet (300 mg total) by mouth every morning 30 tablet 0   • calcium carbonate (OS-BERENICE) 600 MG tablet Take 600 mg by mouth 2 (two) times a day with meals     • cholecalciferol (VITAMIN D3) 1,000 units tablet Take 1,000 Units by mouth daily     • clindamycin (CLEOCIN) 300 MG capsule take 1 capsule by mouth every 6 hours for 10 days     • Ferrous Sulfate (RA IRON PO) Take by mouth     • FLUoxetine (PROzac) 40 MG capsule Take 1 capsule (40 mg total) by mouth daily 30 capsule 0   • fluticasone (FLOVENT HFA) 110 MCG/ACT inhaler Inhale 4 puffs 2 (two) times a day for 14 days Rinse mouth after use   12 g 0   • ipratropium-albuterol (DUO-NEB) 0 5-2 5 mg/3 mL nebulizer solution inhale contents of 1 vial ( 3 milliliters ) in nebulizer by mouth and INTO THE LUNGS every 6 hours     • Magnesium Oxide 500 MG TABS Take by mouth     • montelukast (SINGULAIR) 10 mg tablet Take 1 tablet (10 mg total) by mouth daily 30 tablet 2     No current facility-administered medications for this visit  Functional Status Prior to Diagnosis for Treatment   Occupation: full time job - ER Nurse  Recreation: Outdoor activities  ADL’s: No limitations  [place]: No limitations  Exercise: Bicycling and walking  Other:     Current Functional Status  Occupation: plans to return to work when medically stable  Recreation: as above  ADL’s:able to perform self-care resumed driving  [place]: Capable of performing light ADLs only  Exercise: Agreeable to attend MT  Other:     Patient Specific Goals:  Patient goals include: upgrade HEP, reduced PALOMARES, improve strength, tolerate moderate to heavy ADLS w/out symptoms, ability to ambulate long distances on levels/elevation, return to work full time/duty and attain her maximal level of function           Short Term Program Goals: dietary modifications increased strength improved energy/stamina with ADLs exercise 120-150 mins/wk return to work    Long Term Goals: increased maximal walking duration  increased intial training workload  Improved [facility] Activity Status score  Improved functional capacity  Reduced stress    Oxygen Goals: Maintain SpO2>90% titrating supplemental oxygen as needed     Ability to reach goals/rehabilitation potential:  Very Good     Projected return to function: 12 weeks  Objective tests: sub-max TM ETT      Nutritional   Reviewed details of Rate your Plate  Discussed key elements of heart healthy eating  Reviewed patient goals for dietary modifications and their clinical implications  Reviewed most recent lipid profile       Goals for dietary modification: choose lean cuts of meat  poultry without the skin  low fat ground meat and poultry  eliminate processed meats  reduce portions of meat to 3 oz  increase fish intake  more meatless meals  increase whole grains  increase fruits and vegetables  eliminate butter  low sodium  more nuts/seeds      Emotional/Social  Patient has a history of depression  Patient has a history of anxiety   Reports sufficient emotional support    SOCIAL SUPPORT NETWORK    Marital status: single      Domestic Violence Screening: No    Comments: Patient requires skilled WI interventions in order to attain her goals and maximal level of function  Patient's main goal is to be able to return to work full time/duty

## 2023-02-20 ENCOUNTER — HOSPITAL ENCOUNTER (OUTPATIENT)
Dept: NON INVASIVE DIAGNOSTICS | Facility: CLINIC | Age: 38
Discharge: HOME | End: 2023-02-20

## 2023-02-20 VITALS
HEART RATE: 72 BPM | WEIGHT: 162 LBS | DIASTOLIC BLOOD PRESSURE: 64 MMHG | BODY MASS INDEX: 26.03 KG/M2 | SYSTOLIC BLOOD PRESSURE: 110 MMHG | HEIGHT: 66 IN

## 2023-02-20 DIAGNOSIS — R06.02 SHORTNESS OF BREATH: ICD-10-CM

## 2023-02-20 DIAGNOSIS — U07.1 COVID-19: ICD-10-CM

## 2023-02-20 LAB
AORTIC ROOT: 2.7 CM
APICAL FOUR CHAMBER EJECTION FRACTION: 69 %
ASCENDING AORTA: 3 CM
E WAVE DECELERATION TIME: 251 MS
FRACTIONAL SHORTENING: 33 % (ref 28–44)
INTERVENTRICULAR SEPTUM IN DIASTOLE (PARASTERNAL SHORT AXIS VIEW): 0.7 CM
INTERVENTRICULAR SEPTUM: 0.7 CM (ref 0.6–1.1)
LAAS-AP2: 11.5 CM2
LAAS-AP4: 9.7 CM2
LEFT ATRIUM AREA SYSTOLE SINGLE PLANE A4C: 9.3 CM2
LEFT ATRIUM SIZE: 2.7 CM
LEFT INTERNAL DIMENSION IN SYSTOLE: 3.1 CM (ref 2.1–4)
LEFT VENTRICULAR INTERNAL DIMENSION IN DIASTOLE: 4.6 CM (ref 3.5–6)
LEFT VENTRICULAR POSTERIOR WALL IN END DIASTOLE: 0.7 CM
LEFT VENTRICULAR STROKE VOLUME: 58 ML
LVSV (TEICH): 58 ML
MV E'TISSUE VEL-SEP: 13 CM/S
MV PEAK A VEL: 0.66 M/S
MV PEAK E VEL: 87 CM/S
MV STENOSIS PRESSURE HALF TIME: 73 MS
MV VALVE AREA P 1/2 METHOD: 3.01 CM2
RIGHT ATRIUM AREA SYSTOLE A4C: 9.5 CM2
RIGHT VENTRICLE ID DIMENSION: 3 CM
SL CV LEFT ATRIUM LENGTH A2C: 4.3 CM
SL CV LV EF: 60
SL CV PED ECHO LEFT VENTRICLE DIASTOLIC VOLUME (MOD BIPLANE) 2D: 96 ML
SL CV PED ECHO LEFT VENTRICLE SYSTOLIC VOLUME (MOD BIPLANE) 2D: 38 ML
TRICUSPID ANNULAR PLANE SYSTOLIC EXCURSION: 2.9 CM

## 2023-02-21 ENCOUNTER — CLINICAL SUPPORT (OUTPATIENT)
Dept: PULMONOLOGY | Facility: HOSPITAL | Age: 38
End: 2023-02-21

## 2023-02-21 DIAGNOSIS — U09.9 PERSISTENT DYSPNEA AFTER COVID-19: ICD-10-CM

## 2023-02-21 DIAGNOSIS — R06.00 PERSISTENT DYSPNEA AFTER COVID-19: ICD-10-CM

## 2023-02-22 ENCOUNTER — TELEMEDICINE (OUTPATIENT)
Dept: PSYCHIATRY | Facility: CLINIC | Age: 38
End: 2023-02-22

## 2023-02-22 DIAGNOSIS — F33.1 MODERATE EPISODE OF RECURRENT MAJOR DEPRESSIVE DISORDER (HCC): ICD-10-CM

## 2023-02-22 DIAGNOSIS — F43.10 PTSD (POST-TRAUMATIC STRESS DISORDER): Primary | ICD-10-CM

## 2023-02-22 NOTE — PSYCH
Behavioral Health Psychotherapy Progress Note    Psychotherapy Provided: Individual Psychotherapy     1  PTSD (post-traumatic stress disorder)        2  Moderate episode of recurrent major depressive disorder (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" and has been critical of them  Therapist did a thought record with her for examining her thought that she is "not important" at work  She shared she might mitigate her tone when she is speaking up for herself, which she learned from a difficult situation and feels that she might have an angry attitude that comes across  Therapist encouraged her to look at the positives and remember that she is a positive force in her department  Therapist pointed out that some of her emotion may come from earlier trauma and she shared she was honest, open and communicative with Debbie Olson unlike with Jama and this ended in complete betrayal of her, and that she sometimes sees her current situation in the same lens of this past trauma and is overly negative in how she views the situation  Therapist gave homework of noticing when she is feeling negative and angry at work, and to balance overly negative thinking with remembering the positive impact she has in her workplace  During this session, this clinician used the following therapeutic modalities: Cognitive Behavioral Therapy, Mindfulness-based Strategies and Supportive Psychotherapy    Substance Abuse was not addressed during this session   If the client is diagnosed with a co-occurring substance use disorder, please indicate any changes in the frequency or amount of use:   Stage of change for addressing substance use diagnoses: No substance use/Not applicable    ASSESSMENT:  Rosalina Burroughs presents with a Euthymic/ normal mood  her affect is Normal range and intensity, which is congruent, with her mood and the content of the session  The client has made progress on their goals  Rosalina Burroughs presents with a none risk of suicide, none risk of self-harm, and none risk of harm to others  For any risk assessment that surpasses a "low" rating, a safety plan must be developed  A safety plan was indicated: no  If yes, describe in detail     PLAN: Between sessions, Rosalina Burroughs will balance overly negative thinking and recognize trauma response to her current work environment  At the next session, the therapist will use Cognitive Behavioral Therapy, Mindfulness-based Strategies and Supportive Psychotherapy to address negative thinking, trauma responses  Behavioral Health Treatment Plan and Discharge Planning: Rosalina Burroughs is aware of and agrees to continue to work on their treatment plan  They have identified and are working toward their discharge goals   yes    Visit start and stop times:    02/22/23       Virtual Regular Visit    Verification of patient location:    Patient is located in the following state in which I hold an active license [place]      Assessment/Plan:    Problem List Items Addressed This Visit        Other    Moderate episode of recurrent major depressive disorder ([facility] [address] )    PTSD (post-traumatic stress disorder) - Primary       Goals addressed in session: Goal 1          Reason for visit is   Chief Complaint   Patient presents with   • Virtual Regular Visit        Encounter provider Yarelis Nowak LCSW    Provider located at [address] [place] [id number] [phone]      Recent Visits  No visits were found meeting these conditions  Showing recent visits within past 7 days and meeting all other requirements  Future Appointments  No visits were found meeting these conditions  Showing future appointments within next 150 days and meeting all other requirements       The patient was identified by name and date of birth  Nell Larson was informed that this is a telemedicine visit and that the visit is being conducted throughthe [employer] platform  She agrees to proceed     My office door was closed  No one else was in the room  She acknowledged consent and understanding of privacy and security of the video platform  The patient has agreed to participate and understands they can discontinue the visit at any time  Patient is aware this is a billable service  Subjective  Nell Larson is a 40 y o  female [place] Sriram appeared calm and cooperative and openly communicated thoughts and feelings during session and was able to do some cognitive work on her overly negative thoughts and feelings        HPI     Past Medical History:   Diagnosis Date   • Allergic    • Anemia    • Anxiety    • Arthritis    • Asthma    • Depression    • Exposure to SARS-associated coronavirus 04/12/2020   • GERD (gastroesophageal reflux disease) 2010   • Headache(784 0)    • Lumbar herniated disc    • Pneumonia    • Urinary tract infection    • UTI (urinary tract infection)    • Visual impairment        Past Surgical History:   Procedure Laterality Date   • FL INJECTION LEFT KNEE (ARTHROGRAM)  2/28/2022   • WISDOM TOOTH EXTRACTION         Current Outpatient Medications   Medication Sig Dispense Refill   • albuterol (2 5 mg/3 mL) 0 083 % nebulizer solution inhale contents of 1 vial ( 3 milliliters ) in nebulizer by mouth and INTO THE LUNGS every 6 hours     • albuterol (PROVENTIL HFA,VENTOLIN HFA) 90 mcg/act inhaler Inhale 2 puffs every 6 (six) hours as needed for wheezing 18 g 0   • B Complex-C (B-complex with vitamin C) tablet Take 1 tablet by mouth daily     • buPROPion (Wellbutrin XL) 300 mg 24 hr tablet Take 1 tablet (300 mg total) by mouth every morning 30 tablet 0   • calcium carbonate (OS-BERENICE) 600 MG tablet Take 600 mg by mouth 2 (two) times a day with meals     • cholecalciferol (VITAMIN D3) 1,000 units tablet Take 1,000 Units by mouth daily     • clindamycin (CLEOCIN) 300 MG capsule take 1 capsule by mouth every 6 hours for 10 days     • Ferrous Sulfate (RA IRON PO) Take by mouth     • FLUoxetine (PROzac) 40 MG capsule Take 1 capsule (40 mg total) by mouth daily 30 capsule 0   • fluticasone (FLOVENT HFA) 110 MCG/ACT inhaler Inhale 4 puffs 2 (two) times a day for 14 days Rinse mouth after use  12 g 0   • ipratropium-albuterol (DUO-NEB) 0 5-2 5 mg/3 mL nebulizer solution inhale contents of 1 vial ( 3 milliliters ) in nebulizer by mouth and INTO THE LUNGS every 6 hours     • Magnesium Oxide 500 MG TABS Take by mouth     • montelukast (SINGULAIR) 10 mg tablet Take 1 tablet (10 mg total) by mouth daily 30 tablet 2     No current facility-administered medications for this visit  Allergies   Allergen Reactions   • Sulfa Antibiotics Anaphylaxis   • Levofloxacin      Other reaction(s): sensative   • Prednisone Other (See Comments)   • Tobramycin      Other reaction(s): sensative       Review of Systems    Video Exam    There were no vitals filed for this visit      Physical Exam     Visit Time    Visit Start Time: 1:00pm  Visit Stop Time: 1:52pm  Total Visit Duration: 52 minutes

## 2023-02-23 ENCOUNTER — CLINICAL SUPPORT (OUTPATIENT)
Dept: PULMONOLOGY | Facility: HOSPITAL | Age: 38
End: 2023-02-23

## 2023-02-23 DIAGNOSIS — U09.9 PERSISTENT DYSPNEA AFTER COVID-19: ICD-10-CM

## 2023-02-23 DIAGNOSIS — R06.00 PERSISTENT DYSPNEA AFTER COVID-19: ICD-10-CM

## 2023-02-24 ENCOUNTER — TELEPHONE (OUTPATIENT)
Dept: PULMONOLOGY | Facility: CLINIC | Age: 38
End: 2023-02-24

## 2023-02-24 NOTE — TELEPHONE ENCOUNTER
Patient calling asking if Dr Judson Penny sent sent paperwork stating that the patient cannot return back to work yet because she is still being treated  She sent a personal message to Dr Judson Penny but he did not respond  She would like to speak to someone about this  Please advise

## 2023-02-24 NOTE — TELEPHONE ENCOUNTER
Unfortunately I do not see anything in Dr Ness Davila note regarding this  He will be back next week and can address it then

## 2023-02-27 ENCOUNTER — TELEPHONE (OUTPATIENT)
Dept: PSYCHIATRY | Facility: CLINIC | Age: 38
End: 2023-02-27

## 2023-02-27 NOTE — TELEPHONE ENCOUNTER
Patient called and left message stating she would like to increase her Wellbutrin to 300mg as discussed  Please review, thank you

## 2023-03-02 ENCOUNTER — CLINICAL SUPPORT (OUTPATIENT)
Dept: PULMONOLOGY | Facility: HOSPITAL | Age: 38
End: 2023-03-02

## 2023-03-02 DIAGNOSIS — U07.1 COVID-19: ICD-10-CM

## 2023-03-02 DIAGNOSIS — R06.02 SHORTNESS OF BREATH: ICD-10-CM

## 2023-03-06 DIAGNOSIS — J45.40 MODERATE PERSISTENT ASTHMA WITHOUT COMPLICATION: Primary | ICD-10-CM

## 2023-03-06 RX ORDER — FLUTICASONE FUROATE, UMECLIDINIUM BROMIDE AND VILANTEROL TRIFENATATE 100; 62.5; 25 UG/1; UG/1; UG/1
1 POWDER RESPIRATORY (INHALATION) DAILY
Qty: 180 BLISTER | Refills: 0 | Status: SHIPPED | OUTPATIENT
Start: 2023-03-06 | End: 2023-06-04

## 2023-03-07 ENCOUNTER — CLINICAL SUPPORT (OUTPATIENT)
Dept: PULMONOLOGY | Facility: HOSPITAL | Age: 38
End: 2023-03-07

## 2023-03-07 DIAGNOSIS — R06.00 PERSISTENT DYSPNEA AFTER COVID-19: ICD-10-CM

## 2023-03-07 DIAGNOSIS — U09.9 PERSISTENT DYSPNEA AFTER COVID-19: ICD-10-CM

## 2023-03-08 DIAGNOSIS — U07.1 COVID-19: ICD-10-CM

## 2023-03-08 RX ORDER — DEXAMETHASONE 4 MG/1
TABLET ORAL
Qty: 12 G | Refills: 0 | Status: SHIPPED | OUTPATIENT
Start: 2023-03-08 | End: 2023-03-09 | Stop reason: ALTCHOICE

## 2023-03-09 ENCOUNTER — OFFICE VISIT (OUTPATIENT)
Dept: FAMILY MEDICINE CLINIC | Facility: CLINIC | Age: 38
End: 2023-03-09

## 2023-03-09 ENCOUNTER — CLINICAL SUPPORT (OUTPATIENT)
Dept: PULMONOLOGY | Facility: HOSPITAL | Age: 38
End: 2023-03-09

## 2023-03-09 VITALS
TEMPERATURE: 97.8 F | WEIGHT: 162.6 LBS | HEART RATE: 114 BPM | OXYGEN SATURATION: 97 % | HEIGHT: 66 IN | SYSTOLIC BLOOD PRESSURE: 112 MMHG | DIASTOLIC BLOOD PRESSURE: 68 MMHG | BODY MASS INDEX: 26.13 KG/M2

## 2023-03-09 DIAGNOSIS — R06.00 PERSISTENT DYSPNEA AFTER COVID-19: ICD-10-CM

## 2023-03-09 DIAGNOSIS — U09.9 PERSISTENT DYSPNEA AFTER COVID-19: ICD-10-CM

## 2023-03-09 DIAGNOSIS — J40 BRONCHITIS: ICD-10-CM

## 2023-03-09 DIAGNOSIS — R06.02 SHORTNESS OF BREATH: ICD-10-CM

## 2023-03-09 DIAGNOSIS — U07.1 COVID-19: ICD-10-CM

## 2023-03-09 DIAGNOSIS — R05.2 SUBACUTE COUGH: ICD-10-CM

## 2023-03-09 DIAGNOSIS — Z23 NEED FOR PNEUMOCOCCAL VACCINE: Primary | ICD-10-CM

## 2023-03-09 RX ORDER — CALCIUM CARBONATE/VITAMIN D3 500-10/5ML
LIQUID (ML) ORAL
COMMUNITY

## 2023-03-09 RX ORDER — ALBUTEROL SULFATE 90 UG/1
2 AEROSOL, METERED RESPIRATORY (INHALATION) EVERY 6 HOURS PRN
Qty: 18 G | Refills: 0 | Status: SHIPPED | OUTPATIENT
Start: 2023-03-09

## 2023-03-09 RX ORDER — BIOTIN 10 MG
TABLET ORAL
COMMUNITY

## 2023-03-09 RX ORDER — MONTELUKAST SODIUM 10 MG/1
10 TABLET ORAL DAILY
Qty: 30 TABLET | Refills: 2 | Status: SHIPPED | OUTPATIENT
Start: 2023-03-09 | End: 2023-06-07

## 2023-03-09 RX ORDER — BENZONATATE 200 MG/1
200 CAPSULE ORAL 3 TIMES DAILY PRN
Qty: 30 CAPSULE | Refills: 0 | Status: SHIPPED | OUTPATIENT
Start: 2023-03-09 | End: 2023-03-19

## 2023-03-09 RX ORDER — MULTIVIT WITH MINERALS/LUTEIN
1000 TABLET ORAL DAILY
COMMUNITY

## 2023-03-09 NOTE — ASSESSMENT & PLAN NOTE
Chronic shortness of breath following COVID infection and following with pulmonary and PT pulmonary Still symptomatic with her symptoms

## 2023-03-09 NOTE — PROGRESS NOTES
Assessment/Plan:         Problem List Items Addressed This Visit        Other    COVID-19    Relevant Medications    albuterol (PROVENTIL HFA,VENTOLIN HFA) 90 mcg/act inhaler    Shortness of breath - Primary     Chronic shortness of breath following COVID infection and following with pulmonary and PT pulmonary Still symptomatic with her symptoms          Other Visit Diagnoses     Bronchitis        Relevant Medications    montelukast (SINGULAIR) 10 mg tablet    albuterol (PROVENTIL HFA,VENTOLIN HFA) 90 mcg/act inhaler    benzonatate (TESSALON) 200 MG capsule    Subacute cough        Relevant Medications    benzonatate (TESSALON) 200 MG capsule            Subjective:      Patient ID: Tiesha Milner is a 40 y o  female  Patient here today for follow up on her SOB and COVID  She is using her inhaler 3-4 times a day and is currently on the Trellegy currently and is following with pulmonary and is following with Dr Kadie Frazier and has a f/u on 4/18/2023 for recheck  Patient is also attending Pulmonary PT and is attending therapy and has been attending 2 times a week  Patient is breathless with speaking and able to speak longer sentences  She is able to go up the stairs and dyspneic at rest and is improving but still present  The following portions of the patient's history were reviewed and updated as appropriate: BMI Counseling: Body mass index is 26 24 kg/m²  The BMI is above normal  Nutrition recommendations include decreasing portion sizes, encouraging healthy choices of fruits and vegetables, decreasing fast food intake, consuming healthier snacks, limiting drinks that contain sugar and moderation in carbohydrate intake  Exercise recommendations include moderate physical activity 150 minutes/week  No pharmacotherapy was ordered  Patient referred to PCP  Rationale for BMI follow-up plan is due to patient being overweight or obese         She  has a past medical history of Allergic, Anemia, Anxiety, Arthritis, Asthma, Depression, Exposure to SARS-associated coronavirus (04/12/2020), GERD (gastroesophageal reflux disease) (2010), Headache(784 0), Lumbar herniated disc, Pneumonia, Urinary tract infection, UTI (urinary tract infection), and Visual impairment  She   Patient Active Problem List    Diagnosis Date Noted   • Moderate persistent asthma without complication 47/25/5937   • Annual physical exam 01/27/2023   • COVID-19 01/27/2023   • Shortness of breath 01/27/2023   • PTSD (post-traumatic stress disorder) 01/03/2023   • Sprain of medial collateral ligament of left knee 12/29/2021   • Glaucoma suspect of both eyes 08/17/2021   • Intervertebral disc prolapse 06/23/2021   • Menstrual migraine without status migrainosus, not intractable 12/13/2019   • Moderate episode of recurrent major depressive disorder ([facility] [address] ) 09/10/2019     She  has a past surgical history that includes [place] tooth extraction and FL injection left knee (arthrogram) (2/28/2022)  Her family history includes Alcohol abuse in her father; Asthma in her brother, brother, and mother; Breast cancer additional onset in her maternal grandmother and mother; Cancer in her maternal grandfather, maternal grandmother, and mother; Colon cancer in her maternal grandmother; Completed Suicide  in her maternal grandfather; Diabetes in her father, maternal grandmother, and mother; Hyperlipidemia in her father; Hypertension in her father and mother; Lung cancer in her maternal grandfather; Mental illness in her father; Prostate cancer in her maternal grandfather  She  reports that she has never smoked  She has never used smokeless tobacco  She reports current alcohol use  She reports that she does not use drugs    Current Outpatient Medications   Medication Sig Dispense Refill   • albuterol (2 5 mg/3 mL) 0 083 % nebulizer solution inhale contents of 1 vial ( 3 milliliters ) in nebulizer by mouth and INTO THE LUNGS every 6 hours     • albuterol (PROVENTIL HFA,VENTOLIN HFA) 90 mcg/act inhaler Inhale 2 puffs every 6 (six) hours as needed for wheezing 18 g 0   • Ascorbic Acid (vitamin C) 1000 MG tablet Take 1,000 mg by mouth daily     • B Complex-C (B-complex with vitamin C) tablet Take 1 tablet by mouth daily     • benzonatate (TESSALON) 200 MG capsule Take 1 capsule (200 mg total) by mouth 3 (three) times a day as needed for cough for up to 10 days 30 capsule 0   • Biotin 10 MG TABS Take by mouth     • buPROPion (Wellbutrin XL) 300 mg 24 hr tablet Take 1 tablet (300 mg total) by mouth every morning 30 tablet 0   • calcium carbonate (OS-BERENICE) 600 MG tablet Take 600 mg by mouth 2 (two) times a day with meals     • cholecalciferol (VITAMIN D3) 1,000 units tablet Take 1,000 Units by mouth daily     • Ferrous Sulfate (RA IRON PO) Take by mouth     • FLUoxetine (PROzac) 40 MG capsule Take 1 capsule (40 mg total) by mouth daily 30 capsule 0   • fluticasone-umeclidinium-vilanterol (Trelegy Ellipta) 100-62 5-25 mcg/actuation inhaler Inhale 1 puff daily Rinse mouth after use  180 blister 0   • ipratropium-albuterol (DUO-NEB) 0 5-2 5 mg/3 mL nebulizer solution inhale contents of 1 vial ( 3 milliliters ) in nebulizer by mouth and INTO THE LUNGS every 6 hours     • Magnesium Oxide 500 MG TABS Take by mouth     • montelukast (SINGULAIR) 10 mg tablet Take 1 tablet (10 mg total) by mouth daily 30 tablet 2   • Turmeric-Ginger 135-6 MG CHEW Chew     • Zinc 30 MG CAPS Take by mouth       No current facility-administered medications for this visit  She is allergic to sulfa antibiotics, levofloxacin, prednisone, and tobramycin       Review of Systems   Constitutional: Negative for activity change, appetite change, chills, diaphoresis, fatigue, fever and unexpected weight change  HENT: Negative for congestion, ear pain, hearing loss, postnasal drip, sinus pressure, sinus pain, sneezing and sore throat  Eyes: Negative for pain, redness and visual disturbance     Respiratory: Positive for cough and shortness of breath  Cardiovascular: Negative for chest pain and leg swelling  Gastrointestinal: Negative for abdominal pain, diarrhea, nausea and vomiting  Endocrine: Negative  Genitourinary: Negative  Musculoskeletal: Negative for arthralgias  Skin: Negative  Allergic/Immunologic: Negative  Neurological: Negative for dizziness and light-headedness  Hematological: Negative  Psychiatric/Behavioral: Negative for behavioral problems and dysphoric mood  Objective:      /68 (BP Location: Left arm, Patient Position: Sitting)   Pulse (!) 114   Temp 97 8 °F (36 6 °C) (Temporal)   Ht 5' 6" (1 676 m)   Wt 73 8 kg (162 lb 9 6 oz)   SpO2 97%   BMI 26 24 kg/m²          Physical Exam  Vitals and nursing note reviewed  Constitutional:       General: She is not in acute distress  Appearance: She is well-developed  HENT:      Head: Normocephalic and atraumatic  Right Ear: Tympanic membrane normal       Left Ear: Tympanic membrane normal       Nose: Nose normal       Mouth/Throat:      Mouth: Mucous membranes are moist    Eyes:      Pupils: Pupils are equal, round, and reactive to light  Neck:      Thyroid: No thyromegaly  Cardiovascular:      Rate and Rhythm: Regular rhythm  Tachycardia present  Heart sounds: Normal heart sounds  No murmur heard  Pulmonary:      Effort: Pulmonary effort is normal  Tachypnea present  No respiratory distress  Breath sounds: Normal breath sounds  No wheezing  Comments: Breathless with speech   Abdominal:      General: Bowel sounds are normal       Palpations: Abdomen is soft  Musculoskeletal:         General: Normal range of motion  Cervical back: Normal range of motion  Skin:     General: Skin is warm and dry  Neurological:      Mental Status: She is alert and oriented to person, place, and time     Psychiatric:         Mood and Affect: Mood normal          Behavior: Behavior normal          Thought Content: Thought content normal          Judgment: Judgment normal

## 2023-03-14 ENCOUNTER — TELEMEDICINE (OUTPATIENT)
Dept: PSYCHIATRY | Facility: CLINIC | Age: 38
End: 2023-03-14

## 2023-03-14 ENCOUNTER — APPOINTMENT (OUTPATIENT)
Dept: PULMONOLOGY | Facility: HOSPITAL | Age: 38
End: 2023-03-14

## 2023-03-14 DIAGNOSIS — F43.10 PTSD (POST-TRAUMATIC STRESS DISORDER): ICD-10-CM

## 2023-03-14 DIAGNOSIS — F33.1 MODERATE EPISODE OF RECURRENT MAJOR DEPRESSIVE DISORDER (HCC): Primary | ICD-10-CM
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
## 2023-03-16 ENCOUNTER — CLINICAL SUPPORT (OUTPATIENT)
Dept: PULMONOLOGY | Facility: HOSPITAL | Age: 38
End: 2023-03-16

## 2023-03-16 DIAGNOSIS — U09.9 PERSISTENT DYSPNEA AFTER COVID-19: Primary | ICD-10-CM

## 2023-03-16 DIAGNOSIS — R06.00 PERSISTENT DYSPNEA AFTER COVID-19: Primary | ICD-10-CM

## 2023-03-17 DIAGNOSIS — F33.1 MODERATE EPISODE OF RECURRENT MAJOR DEPRESSIVE DISORDER (HCC): ICD-10-CM

## 2023-03-17 DIAGNOSIS — F41.9 ANXIETY: ICD-10-CM

## 2023-03-17 RX ORDER — FLUOXETINE HYDROCHLORIDE 40 MG/1
40 CAPSULE ORAL DAILY
Qty: 90 CAPSULE | Refills: 0 | Status: SHIPPED | OUTPATIENT
Start: 2023-03-17 | End: 2023-06-15

## 2023-03-17 NOTE — PROGRESS NOTES
Pulmonary Rehabilitation Plan of Care   30 Day Reassessment      Today's date: 3/17/2023   # of Exercise Sessions Completed:   Patient name: Una Hogan      : 1985  Age: 40 y o  MRN: [id number]  Referring Physician: Dr Tayla Galindo MD  Pulmonologist: Dr Tayla Galindo MD  Provider: Celestino allen  Clinician: Shawn Solares, MPT, CCRRP    Dx:   Encounter Diagnosis   Name Primary? • Persistent dyspnea after COVID-19    Hx Asthma    Date of onset: 2023      SUMMARY OF PROGRESS: Naz Aguilar has completed seven exercise sessions at Pulmonary Rehab for the diagnosis of COVID 19 w/persistent PALOMARES  Patient does have a PFT on file, revealing an FEV1/FVC ratio of 86% and an FEV1 of 86 and FVC 99    This is suggestive of mild obstruction  Since her diagnosis, the patient has been experiencing increased dyspnea, increased sitting time, decreased physical activity and weakness  They report dyspnea, weakness, fatigue and dizziness when completing ADLs   The patient currently does follow a formal exercise program at home, including cycling but has been unable to do so due to PALOMARES  Pt reports the following physical limitations: PALOMARES, weakness, fatigue and overall functional decline  Depression screening using the PHQ-9 interprets the patient's score of 13 10-14 = Moderate Depression  Anxiety screening using the MICKI-7 interprets the patients score of 8 5-9 = Mild anxiety  When addressed, the patient admits to having depression/anxiety  Patient reports excellent social/emotional support  Information to begin using [address] was provided as well as contact information for counseling through [employer]  PHQ-9 score will be reassessed in 30 days  The patient is a non-smoker  Patient admits to 100% medication compliance  At rest, the patient rated dyspnea 4-5/10 with SpO2 96-98% on room air  She performs her sessions on room air   The patient’s rating of perceived dyspnea during exercise is also 4-5/10 with SpO2 96-97%  Patient does not require interval training  Telemetry revealed NSR  Resting  /70 with appropriate hemodynamic response to exercise reaching 146/68  Didisimin Zepedach She rates her program a 4-5/10 on a 1-10 RPE Scale  She had correct hemodynamic responses to the activity  Patient will exercise on room air  Education on oxygen use, breathing techniques, pulmonary anatomy, exercise for the pulmonary patient, healthy eating, stress, and relaxation will be provided  Patient has been receiving weekly education; refer to [facility]  documentation for a list of completed topics  Patient continues to work on her goals of  upgrade HEP, reduced PALOMARES, improve strength, tolerate moderate to heavy ADLS w/out symptoms, ability to ambulate long distances on levels/elevation, return to work full time/duty and attain her maximal level of function  Didi Espinal Avelino Cook has been compliant attending her ND sessions  She gives great effort during each session  In addition to continuous Blue Tooth Pulse Oximetry, telemeter has also been utilized for this patient  NSR has been consistent  Patient has normal resting vitals and peak vital responses  Patient's symptoms persist  She often has a dry cough and wheezing  Patient expresses frustration with current physical condition  There are concerns about being able to tolerate her job duties especially 12 hours shifts         Medication compliance: Yes   Comments: Pt reports to be compliant with medications  Fall Risk: Low   Comments: Ambulates with a steady gait with no assist device    Smoking: Never used    RPD at Rest: 4-5/10  RPD with Exercise:  4-5/10    Assessment of progression of lung disease and functional status:  CAT: 27/40  Shortness of breath questionnaire: 55/120      EXERCISE ASSESSMENT and PLAN    Current Exercise Program in Rehab:       Frequency: 2 days/week   Supplement with home exercise 3+ days/wk as tolerated        Minutes: 35-40         METS: 3 08              SpO2: > 96% on RA              RPD: 4-5/10                     HR: 110-120   RPE: 4-6/10         Modalities: Treadmill, UBE, NuStep and Recumbent bike      Exercise Progression 30 Day Goals :    Frequency: 2 days/week    Supplement with home exercise 3+ days/wk as tolerated       Minutes: 40-45        METS: 3 5 to 5 0              SpO2: > 97% on RA              RPD: 0-3/10                    HR: 110-120   RPE: 4-5/10        Modalities: Treadmill, UBE, NuStep and Recumbent bike     Strength trainin-3 days / week  12-15 repetitions  1-2 sets per modality    Modalities: Leg Press and UE PREs    Oxygen Needs: on room air at rest and on room air with exercise  Oxygen Goal: Maintain SpO2>90% during exercise   Patient has been able to exercise on RA and maintain 02 Sat > 90%  Home Exercise: Bicycling  Education: pursed lipped breathing, diaphragmatic breathing, relaxation breathing, home exercise, benefits of exercise for pulmonary disease, RPE scale, RPD scale, O2 saturation monitoring and appropriate O2 response to exercise    Goals: reduced score on  USCD Shortness of Breath Questionnaire, Improved 6MW distance by 10%, reduced dyspnea during exercise (0-3/10), improved exercise tolerance (max METs tolerated in pulmonary rehab), SpO2 >90% during exercise, reduced score on CAT, reduced number of COPD exacerbations, reduced RPD at rest, attend pulmonary rehab regularly and decrease sitting time at home  Progressing:  Will continue to educate and progress as tolerated , Goals not met: no signififcant increase in MET Level ability   , Goals met: attends LA regularly and appropriate 02 responses to activity      Plan: practice breathing techniques 3x/day and reduce time sitting at home    Readiness to change: Action:  (Changing behavior)      NUTRITION ASSESSMENT AND PLAN    Weight control:    Starting weight: 162   Current weight:   162    Diabetes: N/A  A1C 3/25/2022 5 1  Lipid Panel 3/25/2022: Chol 153; Tri 78; HDL 49 and LDL 88      Goals:2 5-5%  wt loss, eliminate processed meats, reduce portion sizes of meat to 3oz or less, increase intake of fish, shellfish, cook without added fat or use vegetable oil/spray, increase intake of meatless meals, eat 3 or more servings of whole grains a day, Eat 4-5 cups of fruits and vegetables daily and daily saturated fat intake <7%/13g  Education: heart healthy eating  low sodium diet  hydration  wt  loss   education class:  Label Reading  education class: healthy choices for managing pulmonary illness  Progressing:Will continue to educate and progress as tolerated , Goals not met: no weight loss during this reporting period  , Goals met: increased water intake; A1C < 7 0 and Chol/Tri/HDL all WNLs      Plan: Education class: Reading Food Labels, switch to low fat cheeses, replace butter with soft spreads made with olive oil, canola or yogurt, replace refined grain bread with whole grain bread, replace unhealthy snacks with fruits & vegs, reduce portion sizes, avoid processed foods, remove salt shaker from table, will try new grains like brown rice, quinoa, farro, will replace sugar sweetened cereals with whole grain or oatmeal, drink more water and keep added daily sugar <25g/day  Readiness to change: Preparation:  (Getting ready to change)       PSYCHOSOCIAL ASSESSMENT AND PLAN    Emotional:  Depression assessment:  PHQ-9 = 13; 10-14 = Moderate Depression            Anxiety measure:  MICKI-7 = 8;  5-9 = Mild anxiety  Self-reported stress level: 10   Social support: Excellent    Goals:  Reduce perceived stress to 1-3/10, improved [facility] QOL < 27, Physical Fitness in [facility] Score < 3, Social Activities in [facility] Score < 3, Overall Health in [facility] Score < 3, Quality of Life in [facility] Score < 3 , Increased interest in doing things and improved sleep  Education: signs/sxs of depression, benefits of a positive support system, class:  Relaxation and class:  Stress and Pulmonary Disease    Progressing:Will continue to educate and progress as tolerated , Goals not met: stress level remains high due to ongoing medical issues and worries about tolerating job duties       Plan: Class: Stress and Your Health, Class: Relaxation, Practice relaxation techniques, Exercise, Spend time outdoors and Enjoy a hobby  Readiness to change: Preparation:  (Getting ready to change)       OTHER CORE COMPONENTS     Tobacco:   Social History     Tobacco Use   Smoking Status Never   Smokeless Tobacco Never       Tobacco Use Intervention: Referral to tobacco expert:   N/A:  Patient is a non-smoker     Blood pressure:    Restin/70   Exercise: 146/68   Recovery:122/64    Goals: consistent BP < 130/80, reduced dietary sodium <2300mg, moderate intensity exercise >150 mins/wk and medication compliance  Education:  pathophysiology of pulmonary disease, preventing infections, control coughing, inspiratory muscle training, nebulizer use, bronchodilators and bronchial hygiene  Progressing:Will continue to educate and progress as tolerated , Goals met: medication compliance and resting BP consistently < 130/80  Di Hoist   Plan: avoid places with second hand smoke, Avoid Processed foods and engage in regular exercise  Readiness to change: Action:  (Changing behavior)

## 2023-03-17 NOTE — TELEPHONE ENCOUNTER
Patient would like to change pharmacy to [facility] and they want a 90 day supply sent over

## 2023-03-21 ENCOUNTER — OFFICE VISIT (OUTPATIENT)
Dept: PSYCHIATRY | Facility: CLINIC | Age: 38
End: 2023-03-21

## 2023-03-21 ENCOUNTER — CLINICAL SUPPORT (OUTPATIENT)
Dept: PULMONOLOGY | Facility: HOSPITAL | Age: 38
End: 2023-03-21

## 2023-03-21 DIAGNOSIS — F43.10 PTSD (POST-TRAUMATIC STRESS DISORDER): ICD-10-CM

## 2023-03-21 DIAGNOSIS — F41.9 ANXIETY: ICD-10-CM

## 2023-03-21 DIAGNOSIS — U09.9 PERSISTENT DYSPNEA AFTER COVID-19: ICD-10-CM

## 2023-03-21 DIAGNOSIS — F33.1 MODERATE EPISODE OF RECURRENT MAJOR DEPRESSIVE DISORDER (HCC): Primary | ICD-10-CM

## 2023-03-21 DIAGNOSIS — R06.00 PERSISTENT DYSPNEA AFTER COVID-19: ICD-10-CM

## 2023-03-21 RX ORDER — FLUTICASONE FUROATE AND VILANTEROL 100; 25 UG/1; UG/1
POWDER RESPIRATORY (INHALATION)
COMMUNITY
Start: 2023-02-15 | End: 2023-03-28 | Stop reason: ALTCHOICE

## 2023-03-21 RX ORDER — BUPROPION HYDROCHLORIDE 300 MG/1
300 TABLET ORAL EVERY MORNING
Qty: 30 TABLET | Refills: 0 | Status: SHIPPED | OUTPATIENT
Start: 2023-03-21

## 2023-03-21 NOTE — BH TREATMENT PLAN
TREATMENT PLAN (Medication Management Only)        [facility]    Name and Date of Birth:  Davin Jenkins 40 y o  1985  Date of Treatment Plan: March 21, 2023  Diagnosis/Diagnoses:    1  Moderate episode of recurrent major depressive disorder ([address] )    2  Anxiety    3  PTSD (post-traumatic stress disorder)      Strengths/Personal Resources for Self-Care: supportive family, taking medications as prescribed, ability to communicate needs, ability to communicate well, ability to understand psychiatric illness, average or above intelligence, independence, motivation for treatment, self-reliance, special hobby/interest, well educated, work skills  Area/Areas of need (in own words): anxiety symptoms, depressive symptoms  1  Long Term Goal: continue improvement in depression  Target Date:6 months - 9/21/2023  Person/Persons responsible for completion of goal: Deb Barry  2  Short Term Objective (s) - How will we reach this goal?:   A  Provider new recommended medication/dosage changes and/or continue medication(s): continue current medications as prescribed  B  Get regular sleep every night   C  Exercise regularly   Target Date:6 months - 9/21/2023  Person/Persons Responsible for Completion of Goal: Deb Barry  Progress Towards Goals: continuing treatment  Treatment Modality: medication management every 6 weeks  Review due 180 days from date of this plan: 6 months - 9/21/2023  Expected length of service: ongoing treatment  My Physician/PA/NP and I have developed this plan together and I agree to work on the goals and objectives  I understand the treatment goals that were developed for my treatment
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
## 2023-03-22 ENCOUNTER — TELEMEDICINE (OUTPATIENT)
Dept: PSYCHIATRY | Facility: CLINIC | Age: 38
End: 2023-03-22

## 2023-03-22 DIAGNOSIS — F33.1 MODERATE EPISODE OF RECURRENT MAJOR DEPRESSIVE DISORDER (HCC): Primary | ICD-10-CM

## 2023-03-22 DIAGNOSIS — F43.10 PTSD (POST-TRAUMATIC STRESS DISORDER): ICD-10-CM

## 2023-03-22 NOTE — PSYCH
Behavioral Health Psychotherapy Progress Note    Psychotherapy Provided: Individual Psychotherapy     1  Moderate episode of recurrent major depressive disorder ([facility] [address] )        2  PTSD (post-traumatic stress disorder)            Goals addressed in session: Goal 1     DATA: Ar Zamora shared she is feeling "eh" today and had difficulty with respiration on and off  She shared she has appointments April 18 and may go back to work after this  Ar Zamora shared she is feeling panicked with breathing and anxiety is worse somewhat and depression is the same  Ar Zamora shared that school is a little busy now with more clinical hours and is not home resting when she should be  Therapist and Ar Zamora went over ADHD criteria  Ar Zamora identified 8 criteria from inattentive category and 7 from the hyperactivity category  Ar Zamora talked about the difficulties that occur from her symptoms including impulsivity  Ar Zamora shared thoughts and feelings about having ADHD and said she uses coping skills for her symptoms including putting things in the same place all the time, using calendars, and taking a break to refocus  During this session, this clinician used the following therapeutic modalities: Mindfulness-based Strategies and Supportive Psychotherapy    Substance Abuse was not addressed during this session  If the client is diagnosed with a co-occurring substance use disorder, please indicate any changes in the frequency or amount of use:   Stage of change for addressing substance use diagnoses: No substance use/Not applicable    ASSESSMENT:  Shilo Santillan presents with a Euthymic/ normal mood  her affect is Normal range and intensity, which is congruent, with her mood and the content of the session  The client has made progress on their goals  Edward Agent presents with a none risk of suicide, none risk of self-harm, and none risk of harm to others      For any risk assessment that surpasses a "low" rating, a safety plan must be developed  A safety plan was indicated: no  If yes, describe in detail     PLAN: Between sessions, Janneth Zaldivar will   At the next session, the therapist will use Mindfulness-based Strategies, Solution-Focused Therapy and Supportive Psychotherapy to address ADHD symptoms, depression, past trauma  Behavioral Health Treatment Plan and Discharge Planning: Janneth Zaldivar is aware of and agrees to continue to work on their treatment plan  They have identified and are working toward their discharge goals  yes    Visit start and stop times:    03/22/23       Virtual Regular Visit    Verification of patient location:    Patient is located in the following state in which I hold an active license [place]      Assessment/Plan:    Problem List Items Addressed This Visit        Other    Moderate episode of recurrent major depressive disorder ([facility] [address] ) - Primary    PTSD (post-traumatic stress disorder)       Goals addressed in session: Goal 1          Reason for visit is   Chief Complaint   Patient presents with   • Virtual Regular Visit        Encounter provider Gauri Toribio LCSW    Provider located at [address] [place] [id number] [phone]      Recent Visits  No visits were found meeting these conditions  Showing recent visits within past 7 days and meeting all other requirements  Future Appointments  No visits were found meeting these conditions  Showing future appointments within next 150 days and meeting all other requirements       The patient was identified by name and date of birth  Janneth Zaldivar was informed that this is a telemedicine visit and that the visit is being conducted throughthe OpenExchange platform  She agrees to proceed     My office door was closed  No one else was in the room  She acknowledged consent and understanding of privacy and security of the video platform   The patient has agreed to participate and understands they can discontinue the visit at any time  Patient is aware this is a billable service  Subjective  Antonio Bird is a 40 y o  female Zoë Renée appeared calm and cooperative and was able to openly communicate thoughts and feelings during session and was able to engage in assessment for ADHD        HPI     Past Medical History:   Diagnosis Date   • Allergic    • Anemia    • Anxiety    • Arthritis    • Asthma    • Depression    • Exposure to SARS-associated coronavirus 04/12/2020   • GERD (gastroesophageal reflux disease) 2010   • Headache(784 0)    • Lumbar herniated disc    • Pneumonia    • Urinary tract infection    • UTI (urinary tract infection)    • Visual impairment        Past Surgical History:   Procedure Laterality Date   • FL INJECTION LEFT KNEE (ARTHROGRAM)  2/28/2022   • WISDOM TOOTH EXTRACTION         Current Outpatient Medications   Medication Sig Dispense Refill   • albuterol (2 5 mg/3 mL) 0 083 % nebulizer solution Take by nebulization every 6 (six) hours as needed     • albuterol (PROVENTIL HFA,VENTOLIN HFA) 90 mcg/act inhaler Inhale 2 puffs every 6 (six) hours as needed for wheezing 18 g 0   • Ascorbic Acid (vitamin C) 1000 MG tablet Take 1,000 mg by mouth daily     • B Complex-C (B-complex with vitamin C) tablet Take 1 tablet by mouth daily     • Biotin 10 MG TABS Take by mouth     • buPROPion (Wellbutrin XL) 300 mg 24 hr tablet Take 1 tablet (300 mg total) by mouth every morning 30 tablet 0   • calcium carbonate (OS-BERENICE) 600 MG tablet Take 600 mg by mouth 2 (two) times a day with meals     • cholecalciferol (VITAMIN D3) 1,000 units tablet Take 1,000 Units by mouth daily     • Ferrous Sulfate (RA IRON PO) Take by mouth     • FLUoxetine (PROzac) 40 MG capsule Take 1 capsule (40 mg total) by mouth daily 90 capsule 0   • Fluticasone Furoate-Vilanterol 100-25 mcg/actuation inhaler  (Patient not taking: Reported on 3/21/2023)     • fluticasone-umeclidinium-vilanterol (Trelegy Ellipta) 100-62 5-25 mcg/actuation inhaler Inhale 1 puff daily Rinse mouth after use  180 blister 0   • ipratropium-albuterol (DUO-NEB) 0 5-2 5 mg/3 mL nebulizer solution inhale contents of 1 vial ( 3 milliliters ) in nebulizer by mouth and INTO THE LUNGS every 6 hours     • Magnesium Oxide 500 MG TABS Take by mouth     • montelukast (SINGULAIR) 10 mg tablet Take 1 tablet (10 mg total) by mouth daily 30 tablet 2   • Turmeric-Elisa 135-6 MG CHEW Chew     • Zinc 30 MG CAPS Take by mouth       No current facility-administered medications for this visit  Allergies   Allergen Reactions   • Sulfa Antibiotics Anaphylaxis   • Levofloxacin      Other reaction(s): sensative   • Prednisone Other (See Comments)   • Tobramycin      Other reaction(s): sensative       Review of Systems    Video Exam    There were no vitals filed for this visit      Physical Exam     Visit Time    Visit Start Time: 3:00pm  Visit Stop Time: 3:52pm  Total Visit Duration: 52 minutes

## 2023-03-23 ENCOUNTER — CLINICAL SUPPORT (OUTPATIENT)
Dept: PULMONOLOGY | Facility: HOSPITAL | Age: 38
End: 2023-03-23

## 2023-03-23 DIAGNOSIS — U09.9 PERSISTENT DYSPNEA AFTER COVID-19: ICD-10-CM

## 2023-03-23 DIAGNOSIS — R06.00 PERSISTENT DYSPNEA AFTER COVID-19: ICD-10-CM

## 2023-03-28 ENCOUNTER — CLINICAL SUPPORT (OUTPATIENT)
Dept: PULMONOLOGY | Facility: HOSPITAL | Age: 38
End: 2023-03-28

## 2023-03-28 DIAGNOSIS — U09.9 PERSISTENT DYSPNEA AFTER COVID-19: ICD-10-CM

## 2023-03-28 DIAGNOSIS — R06.00 PERSISTENT DYSPNEA AFTER COVID-19: ICD-10-CM

## 2023-03-28 DIAGNOSIS — J45.40 MODERATE PERSISTENT ASTHMA WITHOUT COMPLICATION: ICD-10-CM

## 2023-03-30 ENCOUNTER — CLINICAL SUPPORT (OUTPATIENT)
Dept: PULMONOLOGY | Facility: HOSPITAL | Age: 38
End: 2023-03-30

## 2023-03-30 DIAGNOSIS — U09.9 PERSISTENT DYSPNEA AFTER COVID-19: ICD-10-CM

## 2023-03-30 DIAGNOSIS — R06.00 PERSISTENT DYSPNEA AFTER COVID-19: ICD-10-CM

## 2023-04-04 ENCOUNTER — OFFICE VISIT (OUTPATIENT)
Dept: PULMONOLOGY | Facility: CLINIC | Age: 38
End: 2023-04-04

## 2023-04-04 ENCOUNTER — APPOINTMENT (OUTPATIENT)
Dept: LAB | Facility: CLINIC | Age: 38
End: 2023-04-04

## 2023-04-04 ENCOUNTER — APPOINTMENT (OUTPATIENT)
Dept: PULMONOLOGY | Facility: HOSPITAL | Age: 38
End: 2023-04-04

## 2023-04-04 VITALS
WEIGHT: 164 LBS | HEIGHT: 66 IN | TEMPERATURE: 97.7 F | BODY MASS INDEX: 26.36 KG/M2 | SYSTOLIC BLOOD PRESSURE: 112 MMHG | HEART RATE: 78 BPM | OXYGEN SATURATION: 96 % | DIASTOLIC BLOOD PRESSURE: 80 MMHG

## 2023-04-04 DIAGNOSIS — R05.3 CHRONIC COUGH: ICD-10-CM

## 2023-04-04 DIAGNOSIS — J45.40 MODERATE PERSISTENT ASTHMA WITHOUT COMPLICATION: Primary | ICD-10-CM

## 2023-04-04 DIAGNOSIS — R09.02 HYPOXIA: ICD-10-CM

## 2023-04-04 DIAGNOSIS — J45.40 MODERATE PERSISTENT ASTHMA WITHOUT COMPLICATION: ICD-10-CM

## 2023-04-04 LAB
BASOPHILS # BLD AUTO: 0.04 THOUSANDS/ÂΜL (ref 0–0.1)
BASOPHILS NFR BLD AUTO: 1 % (ref 0–1)
EOSINOPHIL # BLD AUTO: 0.04 THOUSAND/ÂΜL (ref 0–0.61)
EOSINOPHIL NFR BLD AUTO: 1 % (ref 0–6)
ERYTHROCYTE [DISTWIDTH] IN BLOOD BY AUTOMATED COUNT: 12.9 % (ref 11.6–15.1)
HCT VFR BLD AUTO: 38.6 % (ref 34.8–46.1)
HGB BLD-MCNC: 12.6 G/DL (ref 11.5–15.4)
IMM GRANULOCYTES # BLD AUTO: 0.01 THOUSAND/UL (ref 0–0.2)
IMM GRANULOCYTES NFR BLD AUTO: 0 % (ref 0–2)
LYMPHOCYTES # BLD AUTO: 1.47 THOUSANDS/ÂΜL (ref 0.6–4.47)
LYMPHOCYTES NFR BLD AUTO: 26 % (ref 14–44)
MCH RBC QN AUTO: 30.7 PG (ref 26.8–34.3)
MCHC RBC AUTO-ENTMCNC: 32.6 G/DL (ref 31.4–37.4)
MCV RBC AUTO: 94 FL (ref 82–98)
MONOCYTES # BLD AUTO: 0.36 THOUSAND/ÂΜL (ref 0.17–1.22)
MONOCYTES NFR BLD AUTO: 6 % (ref 4–12)
NEUTROPHILS # BLD AUTO: 3.72 THOUSANDS/ÂΜL (ref 1.85–7.62)
NEUTS SEG NFR BLD AUTO: 66 % (ref 43–75)
NRBC BLD AUTO-RTO: 0 /100 WBCS
PLATELET # BLD AUTO: 359 THOUSANDS/UL (ref 149–390)
PMV BLD AUTO: 9.4 FL (ref 8.9–12.7)
RBC # BLD AUTO: 4.1 MILLION/UL (ref 3.81–5.12)
WBC # BLD AUTO: 5.64 THOUSAND/UL (ref 4.31–10.16)

## 2023-04-04 RX ORDER — BECLOMETHASONE DIPROPIONATE HFA 40 UG/1
2 AEROSOL, METERED RESPIRATORY (INHALATION) 2 TIMES DAILY
Qty: 10.6 G | Refills: 0 | Status: SHIPPED | OUTPATIENT
Start: 2023-04-04

## 2023-04-04 NOTE — PROGRESS NOTES
Pulmonary Follow-up   David Allen 40 y o  female MRN: [id number]  4/4/2023      Assessment:    Moderate persistent asthma without complication  Patient is a 39 y/o woman with a history of mild intermittent asthma (moslty exercise induced) that was previously well controlled  However, she has had a persistence of sob, chest tightness, wheezing and cough since her most recent covid-19 infection (reports being diagnosed 4x)  Eleanor Barba Recent High resolution CT and previous CTA were unremarkable and did not show evidence of PE or ILD  Started high dose trelegy about 1 week ago, but has not noticed a benefit  Denies any infectious symptoms  Now reports relative hypoxia w/ exercise       Plan   Cont high dose trelegy  Start low dose qvar as well   Cont albuterol, duonebs prn   Cont self paced exercise  Cont pulm rehab  Will check pertussis ab, [place] allergy panel, cbc w/ differential, alpha 1 antitrypsin leve  Refer to ENT for further eval as VCD/vocal muscle tension is a strong possibility   In future could consider addition of gabapentin  Repeat echo w/ bubble to assess for shunt  F/u in approximately 6 weeks       Plan:    Diagnoses and all orders for this visit:    Moderate persistent asthma without complication  -     CBC and differential; Future  -     [facility] Allergy Panel, Adult; Future  -     beclomethasone (Qvar RediHaler) 40 MCG/ACT inhaler; Inhale 2 puffs 2 (two) times a day Rinse mouth after use  Hypoxia  -     Echo complete w/ contrast if indicated; Future    Chronic cough  -     Bordetella pertussis antibody; Future  -     Ambulatory Referral to Otolaryngology; Future  -     Alpha-1-antitrypsin; Future  -     Ambulatory Referral to Otolaryngology; Future        No follow-ups on file      History of Present Illness   HPI:  David Allen is a 40 y o  female with a history of asthma who originally presented to the pulmonary office for evaluation of shortness of breath related to previous COVID infection in January 2022  Patient was seen in the emergency room on January 17 due to continued chest pain, wheezing, cough and shortness of breath  She was diagnosed with asthma as a child but it was previously well controlled prior to her episodes of COVID-19 infection  Previously never required hospitalization or intubation her asthma was historically induced by exercise  In the emergency room influenza/RSV/COVID PCR were negative  D-dimer was mildly elevated 0 97 mcg/mL  VBG showed respiratory alkalosis  CT a with PE study was performed that showed no evidence of pulmonary embolism aneurysm or dissection  Since that time continues to have sob, wheezing, chest tightness despite use of flovent twice daily  She also uses duonebs and albuterol nebs  States that she has had covid-19 four times  Current medications: flovent, singulair,      Triggers exertion: exertion, cold dry air      CBC from 1/2023 was normal      Here today for f/u     Still reports daily shortness of breath chest, tightness, and a nonproductive barking type cough  She typically does not wheeze  She has not had any fevers, chills, sweats  She continues to participate in pulmonary rehab  States that her oxygen sats have dropped to as low as 90% during exercise  She is using high dose trelegy  She is using her albuterol inhaler 4x per day  Reports episodes that last for several minutes and she uses her nebulizer  Has had to breathe into a bag to calm the sensation at times  Has chronic intercostal pain from the coughing  She is not experiencing other new systemic symptoms  Review of Systems   Constitutional: Negative for chills, fatigue and fever  HENT: Negative for congestion, nosebleeds, postnasal drip, rhinorrhea, sinus pressure and sore throat  Eyes: Negative for discharge, redness and itching  Respiratory: Positive for cough, chest tightness and shortness of breath  Negative for choking, wheezing and stridor  Cardiovascular: Negative for chest pain, palpitations and leg swelling  Gastrointestinal: Negative for blood in stool  Genitourinary: Negative for difficulty urinating and dysuria  Musculoskeletal: Negative for arthralgias, joint swelling and myalgias  Skin: Negative for color change and rash  Neurological: Negative for light-headedness and headaches  Hematological: Negative for adenopathy  Historical Information   Past Medical History:   Diagnosis Date   • Allergic    • Anemia    • Anxiety    • Arthritis    • Asthma    • Depression    • Exposure to SARS-associated coronavirus 04/12/2020   • GERD (gastroesophageal reflux disease) 2010   • Headache(784 0)    • Lumbar herniated disc    • Pneumonia    • Urinary tract infection    • UTI (urinary tract infection)    • Visual impairment      Past Surgical History:   Procedure Laterality Date   • FL INJECTION LEFT KNEE (ARTHROGRAM)  2/28/2022   • WISDOM TOOTH EXTRACTION       Family History   Problem Relation Age of Onset   • Breast cancer additional onset Mother         Left Masectomy   • Hypertension Mother    • Diabetes Mother    • Asthma Mother    • Cancer Mother         breast   • Mental illness Father         family history   • Alcohol abuse Father    • Hypertension Father    • Hyperlipidemia Father    • Diabetes Father    • Colon cancer Maternal Grandmother    • Breast cancer additional onset Maternal Grandmother    • Diabetes Maternal Grandmother    • Cancer Maternal Grandmother         breast, colon   • Prostate cancer Maternal Grandfather         lung   • Cancer Maternal Grandfather         lung (smoker)   • Completed Suicide  Maternal Grandfather    • Lung cancer Maternal Grandfather    • Asthma Brother    • Asthma Brother        Social History   Places lived: [place]  Occupation: ER nurse     Tobacco: none smoker  Illicits:  None   Hobbies:   Pets: dogs       Meds/Allergies     Current Outpatient Medications:   •  albuterol (2 5 mg/3 mL) 0 083 % nebulizer solution, Take by nebulization every 6 (six) hours as needed, Disp: , Rfl:   •  albuterol (PROVENTIL HFA,VENTOLIN HFA) 90 mcg/act inhaler, Inhale 2 puffs every 6 (six) hours as needed for wheezing, Disp: 18 g, Rfl: 0  •  Ascorbic Acid (vitamin C) 1000 MG tablet, Take 1,000 mg by mouth daily, Disp: , Rfl:   •  B Complex-C (B-complex with vitamin C) tablet, Take 1 tablet by mouth daily, Disp: , Rfl:   •  beclomethasone (Qvar RediHaler) 40 MCG/ACT inhaler, Inhale 2 puffs 2 (two) times a day Rinse mouth after use , Disp: 10 6 g, Rfl: 0  •  Biotin 10 MG TABS, Take by mouth, Disp: , Rfl:   •  buPROPion (Wellbutrin XL) 300 mg 24 hr tablet, Take 1 tablet (300 mg total) by mouth every morning, Disp: 30 tablet, Rfl: 0  •  calcium carbonate (OS-BERENICE) 600 MG tablet, Take 600 mg by mouth 2 (two) times a day with meals, Disp: , Rfl:   •  cholecalciferol (VITAMIN D3) 1,000 units tablet, Take 1,000 Units by mouth daily, Disp: , Rfl:   •  Ferrous Sulfate (RA IRON PO), Take by mouth, Disp: , Rfl:   •  FLUoxetine (PROzac) 40 MG capsule, Take 1 capsule (40 mg total) by mouth daily, Disp: 90 capsule, Rfl: 0  •  fluticasone-umeclidinium-vilanterol 200-62 5-25 mcg/actuation AEPB inhaler, Inhale 1 puff daily Rinse mouth after use , Disp: 1 each, Rfl: 5  •  ipratropium-albuterol (DUO-NEB) 0 5-2 5 mg/3 mL nebulizer solution, inhale contents of 1 vial ( 3 milliliters ) in nebulizer by mouth and INTO THE LUNGS every 6 hours, Disp: , Rfl:   •  Magnesium Oxide 500 MG TABS, Take by mouth, Disp: , Rfl:   •  montelukast (SINGULAIR) 10 mg tablet, Take 1 tablet (10 mg total) by mouth daily, Disp: 30 tablet, Rfl: 2  •  Turmeric-Ginger 135-6 MG CHEW, Chew, Disp: , Rfl:   •  Zinc 30 MG CAPS, Take by mouth, Disp: , Rfl:   Allergies   Allergen Reactions   • Sulfa Antibiotics Anaphylaxis   • Levofloxacin      Other reaction(s): sensative   • Prednisone Other (See Comments)   • Tobramycin      Other reaction(s): sensative       Vitals: Blood "pressure 112/80, pulse 78, temperature 97 7 °F (36 5 °C), temperature source Tympanic, height 5' 6\" (1 676 m), weight 74 4 kg (164 lb), SpO2 96 %, not currently breastfeeding  Body mass index is 26 47 kg/m²  Oxygen Therapy  SpO2: 96 %  Oxygen Therapy: None (Room air)      Physical Exam  Physical Exam  Vitals reviewed  Constitutional:       General: She is not in acute distress  Appearance: She is well-developed  She is not ill-appearing, toxic-appearing or diaphoretic  Comments: Coughing intermittently during appointment    HENT:      Head: Normocephalic and atraumatic  Right Ear: External ear normal       Left Ear: External ear normal       Nose: Nose normal  No congestion or rhinorrhea  Mouth/Throat:      Pharynx: No oropharyngeal exudate or posterior oropharyngeal erythema  Eyes:      General: No scleral icterus  Right eye: No discharge  Left eye: No discharge  Conjunctiva/sclera: Conjunctivae normal       Pupils: Pupils are equal, round, and reactive to light  Neck:      Trachea: No tracheal deviation  Cardiovascular:      Rate and Rhythm: Normal rate and regular rhythm  Heart sounds: Normal heart sounds  No murmur heard  No friction rub  No gallop  Pulmonary:      Effort: Pulmonary effort is normal       Breath sounds: Normal breath sounds  No stridor  No wheezing or rales  Musculoskeletal:      Cervical back: Normal range of motion  Right lower leg: No edema  Left lower leg: No edema  Lymphadenopathy:      Head:      Right side of head: No submental, submandibular, preauricular or posterior auricular adenopathy  Left side of head: No submental, submandibular, preauricular or posterior auricular adenopathy  Cervical: No cervical adenopathy  Skin:     General: Skin is warm and dry  Findings: No lesion or rash  Neurological:      Mental Status: She is alert and oriented to person, place, and time  Labs:  I have " personally reviewed pertinent lab results  Lab Results   Component Value Date    WBC 8 37 01/16/2023    HGB 13 6 01/16/2023    HCT 39 4 01/16/2023    MCV 90 01/16/2023     (H) 01/16/2023     Lab Results   Component Value Date    CALCIUM 9 2 01/16/2023    K 3 3 (L) 01/16/2023    CO2 23 01/16/2023     01/16/2023    BUN 12 01/16/2023    CREATININE 1 08 01/16/2023     No results found for: IGE  Lab Results   Component Value Date    ALT 18 01/16/2023    AST 20 01/16/2023    ALKPHOS 69 01/16/2023       Imaging and other studies: I have personally reviewed pertinent reports  and I have personally reviewed pertinent films in PACS      Lung 2/1/2023   LUNGS:  Nothing to suggest interstitial lung disease with no reticulation, traction bronchiectasis/bronchiolectasis, groundglass, or honeycombing  No significant air trapping on expiration      AIRWAYS: No significant filling defects      PLEURA:  Unremarkable      HEART/GREAT VESSELS:  Normal for age      MEDIASTINUM AND IGNACIO:  Unremarkable      CHEST WALL AND LOWER NECK: Unremarkable      UPPER ABDOMEN:  2 3 cm low-attenuation lesion in the dome of the liver, 30 Hounsfield units, poorly demonstrated on the CTA of the chest 2 weeks ago due to streak artifact from contrast in the heart      OSSEOUS STRUCTURES: Normal for age      IMPRESSION:     Nothing to indicate interstitial lung disease with no change from the chest CT 2 weeks ago      2 3 cm low-attenuation lesion in the dome of the liver  This may be a benign hemangioma but recommend further evaluation with a contrast-enhanced abdomen CT using the hemangioma protocol  Pulmonary function testing:     Jan 30, 2023     Study Interpretation:   • Normal lung volumes  • Mild airflow limitation  • Significant improvement in the airflow following the administration of bronchodilators  • Normal diffusion capacity  • Normal airway resistance as indicated by the specific airway conductance      EKG, Pathology, and Other Studies: I have personally reviewed pertinent reports  and I have personally reviewed pertinent films in PACS    Normal sinus rhythm  Incomplete right bundle branch block  No previous ECGs available    FÁTIMA Urias    [facility]  Answers for HPI/ROS submitted by the patient on 4/3/2023  Do you have chest tightness?: Yes  Do you have difficulty breathing?: Yes  Chronicity: chronic  When did you first notice your symptoms?: more than 1 month ago  How often do your symptoms occur?: constantly  Since you first noticed this problem, how has it changed?: unchanged  Do you have shortness of breath that occurs with effort or exertion?: Yes  Do you have ear congestion?: No  Do you have heartburn?: No  Do you have fatigue?: Yes  Do you have nasal congestion?: No  Do you have shortness of breath when lying flat?: No  Do you have shortness of breath when you wake up?: Yes  Do you have sweats?: No  Have you experienced weight loss?: No  Which of the following makes your symptoms worse?: change in weather, climbing stairs, exercise, exposure to fumes, exposure to smoke, strenuous activity  Which of the following makes your symptoms better?: beta-agonist, ipratropium, leukotriene antagonist, steroid inhaler  Risk factors for lung disease: animal exposure, occupational exposure

## 2023-04-04 NOTE — ASSESSMENT & PLAN NOTE
Patient is a 41 y/o woman with a history of mild intermittent asthma (moslty exercise induced) that was previously well controlled  However, she has had a persistence of sob, chest tightness, wheezing and cough since her most recent covid-19 infection (reports being diagnosed 4x)  Noel Betancourt Recent High resolution CT and previous CTA were unremarkable and did not show evidence of PE or ILD  Started high dose trelegy about 1 week ago, but has not noticed a benefit  Denies any infectious symptoms   Now reports relative hypoxia w/ exercise       Plan   Cont high dose trelegy  Start low dose qvar as well   Cont albuterol, duonebs prn   Cont self paced exercise  Cont pulm rehab  [place], [address] allergy panel, cbc w/ differential, alpha 1 antitrypsin leve  Refer to ENT for further eval as VCD/vocal muscle tension is a strong possibility   In future could consider addition of gabapentin  Repeat echo w/ bubble to assess for shunt  F/u in approximately 6 weeks

## 2023-04-05 LAB
A1AT SERPL-MCNC: 123 MG/DL (ref 100–188)
B PERT IGA SER QL IA: <1 INDEX (ref 0–0.9)
B PERT IGG SER-ACNC: 4.06 INDEX (ref 0–0.94)
B PERT IGM SER QL IA: <1 INDEX (ref 0–0.9)

## 2023-04-06 ENCOUNTER — APPOINTMENT (OUTPATIENT)
Dept: PULMONOLOGY | Facility: HOSPITAL | Age: 38
End: 2023-04-06

## 2023-04-06 ENCOUNTER — TELEPHONE (OUTPATIENT)
Dept: PULMONOLOGY | Facility: CLINIC | Age: 38
End: 2023-04-06

## 2023-04-06 LAB

## 2023-04-06 NOTE — TELEPHONE ENCOUNTER
Patient is calling, she got a notification that her blood work results came in but [facility] is down and she cant send the Doctor a message  She is worried as the Bordetella results are really high and she isn't sure what to do, can she be around people? She hopes for a call back as soon as possible   Please advise

## 2023-04-06 NOTE — TELEPHONE ENCOUNTER
I spoke with Bee Parr regarding the results of her pertussis antibody showing IgG+  All questions answered  No indication for any treatment at this time  I advised her that Dr Chary Gonzalez would be in communication with any changes or additional results as they return  She is aware to call the office with any other questions or concerns

## 2023-04-07 ENCOUNTER — TELEPHONE (OUTPATIENT)
Dept: PULMONOLOGY | Facility: CLINIC | Age: 38
End: 2023-04-07

## 2023-04-07 ENCOUNTER — HOSPITAL ENCOUNTER (OUTPATIENT)
Dept: NON INVASIVE DIAGNOSTICS | Facility: HOSPITAL | Age: 38
Discharge: HOME | End: 2023-04-07

## 2023-04-07 VITALS
HEIGHT: 66 IN | SYSTOLIC BLOOD PRESSURE: 112 MMHG | HEART RATE: 74 BPM | DIASTOLIC BLOOD PRESSURE: 80 MMHG | BODY MASS INDEX: 26.36 KG/M2 | WEIGHT: 164 LBS

## 2023-04-07 DIAGNOSIS — R09.02 HYPOXIA: ICD-10-CM

## 2023-04-07 NOTE — TELEPHONE ENCOUNTER
Karlene from the [address] [place] [address] has called in requesting a call back in reference to the pts recent Bordetella pertussis antibody lab work. Tatiana is asking for detailed information on why the pt had this lab work done in order to close the case.  Please advise   [phone]

## 2023-04-25 ENCOUNTER — CLINICAL SUPPORT (OUTPATIENT)
Dept: PULMONOLOGY | Facility: HOSPITAL | Age: 38
End: 2023-04-25

## 2023-04-25 ENCOUNTER — ANNUAL EXAM (OUTPATIENT)
Age: 38
End: 2023-04-25

## 2023-04-25 VITALS
DIASTOLIC BLOOD PRESSURE: 74 MMHG | WEIGHT: 162 LBS | SYSTOLIC BLOOD PRESSURE: 116 MMHG | BODY MASS INDEX: 26.03 KG/M2 | HEIGHT: 66 IN

## 2023-04-25 DIAGNOSIS — Z01.419 ENCOUNTER FOR GYNECOLOGICAL EXAMINATION WITHOUT ABNORMAL FINDING: Primary | ICD-10-CM

## 2023-04-25 DIAGNOSIS — R06.00 PERSISTENT DYSPNEA AFTER COVID-19: ICD-10-CM

## 2023-04-25 DIAGNOSIS — U09.9 PERSISTENT DYSPNEA AFTER COVID-19: ICD-10-CM

## 2023-04-25 DIAGNOSIS — Z12.31 BREAST CANCER SCREENING BY MAMMOGRAM: ICD-10-CM

## 2023-04-25 DIAGNOSIS — Z80.3 FAMILY HISTORY OF BREAST CANCER IN MOTHER: ICD-10-CM

## 2023-04-25 PROBLEM — Z00.00 ANNUAL PHYSICAL EXAM: Status: RESOLVED | Noted: 2023-01-27 | Resolved: 2023-04-25

## 2023-04-25 NOTE — PROGRESS NOTES
Diagnoses and all orders for this visit:    Encounter for gynecological examination without abnormal finding    Breast cancer screening by mammogram  -     Mammo screening bilateral w 3d & cad; Future    Family history of breast cancer in mother      Calcium/vit d/PNV inclusion in the diet discussed, call with any issues, SBE reinforced, all concerns addressed  Pleasant 45 y o  premenopausal female here for annual exam  She denies any issues with heavy bleeding or her menses  She reports regular monthly cycles every 28-30 days  They last 4-5 days  Denies history of abnormal pap smears  Last Pap done on 03/17/2022 was neg/neg,a pap was NOT done today  She denies vaginal issues  She denies pelvic pain  She denies dyspareunia  She declines a BCM  She is sexually active occasionally  Mom was diagnosed with breast cancer at age 58 and MGM at age 36  She states it was estrogen driven and BRCA negative  She is thinking about possible pregnancy versus ovarian protection  Progestin only birth control methods were discussed   History of migraines without aura  Mammogram 05/26/2022 negative      Past Medical History:   Diagnosis Date   • Allergic    • Anemia    • Anxiety    • Arthritis    • Asthma    • Chlamydia 2014    Unfaithful partner   • Depression    • Exposure to SARS-associated coronavirus 04/12/2020   • GERD (gastroesophageal reflux disease) 2010   • Gonorrhea 2014    Unfaithful partner   • Headache(784 0)    • Lumbar herniated disc    • Migraine 2012   • Pneumonia    • Urinary tract infection    • Urogenital trichomoniasis 2015    Unfaithful partner   • UTI (urinary tract infection)    • Varicella 1988   • Visual impairment      Past Surgical History:   Procedure Laterality Date   • FL INJECTION LEFT KNEE (ARTHROGRAM)  2/28/2022   • WISDOM TOOTH EXTRACTION       Family History   Problem Relation Age of Onset   • Breast cancer additional onset Mother         Left Masectomy   • Hypertension Mother    • Diabetes Mother    • Asthma Mother    • Cancer Mother         breast   • Breast cancer Mother         In 2019, left side  -brca   • Migraines Mother    • Mental illness Father         family history   • Alcohol abuse Father    • Hypertension Father    • Hyperlipidemia Father    • Diabetes Father    • Heart attack Father         Assumed   Found  at 61   • Colon cancer Maternal Grandmother    • Breast cancer additional onset Maternal Grandmother    • Diabetes Maternal Grandmother    • Cancer Maternal Grandmother         breast, colon   • Breast cancer Maternal Grandmother    • Prostate cancer Maternal Grandfather         lung   • Cancer Maternal Grandfather         lung (smoker)   • Completed Suicide  Maternal Grandfather    • Lung cancer Maternal Grandfather    • Asthma Brother    • Asthma Brother      Social History     Tobacco Use   • Smoking status: Never   • Smokeless tobacco: Never   Vaping Use   • Vaping Use: Never used   Substance Use Topics   • Alcohol use: Yes     Comment: ~1 drinks Twice a month   • Drug use: Never       Current Outpatient Medications:   •  albuterol (2 5 mg/3 mL) 0 083 % nebulizer solution, Take by nebulization every 6 (six) hours as needed, Disp: , Rfl:   •  albuterol (PROVENTIL HFA,VENTOLIN HFA) 90 mcg/act inhaler, Inhale 2 puffs every 6 (six) hours as needed for wheezing, Disp: 18 g, Rfl: 0  •  Ascorbic Acid (vitamin C) 1000 MG tablet, Take 1,000 mg by mouth daily, Disp: , Rfl:   •  B Complex-C (B-complex with vitamin C) tablet, Take 1 tablet by mouth daily, Disp: , Rfl:   •  beclomethasone (Qvar RediHaler) 40 MCG/ACT inhaler, Inhale 2 puffs 2 (two) times a day Rinse mouth after use , Disp: 10 6 g, Rfl: 0  •  Biotin 10 MG TABS, Take by mouth, Disp: , Rfl:   •  buPROPion (Wellbutrin XL) 300 mg 24 hr tablet, Take 1 tablet (300 mg total) by mouth every morning, Disp: 30 tablet, Rfl: 0  •  calcium carbonate (OS-BERENICE) 600 MG tablet, Take 600 mg by mouth 2 (two) times a day with meals, Disp: , Rfl:   •  cholecalciferol (VITAMIN D3) 1,000 units tablet, Take 1,000 Units by mouth daily, Disp: , Rfl:   •  Ferrous Sulfate (RA IRON PO), Take by mouth, Disp: , Rfl:   •  FLUoxetine (PROzac) 40 MG capsule, Take 1 capsule (40 mg total) by mouth daily, Disp: 90 capsule, Rfl: 0  •  fluticasone-umeclidinium-vilanterol 200-62 5-25 mcg/actuation AEPB inhaler, Inhale 1 puff daily Rinse mouth after use , Disp: 1 each, Rfl: 5  •  ipratropium-albuterol (DUO-NEB) 0 5-2 5 mg/3 mL nebulizer solution, inhale contents of 1 vial ( 3 milliliters ) in nebulizer by mouth and INTO THE LUNGS every 6 hours, Disp: , Rfl:   •  Magnesium Oxide 500 MG TABS, Take by mouth, Disp: , Rfl:   •  montelukast (SINGULAIR) 10 mg tablet, Take 1 tablet (10 mg total) by mouth daily, Disp: 30 tablet, Rfl: 2  •  Turmeric-Elisa 135-6 MG CHEW, Chew, Disp: , Rfl:   •  Zinc 30 MG CAPS, Take by mouth, Disp: , Rfl:   Patient Active Problem List    Diagnosis Date Noted   • Family history of breast cancer in mother 2023   • Moderate persistent asthma without complication    • COVID-19 2023   • Shortness of breath 2023   • PTSD (post-traumatic stress disorder) 2023   • Sprain of medial collateral ligament of left knee 2021   • Glaucoma suspect of both eyes 2021   • Intervertebral disc prolapse 2021   • Menstrual migraine without status migrainosus, not intractable 2019   • Moderate episode of recurrent major depressive disorder (HCC) 09/10/2019       Allergies   Allergen Reactions   • Sulfa Antibiotics Anaphylaxis   • Levofloxacin      Other reaction(s): sensative   • Prednisone Other (See Comments)   • Tobramycin      Other reaction(s): sensative       OB History    Para Term  AB Living   0 0 0 0 0 0   SAB IAB Ectopic Multiple Live Births   0 0 0 0 0     Works day shift at [employer], formerly [employer]  She has a boyfriend for the past 4 years, he has a son     Going [place] next [place]    Vitals:    04/25/23 1332   BP: 116/74   Weight: 73 5 kg (162 lb)   Height: 5' 6\" (1 676 m)     Body mass index is 26 15 kg/m²  Patient's last menstrual period was 04/01/2023  Review of Systems   Constitutional: Negative for chills, fever and unexpected weight change  +Fatigue  Respiratory: POSITIVE for shortness of breath/Long COVID  Gastrointestinal: Negative for anal bleeding, blood in stool, constipation and diarrhea  Genitourinary: Negative for difficulty urinating, dysuria and hematuria  Physical Exam   Constitutional: She appears well-developed and well-nourished  No distress  HENT: atraumatic,EOMI  Head: Normocephalic  Neck: Normal range of motion  Neck supple  Pulmonary: Effort normal   Breasts: bilateral without masses, skin changes or nipple discharge  Bilaterally soft and warm to touch  No areas of erythema or pain  Abdominal: Soft  Pelvic exam was performed with patient supine  No labial fusion  There is no rash, tenderness, lesion or injury on the right labia  There is no rash, tenderness, lesion or injury on the left labia  Urethral meatus does not show any tenderness, inflammation or discharge  Palpation of midline bladder without pain or discomfort  Uterus is not deviated, not enlarged, not fixed and not tender  Cervix exhibits no motion tenderness, no discharge and no friability  Right adnexum displays no mass, no tenderness and no fullness  Left adnexum displays no mass, no tenderness and no fullness  No erythema or tenderness in the vagina  No foreign body in the vagina  No signs of injury around the vagina  No vaginal discharge found  No signs of injury around the vagina or anus  Perineum without lesions, signs of injury, erythema or swelling  Lymphadenopathy:        Right: No inguinal adenopathy present  Left: No inguinal adenopathy present           "

## 2023-04-27 ENCOUNTER — CLINICAL SUPPORT (OUTPATIENT)
Dept: PULMONOLOGY | Facility: HOSPITAL | Age: 38
End: 2023-04-27

## 2023-04-27 DIAGNOSIS — R06.00 PERSISTENT DYSPNEA AFTER COVID-19: ICD-10-CM

## 2023-04-27 DIAGNOSIS — U09.9 PERSISTENT DYSPNEA AFTER COVID-19: ICD-10-CM

## 2023-04-30 DIAGNOSIS — J45.40 MODERATE PERSISTENT ASTHMA WITHOUT COMPLICATION: ICD-10-CM

## 2023-05-01 RX ORDER — BECLOMETHASONE DIPROPIONATE HFA 40 UG/1
AEROSOL, METERED RESPIRATORY (INHALATION)
Qty: 10.6 G | Refills: 0 | Status: SHIPPED | OUTPATIENT
Start: 2023-05-01

## 2023-05-02 ENCOUNTER — CLINICAL SUPPORT (OUTPATIENT)
Dept: PULMONOLOGY | Facility: HOSPITAL | Age: 38
End: 2023-05-02

## 2023-05-02 ENCOUNTER — OFFICE VISIT (OUTPATIENT)
Dept: PSYCHIATRY | Facility: CLINIC | Age: 38
End: 2023-05-02

## 2023-05-02 DIAGNOSIS — F43.10 PTSD (POST-TRAUMATIC STRESS DISORDER): ICD-10-CM

## 2023-05-02 DIAGNOSIS — F41.9 ANXIETY: ICD-10-CM

## 2023-05-02 DIAGNOSIS — U09.9 PERSISTENT DYSPNEA AFTER COVID-19: ICD-10-CM

## 2023-05-02 DIAGNOSIS — F33.1 MODERATE EPISODE OF RECURRENT MAJOR DEPRESSIVE DISORDER (HCC): Primary | ICD-10-CM

## 2023-05-02 DIAGNOSIS — R06.00 PERSISTENT DYSPNEA AFTER COVID-19: ICD-10-CM

## 2023-05-02 RX ORDER — BUPROPION HYDROCHLORIDE 300 MG/1
300 TABLET ORAL EVERY MORNING
Qty: 90 TABLET | Refills: 0 | Status: SHIPPED | OUTPATIENT
Start: 2023-05-02 | End: 2023-07-31
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
## 2023-05-04 ENCOUNTER — CLINICAL SUPPORT (OUTPATIENT)
Dept: PULMONOLOGY | Facility: HOSPITAL | Age: 38
End: 2023-05-04

## 2023-05-04 DIAGNOSIS — U09.9 PERSISTENT DYSPNEA AFTER COVID-19: ICD-10-CM

## 2023-05-04 DIAGNOSIS — R06.00 PERSISTENT DYSPNEA AFTER COVID-19: ICD-10-CM

## 2023-05-09 ENCOUNTER — CLINICAL SUPPORT (OUTPATIENT)
Dept: PULMONOLOGY | Facility: HOSPITAL | Age: 38
End: 2023-05-09

## 2023-05-09 DIAGNOSIS — R06.00 PERSISTENT DYSPNEA AFTER COVID-19: ICD-10-CM

## 2023-05-09 DIAGNOSIS — U09.9 PERSISTENT DYSPNEA AFTER COVID-19: ICD-10-CM

## 2023-05-09 DIAGNOSIS — B37.0 ORAL THRUSH: Primary | ICD-10-CM

## 2023-05-09 NOTE — PROGRESS NOTES
Pulmonary Follow-up   Rosey Osorio 45 y o  female MRN: [id number]  5/10/2023      Assessment:    Severe persistent asthma  Patient is a 41 y/o woman with a history of mild intermittent asthma (moslty exercise induced) that was previously well controlled  However, she has had a persistence of sob, chest tightness, wheezing and cough since her most recent covid-19 infection (reports being diagnosed 4x)  Rolando Alvarez Recent High resolution CT and previous CTA were unremarkable and did not show evidence of PE or ILD  [facility] allergy panel to include total IgE was normal  Previous CBC w/o peripheral eosinophilia  Despite high dose trelegy and qvar she continues to have symptoms though somewhat improved since starting pulmonary rehab  Today we discussed the potential benefit of tezspire       Plan   Cont high dose trelegy  Cont low dose qvar as well   If thrush is recurrent or she develops URIs will need to consider ICS dose reduction   Have prescribed prednisone to have on hand in case of exacerbation during her trip out of the country  Needs closer monitoring given glaucoma  Cont albuterol, duonebs prn   Cont self paced exercise  Cont pulm rehab  [place], [address] allergy panel, cbc w/ differential, alpha 1 antitrypsin alhajichina  Referred to ENT for further eval as VCD/vocal muscle tension is a strong possibility   In future could consider addition of gabapentin  F/u in July, 2023       Plan:    Diagnoses and all orders for this visit:    Severe persistent asthma, unspecified whether complicated  -     predniSONE 10 mg tablet; Take 4 tablets (40 mg total) by mouth daily for 3 days, THEN 3 tablets (30 mg total) daily for 3 days, THEN 3 tablets (30 mg total) daily for 3 days, THEN 2 tablets (20 mg total) daily for 3 days, THEN 1 tablet (10 mg total) daily for 3 days  No follow-ups on file      History of Present Illness   HPI:  Rosey Osorio is a 45 y o  female with a history of asthma who originally presented to the pulmonary office for evaluation of shortness of breath related to previous COVID infection in January 2022  Patient was seen in the emergency room on January 17 due to continued chest pain, wheezing, cough and shortness of breath  She was diagnosed with asthma as a child but it was previously well controlled prior to her episodes of COVID-19 infection  Previously never required hospitalization or intubation her asthma was historically induced by exercise  In the emergency room influenza/RSV/COVID PCR were negative  D-dimer was mildly elevated 0 97 mcg/mL  VBG showed respiratory alkalosis  CT a with PE study was performed that showed no evidence of pulmonary embolism aneurysm or dissection  Since that time continues to have sob, wheezing, chest tightness despite use of flovent twice daily  She also uses duonebs and albuterol nebs  States that she has had covid-19 four times  Previous medications: flovent, singulair       Triggers exertion: exertion, cold dry air      CBC from 1/2023 was normal      Echo from 4/7 showed no evidence of shunt  Here today for f/u  States she is doing ok  It depends on the day  She has less chest pain  She is jogging on a treadmill/walking on incline  She pretreats with albuterol prior to exercise  She has not had any significant sob episodes  She has not had any recent exacerbations  Still has chest tightness and cough and still feels she has significant limitations  She is participating in cardiopulmonary rehab--she is unclear if it is helping  She continues to take Trelegy Ellipta, Qvar, Singulair and albuterol  She is taking a trip outside of the country next week  She recently developed thrush and was treated with diflucan and nystatin  Review of Systems   Constitutional: Negative for chills, fatigue and fever  HENT: Negative for congestion, nosebleeds, postnasal drip, rhinorrhea, sinus pressure and sore throat      Eyes: Negative for discharge, redness and itching  Respiratory: Positive for cough, chest tightness, shortness of breath and wheezing  Negative for choking and stridor  Cardiovascular: Negative for chest pain, palpitations and leg swelling  Gastrointestinal: Negative for blood in stool  Genitourinary: Negative for difficulty urinating and dysuria  Musculoskeletal: Negative for arthralgias, joint swelling and myalgias  Skin: Negative for color change and rash  Neurological: Negative for light-headedness and headaches  Hematological: Negative for adenopathy  Historical Information   Past Medical History:   Diagnosis Date   • Allergic    • Anemia    • Anxiety    • Arthritis    • Asthma    • Chlamydia 2014    Unfaithful partner   • Depression    • Exposure to SARS-associated coronavirus 2020   • GERD (gastroesophageal reflux disease)    • Gonorrhea     Unfaithful partner   • Headache(784 0)    • Lumbar herniated disc    • Migraine    • Pneumonia    • Urinary tract infection    • Urogenital trichomoniasis     Unfaithful partner   • UTI (urinary tract infection)    • Varicella    • Visual impairment      Past Surgical History:   Procedure Laterality Date   • FL INJECTION LEFT KNEE (ARTHROGRAM)  2022   • WISDOM TOOTH EXTRACTION       Family History   Problem Relation Age of Onset   • Breast cancer additional onset Mother         Left Masectomy   • Hypertension Mother    • Diabetes Mother    • Asthma Mother    • Cancer Mother         breast   • Breast cancer Mother         In 2019, left side  -brca   • Migraines Mother    • Mental illness Father         family history   • Alcohol abuse Father    • Hypertension Father    • Hyperlipidemia Father    • Diabetes Father    • Heart attack Father         Assumed   Found  at 61   • Colon cancer Maternal Grandmother    • Breast cancer additional onset Maternal Grandmother    • Diabetes Maternal Grandmother    • Cancer Maternal Grandmother breast, colon   • Breast cancer Maternal Grandmother    • Prostate cancer Maternal Grandfather         lung   • Cancer Maternal Grandfather         lung (smoker)   • Completed Suicide  Maternal Grandfather    • Lung cancer Maternal Grandfather    • Asthma Brother    • Asthma Brother        Social History   Places lived: [place]  Occupation: ER nurse     Tobacco: none smoker  Illicits:  None   Hobbies:   Pets: dogs       Meds/Allergies     Current Outpatient Medications:   •  albuterol (2 5 mg/3 mL) 0 083 % nebulizer solution, Take by nebulization every 6 (six) hours as needed, Disp: , Rfl:   •  albuterol (PROVENTIL HFA,VENTOLIN HFA) 90 mcg/act inhaler, Inhale 2 puffs every 6 (six) hours as needed for wheezing, Disp: 18 g, Rfl: 0  •  Ascorbic Acid (vitamin C) 1000 MG tablet, Take 1,000 mg by mouth daily, Disp: , Rfl:   •  B Complex-C (B-complex with vitamin C) tablet, Take 1 tablet by mouth daily, Disp: , Rfl:   •  Biotin 10 MG TABS, Take by mouth, Disp: , Rfl:   •  buPROPion (Wellbutrin XL) 300 mg 24 hr tablet, Take 1 tablet (300 mg total) by mouth every morning, Disp: 90 tablet, Rfl: 0  •  calcium carbonate (OS-BERENICE) 600 MG tablet, Take 600 mg by mouth 2 (two) times a day with meals, Disp: , Rfl:   •  cholecalciferol (VITAMIN D3) 1,000 units tablet, Take 1,000 Units by mouth daily, Disp: , Rfl:   •  Ferrous Sulfate (RA IRON PO), Take by mouth, Disp: , Rfl:   •  FLUoxetine (PROzac) 40 MG capsule, Take 1 capsule (40 mg total) by mouth daily, Disp: 90 capsule, Rfl: 0  •  fluticasone-umeclidinium-vilanterol 200-62 5-25 mcg/actuation AEPB inhaler, Inhale 1 puff daily Rinse mouth after use , Disp: 1 each, Rfl: 5  •  ipratropium-albuterol (DUO-NEB) 0 5-2 5 mg/3 mL nebulizer solution, inhale contents of 1 vial ( 3 milliliters ) in nebulizer by mouth and INTO THE LUNGS every 6 hours, Disp: , Rfl:   •  Magnesium Oxide 500 MG TABS, Take by mouth, Disp: , Rfl:   •  montelukast (SINGULAIR) 10 mg tablet, Take 1 tablet (10 mg "total) by mouth daily, Disp: 30 tablet, Rfl: 2  •  nystatin (MYCOSTATIN) 500,000 units/5 mL suspension, Apply 5 mL (500,000 Units total) to the mouth or throat 4 (four) times a day for 10 days, Disp: 200 mL, Rfl: 0  •  predniSONE 10 mg tablet, Take 4 tablets (40 mg total) by mouth daily for 3 days, THEN 3 tablets (30 mg total) daily for 3 days, THEN 3 tablets (30 mg total) daily for 3 days, THEN 2 tablets (20 mg total) daily for 3 days, THEN 1 tablet (10 mg total) daily for 3 days  , Disp: 39 tablet, Rfl: 0  •  Qvar RediHaler 40 MCG/ACT inhaler, inhale 2 puffs by mouth twice a day then Rinse mouth after use, Disp: 10 6 g, Rfl: 0  •  Turmeric-Elisa 135-6 MG CHEW, Chew, Disp: , Rfl:   •  Zinc 30 MG CAPS, Take by mouth, Disp: , Rfl:   Allergies   Allergen Reactions   • Sulfa Antibiotics Anaphylaxis   • Levofloxacin      Other reaction(s): sensative   • Prednisone Other (See Comments)   • Tobramycin      Other reaction(s): sensative       Vitals: Blood pressure 104/68, pulse 85, height 5' 6\" (1 676 m), weight 73 9 kg (163 lb), SpO2 98 %, not currently breastfeeding  Body mass index is 26 31 kg/m²  Oxygen Therapy  SpO2: 98 %  Oxygen Therapy: None (Room air)      Physical Exam  Physical Exam  Vitals reviewed  Constitutional:       General: She is not in acute distress  Appearance: She is well-developed  She is not ill-appearing, toxic-appearing or diaphoretic  HENT:      Head: Normocephalic and atraumatic  Right Ear: External ear normal       Left Ear: External ear normal       Nose: Nose normal  No congestion or rhinorrhea  Mouth/Throat:      Pharynx: No oropharyngeal exudate or posterior oropharyngeal erythema  Eyes:      General: No scleral icterus  Right eye: No discharge  Left eye: No discharge  Conjunctiva/sclera: Conjunctivae normal       Pupils: Pupils are equal, round, and reactive to light  Neck:      Trachea: No tracheal deviation     Cardiovascular:      Rate and " Rhythm: Normal rate and regular rhythm  Heart sounds: Normal heart sounds  No murmur heard  No friction rub  No gallop  Pulmonary:      Effort: Pulmonary effort is normal       Breath sounds: Normal breath sounds  No stridor  No wheezing or rales  Musculoskeletal:      Cervical back: Normal range of motion  Right lower leg: No edema  Left lower leg: No edema  Lymphadenopathy:      Head:      Right side of head: No submental, submandibular, preauricular or posterior auricular adenopathy  Left side of head: No submental, submandibular, preauricular or posterior auricular adenopathy  Cervical: No cervical adenopathy  Skin:     General: Skin is warm and dry  Findings: No lesion or rash  Neurological:      Mental Status: She is alert and oriented to person, place, and time  Labs: I have personally reviewed pertinent lab results  Lab Results   Component Value Date    WBC 5 64 04/04/2023    HGB 12 6 04/04/2023    HCT 38 6 04/04/2023    MCV 94 04/04/2023     04/04/2023     Lab Results   Component Value Date    CALCIUM 9 2 01/16/2023    K 3 3 (L) 01/16/2023    CO2 23 01/16/2023     01/16/2023    BUN 12 01/16/2023    CREATININE 1 08 01/16/2023     Lab Results   Component Value Date    IGE 27 9 04/04/2023     Lab Results   Component Value Date    ALT 18 01/16/2023    AST 20 01/16/2023    ALKPHOS 69 01/16/2023       Imaging and other studies: I have personally reviewed pertinent reports  and I have personally reviewed pertinent films in PACS      Lung 2/1/2023   LUNGS:  Nothing to suggest interstitial lung disease with no reticulation, traction bronchiectasis/bronchiolectasis, groundglass, or honeycombing    No significant air trapping on expiration      AIRWAYS: No significant filling defects      PLEURA:  Unremarkable      HEART/GREAT VESSELS:  Normal for age      MEDIASTINUM AND IGNACIO:  Unremarkable      CHEST WALL AND LOWER NECK: Unremarkable      UPPER ABDOMEN:  2 3 cm low-attenuation lesion in the dome of the liver, 30 Hounsfield units, poorly demonstrated on the CTA of the chest 2 weeks ago due to streak artifact from contrast in the heart      OSSEOUS STRUCTURES: Normal for age      IMPRESSION:     Nothing to indicate interstitial lung disease with no change from the chest CT 2 weeks ago      2 3 cm low-attenuation lesion in the dome of the liver  This may be a benign hemangioma but recommend further evaluation with a contrast-enhanced abdomen CT using the hemangioma protocol  Pulmonary function testing:     Jan 30, 2023     Study Interpretation:   • Normal lung volumes  • Mild airflow limitation  • Significant improvement in the airflow following the administration of bronchodilators  • Normal diffusion capacity  • Normal airway resistance as indicated by the specific airway conductance  EKG, Pathology, and Other Studies: I have personally reviewed pertinent reports  and I have personally reviewed pertinent films in PACS      •  Left Ventricle: Left ventricular cavity size is normal  Wall thickness is normal  The left ventricular ejection fraction is 60% by visual estimation  Systolic function is normal  Wall motion is normal   •  Atrial Septum: No patent foramen ovale detected with provocation      Findings    Left Ventricle Left ventricular cavity size is normal  Wall thickness is normal  The left ventricular ejection fraction is 60% by visual estimation  Systolic function is normal   Wall motion is normal    Atrial Septum No patent foramen ovale detected with provocation  Normal sinus rhythm  Incomplete right bundle branch block  No previous ECGs available    FÁTIMA Savage    [facility]  Answers for HPI/ROS submitted by the patient on 4/3/2023  Do you have chest tightness?: Yes  Do you have difficulty breathing?: Yes  Chronicity: chronic  When did you first notice your symptoms?: more than 1 month ago  How often do your symptoms occur?: constantly  Since you first noticed this problem, how has it changed?: unchanged  Do you have shortness of breath that occurs with effort or exertion?: Yes  Do you have ear congestion?: No  Do you have heartburn?: No  Do you have fatigue?: Yes  Do you have nasal congestion?: No  Do you have shortness of breath when lying flat?: No  Do you have shortness of breath when you wake up?: Yes  Do you have sweats?: No  Have you experienced weight loss?: No  Which of the following makes your symptoms worse?: change in weather, climbing stairs, exercise, exposure to fumes, exposure to smoke, strenuous activity  Which of the following makes your symptoms better?: beta-agonist, ipratropium, leukotriene antagonist, steroid inhaler  Risk factors for lung disease: animal exposure, occupational exposure

## 2023-05-10 ENCOUNTER — OFFICE VISIT (OUTPATIENT)
Dept: PULMONOLOGY | Facility: CLINIC | Age: 38
End: 2023-05-10

## 2023-05-10 VITALS
HEART RATE: 85 BPM | WEIGHT: 163 LBS | SYSTOLIC BLOOD PRESSURE: 104 MMHG | HEIGHT: 66 IN | BODY MASS INDEX: 26.2 KG/M2 | OXYGEN SATURATION: 98 % | DIASTOLIC BLOOD PRESSURE: 68 MMHG

## 2023-05-10 DIAGNOSIS — J45.50 SEVERE PERSISTENT ASTHMA, UNSPECIFIED WHETHER COMPLICATED: Primary | ICD-10-CM

## 2023-05-10 RX ORDER — PREDNISONE 10 MG/1
TABLET ORAL
Qty: 39 TABLET | Refills: 0 | Status: SHIPPED | OUTPATIENT
Start: 2023-05-10 | End: 2023-05-18 | Stop reason: ALTCHOICE

## 2023-05-10 NOTE — ASSESSMENT & PLAN NOTE
Patient is a 39 y/o woman with a history of mild intermittent asthma (moslty exercise induced) that was previously well controlled  However, she has had a persistence of sob, chest tightness, wheezing and cough since her most recent covid-19 infection (reports being diagnosed 4x)  Mil Antu Recent High resolution CT and previous CTA were unremarkable and did not show evidence of PE or ILD  [facility] allergy panel to include total IgE was normal  Previous CBC w/o peripheral eosinophilia  Despite high dose trelegy and qvar she continues to have symptoms though somewhat improved since starting pulmonary rehab  Today we discussed the potential benefit of tezspire       Plan   Will start prior authorization for tezspire   Cont high dose trelegy  Cont low dose qvar as well   If thrush is recurrent or she develops URIs will need to consider ICS dose reduction   Have prescribed prednisone to have on hand in case of exacerbation during her trip out of the country  Needs closer monitoring given glaucoma     Cont albuterol, duonebs prn   Cont self paced exercise  Cont pulm rehab  [place], [address] allergy panel, cbc w/ differential, alpha 1 antitrypsin efraín  Referred to ENT for further eval as VCD/vocal muscle tension is a strong possibility   In future could consider addition of gabapentin  F/u in July, 2023

## 2023-05-11 ENCOUNTER — CLINICAL SUPPORT (OUTPATIENT)
Dept: PULMONOLOGY | Facility: HOSPITAL | Age: 38
End: 2023-05-11

## 2023-05-11 ENCOUNTER — TELEMEDICINE (OUTPATIENT)
Dept: PSYCHIATRY | Facility: CLINIC | Age: 38
End: 2023-05-11

## 2023-05-11 DIAGNOSIS — R06.00 PERSISTENT DYSPNEA AFTER COVID-19: Primary | ICD-10-CM

## 2023-05-11 DIAGNOSIS — U09.9 PERSISTENT DYSPNEA AFTER COVID-19: Primary | ICD-10-CM

## 2023-05-11 DIAGNOSIS — F33.1 MODERATE EPISODE OF RECURRENT MAJOR DEPRESSIVE DISORDER (HCC): Primary | ICD-10-CM

## 2023-05-11 DIAGNOSIS — F43.10 PTSD (POST-TRAUMATIC STRESS DISORDER): ICD-10-CM

## 2023-05-11 NOTE — PSYCH
"Behavioral Health Psychotherapy Progress Note    Psychotherapy Provided: Individual Psychotherapy     1  Moderate episode of recurrent major depressive disorder ([address] )        2  PTSD (post-traumatic stress disorder)            Goals addressed in session: Goal 1     DATA: Erica Byrd shared she is not working yet and has restrictions  She shares she is feeling dissociated recently and that she is  herself from things including finding out things about her friend  She feels she does not want to do things and still feels depressed  Erica Byrd and therapist created a crisis plan and treatment plan during this session  During this session, this clinician used the following therapeutic modalities: Solution-Focused Therapy and Supportive Psychotherapy    Substance Abuse was not addressed during this session  If the client is diagnosed with a co-occurring substance use disorder, please indicate any changes in the frequency or amount of use:   Stage of change for addressing substance use diagnoses: No substance use/Not applicable    ASSESSMENT:  Silvestre Helton presents with a Euthymic/ normal mood  her affect is Normal range and intensity, which is congruent, with her mood and the content of the session  The client has made progress on their goals  Silvestre Helton presents with a none risk of suicide, none risk of self-harm, and none risk of harm to others  For any risk assessment that surpasses a \"low\" rating, a safety plan must be developed  A safety plan was indicated: no  If yes, describe in detail     PLAN: Between sessions, Silvestre Helton will utilize coping skills for depression  At the next session, the therapist will use Solution-Focused Therapy and Supportive Psychotherapy to address past trauma, depression  Behavioral Health Treatment Plan and Discharge Planning: Silvestre Helton is aware of and agrees to continue to work on their treatment plan   They have identified and are working toward their " discharge goals  yes    Visit start and stop times:    05/11/23       Virtual Regular Visit    Verification of patient location:    Patient is located at Home in the following state in which I hold an active license [place]      Assessment/Plan:    Problem List Items Addressed This Visit        Other    Moderate episode of recurrent major depressive disorder ([facility] [address] ) - Primary    PTSD (post-traumatic stress disorder)       Goals addressed in session: Goal 1          Reason for visit is No chief complaint on file  Encounter provider Kayden Major LCSW    Provider located at [address] [place] [id number] [phone]      Recent Visits  No visits were found meeting these conditions  Showing recent visits within past 7 days and meeting all other requirements  Future Appointments  No visits were found meeting these conditions  Showing future appointments within next 150 days and meeting all other requirements       The patient was identified by name and date of birth  Darvin Calvillo was informed that this is a telemedicine visit and that the visit is being conducted throughthe O4IT platform  She agrees to proceed     My office door was closed  No one else was in the room  She acknowledged consent and understanding of privacy and security of the video platform  The patient has agreed to participate and understands they can discontinue the visit at any time  Patient is aware this is a billable service  Subjective  Darvin Calvillo is a 45 y o  female          HPI     Past Medical History:   Diagnosis Date   • Allergic    • Anemia    • Anxiety    • Arthritis    • Asthma    • Chlamydia 2014    Unfaithful partner   • Depression    • Exposure to SARS-associated coronavirus 04/12/2020   • GERD (gastroesophageal reflux disease) 2010   • Gonorrhea 2014    Unfaithful partner   • Headache(784 0)    • Lumbar herniated disc    • Migraine 2012   • Pneumonia    • Urinary tract infection    • Urogenital trichomoniasis 2015    Unfaithful partner   • UTI (urinary tract infection)    • Varicella 1988   • Visual impairment        Past Surgical History:   Procedure Laterality Date   • FL INJECTION LEFT KNEE (ARTHROGRAM)  2/28/2022   • WISDOM TOOTH EXTRACTION         Current Outpatient Medications   Medication Sig Dispense Refill   • albuterol (2 5 mg/3 mL) 0 083 % nebulizer solution Take by nebulization every 6 (six) hours as needed     • albuterol (PROVENTIL HFA,VENTOLIN HFA) 90 mcg/act inhaler Inhale 2 puffs every 6 (six) hours as needed for wheezing 18 g 0   • Ascorbic Acid (vitamin C) 1000 MG tablet Take 1,000 mg by mouth daily     • B Complex-C (B-complex with vitamin C) tablet Take 1 tablet by mouth daily     • Biotin 10 MG TABS Take by mouth     • buPROPion (Wellbutrin XL) 300 mg 24 hr tablet Take 1 tablet (300 mg total) by mouth every morning 90 tablet 0   • calcium carbonate (OS-BERENICE) 600 MG tablet Take 600 mg by mouth 2 (two) times a day with meals     • cholecalciferol (VITAMIN D3) 1,000 units tablet Take 1,000 Units by mouth daily     • Ferrous Sulfate (RA IRON PO) Take by mouth     • FLUoxetine (PROzac) 40 MG capsule Take 1 capsule (40 mg total) by mouth daily 90 capsule 0   • fluticasone-umeclidinium-vilanterol 200-62 5-25 mcg/actuation AEPB inhaler Inhale 1 puff daily Rinse mouth after use   1 each 5   • ipratropium-albuterol (DUO-NEB) 0 5-2 5 mg/3 mL nebulizer solution inhale contents of 1 vial ( 3 milliliters ) in nebulizer by mouth and INTO THE LUNGS every 6 hours     • Magnesium Oxide 500 MG TABS Take by mouth     • montelukast (SINGULAIR) 10 mg tablet Take 1 tablet (10 mg total) by mouth daily 30 tablet 2   • nystatin (MYCOSTATIN) 500,000 units/5 mL suspension Apply 5 mL (500,000 Units total) to the mouth or throat 4 (four) times a day for 10 days 200 mL 0   • predniSONE 10 mg tablet Take 4 tablets (40 mg total) by mouth daily for 3 days, THEN 3 tablets (30 mg total) daily for 3 days, THEN 3 tablets (30 mg total) daily for 3 days, THEN 2 tablets (20 mg total) daily for 3 days, THEN 1 tablet (10 mg total) daily for 3 days  39 tablet 0   • Qvar RediHaler 40 MCG/ACT inhaler inhale 2 puffs by mouth twice a day then Rinse mouth after use 10 6 g 0   • Turmeric-Elisa 135-6 MG CHEW Chew     • Zinc 30 MG CAPS Take by mouth       No current facility-administered medications for this visit  Allergies   Allergen Reactions   • Sulfa Antibiotics Anaphylaxis   • Levofloxacin      Other reaction(s): sensative   • Prednisone Other (See Comments)   • Tobramycin      Other reaction(s): sensative       Review of Systems    Video Exam    There were no vitals filed for this visit      Physical Exam

## 2023-05-11 NOTE — BH TREATMENT PLAN
[address] [place], [place] [address]  1985     Date of Initial Psychotherapy Assessment: 6/30/2023  Date of Current Treatment Plan: 05/11/23  Treatment Plan Target Date: 5/11/2024  Treatment Plan Expiration Date: 10/31/2023    Diagnosis:   1  Moderate episode of recurrent major depressive disorder ([facility] [address] )        2  PTSD (post-traumatic stress disorder)            Area(s) of Need: Need to work on improved sense of identity, caring less about what people think of me, and work on past trauma triggers    Long Term Goal 1 (in the client's own words): I want to stop being affected by the complex PTSD and develop a stronger sense of self    Stage of Change: Contemplation    Target Date for completion: to be determined     Anticipated therapeutic modalities: emdr, dbt     People identified to complete this goal: Therapist, prescriber, Andrea Urbina      Objective 1: (identify the means of measuring success in meeting the objective): Therapist will engage Andrea Urbina in EMDR therapy for resolving her past traumas including grounding, resourcing and processing  Client will demonstrate effective use of grounding and resourcing in at least 4 out of 5 situations  Client will engage in EMDR   Client and therapist will assess effectiveness of treatment and adjust treatment as needed  Objective 2: (identify the means of measuring success in meeting the objective): Therapist will engage Mendez Ciara in [address] [place] [address] therapy to improve sense of self including mindfulness, distress tolerance, emotional regulation and interpersonal effectiveness Client will learn DBT concepts and skills  Client will practice skills and assess effectiveness with therapist biweekly and adjust skills acquisition as needed  Client will demonstrate successful use of DBT skills in at least 8 out of 10 challenging situations                       I am currently under the care of a [facility] psychiatric provider: yes    My [facility] psychiatric provider is: Ruben    I am currently taking psychiatric medications: Yes, as prescribed    I feel that I will be ready for discharge from mental health care when I reach the following (measurable goal/objective): when trauma is resolved and I have a firm sense of self    For children and adults who have a legal guardian:   Has there been any change to custody orders and/or guardianship status? NA  If yes, attach updated documentation  I have created my Crisis Plan and have been offered a copy of this plan    [address]: Diagnosis and Treatment Plan explained to Blanche Duran acknowledges an understanding of their diagnosis  Stephanie Yepez agrees to this treatment plan  I have been offered a copy of this Treatment Plan  Yes    Stephanie eYpez, 1985, actively participated in the creation of this treatment plan during a virtual session, using the AmWell Now platform  Stephanie Yepez  provided verbal consent on 5/11/2023 at 2:30 PM  The treatment plan was transcribed into the [facility] at a later time  Stephanie Yepez, 1985, actively participated in the creation of this treatment plan during a virtual session, using the AmWell Now platform  Stephanie Marleni  provided verbal consent on 5/11/2023 at 2:30 PM  The treatment plan was transcribed into the [facility] at a later time

## 2023-05-11 NOTE — BH CRISIS PLAN
Client Name: Milton Palmer       Client YOB: 1985  : 1985    Treatment Team (include name and contact information):     Psychotherapist: Claudia Babb    Psychiatrist: Ruben   Release of information completed: no        Healthcare Provider  Johnathan Holliday[address] [facility]  [address] [facility]  [phone]    Type of Plan   * Child plans (children 15 yo and younger) must be completed and signed by the child's legal guardian   * Plans for all individuals 15 yo and above must be signed by the client  Plan Type: adolescent/adult (15 and over) Initial      My Personal Strengths are (in the client's own words):  Empathetic, motivated, creative, resilient, supportive    The stressors and triggers that may put me at risk are:  being physically tired, feeling a lack of control, being treated unfairly, people (describe - names, etc) dad, grandma (), mom, ex , ex 's mistress, other ex boyfriend, spouse of a friend who is cheating on her, places (describe) car events, place of residence and marriage in [place] [address], events (include important dates that might be a trigger) marriage date, date he left me, Ray's birthday, mistress's birthday, thoughts (describe) thinking about past events, emotions (describe) n/a, behaviors (describe) when others are controlling, manipulative or underhanded  When people break trust and are selfish or judmental  When I don't stand up for myself and other (describe) n/a    Coping skills I can use to keep myself calm and safe:  Listen to music, Call a friend or family member, Journal and Other (describe) sleep, driving by myself    Coping skills/supports I can use to maintain abstinence from substance use:   n/a    The people that provide me with help and support: (Include name, contact, and how they can help)   Support person #1: boyfriensai Posey Sara    * Phone number: in phone    * How can they help me?  Supportive, open minded   Support person #2:mom    * Phone number: in phone    * How can they help me? Offer commisseration, solutions    Support person #3: friend Kathy Clifton    * Phone number: in phone    * How can they help me? Supportive, gives feedback         In the past, the following has helped me in times of crisis:    Being in a quiet space, Being with other people, Taking medications, Going into the hospital, Calling a friend, Calling a family member, Taking a walk or exercising, Listening to music and Watching television or a movie       If it is an emergency and you need immediate help, call 9-1-1    If there is a possibility of danger to yourself or others, call the following crisis hotline resources:     Adult Crisis Numbers  Suicide Prevention Hotline - Dial 9-8-8  [facility]: Whitney Drew 13: R [place]a 56: [address]: [phone]  [address] [place] [address] ([facility]): [address] [place] [address] Street: [address] [place] [address] Avenue: [address] [place] [address] St: [phone] (daytime)  [phone] (after hours, weekends, holidays)     Child/Adolescent Crisis Numbers   [facility]: [place] [address]: [phone]   [facility]: [phone]   [address] [place] [address] ([facility]): [phone]    Please note: Some [facility] do not have a separate number for Child/Adolescent specific crisis  If your county is not listed under Child/Adolescent, please call the adult number for your county     National Talk to Text Line   All Ages - [phone]    In the event your feelings become unmanageable, and you cannot reach your support system, you will call 911 immediately or go to the nearest hospital emergency room

## 2023-05-12 NOTE — PROGRESS NOTES
Pulmonary Rehabilitation Plan of Care   90 Day Reassessment      Today's date: 2023   # of Exercise Sessions Completed:   Patient name: Leigha Nichols      : 1985  Age: 45 y o  MRN: [id number]  Referring Physician: Dr Emma Laura MD  Pulmonologist: Dr Emma Laura MD  Provider: Celestino allen  Clinician: Giana Lehman, MPT, CCRRP    Dx:   Encounter Diagnosis   Name Primary? • Persistent dyspnea after COVID-19    Hx Asthma    Date of onset: 2023      SUMMARY OF PROGRESS: Octavio Holland has completed 21 exercise sessions at Pulmonary Rehab for the diagnosis of COVID 19 w/persistent PALOMARES  Patient does have a PFT on file, revealing an FEV1/FVC ratio of 86% and an FEV1 of 86 and FVC 99    This is suggestive of mild obstruction  Since her diagnosis, the patient has been experiencing PALOMARES when completing moderate to heavy ADLs  The patient currently does follow a formal exercise program at home, including cycling, walking and weight lifting  Pt reports overall improvement in past physical limitations  Depression screening using the PHQ-9 interprets the patient's score of 13 10-14 = Moderate Depression  Anxiety screening using the MICKI-7 interprets the patients score of 8 5-9 = Mild anxiety  When addressed, the patient admits to having depression/anxiety  Patient reports excellent social/emotional support  Information to begin using Julio Chávez was provided as well as contact information for counseling through [facility]  PHQ-9 score will be reassessed in 30 days  The patient is a non-smoker  Patient admits to 100% medication compliance  At rest, the patient rated dyspnea 0-3/10 with SpO2 95-98% on room air  She performs her sessions on room air  The patient’s rating of perceived dyspnea during exercise is also 3-5/10 with SpO2 94-97%  Patient does not require interval training  Telemetry revealed NSR     Resting  /64 with appropriate hemodynamic response to "exercise reaching 142/68  Gordon Mann She rates her program a 4-5/10 on a 1-10 RPE Scale  She had correct hemodynamic responses to the activity  Patient will exercise on room air  Education on oxygen use, breathing techniques, pulmonary anatomy, exercise for the pulmonary patient, healthy eating, stress, and relaxation will be provided  Patient has been receiving weekly education; refer to [facility]  documentation for a list of completed topics  Patient continues to work on her goals of  reduced PALOMARES, tolerate heavy ADLS w/out symptoms, ability to ambulate long distances on levels/elevation, return to work full time/duty and attain her maximal level of function  Gordon Mackey has been compliant attending her [place] sessions  She gives great effort during each session  In addition to continuous [place] Pulse Oximetry, telemeter has also been utilized for this patient  NSR has been consistent  Patient has normal resting vitals and peak vital responses  Patient's symptoms are slowly improving  She often has a dry cough and wheezing  At worst time patient exhibits a \"croup type cough/barking\"  It requires 10 -15 minutes for patient's symptoms to resolve  She expresses much frustration w/persistant symptoms even though vitals are WNL  She is now able to tolerate  Activity at  A 9 50 MET Level  Patient expresses frustration with current physical condition  There are concerns about being able to tolerate her job duties especially 12 hours shifts         Medication compliance: Yes   Comments: Pt reports to be compliant with medications  Fall Risk: Low   Comments: Ambulates with a steady gait with no assist device    Smoking: Never used    RPD at Rest: 2-3/10  RPD with Exercise:  3-5/10    Assessment of progression of lung disease and functional status:  CAT: 27/40  Shortness of breath questionnaire: 55/120      EXERCISE ASSESSMENT and PLAN    Current Exercise Program in Rehab:       Frequency: 2 days/week   Supplement with home " exercise 3+ days/wk as tolerated        Minutes: 40-45        METS: 9 50              SpO2: > 94% on RA              RPD: 3-5/10                     HR: 110-120   RPE: 4-5/10         Modalities: Treadmill, UBE, NuStep and Recumbent bike    Patient has been concentrating on the TM in order to attain her maximal functional level w/least amount of symptoms  Exercise Progression 30 Day Goals :    Frequency: 2 days/week    Supplement with home exercise 3+ days/wk as tolerated       Minutes: 40-45        METS: > 9 50              SpO2: > 97% on RA              RPD: 0-3/10                    HR: 110-120   RPE: 4-5/10        Modalities: Treadmill, UBE, NuStep and Recumbent bike     Strength trainin-3 days / week  12-15 repetitions  1-2 sets per modality    Modalities: Leg Press and UE PREs    Oxygen Needs: on room air at rest and on room air with exercise     Oxygen Goal: Maintain SpO2>90% during exercise   Patient has been able to exercise on RA and maintain 02 Sat > 90%    Home Exercise: Bicycling, walking and weights    Education: pursed lipped breathing, diaphragmatic breathing, relaxation breathing, home exercise, benefits of exercise for pulmonary disease, RPE scale, RPD scale, O2 saturation monitoring and appropriate O2 response to exercise    Goals: reduced score on  USCD Shortness of Breath Questionnaire, Improved 6MW distance by 10%, reduced dyspnea during exercise (0-3/10), improved exercise tolerance (max METs tolerated in pulmonary rehab), SpO2 >90% during exercise, reduced score on CAT, reduced number of COPD exacerbations, reduced RPD at rest, attend pulmonary rehab regularly and decrease sitting time at home    Progressing:  Will continue to educate and progress as tolerated , Goals met: [facility], increased MET level ability and appropriate 02 responses to activity  Also, reduced PALOMARES ratings and overall improved exercise tolerance       Plan: practice breathing techniques 3x/day and reduce time sitting at home    Readiness to change: Action:  (Changing behavior)      NUTRITION ASSESSMENT AND PLAN    Weight control:    Starting weight: 162   Current weight:   163    Diabetes: N/A  A1C 3/25/2022 5 1  Lipid Panel 3/25/2022: Chol 153; Tri 78; HDL 49 and LDL 88      Goals:2 5-5%  wt loss, eliminate processed meats, reduce portion sizes of meat to 3oz or less, increase intake of fish, shellfish, cook without added fat or use vegetable oil/spray, increase intake of meatless meals, eat 3 or more servings of whole grains a day, Eat 4-5 cups of fruits and vegetables daily and daily saturated fat intake <7%/13g     Education: heart healthy eating  low sodium diet  hydration  wt  loss   education class:  Label Reading  education class: healthy choices for managing pulmonary illness     Progressing:Will continue to educate and progress as tolerated , Goals not met: no weight loss during this reporting period  , Goals met: increased water intake; A1C < 7 0 and Chol/Tri/HDL all WNLs    Patient notes increase in fresh foods and decreased saturated fats       Plan: Education class: Reading Food Labels, switch to low fat cheeses, replace butter with soft spreads made with olive oil, canola or yogurt, replace refined grain bread with whole grain bread, replace unhealthy snacks with fruits & vegs, reduce portion sizes, avoid processed foods, remove salt shaker from table, will try new grains like brown rice, quinoa, farro, will replace sugar sweetened cereals with whole grain or oatmeal, drink more water and keep added daily sugar <25g/day     Readiness to change: Action:  (Changing behavior)      PSYCHOSOCIAL ASSESSMENT AND PLAN    Emotional:  Depression assessment:  PHQ-9 = 13; 10-14 = Moderate Depression            Anxiety measure:  MICKI-7 = 8;  5-9 = Mild anxiety  Self-reported stress level: 10     Social support: Excellent    Goals:  Reduce perceived stress to 1-3/10, improved [facility] QOL < 27, Physical Fitness in [place] Score < 3, Social Activities in [facility] Score < 3, Overall Health in [facility] Score < 3, Quality of Life in [facility] Score < 3 , Increased interest in doing things and improved sleep     Education: signs/sxs of depression, benefits of a positive support system, class:  Relaxation and class:  Stress and Pulmonary Disease    Progressing:Will continue to educate and progress as tolerated , Goals not met: stress level remains high due to ongoing medical issues and worries about tolerating job duties      Patient has been receiving treatment for her stress and anxiety w/good results  Patient does curently not a return to work date  She states, she has been informed that she may not return part time  Plan: Class: Stress and Your Health, Class: Relaxation, Practice relaxation techniques, Exercise, Spend time outdoors and Enjoy a hobby     Readiness to change: Action:  (Changing behavior)      OTHER CORE COMPONENTS     Tobacco:   Social History     Tobacco Use   Smoking Status Never   Smokeless Tobacco Never       Tobacco Use Intervention: Referral to tobacco expert:   N/A:  Patient is a non-smoker   Blood pressure:    Restin/64   Exercise: 142/68   Recovery:110/64    Goals: consistent BP < 130/80, reduced dietary sodium <2300mg, moderate intensity exercise >150 mins/wk and medication compliance     Education:  pathophysiology of pulmonary disease, preventing infections, control coughing, inspiratory muscle training, nebulizer use, bronchodilators and bronchial hygiene     Progressing:Will continue to educate and progress as tolerated , Goals met: medication compliance moderate exercise at 150 min/week and resting BP consistently < 130/80  Donato Apple      Plan: avoid places with second hand smoke, Avoid Processed foods and engage in regular exercise     Readiness to change: Maintenance: (Maintaining the behavior change)

## 2023-05-16 ENCOUNTER — TELEPHONE (OUTPATIENT)
Dept: PULMONOLOGY | Facility: CLINIC | Age: 38
End: 2023-05-16

## 2023-05-16 NOTE — TELEPHONE ENCOUNTER
I have received a denial from [employer] [facility] [place] for the [employer] injections  They state patient must try Dupixent or Xolair prior  Please let me know if you would like to order either of these

## 2023-05-16 NOTE — TELEPHONE ENCOUNTER
We can try dupixent for ease of use, but honestly I picked tezspire because she doesn't have an elevated Ige (for xolair) or eosinophils (for dupixent)

## 2023-05-17 ENCOUNTER — TELEPHONE (OUTPATIENT)
Dept: PULMONOLOGY | Facility: CLINIC | Age: 38
End: 2023-05-17

## 2023-05-17 NOTE — TELEPHONE ENCOUNTER
Brielle Shelton from [facility], [address] was calling in wanting to speak with the person who handles the medication for this patient  Her number is [phone]

## 2023-05-18 ENCOUNTER — OFFICE VISIT (OUTPATIENT)
Dept: FAMILY MEDICINE CLINIC | Facility: CLINIC | Age: 38
End: 2023-05-18

## 2023-05-18 ENCOUNTER — CLINICAL SUPPORT (OUTPATIENT)
Dept: PULMONOLOGY | Facility: HOSPITAL | Age: 38
End: 2023-05-18

## 2023-05-18 VITALS
WEIGHT: 165.6 LBS | OXYGEN SATURATION: 98 % | DIASTOLIC BLOOD PRESSURE: 70 MMHG | SYSTOLIC BLOOD PRESSURE: 110 MMHG | HEART RATE: 91 BPM | BODY MASS INDEX: 26.61 KG/M2 | HEIGHT: 66 IN | TEMPERATURE: 97.8 F

## 2023-05-18 DIAGNOSIS — J45.50 SEVERE PERSISTENT ASTHMA, UNSPECIFIED WHETHER COMPLICATED: Primary | ICD-10-CM

## 2023-05-18 DIAGNOSIS — R06.00 PERSISTENT DYSPNEA AFTER COVID-19: ICD-10-CM

## 2023-05-18 DIAGNOSIS — U07.1 COVID-19: ICD-10-CM

## 2023-05-18 DIAGNOSIS — R06.02 SHORTNESS OF BREATH: ICD-10-CM

## 2023-05-18 DIAGNOSIS — U09.9 PERSISTENT DYSPNEA AFTER COVID-19: ICD-10-CM

## 2023-05-18 PROBLEM — J45.40 MODERATE PERSISTENT ASTHMA WITHOUT COMPLICATION: Status: RESOLVED | Noted: 2023-02-14 | Resolved: 2023-05-18

## 2023-05-18 NOTE — ASSESSMENT & PLAN NOTE
Patient with continuing symptoms of shortness of breath limiting her activities she is continuing with symptoms daily

## 2023-05-18 NOTE — ASSESSMENT & PLAN NOTE
Patient with daily persisting symptoms and is following closely with pulmonary and is attending her pulmonary rehab   She is on full doses of her inhalers and also needing daily rescue inhalers has a f/u in July with pulmonary

## 2023-05-18 NOTE — PROGRESS NOTES
Name: Savannah Sweet      : 1985      MRN: [id number]  Encounter Provider: JULIEN Junior  Encounter Date: 2023   Encounter department: [address] [place] [address]     1  Severe persistent asthma, unspecified whether complicated  Assessment & Plan:  Patient with daily persisting symptoms and is following closely with pulmonary and is attending her pulmonary rehab  She is on full doses of her inhalers and also needing daily rescue inhalers has a f/u in July with pulmonary       2  Shortness of breath  Assessment & Plan:  Continuing with daily persisting symptoms       3  COVID-19  Assessment & Plan:  Patient with continuing symptoms of shortness of breath limiting her activities she is continuing with symptoms daily              Subjective      Patient here today for follow up and still attending pulmonary rehab and attending twice a week for an hour and is working on jogging and incline training and building up her stamina and is doing ok on the treadmill and at times needing the inhalers  Patient at pulmonary therapists are monitoring and is slowly recovering from the breathing and stamina  Patient has a visit upcoming with 2023 with pulmonary  Patient when she is getting limited with her stamina and is improving but still having episodes of shortness of breath coughing and wheezing  She is still having issues when she is at her normal pace of activities she is still having issues with SOB  Review of Systems   Constitutional: Positive for fatigue  Negative for activity change, appetite change, chills, diaphoresis, fever and unexpected weight change  HENT: Negative for congestion, ear pain, hearing loss, postnasal drip, sinus pressure, sinus pain, sneezing and sore throat  Eyes: Negative for pain, redness and visual disturbance  Respiratory: Positive for cough, chest tightness and shortness of breath      Cardiovascular: Negative for chest pain and leg swelling  Gastrointestinal: Negative for abdominal pain, diarrhea, nausea and vomiting  Endocrine: Negative  Genitourinary: Negative  Musculoskeletal: Negative for arthralgias  Skin: Negative  Allergic/Immunologic: Negative  Neurological: Negative for dizziness and light-headedness  Hematological: Negative  Psychiatric/Behavioral: Negative for behavioral problems and dysphoric mood  Current Outpatient Medications on File Prior to Visit   Medication Sig   • albuterol (PROVENTIL HFA,VENTOLIN HFA) 90 mcg/act inhaler Inhale 2 puffs every 6 (six) hours as needed for wheezing   • Ascorbic Acid (vitamin C) 1000 MG tablet Take 1,000 mg by mouth daily   • B Complex-C (B-complex with vitamin C) tablet Take 1 tablet by mouth daily   • Biotin 10 MG TABS Take by mouth   • buPROPion (Wellbutrin XL) 300 mg 24 hr tablet Take 1 tablet (300 mg total) by mouth every morning   • calcium carbonate (OS-BERENICE) 600 MG tablet Take 600 mg by mouth 2 (two) times a day with meals   • cholecalciferol (VITAMIN D3) 1,000 units tablet Take 1,000 Units by mouth daily   • Ferrous Sulfate (RA IRON PO) Take by mouth   • FLUoxetine (PROzac) 40 MG capsule Take 1 capsule (40 mg total) by mouth daily   • fluticasone-umeclidinium-vilanterol 200-62 5-25 mcg/actuation AEPB inhaler Inhale 1 puff daily Rinse mouth after use     • ipratropium-albuterol (DUO-NEB) 0 5-2 5 mg/3 mL nebulizer solution inhale contents of 1 vial ( 3 milliliters ) in nebulizer by mouth and INTO THE LUNGS every 6 hours   • Magnesium Oxide 500 MG TABS Take by mouth   • montelukast (SINGULAIR) 10 mg tablet Take 1 tablet (10 mg total) by mouth daily   • Qvar RediHaler 40 MCG/ACT inhaler inhale 2 puffs by mouth twice a day then Rinse mouth after use   • Zinc 30 MG CAPS Take by mouth   • albuterol (2 5 mg/3 mL) 0 083 % nebulizer solution Take by nebulization every 6 (six) hours as needed   • [DISCONTINUED] nystatin (MYCOSTATIN) 500,000 units/5 mL suspension Apply 5 "mL (500,000 Units total) to the mouth or throat 4 (four) times a day for 10 days (Patient not taking: Reported on 5/18/2023)   • [DISCONTINUED] predniSONE 10 mg tablet Take 4 tablets (40 mg total) by mouth daily for 3 days, THEN 3 tablets (30 mg total) daily for 3 days, THEN 3 tablets (30 mg total) daily for 3 days, THEN 2 tablets (20 mg total) daily for 3 days, THEN 1 tablet (10 mg total) daily for 3 days  (Patient not taking: Reported on 5/18/2023)   • [DISCONTINUED] Turmeric-Elisa 135-6 MG CHEW Chew (Patient not taking: Reported on 5/18/2023)       Objective     /70 (BP Location: Left arm, Patient Position: Sitting)   Pulse 91   Temp 97 8 °F (36 6 °C) (Temporal)   Ht 5' 6\" (1 676 m)   Wt 75 1 kg (165 lb 9 6 oz)   SpO2 98%   BMI 26 73 kg/m²     Physical Exam  Vitals and nursing note reviewed  Constitutional:       General: She is not in acute distress  Appearance: She is well-developed  HENT:      Head: Normocephalic and atraumatic  Right Ear: Tympanic membrane normal       Left Ear: Tympanic membrane normal       Nose: Nose normal       Mouth/Throat:      Mouth: Mucous membranes are moist    Eyes:      Pupils: Pupils are equal, round, and reactive to light  Neck:      Thyroid: No thyromegaly  Cardiovascular:      Rate and Rhythm: Normal rate and regular rhythm  Heart sounds: Normal heart sounds  No murmur heard  Pulmonary:      Effort: Pulmonary effort is normal  No respiratory distress  Breath sounds: Normal breath sounds  No wheezing  Abdominal:      General: Bowel sounds are normal       Palpations: Abdomen is soft  Musculoskeletal:         General: Normal range of motion  Cervical back: Normal range of motion  Skin:     General: Skin is warm and dry  Neurological:      General: No focal deficit present  Mental Status: She is alert and oriented to person, place, and time     Psychiatric:         Mood and Affect: Mood normal        JULIEN Young  "

## 2023-05-22 ENCOUNTER — PATIENT MESSAGE (OUTPATIENT)
Dept: PULMONOLOGY | Facility: CLINIC | Age: 38
End: 2023-05-22

## 2023-05-23 ENCOUNTER — APPOINTMENT (OUTPATIENT)
Dept: PULMONOLOGY | Facility: HOSPITAL | Age: 38
End: 2023-05-23
Payer: COMMERCIAL

## 2023-05-23 ENCOUNTER — PATIENT MESSAGE (OUTPATIENT)
Dept: PULMONOLOGY | Facility: CLINIC | Age: 38
End: 2023-05-23

## 2023-05-24 NOTE — TELEPHONE ENCOUNTER
Prior auth for [employer] has been sent to [employer]       NHQ:[id number]  PCN:[id number]  RXGRP: [id number]    ID: [id number]

## 2023-05-25 ENCOUNTER — CLINICAL SUPPORT (OUTPATIENT)
Dept: PULMONOLOGY | Facility: HOSPITAL | Age: 38
End: 2023-05-25

## 2023-05-25 ENCOUNTER — TELEMEDICINE (OUTPATIENT)
Dept: PSYCHIATRY | Facility: CLINIC | Age: 38
End: 2023-05-25

## 2023-05-25 DIAGNOSIS — F43.10 PTSD (POST-TRAUMATIC STRESS DISORDER): ICD-10-CM

## 2023-05-25 DIAGNOSIS — U09.9 PERSISTENT DYSPNEA AFTER COVID-19: ICD-10-CM

## 2023-05-25 DIAGNOSIS — J45.50 SEVERE PERSISTENT ASTHMA, UNSPECIFIED WHETHER COMPLICATED: Primary | ICD-10-CM

## 2023-05-25 DIAGNOSIS — R06.00 PERSISTENT DYSPNEA AFTER COVID-19: ICD-10-CM

## 2023-05-25 DIAGNOSIS — F33.1 MODERATE EPISODE OF RECURRENT MAJOR DEPRESSIVE DISORDER (HCC): Primary | ICD-10-CM
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
## 2023-05-25 NOTE — TELEPHONE ENCOUNTER
Received an approval from [employer] for Josemanuel Blanc  [facility]       [employer] approved from: 05/24/23-11/24/23

## 2023-05-26 DIAGNOSIS — T78.2XXA ANAPHYLAXIS, INITIAL ENCOUNTER: Primary | ICD-10-CM

## 2023-05-26 RX ORDER — EPINEPHRINE 0.3 MG/.3ML
0.3 INJECTION SUBCUTANEOUS ONCE
Qty: 0.6 ML | Refills: 0 | Status: SHIPPED | OUTPATIENT
Start: 2023-05-26 | End: 2023-05-26

## 2023-05-27 DIAGNOSIS — J45.40 MODERATE PERSISTENT ASTHMA WITHOUT COMPLICATION: ICD-10-CM

## 2023-05-30 ENCOUNTER — TELEPHONE (OUTPATIENT)
Dept: PULMONOLOGY | Facility: CLINIC | Age: 38
End: 2023-05-30

## 2023-05-30 ENCOUNTER — CLINICAL SUPPORT (OUTPATIENT)
Dept: PULMONOLOGY | Facility: HOSPITAL | Age: 38
End: 2023-05-30

## 2023-05-30 DIAGNOSIS — U09.9 PERSISTENT DYSPNEA AFTER COVID-19: Primary | ICD-10-CM

## 2023-05-30 DIAGNOSIS — R06.00 PERSISTENT DYSPNEA AFTER COVID-19: Primary | ICD-10-CM

## 2023-05-30 NOTE — TELEPHONE ENCOUNTER
Felice Mcintosh from [employer] is calling in reference to the application for fast start

## 2023-06-01 DIAGNOSIS — J45.50 SEVERE PERSISTENT ASTHMA, UNSPECIFIED WHETHER COMPLICATED: ICD-10-CM

## 2023-06-01 RX ORDER — DUPILUMAB 200 MG/1.14ML
INJECTION, SOLUTION SUBCUTANEOUS
Qty: 180 ML | Refills: 10 | Status: SHIPPED | OUTPATIENT
Start: 2023-06-01

## 2023-06-06 ENCOUNTER — CLINICAL SUPPORT (OUTPATIENT)
Dept: PULMONOLOGY | Facility: HOSPITAL | Age: 38
End: 2023-06-06
Payer: COMMERCIAL

## 2023-06-06 DIAGNOSIS — U09.9 PERSISTENT DYSPNEA AFTER COVID-19: Primary | ICD-10-CM

## 2023-06-06 DIAGNOSIS — R06.00 PERSISTENT DYSPNEA AFTER COVID-19: Primary | ICD-10-CM

## 2023-06-06 PROCEDURE — 94625 PHY/QHP OP PULM RHB W/O MNTR: CPT

## 2023-06-07 DIAGNOSIS — J40 BRONCHITIS: ICD-10-CM

## 2023-06-07 DIAGNOSIS — J45.40 MODERATE PERSISTENT ASTHMA WITHOUT COMPLICATION: ICD-10-CM

## 2023-06-07 DIAGNOSIS — U07.1 COVID-19: ICD-10-CM

## 2023-06-07 RX ORDER — BECLOMETHASONE DIPROPIONATE HFA 40 UG/1
2 AEROSOL, METERED RESPIRATORY (INHALATION) 2 TIMES DAILY
Qty: 10.6 G | Refills: 0 | Status: SHIPPED | OUTPATIENT
Start: 2023-06-07

## 2023-06-07 RX ORDER — ALBUTEROL SULFATE 90 UG/1
2 AEROSOL, METERED RESPIRATORY (INHALATION) EVERY 6 HOURS PRN
Qty: 18 G | Refills: 0 | Status: SHIPPED | OUTPATIENT
Start: 2023-06-07

## 2023-06-07 RX ORDER — MONTELUKAST SODIUM 10 MG/1
10 TABLET ORAL DAILY
Qty: 30 TABLET | Refills: 0 | Status: SHIPPED | OUTPATIENT
Start: 2023-06-07 | End: 2023-06-11 | Stop reason: SDUPTHER

## 2023-06-09 NOTE — PROGRESS NOTES
Pulmonary Rehabilitation Plan of Care   120 Day Reassessment      Today's date: 2023   # of Exercise Sessions Completed:   Patient name: Leandra García      : 1985  Age: 45 y o  MRN: [id number]  Referring Physician: Dr Sepideh Campa MD  Pulmonologist: Dr Sepideh Campa MD  Provider: Washington Medeiros  Clinician: Karey Grier, MPT, CCRRP    Dx:   Encounter Diagnosis   Name Primary? • Persistent dyspnea after COVID-19    Hx Asthma    Date of onset: 2023      SUMMARY OF PROGRESS: Mohamud Dolan has completed 25 exercise sessions at Pulmonary Rehab for the diagnosis of COVID 19 w/persistent PALOMARES  Patient does have a PFT on file, revealing an FEV1/FVC ratio of 86% and an FEV1 of 86 and FVC 99    This is suggestive of mild obstruction  Since her diagnosis, the patient has been experiencing PALOMARES when completing moderate to heavy ADLs  The patient currently does follow a formal exercise program at home, including cycling, walking and weight lifting  Pt reports overall improvement in past physical limitations  Depression screening using the PHQ-9 interprets the patient's score of 13 10-14 = Moderate Depression  Anxiety screening using the MICKI-7 interprets the patients score of 8 5-9 = Mild anxiety  When addressed, the patient admits to having depression/anxiety  Patient reports excellent social/emotional support  Information to begin using Julio Chávez was provided as well as contact information for counseling through [facility]  PHQ-9 score will be reassessed in 30 days  The patient is a non-smoker  Patient admits to 100% medication compliance  At rest, the patient rated dyspnea 0/10 with SpO2 97% on room air  She performs her sessions on room air  The patient’s rating of perceived dyspnea during exercise is 5 5/10 with SpO2 96%  Patient does not require interval training  Telemetry revealed NSR     Resting  /64 with appropriate hemodynamic response to exercise reaching "130/62    She rates her program a 4/10 on a 1-10 RPE Scale  She had correct hemodynamic responses to the activity  Patient will exercise on room air  Education on oxygen use, breathing techniques, pulmonary anatomy, exercise for the pulmonary patient, healthy eating, stress, and relaxation will be provided  Patient has been receiving weekly education; refer to [facility]  documentation for a list of completed topics  Patient continues to work on her goals of  reduced PALOMARES, tolerate heavy ADLS w/out symptoms, return to work full time/duty and attain her maximal level of function  Cornelio Boyer has been compliant attending her OR sessions  She gives great effort during each session  In addition to continuous Blue Tooth Pulse Oximetry, telemeter has also been utilized for this patient  NSR has been consistent  Patient has normal resting vitals and peak vital responses  Patient's symptoms are slowly improving  She often has a dry cough and wheezing  At worst time patient exhibits a \"croup type cough/barking\"  It requires 10 -15 minutes for patient's symptoms to resolve  She expresses much frustration w/persistant symptoms even though vitals are WNL  She is now able to tolerate  Activity at  A 9 50 MET Level  Patient expresses frustration with current physical condition  There are concerns about being able to tolerate her job duties especially 12 hours shifts         Medication compliance: Yes   Comments: Pt reports to be compliant with medications  Fall Risk: Low   Comments: Ambulates with a steady gait with no assist device    Smoking: Never used    RPD at Rest: 0-3/10  RPD with Exercise:  3-6/10    Assessment of progression of lung disease and functional status:  CAT: 27/40  Shortness of breath questionnaire: 55/120      EXERCISE ASSESSMENT and PLAN    Current Exercise Program in Rehab:       Frequency: 2 days/week   Supplement with home exercise 3+ days/wk as tolerated        Minutes: 40-45        METS: 9 50        " SpO2: > 96% on RA              RPD: 3-6/10                     HR: 110-120   RPE: 4-5/10         Modalities: Treadmill, UBE, NuStep and Recumbent bike    Patient has been concentrating on the TM in order to attain her maximal functional level w/least amount of symptoms  Exercise Progression 30 Day Goals :    Frequency: 2 days/week    Supplement with home exercise 3+ days/wk as tolerated       Minutes: 40-45        METS: > 9 50              SpO2: > 97% on RA              RPD: 0-310                    HR: 110-120   RPE: 4-5/10        Modalities: Treadmill, UBE, NuStep and Recumbent bike     Strength trainin-3 days / week  12-15 repetitions  1-2 sets per modality    Modalities: Leg Press and UE PREs    Oxygen Needs: on room air at rest and on room air with exercise     Oxygen Goal: Maintain SpO2>90% during exercise   Patient has been able to exercise on RA and maintain 02 Sat > 90%    Home Exercise: Bicycling, walking and weights    Education: pursed lipped breathing, diaphragmatic breathing, relaxation breathing, home exercise, benefits of exercise for pulmonary disease, RPE scale, RPD scale, O2 saturation monitoring and appropriate O2 response to exercise    Goals: reduced score on  USCD Shortness of Breath Questionnaire, Improved 6MW distance by 10%, reduced dyspnea during exercise (0-3/10), improved exercise tolerance (max METs tolerated in pulmonary rehab), SpO2 >90% during exercise, reduced score on CAT, reduced number of COPD exacerbations, reduced RPD at rest, attend pulmonary rehab regularly and decrease sitting time at home    Progressing:  Will continue to educate and progress as tolerated , Goals met: attends NH regularly, increased MET level ability and appropriate 02 responses to activity  Also, reduced PALOMARES ratings, reduced sitting time at home, and overall improved exercise tolerance       Plan: practice breathing techniques 3x/day and reduce time sitting at home    Readiness to change: Action:  (Changing behavior)      NUTRITION ASSESSMENT AND PLAN    Weight control:    Starting weight: 162   Current weight:   163    Diabetes: N/A  A1C 3/25/2022 5 1  Lipid Panel 3/25/2022: Chol 153; Tri 78; HDL 49 and LDL 88      Goals:2 5-5%  wt loss, eliminate processed meats, reduce portion sizes of meat to 3oz or less, increase intake of fish, shellfish, cook without added fat or use vegetable oil/spray, increase intake of meatless meals, eat 3 or more servings of whole grains a day, Eat 4-5 cups of fruits and vegetables daily and daily saturated fat intake <7%/13g     Education: heart healthy eating  low sodium diet  hydration  wt  loss   education class:  Label Reading  education class: healthy choices for managing pulmonary illness     Progressing:Will continue to educate and progress as tolerated , Goals not met: no weight loss during this reporting period  , Goals met: increased water intake; A1C < 7 0 and Chol/Tri/HDL all WNLs    Patient notes increase in fresh foods and decreased saturated fats       Plan: Education class: Reading Food Labels, switch to low fat cheeses, replace butter with soft spreads made with olive oil, canola or yogurt, replace refined grain bread with whole grain bread, replace unhealthy snacks with fruits & vegs, reduce portion sizes, avoid processed foods, remove salt shaker from table, will try new grains like brown rice, quinoa, farro, will replace sugar sweetened cereals with whole grain or oatmeal, drink more water and keep added daily sugar <25g/day     Readiness to change: Action:  (Changing behavior)      PSYCHOSOCIAL ASSESSMENT AND PLAN    Emotional:  Depression assessment:  PHQ-9 = 13; 10-14 = Moderate Depression            Anxiety measure:  MICKI-7 = 8;  5-9 = Mild anxiety  Self-reported stress level: 10     Social support: Excellent    Goals:  Reduce perceived stress to 1-3/10, improved [facility] QOL < 27, Physical Fitness in [facility] Score < 3, Social Activities in [facility] Score < 3, Overall Health in [facility] Score < 3, Quality of Life in [facility] Score < 3 , Increased interest in doing things and improved sleep     Education: signs/sxs of depression, benefits of a positive support system, class:  Relaxation and class:  Stress and Pulmonary Disease    Progressing:Will continue to educate and progress as tolerated , Goals not met: stress level remains high due to ongoing medical issues and worries about tolerating job duties      Patient has been receiving treatment for her stress and anxiety w/good results  Patient does curently not a return to work date  She states, she has been informed that she may not return part time  Plan: Class: Stress and Your Health, Class: Relaxation, Practice relaxation techniques, Exercise, Spend time outdoors and Enjoy a hobby     Readiness to change: Action:  (Changing behavior)      OTHER CORE COMPONENTS     Tobacco:   Social History     Tobacco Use   Smoking Status Never   Smokeless Tobacco Never       Tobacco Use Intervention: Referral to tobacco expert:   N/A:  Patient is a non-smoker   Blood pressure:    Restin/64    Exercise: 130/62   Recovery:110/70    Goals: consistent BP < 130/80, reduced dietary sodium <2300mg, moderate intensity exercise >150 mins/wk and medication compliance     Education:  pathophysiology of pulmonary disease, preventing infections, control coughing, inspiratory muscle training, nebulizer use, bronchodilators and bronchial hygiene     Progressing:Will continue to educate and progress as tolerated , Goals met: medication compliance moderate exercise at 150 min/week and resting BP consistently < 130/80  Valeria Mg      Plan: avoid places with second hand smoke, Avoid Processed foods and engage in regular exercise     Readiness to change: Maintenance: (Maintaining the behavior change)

## 2023-06-13 ENCOUNTER — TELEMEDICINE (OUTPATIENT)
Dept: PSYCHIATRY | Facility: CLINIC | Age: 38
End: 2023-06-13
Payer: COMMERCIAL

## 2023-06-13 ENCOUNTER — CLINICAL SUPPORT (OUTPATIENT)
Dept: PULMONOLOGY | Facility: HOSPITAL | Age: 38
End: 2023-06-13
Payer: COMMERCIAL

## 2023-06-13 DIAGNOSIS — F43.10 PTSD (POST-TRAUMATIC STRESS DISORDER): ICD-10-CM

## 2023-06-13 DIAGNOSIS — U09.9 PERSISTENT DYSPNEA AFTER COVID-19: ICD-10-CM

## 2023-06-13 DIAGNOSIS — F33.1 MODERATE EPISODE OF RECURRENT MAJOR DEPRESSIVE DISORDER (HCC): Primary | ICD-10-CM

## 2023-06-13 DIAGNOSIS — R06.00 PERSISTENT DYSPNEA AFTER COVID-19: ICD-10-CM

## 2023-06-13 PROCEDURE — 94626 PHY/QHP OP PULM RHB W/MNTR: CPT

## 2023-06-13 PROCEDURE — 90834 PSYTX W PT 45 MINUTES: CPT

## 2023-06-13 NOTE — PSYCH
"Behavioral Health Psychotherapy Progress Note    Psychotherapy Provided: Individual Psychotherapy     1  Moderate episode of recurrent major depressive disorder ([address] )        2  PTSD (post-traumatic stress disorder)            Goals addressed in session: Goal 1     DATA:   Indio Pop shared she got a house and is happy about it  Therapist and Indio Pop processed an EMDR target of her feeling she does not have a firm sense of self by processing a memory of her mom not listening to her when she said there was something wrong, focusing on a memory of when she was 23 and suicidal and her mother told her to [facility] it up\" and did not get her help  Indio Pop did some sets of bilateral stimulation and remembered her insecurities over the years, lack of confidence and telling herself she was being \"dramatic\" and feeling like she did not belong  Therapist offered some insights that she apologizes now when she needs things, and that this can be linked back to not getting what she needed emotionally from her mother and feeling like she does not have a right to basic needs  Therapist suggested she stop apologizing and see how this feels, and that she communicate to herself that she has rights and that she is Tiffanie Mu-ism just the way I am\" as a positive affirmation  She said she would do so  During this session, this clinician used the following therapeutic modalities: EMDR (or other form of bilateral Stimulation) and Supportive Psychotherapy    Substance Abuse was not addressed during this session  If the client is diagnosed with a co-occurring substance use disorder, please indicate any changes in the frequency or amount of use:   Stage of change for addressing substance use diagnoses: No substance use/Not applicable    ASSESSMENT:  Wendie Cowan presents with a Euthymic/ normal mood  her affect is Normal range and intensity, which is congruent, with her mood and the content of the session   The client has made progress on their " "goals  Gilbert Morton presents with a none risk of suicide, none risk of self-harm, and none risk of harm to others  For any risk assessment that surpasses a \"low\" rating, a safety plan must be developed  A safety plan was indicated: no  If yes, describe in detail     PLAN: Between sessions, Gilbert Morton will utilize positive affirmation, stop apologizing  At the next session, the therapist will use EMDR (or other form of bilateral Stimulation) and Supportive Psychotherapy to address past traumas, lack of identity  Behavioral Health Treatment Plan and Discharge Planning: Gilbert Morton is aware of and agrees to continue to work on their treatment plan  They have identified and are working toward their discharge goals  yes    Visit start and stop times:    06/13/23  Start Time: 1500  Stop Time: 1550  Total Visit Time: 50 minutes    Virtual Regular Visit    Verification of patient location:    Patient is located at Home in the following state in which I hold an active license [place]      Assessment/Plan:    Problem List Items Addressed This Visit        Other    Moderate episode of recurrent major depressive disorder ([facility] [address] ) - Primary    PTSD (post-traumatic stress disorder)       Goals addressed in session: Goal 1          Reason for visit is No chief complaint on file  Encounter provider Jaimie Gould LCSW    Provider located at [address] [place] [id number] [phone]      Recent Visits  No visits were found meeting these conditions  Showing recent visits within past 7 days and meeting all other requirements  Today's Visits  Date Type Provider Dept   06/13/23 Telemedicine Jaimie Gould LCSW [facility]   Showing today's visits and meeting all other requirements  Future Appointments  No visits were found meeting these conditions    Showing future appointments within " "next 150 days and meeting all other requirements       The patient was identified by name and date of birth  Ryan Vizcaino was informed that this is a telemedicine visit and that the visit is being conducted throughthe [employer] platform  She agrees to proceed     My office door was closed  No one else was in the room  She acknowledged consent and understanding of privacy and security of the video platform  The patient has agreed to participate and understands they can discontinue the visit at any time  Patient is aware this is a billable service  Subjective  Ryan Vizcaino is a 45 y o  female Jim Germantis appeared calm and cooperative and was able to process some memories from the past and to agree to using a positive affirmation of \"I am okay just the way I am\" as well as stop apologizing   HPI     Past Medical History:   Diagnosis Date   • Allergic    • Anemia    • Anxiety    • Arthritis    • Asthma    • Chlamydia 2014    Unfaithful partner   • Depression    • Exposure to SARS-associated coronavirus 04/12/2020   • GERD (gastroesophageal reflux disease) 2010   • Gonorrhea 2014    Unfaithful partner   • Headache(784 0)    • Lumbar herniated disc    • Migraine 2012   • Pneumonia    • Urinary tract infection    • Urogenital trichomoniasis 2015    Unfaithful partner   • UTI (urinary tract infection)    • Varicella 1988   • Visual impairment        Past Surgical History:   Procedure Laterality Date   • FL INJECTION LEFT KNEE (ARTHROGRAM)  2/28/2022   • WISDOM TOOTH EXTRACTION         Current Outpatient Medications   Medication Sig Dispense Refill   • beclomethasone (Qvar RediHaler) 40 MCG/ACT inhaler Inhale 2 puffs 2 (two) times a day Rinse mouth after use   10 6 g 0   • albuterol (2 5 mg/3 mL) 0 083 % nebulizer solution Take by nebulization every 6 (six) hours as needed     • albuterol (PROVENTIL HFA,VENTOLIN HFA) 90 mcg/act inhaler Inhale 2 puffs every 6 (six) hours as needed for wheezing 18 g 0   • Ascorbic " Acid (vitamin C) 1000 MG tablet Take 1,000 mg by mouth daily     • B Complex-C (B-complex with vitamin C) tablet Take 1 tablet by mouth daily     • Biotin 10 MG TABS Take by mouth     • buPROPion (Wellbutrin XL) 300 mg 24 hr tablet Take 1 tablet (300 mg total) by mouth every morning 90 tablet 0   • calcium carbonate (OS-BERENICE) 600 MG tablet Take 600 mg by mouth 2 (two) times a day with meals     • cholecalciferol (VITAMIN D3) 1,000 units tablet Take 1,000 Units by mouth daily     • Dupixent 200 MG/1  14ML SOPN Inject 2 pens (400mg total) under the skin once for 1 dose  180 mL 10   • EPINEPHrine (EPIPEN) 0 3 mg/0 3 mL SOAJ Inject 0 3 mL (0 3 mg total) into a muscle once for 1 dose 0 6 mL 0   • Ferrous Sulfate (RA IRON PO) Take by mouth     • FLUoxetine (PROzac) 40 MG capsule Take 1 capsule (40 mg total) by mouth daily 90 capsule 0   • fluticasone-umeclidinium-vilanterol 200-62 5-25 mcg/actuation AEPB inhaler Inhale 1 puff daily Rinse mouth after use  1 each 0   • ipratropium-albuterol (DUO-NEB) 0 5-2 5 mg/3 mL nebulizer solution inhale contents of 1 vial ( 3 milliliters ) in nebulizer by mouth and INTO THE LUNGS every 6 hours     • Magnesium Oxide 500 MG TABS Take by mouth     • montelukast (SINGULAIR) 10 mg tablet Take 1 tablet (10 mg total) by mouth daily 90 tablet 0   • Zinc 30 MG CAPS Take by mouth       No current facility-administered medications for this visit  Allergies   Allergen Reactions   • Sulfa Antibiotics Anaphylaxis   • Levofloxacin      Other reaction(s): sensative   • Prednisone Other (See Comments)   • Tobramycin      Other reaction(s): sensative       Review of Systems    Video Exam    There were no vitals filed for this visit      Physical Exam

## 2023-06-15 ENCOUNTER — APPOINTMENT (OUTPATIENT)
Dept: PULMONOLOGY | Facility: HOSPITAL | Age: 38
End: 2023-06-15
Payer: COMMERCIAL

## 2023-06-15 ENCOUNTER — OFFICE VISIT (OUTPATIENT)
Dept: FAMILY MEDICINE CLINIC | Facility: CLINIC | Age: 38
End: 2023-06-15
Payer: COMMERCIAL

## 2023-06-15 VITALS
BODY MASS INDEX: 26 KG/M2 | SYSTOLIC BLOOD PRESSURE: 114 MMHG | OXYGEN SATURATION: 97 % | HEART RATE: 84 BPM | HEIGHT: 66 IN | WEIGHT: 161.8 LBS | DIASTOLIC BLOOD PRESSURE: 72 MMHG | TEMPERATURE: 97.9 F

## 2023-06-15 DIAGNOSIS — J45.50 SEVERE PERSISTENT ASTHMA, UNSPECIFIED WHETHER COMPLICATED: ICD-10-CM

## 2023-06-15 DIAGNOSIS — R05.9 COUGH, UNSPECIFIED TYPE: Primary | ICD-10-CM

## 2023-06-15 LAB
SARS-COV-2 AG UPPER RESP QL IA: NEGATIVE
VALID CONTROL: NORMAL

## 2023-06-15 PROCEDURE — 99213 OFFICE O/P EST LOW 20 MIN: CPT | Performed by: NURSE PRACTITIONER

## 2023-06-15 PROCEDURE — 87811 SARS-COV-2 COVID19 W/OPTIC: CPT | Performed by: NURSE PRACTITIONER

## 2023-06-15 NOTE — LETTER
Elyssa 15, 2023     Patient: Jae Hooker  YOB: 1985  Date of Visit: 6/15/2023      To Whom it May Concern:    Jae Hooker is under my professional care  Frances Escalante was seen in my office on 6/15/2023  Patient continues to have limited functional capacity and is following with pulmonary for her ongoing respiratory issues  In addition she has an acute URI and has exacerbated her respiratory status  She is currently participating in pulmonary rehab and following with pulmonary for medication management  If you have any questions or concerns, please don't hesitate to call           Sincerely,          JULIEN Burkett        CC: No Recipients

## 2023-06-15 NOTE — PROGRESS NOTES
Name: Sonam Hook      : 1985      MRN: [id number]  Encounter Provider: JULIEN Cummings  Encounter Date: 6/15/2023   Encounter department: [address] [place] [address]     1  Cough, unspecified type  Assessment & Plan:  Patient appearing to have a viral URI and discussed with patient supportive treatments     Orders:  -     POCT Rapid Covid Ag    2  Severe persistent asthma, unspecified whether complicated  Assessment & Plan:  Patient is following with closely with pulmonary and recently started with dupixent              Subjective      Patient here today and reports that she started with symptoms on Monday 3 days ago with having nasal congestion and sore throat and lost her voice next day  Positive sick contacts with mom and she is recovering  She is having body aches and fatigue and slept 20 hours yesterday headache     Review of Systems   Constitutional: Positive for chills, diaphoresis and fatigue  Negative for activity change, appetite change, fever and unexpected weight change  HENT: Positive for postnasal drip, rhinorrhea, sore throat and voice change  Negative for congestion, ear pain, hearing loss, sinus pressure, sinus pain and sneezing  Eyes: Negative  Negative for pain, redness and visual disturbance  Respiratory: Positive for cough, chest tightness and shortness of breath  Cardiovascular: Negative for chest pain and leg swelling  Gastrointestinal: Negative for abdominal pain, diarrhea, nausea and vomiting  Endocrine: Negative  Genitourinary: Negative  Musculoskeletal: Negative for arthralgias  Skin: Negative  Allergic/Immunologic: Negative  Neurological: Positive for headaches  Negative for dizziness and light-headedness  Hematological: Negative  Psychiatric/Behavioral: Negative for behavioral problems and dysphoric mood         Current Outpatient Medications on File Prior to Visit   Medication Sig   • albuterol (2 5 mg/3 mL) "0 083 % nebulizer solution Take by nebulization every 6 (six) hours as needed   • albuterol (PROVENTIL HFA,VENTOLIN HFA) 90 mcg/act inhaler Inhale 2 puffs every 6 (six) hours as needed for wheezing   • Ascorbic Acid (vitamin C) 1000 MG tablet Take 1,000 mg by mouth daily   • B Complex-C (B-complex with vitamin C) tablet Take 1 tablet by mouth daily   • beclomethasone (Qvar RediHaler) 40 MCG/ACT inhaler Inhale 2 puffs 2 (two) times a day Rinse mouth after use  • Biotin 10 MG TABS Take by mouth   • buPROPion (Wellbutrin XL) 300 mg 24 hr tablet Take 1 tablet (300 mg total) by mouth every morning   • calcium carbonate (OS-BERENICE) 600 MG tablet Take 600 mg by mouth 2 (two) times a day with meals   • cholecalciferol (VITAMIN D3) 1,000 units tablet Take 1,000 Units by mouth daily   • Dupixent 200 MG/1  14ML SOPN Inject 2 pens (400mg total) under the skin once for 1 dose  • EPINEPHrine (EPIPEN) 0 3 mg/0 3 mL SOAJ Inject 0 3 mL (0 3 mg total) into a muscle once for 1 dose   • Ferrous Sulfate (RA IRON PO) Take by mouth   • FLUoxetine (PROzac) 40 MG capsule Take 1 capsule (40 mg total) by mouth daily   • fluticasone-umeclidinium-vilanterol 200-62 5-25 mcg/actuation AEPB inhaler Inhale 1 puff daily Rinse mouth after use  • ipratropium-albuterol (DUO-NEB) 0 5-2 5 mg/3 mL nebulizer solution inhale contents of 1 vial ( 3 milliliters ) in nebulizer by mouth and INTO THE LUNGS every 6 hours   • Magnesium Oxide 500 MG TABS Take by mouth   • montelukast (SINGULAIR) 10 mg tablet Take 1 tablet (10 mg total) by mouth daily   • Zinc 30 MG CAPS Take by mouth       Objective     /72 (BP Location: Left arm, Patient Position: Sitting)   Pulse 84   Temp 97 9 °F (36 6 °C) (Temporal)   Ht 5' 6\" (1 676 m)   Wt 73 4 kg (161 lb 12 8 oz)   SpO2 97%   BMI 26 12 kg/m²     Physical Exam  Vitals and nursing note reviewed  Constitutional:       General: She is not in acute distress  Appearance: She is well-developed     HENT:      " Head: Normocephalic and atraumatic  Right Ear: Tympanic membrane normal       Left Ear: Tympanic membrane normal       Nose: Nose normal       Mouth/Throat:      Mouth: Mucous membranes are moist    Eyes:      Pupils: Pupils are equal, round, and reactive to light  Neck:      Thyroid: No thyromegaly  Cardiovascular:      Rate and Rhythm: Normal rate and regular rhythm  Heart sounds: Normal heart sounds  No murmur heard  Pulmonary:      Effort: Pulmonary effort is normal  No respiratory distress  Breath sounds: Normal breath sounds  No wheezing  Comments: Barking coughing and hoarseness with voice and coughing with speech   Abdominal:      General: Bowel sounds are normal       Palpations: Abdomen is soft  Musculoskeletal:         General: Normal range of motion  Cervical back: Normal range of motion  Skin:     General: Skin is warm and dry  Neurological:      General: No focal deficit present  Mental Status: She is alert and oriented to person, place, and time     Psychiatric:         Mood and Affect: Mood normal        JULIEN Haque

## 2023-06-20 ENCOUNTER — OFFICE VISIT (OUTPATIENT)
Dept: PSYCHIATRY | Facility: CLINIC | Age: 38
End: 2023-06-20
Payer: COMMERCIAL

## 2023-06-20 ENCOUNTER — CLINICAL SUPPORT (OUTPATIENT)
Dept: PULMONOLOGY | Facility: HOSPITAL | Age: 38
End: 2023-06-20
Payer: COMMERCIAL

## 2023-06-20 DIAGNOSIS — U09.9 PERSISTENT DYSPNEA AFTER COVID-19: Primary | ICD-10-CM

## 2023-06-20 DIAGNOSIS — R06.00 PERSISTENT DYSPNEA AFTER COVID-19: Primary | ICD-10-CM

## 2023-06-20 DIAGNOSIS — F41.9 ANXIETY: ICD-10-CM

## 2023-06-20 DIAGNOSIS — F33.1 MODERATE EPISODE OF RECURRENT MAJOR DEPRESSIVE DISORDER (HCC): Primary | ICD-10-CM

## 2023-06-20 DIAGNOSIS — F43.10 PTSD (POST-TRAUMATIC STRESS DISORDER): ICD-10-CM

## 2023-06-20 DIAGNOSIS — F43.12 CHRONIC POST-TRAUMATIC STRESS DISORDER (PTSD): ICD-10-CM

## 2023-06-20 PROCEDURE — 99214 OFFICE O/P EST MOD 30 MIN: CPT | Performed by: PSYCHIATRY & NEUROLOGY

## 2023-06-20 PROCEDURE — 94625 PHY/QHP OP PULM RHB W/O MNTR: CPT

## 2023-06-20 RX ORDER — DOCUSATE SODIUM 100 MG/1
100 CAPSULE, LIQUID FILLED ORAL 3 TIMES DAILY PRN
COMMUNITY

## 2023-06-20 RX ORDER — FLUOXETINE 10 MG/1
30 TABLET, FILM COATED ORAL DAILY
Qty: 90 TABLET | Refills: 1 | Status: SHIPPED | OUTPATIENT
Start: 2023-06-20 | End: 2023-08-19
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
## 2023-06-21 ENCOUNTER — TELEPHONE (OUTPATIENT)
Dept: PSYCHIATRY | Facility: CLINIC | Age: 38
End: 2023-06-21

## 2023-06-21 NOTE — TELEPHONE ENCOUNTER
NO-SHOW LETTER SENT TO Selwyn Torrez VIA MY CHART  Consent 3/Introductory Paragraph: I gave the patient a chance to ask questions they had about the procedure.  Following this I explained the Mohs procedure and consent was obtained. The risks, benefits and alternatives to therapy were discussed in detail. Specifically, the risks of infection, scarring, bleeding, prolonged wound healing, incomplete removal, allergy to anesthesia, nerve injury and recurrence were addressed. Prior to the procedure, the treatment site was clearly identified and confirmed by the patient using a hand mirror. All components of Universal Protocol/PAUSE Rule completed.

## 2023-06-22 ENCOUNTER — CLINICAL SUPPORT (OUTPATIENT)
Dept: PULMONOLOGY | Facility: HOSPITAL | Age: 38
End: 2023-06-22
Payer: COMMERCIAL

## 2023-06-22 DIAGNOSIS — U09.9 PERSISTENT DYSPNEA AFTER COVID-19: ICD-10-CM

## 2023-06-22 DIAGNOSIS — R06.00 PERSISTENT DYSPNEA AFTER COVID-19: ICD-10-CM

## 2023-06-22 PROCEDURE — 94626 PHY/QHP OP PULM RHB W/MNTR: CPT

## 2023-06-27 ENCOUNTER — CLINICAL SUPPORT (OUTPATIENT)
Dept: PULMONOLOGY | Facility: HOSPITAL | Age: 38
End: 2023-06-27
Payer: COMMERCIAL

## 2023-06-27 DIAGNOSIS — U09.9 PERSISTENT DYSPNEA AFTER COVID-19: Primary | ICD-10-CM

## 2023-06-27 DIAGNOSIS — R06.00 PERSISTENT DYSPNEA AFTER COVID-19: Primary | ICD-10-CM

## 2023-06-27 PROCEDURE — 94625 PHY/QHP OP PULM RHB W/O MNTR: CPT

## 2023-06-29 ENCOUNTER — TELEMEDICINE (OUTPATIENT)
Dept: PSYCHIATRY | Facility: CLINIC | Age: 38
End: 2023-06-29
Payer: COMMERCIAL

## 2023-06-29 ENCOUNTER — APPOINTMENT (OUTPATIENT)
Dept: PULMONOLOGY | Facility: HOSPITAL | Age: 38
End: 2023-06-29
Payer: COMMERCIAL

## 2023-06-29 DIAGNOSIS — F33.1 MODERATE EPISODE OF RECURRENT MAJOR DEPRESSIVE DISORDER (HCC): Primary | ICD-10-CM

## 2023-06-29 DIAGNOSIS — F43.10 PTSD (POST-TRAUMATIC STRESS DISORDER): ICD-10-CM

## 2023-06-29 PROCEDURE — 90834 PSYTX W PT 45 MINUTES: CPT

## 2023-06-29 NOTE — PSYCH
Behavioral Health Psychotherapy Progress Note    Psychotherapy Provided: Individual Psychotherapy     1  Moderate episode of recurrent major depressive disorder ([address] )        2  PTSD (post-traumatic stress disorder)            Goals addressed in session: Goal 1     DATA: Rajeev Rosado shared her cat was put down over the weekend and she is not as upset and she thought she would be  She is feeling a little better lung wise but recovery is slow  She shared she is working on being okay with how she is and not apologizing for things  She identified a new calm place which is her front porch  Therapist led Rajeev Rosado through an exercise of The Child, and asked her mom for attention, to believe her, for softness  She was not able to give it  She asked from her father who lived in his studio apartment for nothing other than answers  Why weren't you there why were you horrible to our mom  She asked from her adult self for those things  Her adult self knelt down to eye level to talk to her  She shared her feelings when she asked for what she needed was a feeling of approaching a stranger  She talked about her partner handling his parents who are intrusive in their home and that she feels like the instigator in their relationship because she holds boundaries with them  Rajeev Rosado agreed to continue EMDR processing in the future with targets of her choosing  During this session, this clinician used the following therapeutic modalities: EMDR (or other form of bilateral Stimulation)    Substance Abuse was not addressed during this session  If the client is diagnosed with a co-occurring substance use disorder, please indicate any changes in the frequency or amount of use: Stage of change for addressing substance use diagnoses: No substance use/Not applicable    ASSESSMENT:  Demarcus Fernández presents with a Euthymic/ normal mood  her affect is Normal range and intensity, which is congruent, with her mood and the content of the session  "The client has made progress on their goals  Felicity Guardado presents with a none risk of suicide, none risk of self-harm, and none risk of harm to others  For any risk assessment that surpasses a \"low\" rating, a safety plan must be developed  A safety plan was indicated: no  If yes, describe in detail     PLAN: Between sessions, Felicity Guardado will work on accepting herself as she is  At the next session, the therapist will use Cognitive Behavioral Therapy and EMDR (or other form of bilateral Stimulation) to address self concept  Behavioral Health Treatment Plan and Discharge Planning: Felicity Guardado is aware of and agrees to continue to work on their treatment plan  They have identified and are working toward their discharge goals  yes    Visit start and stop times:    06/29/23  Start Time: 1400  Stop Time: 1452  Total Visit Time: 52 minutes    Virtual Regular Visit    Verification of patient location:    Patient is located at Home in the following state in which I hold an active license [place]      Assessment/Plan:    Problem List Items Addressed This Visit        Other    Moderate episode of recurrent major depressive disorder ([facility] [address] ) - Primary    PTSD (post-traumatic stress disorder)       Goals addressed in session: Goal 1          Reason for visit is No chief complaint on file  Encounter provider Shannan Juarez LCSW    Provider located at [address] [place] [id number] [phone]      Recent Visits  No visits were found meeting these conditions  Showing recent visits within past 7 days and meeting all other requirements  Today's Visits  Date Type Provider Dept   06/29/23 Telemedicine Shannan Juarez LCSW [facility]   Showing today's visits and meeting all other requirements  Future Appointments  No visits were found meeting these conditions    Showing future " appointments within next 150 days and meeting all other requirements       The patient was identified by name and date of birth  Chirag Newton was informed that this is a telemedicine visit and that the visit is being conducted throughthe [employer] platform  She agrees to proceed     My office door was closed  No one else was in the room  She acknowledged consent and understanding of privacy and security of the video platform  The patient has agreed to participate and understands they can discontinue the visit at any time  Patient is aware this is a billable service  Subjective  Chirag Newton is a 45 y o  female    HPI     Past Medical History:   Diagnosis Date   • Allergic    • Anemia    • Anxiety    • Arthritis    • Asthma    • Chlamydia 2014    Unfaithful partner   • Depression    • Exposure to SARS-associated coronavirus 04/12/2020   • GERD (gastroesophageal reflux disease) 2010   • Gonorrhea 2014    Unfaithful partner   • Headache(784 0)    • Lumbar herniated disc    • Migraine 2012   • Pneumonia    • Urinary tract infection    • Urogenital trichomoniasis 2015    Unfaithful partner   • UTI (urinary tract infection)    • Varicella 1988   • Visual impairment        Past Surgical History:   Procedure Laterality Date   • FL INJECTION LEFT KNEE (ARTHROGRAM)  2/28/2022   • WISDOM TOOTH EXTRACTION         Current Outpatient Medications   Medication Sig Dispense Refill   • albuterol (2 5 mg/3 mL) 0 083 % nebulizer solution Take by nebulization every 6 (six) hours as needed     • albuterol (PROVENTIL HFA,VENTOLIN HFA) 90 mcg/act inhaler Inhale 2 puffs every 6 (six) hours as needed for wheezing 18 g 0   • Ascorbic Acid (vitamin C) 1000 MG tablet Take 1,000 mg by mouth daily     • B Complex-C (B-complex with vitamin C) tablet Take 1 tablet by mouth daily     • beclomethasone (Qvar RediHaler) 40 MCG/ACT inhaler Inhale 2 puffs 2 (two) times a day Rinse mouth after use   10 6 g 0   • Biotin 10 MG TABS Take by mouth     • buPROPion (Wellbutrin XL) 300 mg 24 hr tablet Take 1 tablet (300 mg total) by mouth every morning 90 tablet 0   • calcium carbonate (OS-BERENICE) 600 MG tablet Take 600 mg by mouth 2 (two) times a day with meals     • cholecalciferol (VITAMIN D3) 1,000 units tablet Take 1,000 Units by mouth daily     • docusate sodium (COLACE) 100 mg capsule Take 100 mg by mouth 3 (three) times a day as needed for constipation     • Dupixent 200 MG/1  14ML SOPN Inject 2 pens (400mg total) under the skin once for 1 dose  180 mL 10   • EPINEPHrine (EPIPEN) 0 3 mg/0 3 mL SOAJ Inject 0 3 mL (0 3 mg total) into a muscle once for 1 dose 0 6 mL 0   • Ferrous Sulfate (RA IRON PO) Take by mouth     • FLUoxetine (PROzac) 10 MG tablet Take 3 tablets (30 mg total) by mouth daily 90 tablet 1   • fluticasone-umeclidinium-vilanterol 200-62 5-25 mcg/actuation AEPB inhaler Inhale 1 puff daily Rinse mouth after use  1 each 0   • ipratropium-albuterol (DUO-NEB) 0 5-2 5 mg/3 mL nebulizer solution inhale contents of 1 vial ( 3 milliliters ) in nebulizer by mouth and INTO THE LUNGS every 6 hours     • Magnesium Oxide 500 MG TABS Take by mouth     • montelukast (SINGULAIR) 10 mg tablet Take 1 tablet (10 mg total) by mouth daily 90 tablet 0   • Zinc 30 MG CAPS Take by mouth       No current facility-administered medications for this visit  Allergies   Allergen Reactions   • Sulfa Antibiotics Anaphylaxis   • Levofloxacin      Other reaction(s): sensative   • Prednisone Other (See Comments)   • Tobramycin      Other reaction(s): sensative       Review of Systems    Video Exam    There were no vitals filed for this visit      Physical Exam

## 2023-07-04 DIAGNOSIS — J45.40 MODERATE PERSISTENT ASTHMA WITHOUT COMPLICATION: ICD-10-CM

## 2023-07-06 ENCOUNTER — HOSPITAL ENCOUNTER (OUTPATIENT)
Dept: MAMMOGRAPHY | Facility: HOSPITAL | Age: 38
End: 2023-07-06
Payer: COMMERCIAL

## 2023-07-06 ENCOUNTER — APPOINTMENT (OUTPATIENT)
Dept: PULMONOLOGY | Facility: HOSPITAL | Age: 38
End: 2023-07-06
Payer: COMMERCIAL

## 2023-07-06 DIAGNOSIS — Z12.31 BREAST CANCER SCREENING BY MAMMOGRAM: ICD-10-CM

## 2023-07-06 PROCEDURE — 77063 BREAST TOMOSYNTHESIS BI: CPT

## 2023-07-06 PROCEDURE — 77067 SCR MAMMO BI INCL CAD: CPT

## 2023-07-07 NOTE — PROGRESS NOTES
Pulmonary Rehabilitation Plan of Care   150 Day Reassessment      Today's date: 2023   # of Exercise Sessions Completed:   Patient name: Sabra Gunderson      : 1985  Age: 45 y.o. MRN: [id number]  Referring Physician: Dr. Babatunde Berry MD  Pulmonologist: Dr. Babatunde Berry MD  Provider: Jesus Young  Clinician: Belinda Lynch. Jacqueline Benjamin MPT, CCRRP    Dx:   Encounter Diagnosis   Name Primary? • Persistent dyspnea after COVID-19 Yes   Hx Asthma    Date of onset: 2023      SUMMARY OF PROGRESS: Lina Leigh has completed 29 exercise sessions at Pulmonary Rehab for the diagnosis of COVID 19 w/persistent PALOMARES. Patient does have a PFT on file, revealing an FEV1/FVC ratio of 86% and an FEV1 of 86 and FVC 99. . This is suggestive of mild obstruction. Since her diagnosis, the patient has been experiencing PALOMARES when completing moderate to heavy ADLs. She also continues to have a significant dry cough. The patient currently does follow a formal exercise program at home, including cycling, walking and weight lifting. Pt reports overall improvement in past physical limitations. Depression screening using the PHQ-9 interprets the patient's score of 13 10-14 = Moderate Depression. Anxiety screening using the MICKI-7 interprets the patients score of 8 5-9 = Mild anxiety. When addressed, the patient admits to having depression/anxiety. Patient reports excellent social/emotional support. Information to begin using [address] was provided as well as contact information for counseling through [employer]. PHQ-9 score will be reassessed in 30 days. The patient is a non-smoker. Patient admits to 100% medication compliance. At rest, the patient rated dyspnea 0-3/10 with SpO2 97-98% on room air. She performs her sessions on room air. The patient’s rating of perceived dyspnea during exercise is 3-5/10 with SpO2 95-96%. Patient does not require interval training. Telemetry revealed NSR.    Resting  BP 122/60 with appropriate hemodynamic response to exercise reaching 142/70. Dasha Pena She rates her program a 4/10 on a 1-10 RPE Scale. She had correct hemodynamic responses to the activity. Patient will exercise on room air. Education on oxygen use, breathing techniques, pulmonary anatomy, exercise for the pulmonary patient, healthy eating, stress, and relaxation will be provided. Patient has been receiving weekly education; refer to [facility] documentation for a list of completed topics. Patient continues to work on her goals of  reduced PALOMARES, tolerate heavy ADLS w/out symptoms, return to work full time/duty and attain her maximal level of function. Dasha Barnes has been compliant attending her SD sessions. She gives great effort during each session. In addition to continuous Blue Tooth Pulse Oximetry, telemeter has also been utilized for this patient. NSR has been consistent. Patient has normal resting vitals and peak vital responses. Patient's symptoms are slowly improving. She often has a dry cough and wheezing. At worst time patient continues to exhibits a "croup type cough/barking". It requires 10 -15 minutes for patient's symptoms to resolve. She expresses much frustration w/persistant symptoms even though vitals are WNL. She is now able to tolerate  Activity at  A 9.50 MET Level. Patient expresses frustration with current physical condition. There are concerns about being able to tolerate her job duties especially 12 hours shifts.        Medication compliance: Yes   Comments: Pt reports to be compliant with medications  Fall Risk: Low   Comments: Ambulates with a steady gait with no assist device    Smoking: Never used    RPD at Rest: 0-3/10  RPD with Exercise:  3-5/10    Assessment of progression of lung disease and functional status:  CAT: 27/40  Shortness of breath questionnaire: 55/120      EXERCISE ASSESSMENT and PLAN    Current Exercise Program in Rehab:       Frequency: 2 days/week   Supplement with home exercise 3+ days/wk as tolerated        Minutes: 40-45        METS: 9.50              SpO2: > 96% on RA              RPD: 3-6/10                     HR: 110-120   RPE: 4/10         Modalities: Treadmill, UBE, NuStep and Recumbent bike    Patient has been concentrating on the TM in order to attain her maximal functional level w/least amount of symptoms. Exercise Progression 30 Day Goals :    Frequency: 2 days/week    Supplement with home exercise 3+ days/wk as tolerated       Minutes: 40-45        METS: > 9.50              SpO2: > 98% on RA              RPD: 0-3/10                    HR: 110-120   RPE: 4/10        Modalities: Treadmill, UBE, NuStep and Recumbent bike     Strength trainin-3 days / week  12-15 repetitions  1-2 sets per modality    Modalities: Leg Press and UE PREs    Oxygen Needs: on room air at rest and on room air with exercise     Oxygen Goal: Maintain SpO2>90% during exercise   Patient has been able to exercise on RA and maintain 02 Sat > 90%  . Home Exercise: Bicycling, walking and weights    Education: pursed lipped breathing, diaphragmatic breathing, relaxation breathing, home exercise, benefits of exercise for pulmonary disease, RPE scale, RPD scale, O2 saturation monitoring and appropriate O2 response to exercise    Goals: reduced score on  USCD Shortness of Breath Questionnaire, Improved 6MW distance by 10%, reduced dyspnea during exercise (0-3/10), improved exercise tolerance (max METs tolerated in pulmonary rehab), SpO2 >90% during exercise, reduced score on CAT, reduced number of COPD exacerbations, reduced RPD at rest, attend pulmonary rehab regularly and decrease sitting time at home    Progressing:  Will continue to educate and progress as tolerated., Goals met: attends DE regularly, increased MET level ability and appropriate 02 responses to activity. Also, reduced PALOMARES ratings, reduced sitting time at home, and overall improved exercise tolerance.      Plan: practice breathing techniques 3x/day and reduce time sitting at home    Readiness to change: Action:  (Changing behavior)      NUTRITION ASSESSMENT AND PLAN    Weight control:    Starting weight: 162   Current weight:   161    Diabetes: N/A  A1C 3/25/2022 5.1  Lipid Panel 3/25/2022: Chol 153; Tri 78; HDL 49 and LDL 88      Goals:2.5-5%  wt loss, eliminate processed meats, reduce portion sizes of meat to 3oz or less, increase intake of fish, shellfish, cook without added fat or use vegetable oil/spray, increase intake of meatless meals, eat 3 or more servings of whole grains a day, Eat 4-5 cups of fruits and vegetables daily and daily saturated fat intake <7%/13g     Education: heart healthy eating  low sodium diet  hydration  wt. loss   education class:  Label Reading  education class: healthy choices for managing pulmonary illness     Progressing:Will continue to educate and progress as tolerated., Goals not met: no weight loss during this reporting period. , Goals met: increased water intake; A1C < 7.0 and Chol/Tri/HDL all WNLs. . Patient notes increase in fresh foods and decreased saturated fats. Plan: Education class: Reading Food Labels, switch to low fat cheeses, replace butter with soft spreads made with olive oil, canola or yogurt, replace refined grain bread with whole grain bread, replace unhealthy snacks with fruits & vegs, reduce portion sizes, avoid processed foods, remove salt shaker from table, will try new grains like brown rice, quinoa, farro, will replace sugar sweetened cereals with whole grain or oatmeal, drink more water and keep added daily sugar <25g/day     Readiness to change: Action:  (Changing behavior)      PSYCHOSOCIAL ASSESSMENT AND PLAN    Emotional:  Depression assessment:  PHQ-9 = 13; 10-14 = Moderate Depression            Anxiety measure:  MICKI-7 = 8;  5-9 = Mild anxiety  Self-reported stress level: 10/10  Patient is receiving treatment for her stress and anxiety.     Social support: Excellent    Goals:  Reduce perceived stress to 1-3/10, improved [facility] QOL < 27, Physical Fitness in [facility] Score < 3, Social Activities in [facility] Score < 3, Overall Health in [facility] Score < 3, Quality of Life in [facility] Score < 3 , Increased interest in doing things and improved sleep     Education: signs/sxs of depression, benefits of a positive support system, class:  Relaxation and class:  Stress and Pulmonary Disease    Progressing:Will continue to educate and progress as tolerated., Goals not met: stress level remains high due to ongoing medical issues and worries about tolerating job duties. .   Patient has been receiving treatment for her stress and anxiety w/good results. Patient does curently not a return to work date. She states, she has been informed that she may not return part time. Plan: Class: Stress and Your Health, Class: Relaxation, Practice relaxation techniques, Exercise, Spend time outdoors and Enjoy a hobby     Readiness to change: Action:  (Changing behavior)      OTHER CORE COMPONENTS     Tobacco:   Social History     Tobacco Use   Smoking Status Never   Smokeless Tobacco Never       Tobacco Use Intervention: Referral to tobacco expert:   N/A:  Patient is a non-smoker . Blood pressure:    Restin/60    Exercise: 142/70   Recovery:120/56    Goals: consistent BP < 130/80, reduced dietary sodium <2300mg, moderate intensity exercise >150 mins/wk and medication compliance     Education:  pathophysiology of pulmonary disease, preventing infections, control coughing, inspiratory muscle training, nebulizer use, bronchodilators and bronchial hygiene     Progressing:Will continue to educate and progress as tolerated., Goals met: medication compliance moderate exercise at 150 min/week and resting BP consistently < 130/80. Chapo Walter      Plan: avoid places with second hand smoke, Avoid Processed foods and engage in regular exercise     Readiness to change: Maintenance: (Maintaining the behavior change)

## 2023-07-12 ENCOUNTER — OFFICE VISIT (OUTPATIENT)
Dept: PULMONOLOGY | Facility: CLINIC | Age: 38
End: 2023-07-12
Payer: COMMERCIAL

## 2023-07-12 VITALS
BODY MASS INDEX: 26.68 KG/M2 | HEART RATE: 77 BPM | HEIGHT: 66 IN | TEMPERATURE: 97.1 F | SYSTOLIC BLOOD PRESSURE: 128 MMHG | DIASTOLIC BLOOD PRESSURE: 74 MMHG | OXYGEN SATURATION: 99 % | WEIGHT: 166 LBS

## 2023-07-12 DIAGNOSIS — J45.50 SEVERE PERSISTENT ASTHMA, UNSPECIFIED WHETHER COMPLICATED: ICD-10-CM

## 2023-07-12 DIAGNOSIS — R06.02 SHORTNESS OF BREATH: Primary | ICD-10-CM

## 2023-07-12 PROCEDURE — 99214 OFFICE O/P EST MOD 30 MIN: CPT | Performed by: INTERNAL MEDICINE

## 2023-07-12 NOTE — PROGRESS NOTES
Pulmonary Follow-up   Ruthy Rodriguez 45 y.o. female MRN: [id number]  7/12/2023      Assessment:    Shortness of breath  Continues to have daily shortness of breath secondary to underlying asthma. However slowly improving. Severe persistent asthma  Patient has severe persistent asthma. Now on biological therapy as well as inhalers. Overall slowly improving with exception of the past week when she may have not received adequate Dupixent dose. However overall feels that her symptoms are better. Plan  Continue dupixen  Continue Trelegy, Qvar, DuoNebs/albuterol. May be able to stepdown inhaler therapy. She will let me know if she decides to do so and would start with stopping Qvar. Follow-up in 3 months      Plan:    Diagnoses and all orders for this visit:    Shortness of breath    Severe persistent asthma, unspecified whether complicated        No follow-ups on file. History of Present Illness   HPI:  Ruthy Rodriguez is a 45 y.o. female with a history of asthma who originally presented to the pulmonary office for evaluation of shortness of breath related to previous COVID infection in January 2022. Patient was seen in the emergency room on January 17 due to continued chest pain, wheezing, cough and shortness of breath. She was diagnosed with asthma as a child but it was previously well controlled prior to her episodes of COVID-19 infection. Previously never required hospitalization or intubation her asthma was historically induced by exercise. In the emergency room influenza/RSV/COVID PCR were negative. D-dimer was mildly elevated 0.97 mcg/mL. VBG showed respiratory alkalosis. CT a with PE study was performed that showed no evidence of pulmonary embolism aneurysm or dissection. Since that time continues to have sob, wheezing, chest tightness despite use of flovent twice daily. She also uses duonebs and albuterol nebs. States that she has had covid-19 four times.      Previous medications: flovent, singulair       Triggers exertion: exertion, cold dry air      CBC from 1/2023 was normal.     Echo from 4/7 showed no evidence of shunt. Here today for follow-up     Remains on dupixent. States that overall feeling better with the exception of the past few days--having more sob, bronchospasm, cough. States she may have misadministered her dupixent and did not receive full dose. Continues to participate ini pulmonary rehab. She is not experiencing any systemic symptoms. . Answers for HPI/ROS submitted by the patient on 7/11/2023  Do you have chest tightness?: Yes  Chronicity: chronic  When did you first notice your symptoms?: more than 1 month ago  How often do your symptoms occur?: 2 to 4 times per day  Since you first noticed this problem, how has it changed?: unchanged  Do you have shortness of breath that occurs with effort or exertion?: No  Do you have ear congestion?: No  Do you have heartburn?: No  Do you have fatigue?: Yes  Do you have nasal congestion?: No  Do you have shortness of breath when lying flat?: No  Do you have shortness of breath when you wake up?: Yes  Do you have sweats?: No  Have you experienced weight loss?: No  Which of the following makes your symptoms worse?: change in weather, climbing stairs, exercise, exposure to smoke, minimal activity, strenuous activity, URI  Which of the following makes your symptoms better?: beta-agonist, leukotriene antagonist, rest            Review of Systems   Constitutional: Negative for chills, fatigue and fever. HENT: Negative for congestion, nosebleeds, postnasal drip, rhinorrhea, sinus pressure and sore throat. Eyes: Negative for discharge, redness and itching. Respiratory: Positive for cough, chest tightness, shortness of breath and wheezing. Negative for choking and stridor. Cardiovascular: Negative for chest pain, palpitations and leg swelling. Gastrointestinal: Negative for blood in stool.    Genitourinary: Negative for difficulty urinating and dysuria. Musculoskeletal: Negative for arthralgias, joint swelling and myalgias. Skin: Negative for color change and rash. Neurological: Negative for light-headedness and headaches. Hematological: Negative for adenopathy. Historical Information   Past Medical History:   Diagnosis Date   • Allergic    • Anemia    • Anxiety    • Arthritis    • Asthma    • Chlamydia     Unfaithful partner   • Depression    • Exposure to SARS-associated coronavirus 2020   • GERD (gastroesophageal reflux disease)    • Gonorrhea     Unfaithful partner   • Headache(784.0)    • Lumbar herniated disc    • Migraine    • Pneumonia    • Urinary tract infection    • Urogenital trichomoniasis     Unfaithful partner   • UTI (urinary tract infection)    • Varicella    • Visual impairment      Past Surgical History:   Procedure Laterality Date   • FL INJECTION LEFT KNEE (ARTHROGRAM)  2022   • WISDOM TOOTH EXTRACTION       Family History   Problem Relation Age of Onset   • BRCA2 Negative Mother    • BRCA1 Negative Mother    • Breast cancer additional onset Mother         Left Masectomy   • Hypertension Mother    • Diabetes Mother    • Asthma Mother    • Cancer Mother         breast   • Breast cancer Mother         In , left side. -brca   • Migraines Mother    • Mental illness Father         family history   • Alcohol abuse Father    • Hypertension Father    • Hyperlipidemia Father    • Diabetes Father    • Heart attack Father         Assumed.  Found  at 61   • Colon cancer Maternal Grandmother    • Breast cancer additional onset Maternal Grandmother    • Diabetes Maternal Grandmother    • Cancer Maternal Grandmother         breast, colon   • Breast cancer Maternal Grandmother    • Prostate cancer Maternal Grandfather         lung   • Cancer Maternal Grandfather         lung (smoker)   • Completed Suicide  Maternal Grandfather    • Lung cancer Maternal Grandfather • Asthma Brother    • Asthma Brother        Social History   Places lived: [place]  Occupation: ER nurse. Tobacco: none smoker  Illicits:  None   Hobbies:   Pets: dogs       Meds/Allergies     Current Outpatient Medications:   •  albuterol (2.5 mg/3 mL) 0.083 % nebulizer solution, Take by nebulization every 6 (six) hours as needed, Disp: , Rfl:   •  albuterol (PROVENTIL HFA,VENTOLIN HFA) 90 mcg/act inhaler, Inhale 2 puffs every 6 (six) hours as needed for wheezing, Disp: 18 g, Rfl: 0  •  Ascorbic Acid (vitamin C) 1000 MG tablet, Take 1,000 mg by mouth daily, Disp: , Rfl:   •  B Complex-C (B-complex with vitamin C) tablet, Take 1 tablet by mouth daily, Disp: , Rfl:   •  beclomethasone (Qvar RediHaler) 40 MCG/ACT inhaler, Inhale 2 puffs 2 (two) times a day Rinse mouth after use., Disp: 10.6 g, Rfl: 0  •  Biotin 10 MG TABS, Take by mouth, Disp: , Rfl:   •  buPROPion (Wellbutrin XL) 300 mg 24 hr tablet, Take 1 tablet (300 mg total) by mouth every morning, Disp: 90 tablet, Rfl: 0  •  calcium carbonate (OS-BERENICE) 600 MG tablet, Take 600 mg by mouth 2 (two) times a day with meals, Disp: , Rfl:   •  cholecalciferol (VITAMIN D3) 1,000 units tablet, Take 1,000 Units by mouth daily, Disp: , Rfl:   •  docusate sodium (COLACE) 100 mg capsule, Take 100 mg by mouth 3 (three) times a day as needed for constipation, Disp: , Rfl:   •  Dupixent 200 MG/1. 14ML SOPN, Inject 2 pens (400mg total) under the skin once for 1 dose., Disp: 180 mL, Rfl: 10  •  EPINEPHrine (EPIPEN) 0.3 mg/0.3 mL SOAJ, Inject 0.3 mL (0.3 mg total) into a muscle once for 1 dose, Disp: 0.6 mL, Rfl: 0  •  Ferrous Sulfate (RA IRON PO), Take by mouth, Disp: , Rfl:   •  FLUoxetine (PROzac) 10 MG tablet, Take 3 tablets (30 mg total) by mouth daily, Disp: 90 tablet, Rfl: 1  •  fluticasone-umeclidinium-vilanterol 200-62.5-25 mcg/actuation AEPB inhaler, Inhale 1 puff daily Rinse mouth after use., Disp: 1 each, Rfl: 0  •  ipratropium-albuterol (DUO-NEB) 0.5-2.5 mg/3 mL nebulizer solution, inhale contents of 1 vial ( 3 milliliters ) in nebulizer by mouth and INTO THE LUNGS every 6 hours, Disp: , Rfl:   •  Magnesium Oxide 500 MG TABS, Take by mouth, Disp: , Rfl:   •  montelukast (SINGULAIR) 10 mg tablet, Take 1 tablet (10 mg total) by mouth daily, Disp: 90 tablet, Rfl: 0  •  Zinc 30 MG CAPS, Take by mouth, Disp: , Rfl:   Allergies   Allergen Reactions   • Sulfa Antibiotics Anaphylaxis   • Levofloxacin      Other reaction(s): sensative   • Prednisone Other (See Comments)   • Tobramycin      Other reaction(s): sensative   • Pseudoephedrine Palpitations       Vitals: Blood pressure 128/74, pulse 77, temperature (!) 97.1 °F (36.2 °C), temperature source Tympanic, height 5' 6" (1.676 m), weight 75.3 kg (166 lb), last menstrual period 06/20/2023, SpO2 99 %, not currently breastfeeding. Body mass index is 26.79 kg/m². Oxygen Therapy  SpO2: 99 %  Oxygen Therapy: None (Room air)      Physical Exam  Physical Exam  Vitals reviewed. Constitutional:       General: She is not in acute distress. Appearance: She is well-developed. She is not ill-appearing, toxic-appearing or diaphoretic. HENT:      Head: Normocephalic and atraumatic. Right Ear: External ear normal.      Left Ear: External ear normal.      Nose: Nose normal. No congestion or rhinorrhea. Mouth/Throat:      Pharynx: No oropharyngeal exudate or posterior oropharyngeal erythema. Eyes:      General: No scleral icterus. Right eye: No discharge. Left eye: No discharge. Conjunctiva/sclera: Conjunctivae normal.      Pupils: Pupils are equal, round, and reactive to light. Neck:      Trachea: No tracheal deviation. Cardiovascular:      Rate and Rhythm: Normal rate and regular rhythm. Heart sounds: Normal heart sounds. No murmur heard. No friction rub. No gallop. Pulmonary:      Effort: Pulmonary effort is normal.      Breath sounds: Normal breath sounds. No stridor. No wheezing or rales. Musculoskeletal:      Cervical back: Normal range of motion. Right lower leg: No edema. Left lower leg: No edema. Lymphadenopathy:      Head:      Right side of head: No submental, submandibular, preauricular or posterior auricular adenopathy. Left side of head: No submental, submandibular, preauricular or posterior auricular adenopathy. Cervical: No cervical adenopathy. Skin:     General: Skin is warm and dry. Findings: No lesion or rash. Neurological:      Mental Status: She is alert and oriented to person, place, and time. Labs: I have personally reviewed pertinent lab results. Lab Results   Component Value Date    WBC 5.64 04/04/2023    HGB 12.6 04/04/2023    HCT 38.6 04/04/2023    MCV 94 04/04/2023     04/04/2023     Lab Results   Component Value Date    CALCIUM 9.2 01/16/2023    K 3.3 (L) 01/16/2023    CO2 23 01/16/2023     01/16/2023    BUN 12 01/16/2023    CREATININE 1.08 01/16/2023     Lab Results   Component Value Date    IGE 27.9 04/04/2023     Lab Results   Component Value Date    ALT 18 01/16/2023    AST 20 01/16/2023    ALKPHOS 69 01/16/2023       Imaging and other studies: I have personally reviewed pertinent reports. and I have personally reviewed pertinent films in PACS      Lung 2/1/2023   LUNGS:  Nothing to suggest interstitial lung disease with no reticulation, traction bronchiectasis/bronchiolectasis, groundglass, or honeycombing.   No significant air trapping on expiration.     AIRWAYS: No significant filling defects.     PLEURA:  Unremarkable.     HEART/GREAT VESSELS:  Normal for age.     MEDIASTINUM AND IGNACIO:  Unremarkable.     CHEST WALL AND LOWER NECK: Unremarkable.     UPPER ABDOMEN:  2.3 cm low-attenuation lesion in the dome of the liver, 30 Hounsfield units, poorly demonstrated on the CTA of the chest 2 weeks ago due to streak artifact from contrast in the heart.     OSSEOUS STRUCTURES: Normal for age.     IMPRESSION:     Nothing to indicate interstitial lung disease with no change from the chest CT 2 weeks ago.     2.3 cm low-attenuation lesion in the dome of the liver. This may be a benign hemangioma but recommend further evaluation with a contrast-enhanced abdomen CT using the hemangioma protocol. Pulmonary function testing:     Jan 30, 2023     Study Interpretation:   • Normal lung volumes. • Mild airflow limitation. • Significant improvement in the airflow following the administration of bronchodilators. • Normal diffusion capacity. • Normal airway resistance as indicated by the specific airway conductance. EKG, Pathology, and Other Studies: I have personally reviewed pertinent reports. and I have personally reviewed pertinent films in PACS      •  Left Ventricle: Left ventricular cavity size is normal. Wall thickness is normal. The left ventricular ejection fraction is 60% by visual estimation. Systolic function is normal. Wall motion is normal.  •  Atrial Septum: No patent foramen ovale detected with provocation.     Findings    Left Ventricle Left ventricular cavity size is normal. Wall thickness is normal. The left ventricular ejection fraction is 60% by visual estimation. Systolic function is normal.  Wall motion is normal.   Atrial Septum No patent foramen ovale detected with provocation. Normal sinus rhythm  Incomplete right bundle branch block  No previous ECGs available    Ti Arriola M.D.   [facility]  Answers for HPI/ROS submitted by the patient on 4/3/2023  Do you have chest tightness?: Yes  Do you have difficulty breathing?: Yes  Chronicity: chronic  When did you first notice your symptoms?: more than 1 month ago  How often do your symptoms occur?: constantly  Since you first noticed this problem, how has it changed?: unchanged  Do you have shortness of breath that occurs with effort or exertion?: Yes  Do you have ear congestion?: No  Do you have heartburn?: No  Do you have fatigue?: Yes  Do you have nasal congestion?: No  Do you have shortness of breath when lying flat?: No  Do you have shortness of breath when you wake up?: Yes  Do you have sweats?: No  Have you experienced weight loss?: No  Which of the following makes your symptoms worse?: change in weather, climbing stairs, exercise, exposure to fumes, exposure to smoke, strenuous activity  Which of the following makes your symptoms better?: beta-agonist, ipratropium, leukotriene antagonist, steroid inhaler  Risk factors for lung disease: animal exposure, occupational exposure      Answers for HPI/ROS submitted by the patient on 7/11/2023  Do you have chest tightness?: Yes  Chronicity: chronic  When did you first notice your symptoms?: more than 1 month ago  How often do your symptoms occur?: 2 to 4 times per day  Since you first noticed this problem, how has it changed?: unchanged  Do you have shortness of breath that occurs with effort or exertion?: No  Do you have ear congestion?: No  Do you have heartburn?: No  Do you have fatigue?: Yes  Do you have nasal congestion?: No  Do you have shortness of breath when lying flat?: No  Do you have shortness of breath when you wake up?: Yes  Do you have sweats?: No  Have you experienced weight loss?: No  Which of the following makes your symptoms worse?: change in weather, climbing stairs, exercise, exposure to smoke, minimal activity, strenuous activity, URI  Which of the following makes your symptoms better?: beta-agonist, leukotriene antagonist, rest

## 2023-07-12 NOTE — ASSESSMENT & PLAN NOTE
Continues to have daily shortness of breath secondary to underlying asthma. However slowly improving.

## 2023-07-12 NOTE — ASSESSMENT & PLAN NOTE
Patient has severe persistent asthma. Now on biological therapy as well as inhalers. Overall slowly improving with exception of the past week when she may have not received adequate Dupixent dose. However overall feels that her symptoms are better. Plan  Continue dupixen  Continue Trelegy, Qvar, DuoNebs/albuterol. May be able to stepdown inhaler therapy. She will let me know if she decides to do so and would start with stopping Qvar.   Follow-up in 3 months

## 2023-07-13 ENCOUNTER — OFFICE VISIT (OUTPATIENT)
Dept: FAMILY MEDICINE CLINIC | Facility: CLINIC | Age: 38
End: 2023-07-13
Payer: COMMERCIAL

## 2023-07-13 VITALS
OXYGEN SATURATION: 98 % | BODY MASS INDEX: 26.68 KG/M2 | HEIGHT: 66 IN | HEART RATE: 79 BPM | SYSTOLIC BLOOD PRESSURE: 122 MMHG | WEIGHT: 166 LBS | TEMPERATURE: 97.7 F | DIASTOLIC BLOOD PRESSURE: 84 MMHG

## 2023-07-13 DIAGNOSIS — R06.02 SHORTNESS OF BREATH: ICD-10-CM

## 2023-07-13 DIAGNOSIS — U07.1 COVID-19: ICD-10-CM

## 2023-07-13 DIAGNOSIS — J45.50 SEVERE PERSISTENT ASTHMA, UNSPECIFIED WHETHER COMPLICATED: Primary | ICD-10-CM

## 2023-07-13 PROCEDURE — 99213 OFFICE O/P EST LOW 20 MIN: CPT | Performed by: NURSE PRACTITIONER

## 2023-07-13 NOTE — ASSESSMENT & PLAN NOTE
Currently having symptoms of shortness of breath with activities secondary to her COVID infection and asthma

## 2023-07-13 NOTE — PROGRESS NOTES
Name: Hany Cain      : 1985      MRN: [id number]  Encounter Provider: Georga Hodgkin, CRNP  Encounter Date: 2023   Encounter department: [address] [place] [address]     1. Severe persistent asthma, unspecified whether complicated  Assessment & Plan:  Patient is following with Pulmonary and is currently on the [address] and her inhalers       2. Shortness of breath  Assessment & Plan:  Currently having symptoms of shortness of breath with activities secondary to her COVID infection and asthma       3. COVID-19  Assessment & Plan:  Chronic shortness of breath with minimal activities and is working to get a more sedentary position              Subjective      Patient here today for follow up and reports that she is about the same with her symptoms and reports that she is on the [address] and is helping with her symptoms and is following closely with pulmonary and is seeming to be improving in her symptoms slowly. She is optimistic that the dupixient is helping. Activity toleance is the same and low and has not been helpful with therapy. Patient is interviewing for job interview for a more sedentary position. Review of Systems   Constitutional: Negative for activity change, appetite change, chills, diaphoresis, fatigue, fever and unexpected weight change. HENT: Negative for congestion, ear pain, hearing loss, postnasal drip, sinus pressure, sinus pain, sneezing and sore throat. Eyes: Negative for pain, redness and visual disturbance. Respiratory: Positive for shortness of breath. Negative for cough. Cardiovascular: Negative for chest pain and leg swelling. Gastrointestinal: Negative for abdominal pain, diarrhea, nausea and vomiting. Musculoskeletal: Negative for arthralgias. Neurological: Negative for dizziness and light-headedness. Psychiatric/Behavioral: Negative for behavioral problems and dysphoric mood.        Current Outpatient Medications on File Prior to Visit   Medication Sig   • albuterol (2.5 mg/3 mL) 0.083 % nebulizer solution Take by nebulization every 6 (six) hours as needed   • albuterol (PROVENTIL HFA,VENTOLIN HFA) 90 mcg/act inhaler Inhale 2 puffs every 6 (six) hours as needed for wheezing   • Ascorbic Acid (vitamin C) 1000 MG tablet Take 1,000 mg by mouth daily   • B Complex-C (B-complex with vitamin C) tablet Take 1 tablet by mouth daily   • beclomethasone (Qvar RediHaler) 40 MCG/ACT inhaler Inhale 2 puffs 2 (two) times a day Rinse mouth after use. • Biotin 10 MG TABS Take by mouth   • buPROPion (Wellbutrin XL) 300 mg 24 hr tablet Take 1 tablet (300 mg total) by mouth every morning   • calcium carbonate (OS-BERENICE) 600 MG tablet Take 600 mg by mouth 2 (two) times a day with meals   • cholecalciferol (VITAMIN D3) 1,000 units tablet Take 1,000 Units by mouth daily   • docusate sodium (COLACE) 100 mg capsule Take 100 mg by mouth 3 (three) times a day as needed for constipation   • Dupixent 200 MG/1. 14ML SOPN Inject 2 pens (400mg total) under the skin once for 1 dose. • EPINEPHrine (EPIPEN) 0.3 mg/0.3 mL SOAJ Inject 0.3 mL (0.3 mg total) into a muscle once for 1 dose   • Ferrous Sulfate (RA IRON PO) Take by mouth   • FLUoxetine (PROzac) 10 MG tablet Take 3 tablets (30 mg total) by mouth daily   • fluticasone-umeclidinium-vilanterol 200-62.5-25 mcg/actuation AEPB inhaler Inhale 1 puff daily Rinse mouth after use.    • ipratropium-albuterol (DUO-NEB) 0.5-2.5 mg/3 mL nebulizer solution inhale contents of 1 vial ( 3 milliliters ) in nebulizer by mouth and INTO THE LUNGS every 6 hours   • Magnesium Oxide 500 MG TABS Take by mouth   • montelukast (SINGULAIR) 10 mg tablet Take 1 tablet (10 mg total) by mouth daily   • Zinc 30 MG CAPS Take by mouth       Objective     /84 (BP Location: Left arm, Patient Position: Sitting, Cuff Size: Adult)   Pulse 79   Temp 97.7 °F (36.5 °C)   Ht 5' 6" (1.676 m)   Wt 75.3 kg (166 lb)   LMP 06/20/2023   SpO2 98% BMI 26.79 kg/m²     Physical Exam  Vitals and nursing note reviewed. Constitutional:       General: She is not in acute distress. Appearance: She is well-developed. HENT:      Head: Normocephalic and atraumatic. Right Ear: Tympanic membrane normal.      Left Ear: Tympanic membrane normal.      Nose: Nose normal.      Mouth/Throat:      Mouth: Mucous membranes are moist.   Eyes:      Pupils: Pupils are equal, round, and reactive to light. Neck:      Thyroid: No thyromegaly. Cardiovascular:      Rate and Rhythm: Normal rate and regular rhythm. Heart sounds: Normal heart sounds. No murmur heard. Pulmonary:      Effort: Pulmonary effort is normal. No respiratory distress. Breath sounds: Normal breath sounds. No wheezing. Abdominal:      General: Bowel sounds are normal.      Palpations: Abdomen is soft. Musculoskeletal:         General: Normal range of motion. Cervical back: Normal range of motion. Skin:     General: Skin is warm and dry. Neurological:      General: No focal deficit present. Mental Status: She is alert and oriented to person, place, and time.    Psychiatric:         Mood and Affect: Mood normal.       JULIEN Gill

## 2023-07-18 ENCOUNTER — TELEMEDICINE (OUTPATIENT)
Dept: PSYCHIATRY | Facility: CLINIC | Age: 38
End: 2023-07-18
Payer: COMMERCIAL

## 2023-07-18 DIAGNOSIS — F33.1 MODERATE EPISODE OF RECURRENT MAJOR DEPRESSIVE DISORDER (HCC): ICD-10-CM

## 2023-07-18 DIAGNOSIS — F43.10 PTSD (POST-TRAUMATIC STRESS DISORDER): Primary | ICD-10-CM

## 2023-07-18 PROCEDURE — 90834 PSYTX W PT 45 MINUTES: CPT

## 2023-07-18 NOTE — PSYCH
Behavioral Health Psychotherapy Progress Note    Psychotherapy Provided: Individual Psychotherapy     1. PTSD (post-traumatic stress disorder)        2. Moderate episode of recurrent major depressive disorder ([address])            Goals addressed in session: Goal 1     DATA: Paulita Fothergill shared she is feeling "in a fog" emotionally because of not having money right now and is too tired for exercise. Kaya Fothergill and therapist identified an EMDR target with her marriage falling apart. She identified the worst part as the betrayal aspect of the marriage falling apart, and NC of "I can't trust my perceptions about others" to "I can trust my perception about others" as PC. She was able to talk about feeling enraged and angry and felt it in her torso and head, and talked about having problems trusting others because of the trauma of the marriage falling apart. Paulita Fothergill shared she feels bad for having a "meandering conversation" in therapy about it, and therapist assured her this was good and she was exploring her feelings appropriately. During this session, this clinician used the following therapeutic modalities: EMDR (or other form of bilateral Stimulation)    Substance Abuse was not addressed during this session. If the client is diagnosed with a co-occurring substance use disorder, please indicate any changes in the frequency or amount of use: . Stage of change for addressing substance use diagnoses: No substance use/Not applicable    ASSESSMENT:  Xiomara Avila presents with a Euthymic/ normal mood. her affect is Normal range and intensity, which is congruent, with her mood and the content of the session. The client has made progress on their goals. Xiomara Avila presents with a none risk of suicide, none risk of self-harm, and none risk of harm to others. For any risk assessment that surpasses a "low" rating, a safety plan must be developed.     A safety plan was indicated: no  If yes, describe in detail     PLAN: Between sessions, Marita Gandara will utilize calm place and other calming coping skills as needed, as well as container exercise. At the next session, the therapist will use EMDR (or other form of bilateral Stimulation) to address past trauma. Behavioral Health Treatment Plan and Discharge Planning: Marita Gandara is aware of and agrees to continue to work on their treatment plan. They have identified and are working toward their discharge goals. yes    Visit start and stop times:    07/18/23  Start Time: 1400  Stop Time: 1452  Total Visit Time: 52 minutes    Virtual Regular Visit    Verification of patient location:    Patient is located at Home in the following state in which I hold an active license [place]      Assessment/Plan:    Problem List Items Addressed This Visit        Other    Moderate episode of recurrent major depressive disorder (HCC)    PTSD (post-traumatic stress disorder) - Primary       Goals addressed in session: Goal 1          Reason for visit is No chief complaint on file. Encounter provider Janusz Rolle LCSW    Provider located at [address] [place] [id number] [phone]      Recent Visits  Date Type Provider Dept   07/13/23 Office Visit JULIEN Nielson [facility] recent visits within past 7 days and meeting all other requirements  Today's Visits  Date Type Provider Dept   07/18/23 Telemedicine Janusz Rolle LCSW [facility]   Showing today's visits and meeting all other requirements  Future Appointments  No visits were found meeting these conditions. Showing future appointments within next 150 days and meeting all other requirements       The patient was identified by name and date of birth. Marita Gandara was informed that this is a telemedicine visit and that the visit is being conducted throughthe Amitive platform.  She agrees to proceed. .  My office door was closed. No one else was in the room. She acknowledged consent and understanding of privacy and security of the video platform. The patient has agreed to participate and understands they can discontinue the visit at any time. Patient is aware this is a billable service. Yolanda Arreola is a 45 y.o. female Crispin Campa appeared calm and cooperative and openly communicated thoughts and feelings during session and engaged in developing EMDR target . HPI     Past Medical History:   Diagnosis Date   • Allergic    • Anemia    • Anxiety    • Arthritis    • Asthma    • Chlamydia 2014    Unfaithful partner   • Depression    • Exposure to SARS-associated coronavirus 04/12/2020   • GERD (gastroesophageal reflux disease) 2010   • Gonorrhea 2014    Unfaithful partner   • Headache(784.0)    • Lumbar herniated disc    • Migraine 2012   • Pneumonia    • Urinary tract infection    • Urogenital trichomoniasis 2015    Unfaithful partner   • UTI (urinary tract infection)    • Varicella 1988   • Visual impairment        Past Surgical History:   Procedure Laterality Date   • FL INJECTION LEFT KNEE (ARTHROGRAM)  2/28/2022   • WISDOM TOOTH EXTRACTION         Current Outpatient Medications   Medication Sig Dispense Refill   • albuterol (2.5 mg/3 mL) 0.083 % nebulizer solution Take by nebulization every 6 (six) hours as needed     • albuterol (PROVENTIL HFA,VENTOLIN HFA) 90 mcg/act inhaler Inhale 2 puffs every 6 (six) hours as needed for wheezing 18 g 0   • Ascorbic Acid (vitamin C) 1000 MG tablet Take 1,000 mg by mouth daily     • B Complex-C (B-complex with vitamin C) tablet Take 1 tablet by mouth daily     • beclomethasone (Qvar RediHaler) 40 MCG/ACT inhaler Inhale 2 puffs 2 (two) times a day Rinse mouth after use.  10.6 g 0   • Biotin 10 MG TABS Take by mouth     • buPROPion (Wellbutrin XL) 300 mg 24 hr tablet Take 1 tablet (300 mg total) by mouth every morning 90 tablet 0   • calcium carbonate (OS-BERENICE) 600 MG tablet Take 600 mg by mouth 2 (two) times a day with meals     • cholecalciferol (VITAMIN D3) 1,000 units tablet Take 1,000 Units by mouth daily     • docusate sodium (COLACE) 100 mg capsule Take 100 mg by mouth 3 (three) times a day as needed for constipation     • Dupixent 200 MG/1. 14ML SOPN Inject 2 pens (400mg total) under the skin once for 1 dose. 180 mL 10   • EPINEPHrine (EPIPEN) 0.3 mg/0.3 mL SOAJ Inject 0.3 mL (0.3 mg total) into a muscle once for 1 dose 0.6 mL 0   • Ferrous Sulfate (RA IRON PO) Take by mouth     • FLUoxetine (PROzac) 10 MG tablet Take 3 tablets (30 mg total) by mouth daily 90 tablet 1   • fluticasone-umeclidinium-vilanterol 200-62.5-25 mcg/actuation AEPB inhaler Inhale 1 puff daily Rinse mouth after use. 1 each 0   • ipratropium-albuterol (DUO-NEB) 0.5-2.5 mg/3 mL nebulizer solution inhale contents of 1 vial ( 3 milliliters ) in nebulizer by mouth and INTO THE LUNGS every 6 hours     • Magnesium Oxide 500 MG TABS Take by mouth     • montelukast (SINGULAIR) 10 mg tablet Take 1 tablet (10 mg total) by mouth daily 90 tablet 0   • Zinc 30 MG CAPS Take by mouth       No current facility-administered medications for this visit. Allergies   Allergen Reactions   • Sulfa Antibiotics Anaphylaxis   • Levofloxacin      Other reaction(s): sensative   • Prednisone Other (See Comments)   • Tobramycin      Other reaction(s): sensative   • Pseudoephedrine Palpitations       Review of Systems    Video Exam    There were no vitals filed for this visit.     Physical Exam

## 2023-07-20 ENCOUNTER — CLINICAL SUPPORT (OUTPATIENT)
Dept: PULMONOLOGY | Facility: HOSPITAL | Age: 38
End: 2023-07-20
Payer: COMMERCIAL

## 2023-07-20 DIAGNOSIS — R06.00 PERSISTENT DYSPNEA AFTER COVID-19: ICD-10-CM

## 2023-07-20 DIAGNOSIS — U09.9 PERSISTENT DYSPNEA AFTER COVID-19: ICD-10-CM

## 2023-07-20 PROCEDURE — 94626 PHY/QHP OP PULM RHB W/MNTR: CPT

## 2023-07-25 ENCOUNTER — CLINICAL SUPPORT (OUTPATIENT)
Dept: PULMONOLOGY | Facility: HOSPITAL | Age: 38
End: 2023-07-25
Payer: COMMERCIAL

## 2023-07-25 ENCOUNTER — TELEMEDICINE (OUTPATIENT)
Dept: PSYCHIATRY | Facility: CLINIC | Age: 38
End: 2023-07-25
Payer: COMMERCIAL

## 2023-07-25 DIAGNOSIS — R06.00 PERSISTENT DYSPNEA AFTER COVID-19: ICD-10-CM

## 2023-07-25 DIAGNOSIS — U09.9 PERSISTENT DYSPNEA AFTER COVID-19: ICD-10-CM

## 2023-07-25 DIAGNOSIS — F43.10 PTSD (POST-TRAUMATIC STRESS DISORDER): ICD-10-CM

## 2023-07-25 DIAGNOSIS — F33.1 MODERATE EPISODE OF RECURRENT MAJOR DEPRESSIVE DISORDER (HCC): Primary | ICD-10-CM

## 2023-07-25 PROCEDURE — 90834 PSYTX W PT 45 MINUTES: CPT

## 2023-07-25 PROCEDURE — 94626 PHY/QHP OP PULM RHB W/MNTR: CPT

## 2023-07-25 NOTE — PSYCH
Behavioral Health Psychotherapy Progress Note    Psychotherapy Provided: Individual Psychotherapy     1. Moderate episode of recurrent major depressive disorder ([address])        2. PTSD (post-traumatic stress disorder)            Goals addressed in session: Goal 1     DATA: Renée Gomez shared she got back from pulmonary rehab and has some interviews for jobs but no job yet. Therapist and Renée Gomez processed EMDR target of her marriage falling apart. What came up was the day she found out he was cheating and she went to find him, he wasn't at work. She then went to his mother's house to see if he ws there. She found his car and tailgated him and texted him and met in a parking lot to talk. He didn't give answers about his behaviors and was protective of the girl he was seeing. He kept backing away from her and blamed her for what happened. Mom came and got her and she moved back to her mom's. Things got blurry after that. She said she couldn't stop crying that came out of nowhere for weeks. Her mom couldn't handle her feelings and didn't comfort her. She was able to tell her mother what she needed in the moment which was support instead of "tough love" and she would stare a lot. She called out of work and started hemorrhaging mid cycle but was not pregnant. She talked about her mom teaching her to not trust her feelings and she went along with the marriage even though there were red flags. She said she floated for awhile and felt empty and didn't eat for months. Went back to apartment to get her things during this time. She noticed things changing in the apartment during this time and found things of Danial's girlfriend in the apartment and got angry about his betrayal. Therapist and Renée Gomez did an exercise of her imagining being more confident and having a more stable sense of self and she was able to do so.    Renée Patricia shared that she does not have a stable solid sense of self because of the invalidating environment she grew up in, and she appreciated being validated in the relationship she has now with her fiance. Therapist pointed out that since Codi Patel could imagine herself having more confidence, she can accomplish it. Codi Patel agreed. During this session, this clinician used the following therapeutic modalities: EMDR (or other form of bilateral Stimulation)    Substance Abuse was not addressed during this session. If the client is diagnosed with a co-occurring substance use disorder, please indicate any changes in the frequency or amount of use: . Stage of change for addressing substance use diagnoses: No substance use/Not applicable    ASSESSMENT:  Delmi Barillas presents with a Euthymic/ normal mood. her affect is Normal range and intensity, which is congruent, with her mood and the content of the session. The client has made progress on their goals. Delmi Barillas presents with a none risk of suicide, none risk of self-harm, and none risk of harm to others. For any risk assessment that surpasses a "low" rating, a safety plan must be developed. A safety plan was indicated: no  If yes, describe in detail     PLAN: Between sessions, Delmi Barillas will utilize calm place and container as needed. At the next session, the therapist will use EMDR (or other form of bilateral Stimulation) to address past trauma. Behavioral Health Treatment Plan and Discharge Planning: Delmi Barillas is aware of and agrees to continue to work on their treatment plan. They have identified and are working toward their discharge goals.  yes    Visit start and stop times:    07/25/23  Start Time: 1300  Stop Time: 1352  Total Visit Time: 52 minutes    Virtual Regular Visit    Verification of patient location:    Patient is located at Home in the following state in which I hold an active license [place]      Assessment/Plan:    Problem List Items Addressed This Visit        Other    Moderate episode of recurrent major depressive disorder ([address]) - Primary    PTSD (post-traumatic stress disorder)       Goals addressed in session: Goal 1          Reason for visit is No chief complaint on file. Encounter provider Ranjan Bowen LCSW    Provider located at [address] [place] [id number] [phone]      Recent Visits  Date Type Provider Dept   07/18/23 Telemedicine Ranjan Bowen LCSW [facility]   Showing recent visits within past 7 days and meeting all other requirements  Today's Visits  Date Type Provider Dept   07/25/23 Telemedicine Ranjan Bowen LCSW [facility]   Showing today's visits and meeting all other requirements  Future Appointments  No visits were found meeting these conditions. Showing future appointments within next 150 days and meeting all other requirements       The patient was identified by name and date of birth. Fred Moody was informed that this is a telemedicine visit and that the visit is being conducted throughthe Geron platform. She agrees to proceed. .  My office door was closed. No one else was in the room. She acknowledged consent and understanding of privacy and security of the video platform. The patient has agreed to participate and understands they can discontinue the visit at any time. Patient is aware this is a billable service. Subjective  Fred Moody is a 45 y.o. female Nickie Dance appeared calm and cooperative and was able to openly communicate and process past memories of her marriage coming to an end. Nickie Dance shared some insights about how her past continues to affect her currently.       HPI     Past Medical History:   Diagnosis Date   • Allergic    • Anemia    • Anxiety    • Arthritis    • Asthma    • Chlamydia 2014    Unfaithful partner   • Depression    • Exposure to SARS-associated coronavirus 04/12/2020   • GERD (gastroesophageal reflux disease) 2010   • Gonorrhea 2014    Unfaithful partner   • Headache(784.0)    • Lumbar herniated disc    • Migraine 2012   • Pneumonia    • Urinary tract infection    • Urogenital trichomoniasis 2015    Unfaithful partner   • UTI (urinary tract infection)    • Varicella 1988   • Visual impairment        Past Surgical History:   Procedure Laterality Date   • FL INJECTION LEFT KNEE (ARTHROGRAM)  2/28/2022   • WISDOM TOOTH EXTRACTION         Current Outpatient Medications   Medication Sig Dispense Refill   • albuterol (2.5 mg/3 mL) 0.083 % nebulizer solution Take by nebulization every 6 (six) hours as needed     • albuterol (PROVENTIL HFA,VENTOLIN HFA) 90 mcg/act inhaler Inhale 2 puffs every 6 (six) hours as needed for wheezing 18 g 0   • Ascorbic Acid (vitamin C) 1000 MG tablet Take 1,000 mg by mouth daily     • B Complex-C (B-complex with vitamin C) tablet Take 1 tablet by mouth daily     • beclomethasone (Qvar RediHaler) 40 MCG/ACT inhaler Inhale 2 puffs 2 (two) times a day Rinse mouth after use. 10.6 g 0   • Biotin 10 MG TABS Take by mouth     • buPROPion (Wellbutrin XL) 300 mg 24 hr tablet Take 1 tablet (300 mg total) by mouth every morning 90 tablet 0   • calcium carbonate (OS-BERENICE) 600 MG tablet Take 600 mg by mouth 2 (two) times a day with meals     • cholecalciferol (VITAMIN D3) 1,000 units tablet Take 1,000 Units by mouth daily     • docusate sodium (COLACE) 100 mg capsule Take 100 mg by mouth 3 (three) times a day as needed for constipation     • Dupixent 200 MG/1. 14ML SOPN Inject 2 pens (400mg total) under the skin once for 1 dose.  180 mL 10   • EPINEPHrine (EPIPEN) 0.3 mg/0.3 mL SOAJ Inject 0.3 mL (0.3 mg total) into a muscle once for 1 dose 0.6 mL 0   • Ferrous Sulfate (RA IRON PO) Take by mouth     • FLUoxetine (PROzac) 10 MG tablet Take 3 tablets (30 mg total) by mouth daily 90 tablet 1   • fluticasone-umeclidinium-vilanterol 200-62.5-25 mcg/actuation AEPB inhaler Inhale 1 puff daily Rinse mouth after use. 1 each 0   • ipratropium-albuterol (DUO-NEB) 0.5-2.5 mg/3 mL nebulizer solution inhale contents of 1 vial ( 3 milliliters ) in nebulizer by mouth and INTO THE LUNGS every 6 hours     • Magnesium Oxide 500 MG TABS Take by mouth     • montelukast (SINGULAIR) 10 mg tablet Take 1 tablet (10 mg total) by mouth daily 90 tablet 0   • Zinc 30 MG CAPS Take by mouth       No current facility-administered medications for this visit. Allergies   Allergen Reactions   • Sulfa Antibiotics Anaphylaxis   • Levofloxacin      Other reaction(s): sensative   • Prednisone Other (See Comments)   • Tobramycin      Other reaction(s): sensative   • Pseudoephedrine Palpitations       Review of Systems    Video Exam    There were no vitals filed for this visit.     Physical Exam

## 2023-07-27 ENCOUNTER — CLINICAL SUPPORT (OUTPATIENT)
Dept: PULMONOLOGY | Facility: HOSPITAL | Age: 38
End: 2023-07-27
Payer: COMMERCIAL

## 2023-07-27 DIAGNOSIS — U07.1 COVID-19: ICD-10-CM

## 2023-07-27 DIAGNOSIS — R06.02 SHORTNESS OF BREATH: ICD-10-CM

## 2023-07-27 PROCEDURE — 94626 PHY/QHP OP PULM RHB W/MNTR: CPT

## 2023-08-01 ENCOUNTER — CLINICAL SUPPORT (OUTPATIENT)
Dept: PULMONOLOGY | Facility: HOSPITAL | Age: 38
End: 2023-08-01
Payer: COMMERCIAL

## 2023-08-01 DIAGNOSIS — R06.00 PERSISTENT DYSPNEA AFTER COVID-19: ICD-10-CM

## 2023-08-01 DIAGNOSIS — U09.9 PERSISTENT DYSPNEA AFTER COVID-19: ICD-10-CM

## 2023-08-01 PROCEDURE — 94626 PHY/QHP OP PULM RHB W/MNTR: CPT

## 2023-08-03 ENCOUNTER — CLINICAL SUPPORT (OUTPATIENT)
Dept: PULMONOLOGY | Facility: HOSPITAL | Age: 38
End: 2023-08-03
Payer: COMMERCIAL

## 2023-08-03 DIAGNOSIS — U09.9 PERSISTENT DYSPNEA AFTER COVID-19: Primary | ICD-10-CM

## 2023-08-03 DIAGNOSIS — R06.00 PERSISTENT DYSPNEA AFTER COVID-19: Primary | ICD-10-CM

## 2023-08-03 PROCEDURE — 94626 PHY/QHP OP PULM RHB W/MNTR: CPT

## 2023-08-03 NOTE — PROGRESS NOTES
Pulmonary Rehabilitation Plan of Care   180 Day Reassessment      Today's date: 8/3/2023   # of Exercise Sessions Completed:   Patient name: Claudine Ochoa      : 1985  Age: 45 y.o. MRN: [id number]  Referring Physician: Dr. Taurus Park MD  Pulmonologist: Dr. Taurus Park MD  Provider: [facility]  Clinician: Esme Puckett. LEEANNA Lozada, CCRRP    Dx:   Encounter Diagnosis   Name Primary? • Persistent dyspnea after COVID-19    Hx Asthma    Date of onset: 2023      SUMMARY OF PROGRESS: Nick Ann has completed 34 exercise sessions at Pulmonary Rehab for the diagnosis of COVID 19 w/persistent PALOMARES. Patient does have a PFT on file, revealing an FEV1/FVC ratio of 86% and an FEV1 of 86 and FVC 99. . This is suggestive of mild obstruction. Since her diagnosis, the patient has been experiencing PALOMARES when completing moderate to heavy ADLs. She also continues to have a significant dry cough. The patient currently does follow a formal exercise program at home, including cycling, walking and weight lifting. Pt reports overall improvement in past physical limitations. She remains symptomatic. Depression screening using the PHQ-9 interprets the patient's score of 13 10-14 = Moderate Depression. Anxiety screening using the MICKI-7 interprets the patients score of 8 5-9 = Mild anxiety. When addressed, the patient admits to having depression/anxiety. Patient reports excellent social/emotional support. Information to begin using [employer] was provided as well as contact information for counseling through [employer]. PHQ-9 score will be reassessed in 30 days. The patient is a non-smoker. Patient admits to 100% medication compliance. At rest, the patient rated dyspnea 0-3/10 with SpO2 96-98% on room air. She performs her sessions on room air. The patient’s rating of perceived dyspnea during exercise is 3-5/10 with SpO2 95-97%. Patient does not require interval training.   Telemetry revealed NSR.   Resting  /64 with appropriate hemodynamic response to exercise reaching 148/66. Guzman Rahman rates her program a 4/10 on a 1-10 RPE Scale. She had correct hemodynamic responses to the activity. Patient will exercise on room air. Education on oxygen use, breathing techniques, pulmonary anatomy, exercise for the pulmonary patient, healthy eating, stress, and relaxation will be provided. Patient has been receiving weekly education; refer to [facility] documentation for a list of completed topics. Patient continues to work on her goals of  reduced PALOMARES, tolerate heavy ADLS w/out symptoms, return to work full time/duty and attain her maximal level of function. Guzman Damon has been compliant attending her [place] sessions. She gives great effort during each session. In addition to continuous [employer] Pulse Oximetry, telemeter has also been utilized for this patient. NSR has been consistent. Patient has normal resting vitals and peak vital responses. Patient's symptoms has been slowly improving. She continues with a dry cough and wheezing. At worst time patient continues to exhibits a "croup type cough/barking". It requires 10 -15 minutes for patient's symptoms to resolve. She expresses much frustration w/persistant symptoms even though vitals are WNL. She is able to tolerate activity at a 6.80 to 9.50 MET Level. Patient expresses frustration with current physical condition. She has had an increase in her stress level due to employment issues. Patient scheduled to perform follow up testing in preparation for upcoming discharge.        Medication compliance: Yes   Comments: Pt reports to be compliant with medications  Fall Risk: Low   Comments: Ambulates with a steady gait with no assist device    Smoking: Never used    RPD at Rest: 0-3/10  RPD with Exercise:  3-5/10    Assessment of progression of lung disease and functional status:  CAT: 27/40  Shortness of breath questionnaire: 55/120      EXERCISE ASSESSMENT and PLAN    Current Exercise Program in Rehab:       Frequency: 2 days/week   Supplement with home exercise 3+ days/wk as tolerated        Minutes: 40-45        METS: 6.80 -9.50              SpO2: > 96% on RA              RPD: 3-5/10                     HR: 110-120   RPE: 4/10         Modalities: Treadmill, UBE, Rower, NuStep and Recumbent bike    Patient has been concentrating on the TM in order to attain her maximal functional level w/least amount of symptoms. Exercise Progression 30 Day Goals :    Frequency: 2 days/week    Supplement with home exercise 3+ days/wk as tolerated       Minutes: 40-45        METS: > 9.50              SpO2: > 98% on RA              RPD: 0-3/10                    HR: 110-120   RPE: 4/10        Modalities: Treadmill, UBE, Rower, NuStep and Recumbent bike     Strength trainin-3 days / week  12-15 repetitions  1-2 sets per modality    Modalities: Leg Press and UE PREs    Oxygen Needs: on room air at rest and on room air with exercise     Oxygen Goal: Maintain SpO2>90% during exercise   Patient has been able to exercise on RA and maintain 02 Sat > 90%  . Home Exercise: Bicycling, walking and weights    Education: pursed lipped breathing, diaphragmatic breathing, relaxation breathing, home exercise, benefits of exercise for pulmonary disease, RPE scale, RPD scale, O2 saturation monitoring and appropriate O2 response to exercise    Goals: reduced score on  USCD Shortness of Breath Questionnaire, Improved 6MW distance by 10%, reduced dyspnea during exercise (0-3/10), improved exercise tolerance (max METs tolerated in pulmonary rehab), SpO2 >90% during exercise, reduced score on CAT, reduced number of COPD exacerbations, reduced RPD at rest, attend pulmonary rehab regularly and decrease sitting time at home    Progressing:  Will continue to educate and progress as tolerated., Goals met: attends HI regularly, increased MET level ability and appropriate 02 responses to activity.  Also, reduced PALOMARES ratings, reduced sitting time at home, and overall improved exercise tolerance. Plan: practice breathing techniques 3x/day and reduce time sitting at home    Readiness to change: Action:  (Changing behavior)      NUTRITION ASSESSMENT AND PLAN    Weight control:    Starting weight: 162   Current weight:   166    Diabetes: N/A  A1C 3/25/2022 5.1  Lipid Panel 3/25/2022: Chol 153; Tri 78; HDL 49 and LDL 88      Goals:2.5-5%  wt loss, eliminate processed meats, reduce portion sizes of meat to 3oz or less, increase intake of fish, shellfish, cook without added fat or use vegetable oil/spray, increase intake of meatless meals, eat 3 or more servings of whole grains a day, Eat 4-5 cups of fruits and vegetables daily and daily saturated fat intake <7%/13g     Education: heart healthy eating  low sodium diet  hydration  wt. loss   education class:  Label Reading  education class: healthy choices for managing pulmonary illness     Progressing:Will continue to educate and progress as tolerated., Goals not met: no weight loss during this reporting period. , Goals met: increased water intake; A1C < 7.0 and Chol/Tri/HDL all WNLs. . Patient notes increase in fresh foods and decreased saturated fats.      Plan: Education class: Reading Food Labels, switch to low fat cheeses, replace butter with soft spreads made with olive oil, canola or yogurt, replace refined grain bread with whole grain bread, replace unhealthy snacks with fruits & vegs, reduce portion sizes, avoid processed foods, remove salt shaker from table, will try new grains like brown rice, quinoa, farro, will replace sugar sweetened cereals with whole grain or oatmeal, drink more water and keep added daily sugar <25g/day     Readiness to change: Action:  (Changing behavior)      PSYCHOSOCIAL ASSESSMENT AND PLAN    Emotional:  Depression assessment:  PHQ-9 = 13; 10-14 = Moderate Depression            Anxiety measure:  MICKI-7 = 8;  5-9 = Mild anxiety  Self-reported stress level: 10/10  Patient is receiving treatment for her stress and anxiety. Recently stress level increased due to employment issues. Social support: Excellent    Goals:  Reduce perceived stress to 1-3/10, improved [facility] QOL < 27, Physical Fitness in [facility] Score < 3, Social Activities in [facility] Score < 3, Overall Health in [facility] Score < 3, Quality of Life in [facility] Score < 3 , Increased interest in doing things and improved sleep     Education: signs/sxs of depression, benefits of a positive support system, class:  Relaxation and class:  Stress and Pulmonary Disease    Progressing:Will continue to educate and progress as tolerated., Goals not met: stress level remains high due to ongoing medical issues and worries about tolerating job duties. .   Patient has been receiving treatment for her stress and anxiety w/good results. Patient does curently not a return to work date. She states, she has been informed that she may not return part time. Plan: Class: Stress and Your Health, Class: Relaxation, Practice relaxation techniques, Exercise, Spend time outdoors and Enjoy a hobby     Readiness to change: Action:  (Changing behavior)      OTHER CORE COMPONENTS     Tobacco:   Social History     Tobacco Use   Smoking Status Never   Smokeless Tobacco Never       Tobacco Use Intervention: Referral to tobacco expert:   N/A:  Patient is a non-smoker .     Blood pressure:    Restin/64    Exercise: 148/66   Recovery:122/64    Goals: consistent BP < 130/80, reduced dietary sodium <2300mg, moderate intensity exercise >150 mins/wk and medication compliance     Education:  pathophysiology of pulmonary disease, preventing infections, control coughing, inspiratory muscle training, nebulizer use, bronchodilators and bronchial hygiene     Progressing:Will continue to educate and progress as tolerated., Goals met: medication compliance moderate exercise at 150 min/week and resting BP consistently < 130/80. Dileep Piety      Plan: avoid places with second hand smoke, Avoid Processed foods and engage in regular exercise     Readiness to change: Maintenance: (Maintaining the behavior change)

## 2023-08-04 ENCOUNTER — TELEMEDICINE (OUTPATIENT)
Dept: PSYCHIATRY | Facility: CLINIC | Age: 38
End: 2023-08-04
Payer: COMMERCIAL

## 2023-08-04 DIAGNOSIS — F43.10 PTSD (POST-TRAUMATIC STRESS DISORDER): ICD-10-CM

## 2023-08-04 DIAGNOSIS — F33.1 MODERATE EPISODE OF RECURRENT MAJOR DEPRESSIVE DISORDER (HCC): Primary | ICD-10-CM

## 2023-08-04 PROCEDURE — 90834 PSYTX W PT 45 MINUTES: CPT

## 2023-08-04 NOTE — PSYCH
Behavioral Health Psychotherapy Progress Note    Psychotherapy Provided: Individual Psychotherapy     1. Moderate episode of recurrent major depressive disorder ([address])        2. PTSD (post-traumatic stress disorder)            Goals addressed in session: Goal 1     DATA: Britni Damon shared she has been having nightmares about her ex  and his mistress and the theme has been being lost.  Therapist suggested she set intention for better dreams including finding something she was looking for in the dream as well as other things she wants to dream about. Therapist also went over flashback management during session and said that she would follow some of the steps. Behavioral Health Psychotherapy Progress Note    Psychotherapy Provided: Individual Psychotherapy     1. Moderate episode of recurrent major depressive disorder ([address])        2. PTSD (post-traumatic stress disorder)            Goals addressed in session: Goal 1     DATA:   During this session, this clinician used the following therapeutic modalities: Mindfulness-based Strategies and Supportive Psychotherapy    Substance Abuse was not addressed during this session. If the client is diagnosed with a co-occurring substance use disorder, please indicate any changes in the frequency or amount of use: . Stage of change for addressing substance use diagnoses: No substance use/Not applicable    ASSESSMENT:  Taurus Fleming presents with a Euthymic/ normal mood. her affect is Normal range and intensity, which is congruent, with her mood and the content of the session. The client has made progress on their goals. Taurus Fleming presents with a none risk of suicide, none risk of self-harm, and none risk of harm to others. For any risk assessment that surpasses a "low" rating, a safety plan must be developed. A safety plan was indicated: no  If yes, describe in detail     PLAN: Between sessions, Taurus Fleming will utilize flashback management.  At the next session, the therapist will use Mindfulness-based Strategies and Supportive Psychotherapy to address flashbacks. Behavioral Health Treatment Plan and Discharge Planning: Tre Cyr is aware of and agrees to continue to work on their treatment plan. They have identified and are working toward their discharge goals.  yes    Visit start and stop times:    08/04/23  Start Time: 0700  Stop Time: 5207  Total Visit Time: 50 minutes  During this session, this clinician used the following therapeutic   Subjective  Tre Cyr is a 45 y.o. female Eliana Amite appeared calm and cooperative and was able to openly communicate thoughts and feelings during session and learn flashback management      HPI     Past Medical History:   Diagnosis Date   • Allergic    • Anemia    • Anxiety    • Arthritis    • Asthma    • Chlamydia 2014    Unfaithful partner   • Depression    • Exposure to SARS-associated coronavirus 04/12/2020   • GERD (gastroesophageal reflux disease) 2010   • Gonorrhea 2014    Unfaithful partner   • Headache(784.0)    • Lumbar herniated disc    • Migraine 2012   • Pneumonia    • Urinary tract infection    • Urogenital trichomoniasis 2015    Unfaithful partner   • UTI (urinary tract infection)    • Varicella 1988   • Visual impairment        Past Surgical History:   Procedure Laterality Date   • FL INJECTION LEFT KNEE (ARTHROGRAM)  2/28/2022   • WISDOM TOOTH EXTRACTION         Current Outpatient Medications   Medication Sig Dispense Refill   • albuterol (2.5 mg/3 mL) 0.083 % nebulizer solution Take by nebulization every 6 (six) hours as needed     • albuterol (PROVENTIL HFA,VENTOLIN HFA) 90 mcg/act inhaler Inhale 2 puffs every 6 (six) hours as needed for wheezing 18 g 0   • Ascorbic Acid (vitamin C) 1000 MG tablet Take 1,000 mg by mouth daily     • B Complex-C (B-complex with vitamin C) tablet Take 1 tablet by mouth daily     • beclomethasone (Qvar RediHaler) 40 MCG/ACT inhaler Inhale 2 puffs 2 (two) times a day Rinse mouth after use. 10.6 g 0   • Biotin 10 MG TABS Take by mouth     • buPROPion (Wellbutrin XL) 300 mg 24 hr tablet Take 1 tablet (300 mg total) by mouth every morning 90 tablet 0   • calcium carbonate (OS-BERENICE) 600 MG tablet Take 600 mg by mouth 2 (two) times a day with meals     • cholecalciferol (VITAMIN D3) 1,000 units tablet Take 1,000 Units by mouth daily     • docusate sodium (COLACE) 100 mg capsule Take 100 mg by mouth 3 (three) times a day as needed for constipation     • Dupixent 200 MG/1. 14ML SOPN Inject 2 pens (400mg total) under the skin once for 1 dose. 180 mL 10   • EPINEPHrine (EPIPEN) 0.3 mg/0.3 mL SOAJ Inject 0.3 mL (0.3 mg total) into a muscle once for 1 dose 0.6 mL 0   • Ferrous Sulfate (RA IRON PO) Take by mouth     • FLUoxetine (PROzac) 10 MG tablet Take 3 tablets (30 mg total) by mouth daily 90 tablet 1   • fluticasone-umeclidinium-vilanterol 200-62.5-25 mcg/actuation AEPB inhaler Inhale 1 puff daily Rinse mouth after use. 1 each 0   • ipratropium-albuterol (DUO-NEB) 0.5-2.5 mg/3 mL nebulizer solution inhale contents of 1 vial ( 3 milliliters ) in nebulizer by mouth and INTO THE LUNGS every 6 hours     • Magnesium Oxide 500 MG TABS Take by mouth     • montelukast (SINGULAIR) 10 mg tablet Take 1 tablet (10 mg total) by mouth daily 90 tablet 0   • Zinc 30 MG CAPS Take by mouth       No current facility-administered medications for this visit. Allergies   Allergen Reactions   • Sulfa Antibiotics Anaphylaxis   • Levofloxacin      Other reaction(s): sensative   • Prednisone Other (See Comments)   • Tobramycin      Other reaction(s): sensative   • Pseudoephedrine Palpitations       Review of Systems    Video Exam    There were no vitals filed for this visit.     Physical Exam

## 2023-08-08 ENCOUNTER — CLINICAL SUPPORT (OUTPATIENT)
Dept: PULMONOLOGY | Facility: HOSPITAL | Age: 38
End: 2023-08-08
Payer: COMMERCIAL

## 2023-08-08 ENCOUNTER — TELEMEDICINE (OUTPATIENT)
Dept: PSYCHIATRY | Facility: CLINIC | Age: 38
End: 2023-08-08

## 2023-08-08 DIAGNOSIS — U09.9 PERSISTENT DYSPNEA AFTER COVID-19: ICD-10-CM

## 2023-08-08 DIAGNOSIS — R06.00 PERSISTENT DYSPNEA AFTER COVID-19: ICD-10-CM

## 2023-08-08 DIAGNOSIS — F43.10 PTSD (POST-TRAUMATIC STRESS DISORDER): ICD-10-CM

## 2023-08-08 DIAGNOSIS — F33.1 MODERATE EPISODE OF RECURRENT MAJOR DEPRESSIVE DISORDER (HCC): Primary | ICD-10-CM

## 2023-08-08 PROCEDURE — 94626 PHY/QHP OP PULM RHB W/MNTR: CPT

## 2023-08-08 NOTE — PSYCH
Behavioral Health Psychotherapy Progress Note    Psychotherapy Provided: Individual Psychotherapy     1. Moderate episode of recurrent major depressive disorder ([address])        2. PTSD (post-traumatic stress disorder)            Goals addressed in session: Goal 1     DATA:  Chas Razo checked in with this therapist and discussed that she had had a job interview, and that she did not have a lot to talk about today for therapy since the last meeting was fairly recent and her job interviews went well which was something she had thought she wanted to check in about. Therapist helped Chas Razo to schedule a therapy appointment for next week successfully and ended session early at 3:10. During this session, this clinician used the following therapeutic modalities: Supportive Psychotherapy    Substance Abuse was not addressed during this session. If the client is diagnosed with a co-occurring substance use disorder, please indicate any changes in the frequency or amount of use: . Stage of change for addressing substance use diagnoses: No substance use/Not applicable    ASSESSMENT:  Maye Rollins presents with a Euthymic/ normal mood. her affect is Normal range and intensity, which is congruent, with her mood and the content of the session. The client has made progress on their goals. Maye Rollins presents with a none risk of suicide, none risk of self-harm, and none risk of harm to others. For any risk assessment that surpasses a "low" rating, a safety plan must be developed. A safety plan was indicated: no  If yes, describe in detail     PLAN: Between sessions, Maye Rollins will utilize flashback management skills and other coping skills. At the next session, the therapist will use Dialectical Behavior Therapy and Supportive Psychotherapy to address PTSD. Behavioral Health Treatment Plan and Discharge Planning: Maye Rollins is aware of and agrees to continue to work on their treatment plan.  They have identified and are working toward their discharge goals. yes    Visit start and stop times:    08/08/23       Virtual Regular Visit    Verification of patient location:    Patient is located at Home in the following state in which I hold an active license [place]      Assessment/Plan:    Problem List Items Addressed This Visit        Other    Moderate episode of recurrent major depressive disorder ([address]) - Primary    PTSD (post-traumatic stress disorder)       Goals addressed in session: Goal 1          Reason for visit is No chief complaint on file. Encounter provider Barron Paget, LCSW    Provider located at [address] [place] [id number] [phone]      Recent Visits  Date Type Provider Dept   08/04/23 Telemedicine Barron Paget, LCSW [facility]   Showing recent visits within past 7 days and meeting all other requirements  Future Appointments  No visits were found meeting these conditions. Showing future appointments within next 150 days and meeting all other requirements       The patient was identified by name and date of birth. Sonny Brown was informed that this is a telemedicine visit and that the visit is being conducted throughthe [employer] platform. She agrees to proceed. .  My office door was closed. No one else was in the room. She acknowledged consent and understanding of privacy and security of the video platform. The patient has agreed to participate and understands they can discontinue the visit at any time. Patient is aware this is a billable service. Subjective  Sonny Brown is a 45 y.o. female Minesh Menjivarman appeared calm and cooperative and was able to engage in open communication during check in today     .       HPI     Past Medical History:   Diagnosis Date   • Allergic    • Anemia    • Anxiety    • Arthritis    • Asthma    • Chlamydia 2014    Unfaithful partner   • Depression    • Exposure to SARS-associated coronavirus 04/12/2020   • GERD (gastroesophageal reflux disease) 2010   • Gonorrhea 2014    Unfaithful partner   • Headache(784.0)    • Lumbar herniated disc    • Migraine 2012   • Pneumonia    • Urinary tract infection    • Urogenital trichomoniasis 2015    Unfaithful partner   • UTI (urinary tract infection)    • Varicella 1988   • Visual impairment        Past Surgical History:   Procedure Laterality Date   • FL INJECTION LEFT KNEE (ARTHROGRAM)  2/28/2022   • WISDOM TOOTH EXTRACTION         Current Outpatient Medications   Medication Sig Dispense Refill   • albuterol (2.5 mg/3 mL) 0.083 % nebulizer solution Take by nebulization every 6 (six) hours as needed     • albuterol (PROVENTIL HFA,VENTOLIN HFA) 90 mcg/act inhaler Inhale 2 puffs every 6 (six) hours as needed for wheezing 18 g 0   • Ascorbic Acid (vitamin C) 1000 MG tablet Take 1,000 mg by mouth daily     • B Complex-C (B-complex with vitamin C) tablet Take 1 tablet by mouth daily     • beclomethasone (Qvar RediHaler) 40 MCG/ACT inhaler Inhale 2 puffs 2 (two) times a day Rinse mouth after use. 10.6 g 0   • Biotin 10 MG TABS Take by mouth     • buPROPion (Wellbutrin XL) 300 mg 24 hr tablet Take 1 tablet (300 mg total) by mouth every morning 90 tablet 0   • calcium carbonate (OS-BERENICE) 600 MG tablet Take 600 mg by mouth 2 (two) times a day with meals     • cholecalciferol (VITAMIN D3) 1,000 units tablet Take 1,000 Units by mouth daily     • docusate sodium (COLACE) 100 mg capsule Take 100 mg by mouth 3 (three) times a day as needed for constipation     • Dupixent 200 MG/1. 14ML SOPN Inject 2 pens (400mg total) under the skin once for 1 dose.  180 mL 10   • EPINEPHrine (EPIPEN) 0.3 mg/0.3 mL SOAJ Inject 0.3 mL (0.3 mg total) into a muscle once for 1 dose 0.6 mL 0   • Ferrous Sulfate (RA IRON PO) Take by mouth     • FLUoxetine (PROzac) 10 MG tablet Take 3 tablets (30 mg total) by mouth daily 90 tablet 1   • fluticasone-umeclidinium-vilanterol 228-99.7-11 mcg/actuation AEPB inhaler Inhale 1 puff daily Rinse mouth after use. 1 each 0   • ipratropium-albuterol (DUO-NEB) 0.5-2.5 mg/3 mL nebulizer solution inhale contents of 1 vial ( 3 milliliters ) in nebulizer by mouth and INTO THE LUNGS every 6 hours     • Magnesium Oxide 500 MG TABS Take by mouth     • montelukast (SINGULAIR) 10 mg tablet Take 1 tablet (10 mg total) by mouth daily 90 tablet 0   • Zinc 30 MG CAPS Take by mouth       No current facility-administered medications for this visit. Allergies   Allergen Reactions   • Sulfa Antibiotics Anaphylaxis   • Levofloxacin      Other reaction(s): sensative   • Prednisone Other (See Comments)   • Tobramycin      Other reaction(s): sensative   • Pseudoephedrine Palpitations       Review of Systems    Video Exam    There were no vitals filed for this visit.     Physical Exam     Visit Time    Visit Start Time: 1500   Visit Stop Time: 1959  Total Visit Duration: 10 minutes

## 2023-08-09 DIAGNOSIS — F33.1 MODERATE EPISODE OF RECURRENT MAJOR DEPRESSIVE DISORDER (HCC): ICD-10-CM

## 2023-08-09 RX ORDER — BUPROPION HYDROCHLORIDE 300 MG/1
300 TABLET ORAL EVERY MORNING
Qty: 90 TABLET | Refills: 0 | Status: SHIPPED | OUTPATIENT
Start: 2023-08-09 | End: 2023-11-07

## 2023-08-09 NOTE — TELEPHONE ENCOUNTER
Medication Refill Request     Name of Medication Wellbutrin XL  Dose/Frequency 300mg take 1 tablet by mouth every morning  Quantity 90  Verified pharmacy   [x]  Verified ordering Provider   [x]  Does patient have enough for the next 3 days? Yes [] No [x]  Does patient have a follow-up appointment scheduled?  Yes [x] No []   If so when is appointment: 8/14/2023 8am

## 2023-08-10 ENCOUNTER — CLINICAL SUPPORT (OUTPATIENT)
Dept: PULMONOLOGY | Facility: HOSPITAL | Age: 38
End: 2023-08-10
Payer: COMMERCIAL

## 2023-08-10 DIAGNOSIS — R06.00 PERSISTENT DYSPNEA AFTER COVID-19: Primary | ICD-10-CM

## 2023-08-10 DIAGNOSIS — U09.9 PERSISTENT DYSPNEA AFTER COVID-19: Primary | ICD-10-CM

## 2023-08-10 PROCEDURE — 94626 PHY/QHP OP PULM RHB W/MNTR: CPT

## 2023-08-10 NOTE — PROGRESS NOTES
Pulmonary Rehabilitation Plan of Care   Discharge      Today's date: 8/10/2023   # of Exercise Sessions Completed:   Patient name: Harmony Pantoja      : 1985  Age: 45 y.o. MRN: [id number]  Referring Physician: Dr. Joni Londono MD  Pulmonologist: Dr. Joni Londono MD  Provider: Abraham Modi  Clinician: Meliza Trent MS    Dx:   Encounter Diagnosis   Name Primary? • Persistent dyspnea after COVID-19    Hx Asthma    Date of onset: 2023      SUMMARY OF PROGRESS: Marcia Watts has completed 36 exercise sessions at Pulmonary Rehab for the diagnosis of COVID 19 w/persistent PALOMARES. Patient does have a PFT on file, revealing an FEV1/FVC ratio of 86% and an FEV1 of 86 and FVC 99. . This is suggestive of mild obstruction. Since her diagnosis, the patient has been experiencing PALOMARES when completing moderate to heavy ADLs. She also continues to have a significant dry cough. The patient currently does follow a formal exercise program at home, including cycling, walking and weight lifting. Pt reports overall improvement in past physical limitations. Depression screening using the PHQ-9 interprets the patient's score of 12 10-14 = Moderate Depression. Anxiety screening using the MICKI-7 interprets the patients score of 7 5-9 = Mild anxiety. When addressed, the patient admits to having depression/anxiety. Patient reports excellent social/emotional support. The patient is a non-smoker. Patient admits to 100% medication compliance. At rest, the patient rated dyspnea 0/10 with SpO2 98% on room air. She performs her sessions on room air. The patient’s rating of perceived dyspnea during exercise is 0-3/10 with SpO2 95-97%. Patient does not require interval training. Resting /60 with appropriate hemodynamic response to exercise reaching 148/68. She rates her program a 4/10 on a 1-10 RPE Scale. She had correct hemodynamic responses to the activity. Patient will exercise on room air.  Patient has been receiving weekly education; refer to [facility] documentation for a list of completed topics. Patient continues to work on her goals of  reduced PALOMARES, tolerate heavy ADLS w/out symptoms, return to work full time/duty and attain her maximal level of function. [facility] Felisha Medina has been compliant attending her GA sessions. She gives great effort during each session. In addition to continuous Blue Tooth Pulse Oximetry, telemeter has also been utilized for this patient. NSR has been consistent. Patient has normal resting vitals and peak vital responses. Felisha Medina was very compliant in attended her 2x weekly pulmonary rehab appointments and gave great effort each session. Her symptoms slowly improved over her 36 sessions. At worst, patient continues to exhibits a "croup type cough/barking". She was able to work consistently at a 5.37-9.50 MET level. She expresses much frustration w/persistant symptoms even though vitals are WNL. Her stress level remains elevated due to schooling and employment issues. She was encouraged to stay active and to reach out the our GA department if we can be any service to her in the future.     Medication compliance: Yes   Comments: Pt reports to be compliant with medications  Fall Risk: Low   Comments: Ambulates with a steady gait with no assist device    Smoking: Never used    RPD at Rest: 0-3/10  RPD with Exercise:  3-5/10    Assessment of progression of lung disease and functional status:  CAT: 27/40 at initial evaluation, 27/40 at discharge  Shortness of breath questionnaire: 55/120 at initial evaluation, 51/120 at discharge      EXERCISE ASSESSMENT and PLAN    Current Exercise Program in Rehab:       Frequency: 2 days/week   Supplement with home exercise 3+ days/wk as tolerated        Minutes: 40-45        METS: 5.37 -9.50              SpO2: > 96% on RA              RPD: 3-5/10                     HR: 110-120   RPE: 4/10         Modalities: Treadmill, UBE, Rower, NuStep and Recumbent bike        Strength trainin-3 days / week  12-15 repetitions  1-2 sets per modality    Modalities: Leg Press and UE PREs    Oxygen Needs: on room air at rest and on room air with exercise     Oxygen Goal: Maintain SpO2>90% during exercise   Patient has been able to exercise on RA and maintain 02 Sat > 90%  Home Exercise: Bicycling, walking and weights    Education: pursed lipped breathing, diaphragmatic breathing, relaxation breathing, home exercise, benefits of exercise for pulmonary disease, RPE scale, RPD scale, O2 saturation monitoring and appropriate O2 response to exercise    Goals: reduced score on  [facility] Shortness of Breath Questionnaire, Improved 6MW distance by 10%, reduced dyspnea during exercise (0-3/10), improved exercise tolerance (max METs tolerated in pulmonary rehab), SpO2 >90% during exercise, reduced score on CAT, reduced number of COPD exacerbations, reduced RPD at rest, attend pulmonary rehab regularly and decrease sitting time at home    Progressing:  Goals not met: SOBQ did not improve significantly, CAT did not improve.  , Goals met: ETT improved from 4.3 to 7.5 METs, able to tolerate up to 9.50 METs, attended rehab regularly, SpO2 always > 90%, resumed her HEP., Patient will be encouraged to focus on lifestyle modifications following discharge. Plan: practice breathing techniques 3x/day and reduce time sitting at home    Readiness to change: Maintenance: (Maintaining the behavior change)      NUTRITION ASSESSMENT AND PLAN    Weight control:    Starting weight: 162   Current weight:   160    Diabetes: N/A  A1C 3/25/2022 5.1  Lipid Panel 3/25/2022: Chol 153; Tri 78;  HDL 49 and LDL 88      Goals:2.5-5%  wt loss, eliminate processed meats, reduce portion sizes of meat to 3oz or less, increase intake of fish, shellfish, cook without added fat or use vegetable oil/spray, increase intake of meatless meals, eat 3 or more servings of whole grains a day, Eat 4-5 cups of fruits and vegetables daily and daily saturated fat intake <7%/13g     Education: heart healthy eating  low sodium diet  hydration  wt. loss   education class:  Label Reading  education class: healthy choices for managing pulmonary illness     Progressing:Goals not met: no weight loss.  , Goals met: increased water intake; A1C < 7.0 and Chol/Tri/HDL all WNLs. ., Patient will be encouraged to focus on lifestyle modifications following discharge. Plan: Education class: Reading Food Labels, switch to low fat cheeses, replace butter with soft spreads made with olive oil, canola or yogurt, replace refined grain bread with whole grain bread, replace unhealthy snacks with fruits & vegs, reduce portion sizes, avoid processed foods, remove salt shaker from table, will try new grains like brown rice, quinoa, farro, will replace sugar sweetened cereals with whole grain or oatmeal, drink more water and keep added daily sugar <25g/day     Readiness to change: Maintenance: (Maintaining the behavior change)      PSYCHOSOCIAL ASSESSMENT AND PLAN    Emotional:  Depression assessment:  PHQ-9 = 12; 10-14 = Moderate Depression            Anxiety measure:  MICKI-7 = 7;  5-9 = Mild anxiety  Self-reported stress level: 10/10  Patient is receiving treatment for her stress and anxiety. Recently stress level increased due to employment issues. Social support: Excellent    Goals:  Reduce perceived stress to 1-3/10, improved [facility] QOL < 27, Physical Fitness in [facility] Score < 3, Social Activities in [facility] Score < 3, Overall Health in [facility] Score < 3, Quality of Life in [facility] Score < 3 , Increased interest in doing things and improved sleep     Education: signs/sxs of depression, benefits of a positive support system, class:  Relaxation and class:  Stress and Pulmonary Disease    Progressing:Goals not met: stress level remains high due to ongoing medical issues and worries about tolerating job duties.   , Goals met: PHQ9 improved from 13 to 12, MICKI 7 improved from 8 to 7, receiving mental health treatments with good results. , Patient will be encouraged to focus on lifestyle modifications following discharge. Plan: Class: Stress and Your Health, Class: Relaxation, Practice relaxation techniques, Exercise, Spend time outdoors and Enjoy a hobby     Readiness to change: Maintenance: (Maintaining the behavior change)      OTHER CORE COMPONENTS     Tobacco:   Social History     Tobacco Use   Smoking Status Never   Smokeless Tobacco Never       Tobacco Use Intervention: Referral to tobacco expert:   N/A:  Patient is a non-smoker . Blood pressure:    Restin/60    Exercise: 148/68   Recovery:122/64    Goals: consistent BP < 130/80, reduced dietary sodium <2300mg, moderate intensity exercise >150 mins/wk and medication compliance     Education:  pathophysiology of pulmonary disease, preventing infections, control coughing, inspiratory muscle training, nebulizer use, bronchodilators and bronchial hygiene     Progressing:Goals met: medication compliance moderate exercise at 150 min/week and resting BP consistently < 130/80. ., Patient will be encouraged to focus on lifestyle modifications following discharge.      Plan: avoid places with second hand smoke, Avoid Processed foods and engage in regular exercise     Readiness to change: Maintenance: (Maintaining the behavior change)

## 2023-08-14 ENCOUNTER — OFFICE VISIT (OUTPATIENT)
Dept: PSYCHIATRY | Facility: CLINIC | Age: 38
End: 2023-08-14
Payer: COMMERCIAL

## 2023-08-14 VITALS
BODY MASS INDEX: 26.76 KG/M2 | DIASTOLIC BLOOD PRESSURE: 72 MMHG | WEIGHT: 166.5 LBS | SYSTOLIC BLOOD PRESSURE: 109 MMHG | HEART RATE: 72 BPM | HEIGHT: 66 IN

## 2023-08-14 DIAGNOSIS — F43.10 PTSD (POST-TRAUMATIC STRESS DISORDER): ICD-10-CM

## 2023-08-14 DIAGNOSIS — F90.0 ATTENTION DEFICIT HYPERACTIVITY DISORDER, INATTENTIVE TYPE: ICD-10-CM

## 2023-08-14 DIAGNOSIS — R53.83 FATIGUE, UNSPECIFIED TYPE: ICD-10-CM

## 2023-08-14 DIAGNOSIS — F33.1 MODERATE EPISODE OF RECURRENT MAJOR DEPRESSIVE DISORDER (HCC): Primary | ICD-10-CM

## 2023-08-14 PROBLEM — R05.9 COUGH: Status: RESOLVED | Noted: 2023-06-15 | Resolved: 2023-08-14

## 2023-08-14 PROCEDURE — 90792 PSYCH DIAG EVAL W/MED SRVCS: CPT | Performed by: STUDENT IN AN ORGANIZED HEALTH CARE EDUCATION/TRAINING PROGRAM

## 2023-08-14 RX ORDER — FLUOXETINE 10 MG/1
20 TABLET, FILM COATED ORAL DAILY
Qty: 30 TABLET | Refills: 2 | Status: SHIPPED | OUTPATIENT
Start: 2023-08-14 | End: 2023-10-13

## 2023-08-14 RX ORDER — METHYLPHENIDATE HYDROCHLORIDE 10 MG/1
10 CAPSULE, EXTENDED RELEASE ORAL EVERY MORNING
Qty: 30 CAPSULE | Refills: 0
Start: 2023-08-14 | End: 2023-08-14 | Stop reason: SDUPTHER

## 2023-08-14 RX ORDER — METHYLPHENIDATE HYDROCHLORIDE 10 MG/1
10 CAPSULE, EXTENDED RELEASE ORAL EVERY MORNING
Qty: 30 CAPSULE | Refills: 0 | Status: SHIPPED | OUTPATIENT
Start: 2023-08-14 | End: 2023-08-24 | Stop reason: ALTCHOICE
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
## 2023-08-14 NOTE — PATIENT INSTRUCTIONS
Please present for your previously scheduled appointment approximately 15 minutes prior to appointment time. If you are running late or are unable to attend your appointment, please call our [place] office at [phone] or our [place] ([place]) office at [phone] if applicable to notify the office of your absence. If you are in psychological crisis including not limited to suicidal/homicidal thoughts or thoughts of self-injury, do not hesitate to call/contact your [facility] hotline (see below) or attend to the nearest emergency department. [facility] Crisis: [address] Crisis: [address] Crisis: [address] Crisis: [address] Crisis: [address] Crisis: [address] Crisis: [phone]  [place] Suicide Prevention Hotline: 1-110.820.1544    Follow these tips to establish healthy sleep habits:    - Keep a consistent sleep schedule. Get up at the same time every day, even on weekends or during vacations. - Set a bedtime that is early enough for you to get at least 7-8 hours of sleep. - Don’t go to bed unless you are sleepy. - If you don’t fall asleep after 20 minutes, get out of bed and take a brief "break". Go to a quiet activity without a lot of light exposure. It is especially important to not get on electronics. During this "break," you might consider sitting in a chair and reading a boring book or listening to soft music, until your eyelids start to feel heavy. Then, would go back to sleep and try again.    - Establish a relaxing bedtime routine.  - Use your bed only for sleep and sex. - Make your bedroom quiet and relaxing. Keep the room at a comfortable, cool temperature. - Limit exposure to bright light in the evenings.   - Turn off electronic devices at least 30 minutes before bedtime.  - Avoid watching stressful or suspenseful TV programs right before bedtime.  - Don’t eat a large meal before bedtime. If you are hungry at night, eat a light, healthy snack.  - Exercise regularly and maintain a healthy diet. - Avoid consuming caffeine in the afternoon or evening.  - Avoid consuming alcohol before bedtime.  - Reduce your fluid intake before bedtime.  - Avoid daytime napping.

## 2023-08-14 NOTE — BH TREATMENT PLAN
TREATMENT PLAN (Medication Management Only)        [address] [facility]    Name and Date of Birth:  Tramaine Gipson 45 y.o. 1985  Date of Treatment Plan: August 14, 2023  Diagnosis/Diagnoses:    1. Moderate episode of recurrent major depressive disorder ([address])    2. Attention deficit hyperactivity disorder, inattentive type    3. PTSD (post-traumatic stress disorder)    4. Fatigue, unspecified type      Strengths/Personal Resources for Self-Care: supportive family, taking medications as prescribed, ability to communicate needs, ability to communicate well, ability to understand psychiatric illness, average or above intelligence, independence, motivation for treatment, self-reliance, special hobby/interest, well educated, work skills. Area/Areas of need (in own words): Fatigue, anxiety symptoms and depressive symptoms  1. Long Term Goal: Continue improvement in symptoms of depression, improving anxiety, and improving focus  Target Date:6 months - 2/14/2024  Person/Persons responsible for completion of goal: Becka Sanchez  2. Short Term Objective (s) - How will we reach this goal?:   A. Provider new recommended medication/dosage changes and/or continue medication(s): Continue ongoing psychiatric medication management. B.  Maintain scheduled appointments. C.  Continue psychotherapy on a routine basis. D.  Exercise regularly  E.  Eat a balanced diet  F. Continuing to work on deep breathing exercises  Target Date:3 months - 11/14/2023  Person/Persons Responsible for Completion of Goal: Becka Choudhury  Progress Towards Goals: continuing treatment, improving gradually  Treatment Modality: Medication management every 1 to 3 months  Review due 180 days from date of this plan: 6 months - 2/14/2024  Expected length of service: ongoing treatment  My Physician/PA/NP and I have developed this plan together and I agree to work on the goals and objectives.  I understand the treatment goals that were developed for my treatment.

## 2023-08-15 ENCOUNTER — TELEMEDICINE (OUTPATIENT)
Dept: PSYCHIATRY | Facility: CLINIC | Age: 38
End: 2023-08-15
Payer: COMMERCIAL

## 2023-08-15 DIAGNOSIS — F43.10 PTSD (POST-TRAUMATIC STRESS DISORDER): ICD-10-CM

## 2023-08-15 DIAGNOSIS — F33.1 MODERATE EPISODE OF RECURRENT MAJOR DEPRESSIVE DISORDER (HCC): Primary | ICD-10-CM

## 2023-08-15 PROCEDURE — 90834 PSYTX W PT 45 MINUTES: CPT

## 2023-08-15 NOTE — PSYCH
Behavioral Health Psychotherapy Progress Note    Psychotherapy Provided: Individual Psychotherapy     1. Moderate episode of recurrent major depressive disorder ([address])        2. PTSD (post-traumatic stress disorder)            Goals addressed in session: Goal 1     DATA:  Nevaeh Kyle shared that she had her psychiatry appointment and shared that it brought up some ideas for what to work on including 911 and how this has affected the way that she thinks about her safety. Nevaehfrancis Kyle and therapist talked about CBT-I with therapist sending sleep diary and instructions. Therapist and Nevaeh Kyle developed a target for EMDR of her memory of 911. During this session, this clinician used the following therapeutic modalities: Cognitive Behavioral Therapy and EMDR (or other form of bilateral Stimulation)    Substance Abuse was not addressed during this session. If the client is diagnosed with a co-occurring substance use disorder, please indicate any changes in the frequency or amount of use: . Stage of change for addressing substance use diagnoses: No substance use/Not applicable    ASSESSMENT:  Wil Ahumada presents with a Euthymic/ normal mood. her affect is Normal range and intensity, which is congruent, with her mood and the content of the session. The client has made progress on their goals. Wil Ahumada presents with a none risk of suicide, none risk of self-harm, and none risk of harm to others. For any risk assessment that surpasses a "low" rating, a safety plan must be developed. A safety plan was indicated: no  If yes, describe in detail     PLAN: Between sessions, Wil Ahumada will fill out sleep diary. At the next session, the therapist will use Cognitive Behavioral Therapy and EMDR (or other form of bilateral Stimulation) to address past trauma, sleep difficulties. Behavioral Health Treatment Plan and Discharge Planning: Wil Ahumada is aware of and agrees to continue to work on their treatment plan. They have identified and are working toward their discharge goals. yes    Visit start and stop times:    08/15/23  Start Time: 1400  Stop Time: 1452  Total Visit Time: 52 minutes    Virtual Regular Visit    Verification of patient location:    Patient is located at Home in the following state in which I hold an active license [place]      Assessment/Plan:    Problem List Items Addressed This Visit        Other    Moderate episode of recurrent major depressive disorder ([address]) - Primary    PTSD (post-traumatic stress disorder)       Goals addressed in session: Goal 1          Reason for visit is No chief complaint on file. Encounter provider Barron Paget, LCSW    Provider located at [address] [place] [id number] [phone]      Recent Visits  Date Type Provider Dept   08/08/23 Telemedicine Barron Paget, LCSW [facility]   Showing recent visits within past 7 days and meeting all other requirements  Today's Visits  Date Type Provider Dept   08/15/23 Telemedicine Barron Paget, LCSW [facility]   Showing today's visits and meeting all other requirements  Future Appointments  No visits were found meeting these conditions. Showing future appointments within next 150 days and meeting all other requirements       The patient was identified by name and date of birth. Sonny Brown was informed that this is a telemedicine visit and that the visit is being conducted throughthe [employer] platform. She agrees to proceed. .  My office door was closed. No one else was in the room. She acknowledged consent and understanding of privacy and security of the video platform. The patient has agreed to participate and understands they can discontinue the visit at any time. Patient is aware this is a billable service.      Subjective  Sonny Brown is a 45 y.o. female Minesh Crocker appeared calm and cooperative and openly communicated thoughts and feelings during session      HPI     Past Medical History:   Diagnosis Date   • Allergic    • Anemia    • Anxiety    • Arthritis    • Asthma    • Chlamydia 2014    Unfaithful partner   • Depression    • Exposure to SARS-associated coronavirus 04/12/2020   • GERD (gastroesophageal reflux disease) 2010   • Gonorrhea 2014    Unfaithful partner   • Headache(784.0)    • Lumbar herniated disc    • Migraine 2012   • Pneumonia    • Urinary tract infection    • Urogenital trichomoniasis 2015    Unfaithful partner   • UTI (urinary tract infection)    • Varicella 1988   • Visual impairment        Past Surgical History:   Procedure Laterality Date   • FL INJECTION LEFT KNEE (ARTHROGRAM)  2/28/2022   • WISDOM TOOTH EXTRACTION         Current Outpatient Medications   Medication Sig Dispense Refill   • albuterol (2.5 mg/3 mL) 0.083 % nebulizer solution Take by nebulization every 6 (six) hours as needed     • albuterol (PROVENTIL HFA,VENTOLIN HFA) 90 mcg/act inhaler Inhale 2 puffs every 6 (six) hours as needed for wheezing 18 g 0   • Ascorbic Acid (vitamin C) 1000 MG tablet Take 1,000 mg by mouth daily     • B Complex-C (B-complex with vitamin C) tablet Take 1 tablet by mouth daily     • beclomethasone (Qvar RediHaler) 40 MCG/ACT inhaler Inhale 2 puffs 2 (two) times a day Rinse mouth after use. 10.6 g 0   • Biotin 10 MG TABS Take by mouth     • buPROPion (Wellbutrin XL) 300 mg 24 hr tablet Take 1 tablet (300 mg total) by mouth every morning 90 tablet 0   • calcium carbonate (OS-BERENICE) 600 MG tablet Take 600 mg by mouth 2 (two) times a day with meals     • cholecalciferol (VITAMIN D3) 1,000 units tablet Take 1,000 Units by mouth daily     • docusate sodium (COLACE) 100 mg capsule Take 100 mg by mouth 3 (three) times a day as needed for constipation     • Dupixent 200 MG/1. 14ML SOPN Inject 2 pens (400mg total) under the skin once for 1 dose.  180 mL 10   • EPINEPHrine (EPIPEN) 0.3 mg/0.3 mL SOAJ Inject 0.3 mL (0.3 mg total) into a muscle once for 1 dose 0.6 mL 0   • Ferrous Sulfate (RA IRON PO) Take by mouth     • FLUoxetine (PROzac) 10 MG tablet Take 2 tablets (20 mg total) by mouth daily 30 tablet 2   • fluticasone-umeclidinium-vilanterol 200-62.5-25 mcg/actuation AEPB inhaler Inhale 1 puff daily Rinse mouth after use. 1 each 0   • ipratropium-albuterol (DUO-NEB) 0.5-2.5 mg/3 mL nebulizer solution inhale contents of 1 vial ( 3 milliliters ) in nebulizer by mouth and INTO THE LUNGS every 6 hours     • Magnesium Oxide 500 MG TABS Take by mouth     • methylphenidate (Ritalin LA) 10 MG 24 hr capsule Take 1 capsule (10 mg total) by mouth every morning - This medication should not be taken on weekends or days when you are not working. Max Daily Amount: 10 mg 30 capsule 0   • montelukast (SINGULAIR) 10 mg tablet Take 1 tablet (10 mg total) by mouth daily 90 tablet 0   • Zinc 30 MG CAPS Take by mouth       No current facility-administered medications for this visit. Allergies   Allergen Reactions   • Sulfa Antibiotics Anaphylaxis   • Levofloxacin      Other reaction(s): sensative   • Prednisone Other (See Comments)   • Tobramycin      Other reaction(s): sensative   • Pseudoephedrine Palpitations       Review of Systems    Video Exam    There were no vitals filed for this visit.     Physical Exam     Visit Time    V

## 2023-08-18 DIAGNOSIS — J40 BRONCHITIS: ICD-10-CM

## 2023-08-18 DIAGNOSIS — U07.1 COVID-19: ICD-10-CM

## 2023-08-18 DIAGNOSIS — J45.40 MODERATE PERSISTENT ASTHMA WITHOUT COMPLICATION: ICD-10-CM

## 2023-08-18 RX ORDER — IPRATROPIUM BROMIDE AND ALBUTEROL SULFATE 2.5; .5 MG/3ML; MG/3ML
3 SOLUTION RESPIRATORY (INHALATION) EVERY 6 HOURS PRN
Qty: 60 ML | Refills: 3 | Status: SHIPPED | OUTPATIENT
Start: 2023-08-18

## 2023-08-18 RX ORDER — MONTELUKAST SODIUM 10 MG/1
10 TABLET ORAL DAILY
Qty: 90 TABLET | Refills: 0 | Status: SHIPPED | OUTPATIENT
Start: 2023-08-18 | End: 2023-11-16

## 2023-08-18 RX ORDER — ALBUTEROL SULFATE 90 UG/1
2 AEROSOL, METERED RESPIRATORY (INHALATION) EVERY 6 HOURS PRN
Qty: 18 G | Refills: 0 | Status: SHIPPED | OUTPATIENT
Start: 2023-08-18

## 2023-08-22 ENCOUNTER — TELEPHONE (OUTPATIENT)
Dept: PSYCHIATRY | Facility: CLINIC | Age: 38
End: 2023-08-22

## 2023-08-22 NOTE — TELEPHONE ENCOUNTER
Patient called and stated that she was just at her eye doctor and they suggested to stop the medication Ritalin due to having eye issues and having eye surgery in a month. Patient was upset over the phone due to the recent news and she stated that is it ok for the provider to call her back to discuss the matter.

## 2023-08-23 ENCOUNTER — APPOINTMENT (OUTPATIENT)
Dept: LAB | Facility: HOSPITAL | Age: 38
End: 2023-08-23
Payer: COMMERCIAL

## 2023-08-23 DIAGNOSIS — Z00.8 ENCOUNTER FOR OTHER GENERAL EXAMINATION: ICD-10-CM

## 2023-08-23 LAB
CHOLEST SERPL-MCNC: 174 MG/DL
EST. AVERAGE GLUCOSE BLD GHB EST-MCNC: 105 MG/DL
HBA1C MFR BLD: 5.3 %
HDLC SERPL-MCNC: 60 MG/DL
LDLC SERPL CALC-MCNC: 104 MG/DL (ref 0–100)
NONHDLC SERPL-MCNC: 114 MG/DL
TRIGL SERPL-MCNC: 48 MG/DL

## 2023-08-23 PROCEDURE — 80061 LIPID PANEL: CPT

## 2023-08-23 PROCEDURE — 83036 HEMOGLOBIN GLYCOSYLATED A1C: CPT

## 2023-08-23 PROCEDURE — 36415 COLL VENOUS BLD VENIPUNCTURE: CPT

## 2023-08-23 NOTE — TELEPHONE ENCOUNTER
Returned patient's call; she would like to talk with provider regarding stopping Ritalin due to upcoming eye surgery.     Please call patient back at [phone]

## 2023-08-23 NOTE — TELEPHONE ENCOUNTER
Patient called as she had a missed call. Informed her message was sent to provider and will follow up.

## 2023-08-24 NOTE — TELEPHONE ENCOUNTER
This writer called the patient Korina Goldsmith to discuss recent developments. Ct Coronado reports that she was recently seen [place] [facility] [place] for ongoing ophthalmologic care. She reports that she has been monitored at [facility] due to suspected glaucoma for over the course of two years and overall her findings had been stable leading up to this most recent appointment. Unfortunately, at this appointment on 8/22 she was found to have worsening angle closure and reports at this time she is planned to have iridotomy procedures for both eyes in the upcoming months (with her first procedure scheduled in October). In the context of suspected glaucoma with worsening angle closure, at this time Ritalin LA will be discontinued, as stimulant agents exhibit sympathomimetic activity and may induce mydriasis, provoking an increase in intraocular pressure. Following iridotomy procedures, this writer will collaborate and coordinate with the patient's ophthalmologist to determine if stimulants can be utilized in the future. Ct Coronado expressed understanding. She reported overall that her new job was going well and reported her psychiatric symptoms were stable at this time. She denies suicidal thoughts, homicidal thoughts, thoughts of hurting herself or others. She plans to follow up on 9/11/23 for ongoing psychiatric care.     Cristobal Cohen MD

## 2023-09-05 ENCOUNTER — OFFICE VISIT (OUTPATIENT)
Dept: FAMILY MEDICINE CLINIC | Facility: CLINIC | Age: 38
End: 2023-09-05
Payer: COMMERCIAL

## 2023-09-05 VITALS
DIASTOLIC BLOOD PRESSURE: 80 MMHG | BODY MASS INDEX: 26.84 KG/M2 | SYSTOLIC BLOOD PRESSURE: 108 MMHG | WEIGHT: 167 LBS | HEIGHT: 66 IN | HEART RATE: 82 BPM | TEMPERATURE: 97.8 F | OXYGEN SATURATION: 99 %

## 2023-09-05 DIAGNOSIS — J45.50 SEVERE PERSISTENT ASTHMA, UNSPECIFIED WHETHER COMPLICATED: Primary | ICD-10-CM

## 2023-09-05 DIAGNOSIS — R06.02 SHORTNESS OF BREATH: ICD-10-CM

## 2023-09-05 DIAGNOSIS — H40.003 GLAUCOMA SUSPECT OF BOTH EYES: ICD-10-CM

## 2023-09-05 DIAGNOSIS — F43.10 PTSD (POST-TRAUMATIC STRESS DISORDER): ICD-10-CM

## 2023-09-05 DIAGNOSIS — F33.1 MODERATE EPISODE OF RECURRENT MAJOR DEPRESSIVE DISORDER (HCC): ICD-10-CM

## 2023-09-05 DIAGNOSIS — U07.1 COVID-19: ICD-10-CM

## 2023-09-05 PROCEDURE — 99213 OFFICE O/P EST LOW 20 MIN: CPT | Performed by: NURSE PRACTITIONER

## 2023-09-05 NOTE — ASSESSMENT & PLAN NOTE
Patient is following with Oh waddell and having an iridotomy on both eyes and pressures are doing well

## 2023-09-05 NOTE — PROGRESS NOTES
Name: Jess Farmer      : 1985      MRN: [id number]  Encounter Provider: JULIEN Baptiste  Encounter Date: 2023   Encounter department: [address] [place] [address]     1. Severe persistent asthma, unspecified whether complicated  Assessment & Plan:  Patient is doing well on the Singulair 10 mg duo-nebs, fluticasone, dupixent, and prn ventolin       2. Shortness of breath  Assessment & Plan:  Patient with ongoing shortness of breath secondary to COVID       3. COVID-19    4. Moderate episode of recurrent major depressive disorder [facility])  Assessment & Plan:  Taking the Prozac and Wellbutrin and following with psychiatry       5. PTSD (post-traumatic stress disorder)    6. 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 Patient plans to study and take her boards in the future. She is on the dupixent and is doing better with her breathing is having some IBS symptoms     Review of Systems   Constitutional: Negative for activity change, appetite change, chills, diaphoresis, fatigue, fever and unexpected weight change. HENT: Negative for congestion, ear pain, hearing loss, postnasal drip, sinus pressure, sinus pain, sneezing and sore throat. Eyes: Negative for pain, redness and visual disturbance. Respiratory: Positive for cough and chest tightness. Negative for shortness of breath. Cardiovascular: Negative for chest pain and leg swelling. Gastrointestinal: Negative for abdominal pain, diarrhea, nausea and vomiting. Endocrine: Negative. Genitourinary: Negative. Musculoskeletal: Negative for arthralgias. Allergic/Immunologic: Negative.     Neurological: Negative for dizziness and light-headedness. Hematological: Negative. Psychiatric/Behavioral: Negative for behavioral problems and dysphoric mood. Current Outpatient Medications on File Prior to Visit   Medication Sig   • albuterol (2.5 mg/3 mL) 0.083 % nebulizer solution Take by nebulization every 6 (six) hours as needed   • albuterol (PROVENTIL HFA,VENTOLIN HFA) 90 mcg/act inhaler Inhale 2 puffs every 6 (six) hours as needed for wheezing   • Ascorbic Acid (vitamin C) 1000 MG tablet Take 1,000 mg by mouth daily   • B Complex-C (B-complex with vitamin C) tablet Take 1 tablet by mouth daily   • Biotin 10 MG TABS Take by mouth   • buPROPion (Wellbutrin XL) 300 mg 24 hr tablet Take 1 tablet (300 mg total) by mouth every morning   • calcium carbonate (OS-BERENICE) 600 MG tablet Take 600 mg by mouth 2 (two) times a day with meals   • cholecalciferol (VITAMIN D3) 1,000 units tablet Take 1,000 Units by mouth daily   • docusate sodium (COLACE) 100 mg capsule Take 100 mg by mouth 3 (three) times a day as needed for constipation   • Dupixent 200 MG/1. 14ML SOPN Inject 2 pens (400mg total) under the skin once for 1 dose. • EPINEPHrine (EPIPEN) 0.3 mg/0.3 mL SOAJ Inject 0.3 mL (0.3 mg total) into a muscle once for 1 dose   • Ferrous Sulfate (RA IRON PO) Take by mouth   • FLUoxetine (PROzac) 10 MG tablet Take 2 tablets (20 mg total) by mouth daily   • fluticasone-umeclidinium-vilanterol 200-62.5-25 mcg/actuation AEPB inhaler Inhale 1 puff daily Rinse mouth after use. • ipratropium-albuterol (DUO-NEB) 0.5-2.5 mg/3 mL nebulizer solution Take 3 mL by nebulization every 6 (six) hours as needed for wheezing or shortness of breath   • Magnesium Oxide 500 MG TABS Take by mouth   • montelukast (SINGULAIR) 10 mg tablet Take 1 tablet (10 mg total) by mouth daily   • Zinc 30 MG CAPS Take by mouth   • [DISCONTINUED] beclomethasone (Qvar RediHaler) 40 MCG/ACT inhaler Inhale 2 puffs 2 (two) times a day Rinse mouth after use.        Objective     /80 (BP Location: Left arm, Patient Position: Sitting, Cuff Size: Adult)   Pulse 82   Temp 97.8 °F (36.6 °C)   Ht 5' 6" (1.676 m)   Wt 75.8 kg (167 lb)   SpO2 99%   BMI 26.95 kg/m²     Physical Exam  Vitals and nursing note reviewed. Constitutional:       General: She is not in acute distress. Appearance: She is well-developed. HENT:      Head: Normocephalic and atraumatic. Right Ear: Tympanic membrane normal.      Left Ear: Tympanic membrane normal.      Nose: Nose normal.      Mouth/Throat:      Mouth: Mucous membranes are moist.   Eyes:      Pupils: Pupils are equal, round, and reactive to light. Neck:      Thyroid: No thyromegaly. Cardiovascular:      Rate and Rhythm: Normal rate and regular rhythm. Heart sounds: Normal heart sounds. No murmur heard. Pulmonary:      Effort: Pulmonary effort is normal. No respiratory distress. Breath sounds: Normal breath sounds. No wheezing. Abdominal:      General: Bowel sounds are normal.      Palpations: Abdomen is soft. Musculoskeletal:         General: Normal range of motion. Cervical back: Normal range of motion. Skin:     General: Skin is warm and dry. Neurological:      General: No focal deficit present. Mental Status: She is alert and oriented to person, place, and time.    Psychiatric:         Mood and Affect: Mood normal.       JULIEN Pacheco

## 2023-09-06 DIAGNOSIS — F33.1 MODERATE EPISODE OF RECURRENT MAJOR DEPRESSIVE DISORDER (HCC): ICD-10-CM

## 2023-09-06 RX ORDER — FLUOXETINE 20 MG/1
20 TABLET, FILM COATED ORAL DAILY
Qty: 30 TABLET | Refills: 2 | Status: SHIPPED | OUTPATIENT
Start: 2023-09-06 | End: 2023-11-05

## 2023-09-06 NOTE — TELEPHONE ENCOUNTER
Medication Refill Request     Name of Medication FLUoxetine (PROzac) 10 MG tablet  Dose/Frequency Take 2 tablets (20 mg total) by mouth daily  Quantity 30  Verified pharmacy   [x]  Verified ordering Provider   [x]  Does patient have enough for the next 3 days? Yes [x] No []  Does patient have a follow-up appointment scheduled?  Yes [x] No []   If so when is appointment: 9/11 @ 4pm

## 2023-09-08 ENCOUNTER — TELEMEDICINE (OUTPATIENT)
Dept: PSYCHIATRY | Facility: CLINIC | Age: 38
End: 2023-09-08
Payer: COMMERCIAL

## 2023-09-08 DIAGNOSIS — F43.10 PTSD (POST-TRAUMATIC STRESS DISORDER): Primary | ICD-10-CM

## 2023-09-08 DIAGNOSIS — F33.1 MODERATE EPISODE OF RECURRENT MAJOR DEPRESSIVE DISORDER (HCC): ICD-10-CM

## 2023-09-08 PROCEDURE — 90834 PSYTX W PT 45 MINUTES: CPT

## 2023-09-08 NOTE — PSYCH
Behavioral Health Psychotherapy Progress Note    Psychotherapy Provided: Individual Psychotherapy     1. PTSD (post-traumatic stress disorder)        2. Moderate episode of recurrent major depressive disorder ([address])            Goals addressed in session: Goal 1     DATA:   Homero Guidry shared that she started her new job and it feels weird to her because of not having to run around and having a lunch break. Therapist and Homero Guidry reviewed CBT-I principles including sleep prescription of 7 hours 30 minutes, getting up every day at the same time and getting out of bed if she can't get back to sleep after 15 minutes. Homero Guidry shared that she feels better with Covid and she wants to take [email or web address] to help her. Therapist shared 13 steps for managing flashbacks, and recommended a book called What my Bones Know by Fanny Thompson for reading. During this session, this clinician used the following therapeutic modalities: Mindfulness-based Strategies and Supportive Psychotherapy    Substance Abuse was not addressed during this session. If the client is diagnosed with a co-occurring substance use disorder, please indicate any changes in the frequency or amount of use: . Stage of change for addressing substance use diagnoses: No substance use/Not applicable    ASSESSMENT:  Brad Rinne presents with a Euthymic/ normal mood. her affect is Normal range and intensity, which is congruent, with her mood and the content of the session. The client has made progress on their goals. Brad Rinne presents with a none risk of suicide, none risk of self-harm, and none risk of harm to others. For any risk assessment that surpasses a "low" rating, a safety plan must be developed. A safety plan was indicated: no  If yes, describe in detail     PLAN: Between sessions, Brad Rinne will .  At the next session, the therapist will use EMDR (or other form of bilateral Stimulation), Mindfulness-based Strategies and Supportive Psychotherapy to address complex PTSD. Behavioral Health Treatment Plan and Discharge Planning: Jamari Sanders is aware of and agrees to continue to work on their treatment plan. They have identified and are working toward their discharge goals. yes    Visit start and stop times:    09/08/23  Start Time: 0700  Stop Time: 0752  Total Visit Time: 52 minutes    Virtual Regular Visit    Verification of patient location:    Patient is located at Home in the following state in which I hold an active license [place]      Assessment/Plan:    Problem List Items Addressed This Visit        Other    Moderate episode of recurrent major depressive disorder (HCC)    PTSD (post-traumatic stress disorder) - Primary       Goals addressed in session: Goal 1          Reason for visit is No chief complaint on file. Encounter provider Marisa Cuadra LCSW    Provider located at [address] [place] [address] [id number] [phone]      Recent Visits  Date Type Provider Dept   09/05/23 Office Visit JULIEN Esposito [facility] recent visits within past 7 days and meeting all other requirements  Today's Visits  Date Type Provider Dept   09/08/23 Telemedicine Marisa Cuadra LCSW [facility]   Showing today's visits and meeting all other requirements  Future Appointments  No visits were found meeting these conditions. Showing future appointments within next 150 days and meeting all other requirements       The patient was identified by name and date of birth. Jamari Sanders was informed that this is a telemedicine visit and that the visit is being conducted throughthe [employer] platform. She agrees to proceed. .  My office door was closed. No one else was in the room. She acknowledged consent and understanding of privacy and security of the video platform.  The patient has agreed to participate and understands they can discontinue the visit at any time. Patient is aware this is a billable service. Yolanda Conde is a 45 y.o. female Rosenberg Berliner appeared calm and cooperative and shared her thoughts and feelings during session and was able to engage in problem solving for sleep issues.       HPI     Past Medical History:   Diagnosis Date   • Allergic    • Anemia    • Anxiety    • Arthritis    • Asthma    • Chlamydia 2014    Unfaithful partner   • Depression    • Exposure to SARS-associated coronavirus 04/12/2020   • GERD (gastroesophageal reflux disease) 2010   • Gonorrhea 2014    Unfaithful partner   • Headache(784.0)    • Lumbar herniated disc    • Migraine 2012   • Pneumonia    • Urinary tract infection    • Urogenital trichomoniasis 2015    Unfaithful partner   • UTI (urinary tract infection)    • Varicella 1988   • Visual impairment        Past Surgical History:   Procedure Laterality Date   • FL INJECTION LEFT KNEE (ARTHROGRAM)  2/28/2022   • WISDOM TOOTH EXTRACTION         Current Outpatient Medications   Medication Sig Dispense Refill   • albuterol (2.5 mg/3 mL) 0.083 % nebulizer solution Take by nebulization every 6 (six) hours as needed     • albuterol (PROVENTIL HFA,VENTOLIN HFA) 90 mcg/act inhaler Inhale 2 puffs every 6 (six) hours as needed for wheezing 18 g 0   • Ascorbic Acid (vitamin C) 1000 MG tablet Take 1,000 mg by mouth daily     • B Complex-C (B-complex with vitamin C) tablet Take 1 tablet by mouth daily     • Biotin 10 MG TABS Take by mouth     • buPROPion (Wellbutrin XL) 300 mg 24 hr tablet Take 1 tablet (300 mg total) by mouth every morning 90 tablet 0   • calcium carbonate (OS-BERENICE) 600 MG tablet Take 600 mg by mouth 2 (two) times a day with meals     • cholecalciferol (VITAMIN D3) 1,000 units tablet Take 1,000 Units by mouth daily     • docusate sodium (COLACE) 100 mg capsule Take 100 mg by mouth 3 (three) times a day as needed for constipation     • Dupixent 200 MG/1. 14ML SOPN Inject 2 pens (400mg total) under the skin once for 1 dose. 180 mL 10   • EPINEPHrine (EPIPEN) 0.3 mg/0.3 mL SOAJ Inject 0.3 mL (0.3 mg total) into a muscle once for 1 dose 0.6 mL 0   • Ferrous Sulfate (RA IRON PO) Take by mouth     • FLUoxetine (PROzac) 20 MG tablet Take 1 tablet (20 mg total) by mouth daily 30 tablet 2   • fluticasone-umeclidinium-vilanterol 200-62.5-25 mcg/actuation AEPB inhaler Inhale 1 puff daily Rinse mouth after use. 1 each 0   • ipratropium-albuterol (DUO-NEB) 0.5-2.5 mg/3 mL nebulizer solution Take 3 mL by nebulization every 6 (six) hours as needed for wheezing or shortness of breath 60 mL 3   • Magnesium Oxide 500 MG TABS Take by mouth     • montelukast (SINGULAIR) 10 mg tablet Take 1 tablet (10 mg total) by mouth daily 90 tablet 0   • Zinc 30 MG CAPS Take by mouth       No current facility-administered medications for this visit. Allergies   Allergen Reactions   • Sulfa Antibiotics Anaphylaxis   • Levofloxacin      Other reaction(s): sensative   • Prednisone Other (See Comments)   • Tobramycin      Other reaction(s): sensative   • Pseudoephedrine Palpitations       Review of Systems    Video Exam    There were no vitals filed for this visit.     Physical Exam

## 2023-09-11 ENCOUNTER — TELEMEDICINE (OUTPATIENT)
Dept: PSYCHIATRY | Facility: CLINIC | Age: 38
End: 2023-09-11
Payer: COMMERCIAL

## 2023-09-11 DIAGNOSIS — R53.83 FATIGUE, UNSPECIFIED TYPE: ICD-10-CM

## 2023-09-11 DIAGNOSIS — F90.0 ATTENTION DEFICIT HYPERACTIVITY DISORDER, INATTENTIVE TYPE: ICD-10-CM

## 2023-09-11 DIAGNOSIS — F33.1 MODERATE EPISODE OF RECURRENT MAJOR DEPRESSIVE DISORDER (HCC): Primary | ICD-10-CM

## 2023-09-11 DIAGNOSIS — F43.10 PTSD (POST-TRAUMATIC STRESS DISORDER): ICD-10-CM

## 2023-09-11 PROCEDURE — 99214 OFFICE O/P EST MOD 30 MIN: CPT

## 2023-09-11 RX ORDER — PRAZOSIN HYDROCHLORIDE 1 MG/1
1 CAPSULE ORAL
Qty: 30 CAPSULE | Refills: 0 | Status: SHIPPED | OUTPATIENT
Start: 2023-09-11

## 2023-09-11 NOTE — PSYCH
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 has no access to them. [employer] no history of arrests or violence pertaining to use of a deadly weapon. Based on today's assessment, Sandor Carreon presents the following risk of harm to self: Minimal     Risk of Harm to Others: The following ratings are based on assessment at the time of the interview  Demographic Risk Factors include: Under age 36  Historical Risk Factors include: None  Recent Specific Risk Factors include: None  Protective Factors: No current homicidal ideation  Based on today's assessment, Sandor Carreon presents the following risk of harm to others: Minimal    Risks/Benefits      Risks, Benefits And Possible Side Effects Of Medications:    Risks, benefits, and possible side effects of medications explained to Sandor Carreon and she verbalizes understanding and agreement for treatment. Controlled Medication Discussion:     Sandor Carreon has been filling controlled prescriptions on time as prescribed according to [address], however at this time controlled medications or not being prescribed by the psychiatry practice. Psychotherapy Provided:     Individual psychotherapy provided: Yes  Medication changes discussed with Sandor Carreon. Recent stressor including job stressors discussed with Sandor Carreon. Cognitive therapy was utilized during the session.     Treatment Plan:    Completed and signed during the session: Not applicable - Treatment Plan not due at this session    Visit Time    Visit Start Time: 3:30 PM  Visit Stop Time: 4:30 PM  Total Visit Duration: 60 minutes    This note was shared with patient. Jason Centeno MD 09/11/23    This note has been constructed using a voice recognition system. There may be translation, syntax, or grammatical errors. If you have any questions, please contact the dictating provider.

## 2023-09-11 NOTE — PATIENT INSTRUCTIONS
Please present for your previously scheduled appointment approximately 15 minutes prior to appointment time. If you are running late or are unable to attend your appointment, please call our [place] office at [phone] or our [place] ([place]) office at [phone] if applicable to notify the office of your absence. If you are in psychological crisis including not limited to suicidal/homicidal thoughts or thoughts of self-injury, do not hesitate to call/contact your [facility] hotline (see below) or attend to the nearest emergency department.   [facility] Crisis: [address] Crisis: [phone]  [address] Crisis: [address] Crisis: [address] Crisis: [address] Crisis: [address] Crisis: [phone]  National Suicide Prevention Hotline: 3-819.804.8555

## 2023-09-12 ENCOUNTER — OFFICE VISIT (OUTPATIENT)
Dept: PULMONOLOGY | Facility: CLINIC | Age: 38
End: 2023-09-12
Payer: COMMERCIAL

## 2023-09-12 VITALS
DIASTOLIC BLOOD PRESSURE: 82 MMHG | HEIGHT: 66 IN | SYSTOLIC BLOOD PRESSURE: 110 MMHG | BODY MASS INDEX: 27.19 KG/M2 | WEIGHT: 169.2 LBS | HEART RATE: 83 BPM | RESPIRATION RATE: 18 BRPM | OXYGEN SATURATION: 98 % | TEMPERATURE: 98 F

## 2023-09-12 DIAGNOSIS — J06.9 UPPER RESPIRATORY INFECTION, ACUTE: ICD-10-CM

## 2023-09-12 DIAGNOSIS — J45.50 SEVERE PERSISTENT ASTHMA WITHOUT COMPLICATION: ICD-10-CM

## 2023-09-12 DIAGNOSIS — J06.9 UPPER RESPIRATORY TRACT INFECTION, UNSPECIFIED TYPE: Primary | ICD-10-CM

## 2023-09-12 PROCEDURE — 99214 OFFICE O/P EST MOD 30 MIN: CPT | Performed by: INTERNAL MEDICINE

## 2023-09-12 RX ORDER — AZITHROMYCIN 250 MG/1
TABLET, FILM COATED ORAL
Qty: 6 TABLET | Refills: 0 | Status: SHIPPED | OUTPATIENT
Start: 2023-09-12 | End: 2023-09-16

## 2023-09-12 RX ORDER — BENZONATATE 100 MG/1
100 CAPSULE ORAL 3 TIMES DAILY PRN
Qty: 20 CAPSULE | Refills: 0 | Status: SHIPPED | OUTPATIENT
Start: 2023-09-12

## 2023-09-12 NOTE — ASSESSMENT & PLAN NOTE
Likely contracted from her son who recently restarted school. She is improving.     Plan  Continue rest,   Tessalon Perles  Mucinex  Adding azithromycin for anti-inflammatory effects  If symptoms fail to improve may need lab work and further imaging  Continue asthma treatment

## 2023-09-12 NOTE — ASSESSMENT & PLAN NOTE
Has had great improvement in terms of her asthma after the addition of Dupixent. Recently having more upper respiratory symptoms but this is related to acute infection. Plan  Continue Dupixent, high-dose Trelegy, Singulair, albuterol.   In the future may be able to dose reduce Trelegy  Have tried to reduced administration of prednisone administration given history of glaucoma  Follow-up in 3 months

## 2023-09-12 NOTE — PROGRESS NOTES
Pulmonary Follow-up   Karey Morley 45 y.o. female MRN: [id number]  9/12/2023      Assessment:    Severe persistent asthma  Has had great improvement in terms of her asthma after the addition of Dupixent. Recently having more upper respiratory symptoms but this is related to acute infection. Plan  Continue Dupixent, high-dose Trelegy, Singulair, albuterol. In the future may be able to dose reduce Trelegy  Have tried to reduced administration of prednisone administration given history of glaucoma  Follow-up in 3 months    Upper respiratory infection, acute  Likely contracted from her son who recently restarted school. She is improving. Plan  Continue rest,   Tessalon Perles  Mucinex  Adding azithromycin for anti-inflammatory effects  If symptoms fail to improve may need lab work and further imaging  Continue asthma treatment      Plan:    Diagnoses and all orders for this visit:    Upper respiratory tract infection, unspecified type  -     azithromycin (ZITHROMAX) 250 mg tablet; Take 2 tablets today then 1 tablet daily x 4 days  -     benzonatate (TESSALON PERLES) 100 mg capsule; Take 1 capsule (100 mg total) by mouth 3 (three) times a day as needed for cough    Severe persistent asthma without complication    Upper respiratory infection, acute        No follow-ups on file. History of Present Illness   HPI:  Karey Morley is a 45 y.o. female with a history of asthma who originally presented to the pulmonary office for evaluation of shortness of breath related to previous COVID infection in January 2022. Patient was seen in the emergency room on January 17 due to continued chest pain, wheezing, cough and shortness of breath. She was diagnosed with asthma as a child but it was previously well controlled prior to her episodes of COVID-19 infection. Previously never required hospitalization or intubation her asthma was historically induced by exercise.     In the emergency room influenza/RSV/COVID PCR were negative. D-dimer was mildly elevated 0.97 mcg/mL. VBG showed respiratory alkalosis. CT a with PE study was performed that showed no evidence of pulmonary embolism aneurysm or dissection. Since that time continues to have sob, wheezing, chest tightness despite use of flovent twice daily. She also uses duonebs and albuterol nebs. States that she has had covid-19 four times. Previous medications: flovent, singulair     Triggers exertion: exertion, cold dry air    CBC from 1/2023 was normal.     Echo from 4/7 showed no evidence of shunt. Here today for follow    She remains on dupixent, Trelegy Singulair and albuterol. However the past several days has been acutely ill with upper respiratory symptoms to include headaches, sore throat and fevers. Reports that her son was sick with similar symptoms and recently restarted school  Prior to this acute illness she was doing well and had good improvement in her symptoms.                . Answers for HPI/ROS submitted by the patient on 7/11/2023  Do you have chest tightness?: Yes  Chronicity: chronic  When did you first notice your symptoms?: more than 1 month ago  How often do your symptoms occur?: 2 to 4 times per day  Since you first noticed this problem, how has it changed?: unchanged  Do you have shortness of breath that occurs with effort or exertion?: No  Do you have ear congestion?: No  Do you have heartburn?: No  Do you have fatigue?: Yes  Do you have nasal congestion?: No  Do you have shortness of breath when lying flat?: No  Do you have shortness of breath when you wake up?: Yes  Do you have sweats?: No  Have you experienced weight loss?: No  Which of the following makes your symptoms worse?: change in weather, climbing stairs, exercise, exposure to smoke, minimal activity, strenuous activity, URI  Which of the following makes your symptoms better?: beta-agonist, leukotriene antagonist, rest            Review of Systems   Constitutional: Negative for chills, fatigue and fever. HENT: Negative for congestion, nosebleeds, postnasal drip, rhinorrhea, sinus pressure and sore throat. Eyes: Negative for discharge, redness and itching. Respiratory: Positive for cough, chest tightness, shortness of breath and wheezing. Negative for choking and stridor. Cardiovascular: Negative for chest pain, palpitations and leg swelling. Gastrointestinal: Negative for blood in stool. Genitourinary: Negative for difficulty urinating and dysuria. Musculoskeletal: Negative for arthralgias, joint swelling and myalgias. Skin: Negative for color change and rash. Neurological: Negative for light-headedness and headaches. Hematological: Negative for adenopathy.        Historical Information   Past Medical History:   Diagnosis Date   • Allergic    • Anemia    • Anxiety    • Arthritis    • Asthma    • Chlamydia 2014    Unfaithful partner   • Depression    • Exposure to SARS-associated coronavirus 04/12/2020   • GERD (gastroesophageal reflux disease) 2010   • Gonorrhea 2014    Unfaithful partner   • Headache(784.0)    • Lumbar herniated disc    • Migraine 2012   • Pneumonia    • Urinary tract infection    • Urogenital trichomoniasis 2015    Unfaithful partner   • UTI (urinary tract infection)    • Varicella 1988   • Visual impairment      Past Surgical History:   Procedure Laterality Date   • FL INJECTION LEFT KNEE (ARTHROGRAM)  2/28/2022   • WISDOM TOOTH EXTRACTION       Family History   Problem Relation Age of Onset   • BRCA2 Negative Mother    • BRCA1 Negative Mother    • Breast cancer additional onset Mother         Left Masectomy   • Hypertension Mother    • Diabetes Mother    • Asthma Mother    • Cancer Mother         breast   • Breast cancer Mother         In 2019, left side. -brca   • Migraines Mother    • Mental illness Father         family history   • Alcohol abuse Father    • Hypertension Father    • Hyperlipidemia Father    • Diabetes Father    • Heart attack Father         Assumed. Found  at 61   • Colon cancer Maternal Grandmother    • Breast cancer additional onset Maternal Grandmother    • Diabetes Maternal Grandmother    • Cancer Maternal Grandmother         breast, colon   • Breast cancer Maternal Grandmother    • Prostate cancer Maternal Grandfather         lung   • Cancer Maternal Grandfather         lung (smoker)   • Completed Suicide  Maternal Grandfather    • Lung cancer Maternal Grandfather    • Asthma Brother    • Asthma Brother        Social History   Places lived: [place]  Occupation: ER nurse.    Tobacco: none smoker  Illicits:  None   Hobbies:   Pets: dogs       Meds/Allergies     Current Outpatient Medications:   •  albuterol (2.5 mg/3 mL) 0.083 % nebulizer solution, Take by nebulization every 6 (six) hours as needed, Disp: , Rfl:   •  albuterol (PROVENTIL HFA,VENTOLIN HFA) 90 mcg/act inhaler, Inhale 2 puffs every 6 (six) hours as needed for wheezing, Disp: 18 g, Rfl: 0  •  Ascorbic Acid (vitamin C) 1000 MG tablet, Take 1,000 mg by mouth daily, Disp: , Rfl:   •  azithromycin (ZITHROMAX) 250 mg tablet, Take 2 tablets today then 1 tablet daily x 4 days, Disp: 6 tablet, Rfl: 0  •  B Complex-C (B-complex with vitamin C) tablet, Take 1 tablet by mouth daily, Disp: , Rfl:   •  benzonatate (TESSALON PERLES) 100 mg capsule, Take 1 capsule (100 mg total) by mouth 3 (three) times a day as needed for cough, Disp: 20 capsule, Rfl: 0  •  Biotin 10 MG TABS, Take by mouth, Disp: , Rfl:   •  buPROPion (Wellbutrin XL) 300 mg 24 hr tablet, Take 1 tablet (300 mg total) by mouth every morning, Disp: 90 tablet, Rfl: 0  •  calcium carbonate (OS-BERENICE) 600 MG tablet, Take 600 mg by mouth 2 (two) times a day with meals, Disp: , Rfl:   •  cholecalciferol (VITAMIN D3) 1,000 units tablet, Take 1,000 Units by mouth daily, Disp: , Rfl:   •  docusate sodium (COLACE) 100 mg capsule, Take 100 mg by mouth 3 (three) times a day as needed for constipation, Disp: , Rfl:   • Dupixent 200 MG/1. 14ML SOPN, Inject 2 pens (400mg total) under the skin once for 1 dose., Disp: 180 mL, Rfl: 10  •  Ferrous Sulfate (RA IRON PO), Take by mouth, Disp: , Rfl:   •  FLUoxetine (PROzac) 20 MG tablet, Take 1 tablet (20 mg total) by mouth daily, Disp: 30 tablet, Rfl: 2  •  fluticasone-umeclidinium-vilanterol 200-62.5-25 mcg/actuation AEPB inhaler, Inhale 1 puff daily Rinse mouth after use., Disp: 1 each, Rfl: 0  •  ipratropium-albuterol (DUO-NEB) 0.5-2.5 mg/3 mL nebulizer solution, Take 3 mL by nebulization every 6 (six) hours as needed for wheezing or shortness of breath, Disp: 60 mL, Rfl: 3  •  Magnesium Oxide 500 MG TABS, Take by mouth, Disp: , Rfl:   •  montelukast (SINGULAIR) 10 mg tablet, Take 1 tablet (10 mg total) by mouth daily, Disp: 90 tablet, Rfl: 0  •  Zinc 30 MG CAPS, Take by mouth, Disp: , Rfl:   •  EPINEPHrine (EPIPEN) 0.3 mg/0.3 mL SOAJ, Inject 0.3 mL (0.3 mg total) into a muscle once for 1 dose, Disp: 0.6 mL, Rfl: 0  •  prazosin (MINIPRESS) 1 mg capsule, Take 1 capsule (1 mg total) by mouth daily at bedtime (Patient not taking: Reported on 9/12/2023), Disp: 30 capsule, Rfl: 0  Allergies   Allergen Reactions   • Sulfa Antibiotics Anaphylaxis   • Levofloxacin      Other reaction(s): sensative   • Prednisone Other (See Comments)   • Tobramycin      Other reaction(s): sensative   • Pseudoephedrine Palpitations       Vitals: Blood pressure 110/82, pulse 83, temperature 98 °F (36.7 °C), temperature source Tympanic, resp. rate 18, height 5' 6" (1.676 m), weight 76.7 kg (169 lb 3.2 oz), SpO2 98 %, not currently breastfeeding. Body mass index is 27.31 kg/m². Oxygen Therapy  SpO2: 98 %  Oxygen Therapy: None (Room air)      Physical Exam  Physical Exam  Vitals reviewed. Constitutional:       General: She is not in acute distress. Appearance: She is well-developed. She is not ill-appearing, toxic-appearing or diaphoretic. HENT:      Head: Normocephalic and atraumatic.       Right Ear: External ear normal.      Left Ear: External ear normal.      Nose: Nose normal. No congestion or rhinorrhea. Mouth/Throat:      Pharynx: No oropharyngeal exudate or posterior oropharyngeal erythema. Eyes:      General: No scleral icterus. Right eye: No discharge. Left eye: No discharge. Conjunctiva/sclera: Conjunctivae normal.      Pupils: Pupils are equal, round, and reactive to light. Neck:      Trachea: No tracheal deviation. Cardiovascular:      Rate and Rhythm: Normal rate and regular rhythm. Heart sounds: Normal heart sounds. No murmur heard. No friction rub. No gallop. Pulmonary:      Effort: Pulmonary effort is normal.      Breath sounds: Normal breath sounds. No stridor. No wheezing or rales. Musculoskeletal:      Cervical back: Normal range of motion. Right lower leg: No edema. Left lower leg: No edema. Lymphadenopathy:      Head:      Right side of head: No submental, submandibular, preauricular or posterior auricular adenopathy. Left side of head: No submental, submandibular, preauricular or posterior auricular adenopathy. Cervical: No cervical adenopathy. Skin:     General: Skin is warm and dry. Findings: No lesion or rash. Neurological:      Mental Status: She is alert and oriented to person, place, and time. Labs: I have personally reviewed pertinent lab results.   Lab Results   Component Value Date    WBC 5.64 04/04/2023    HGB 12.6 04/04/2023    HCT 38.6 04/04/2023    MCV 94 04/04/2023     04/04/2023     Lab Results   Component Value Date    CALCIUM 9.2 01/16/2023    K 3.3 (L) 01/16/2023    CO2 23 01/16/2023     01/16/2023    BUN 12 01/16/2023    CREATININE 1.08 01/16/2023     Lab Results   Component Value Date    IGE 27.9 04/04/2023     Lab Results   Component Value Date    ALT 18 01/16/2023    AST 20 01/16/2023    ALKPHOS 69 01/16/2023       Imaging and other studies: I have personally reviewed pertinent reports. and I have personally reviewed pertinent films in PACS      Lung 2/1/2023   LUNGS:  Nothing to suggest interstitial lung disease with no reticulation, traction bronchiectasis/bronchiolectasis, groundglass, or honeycombing. No significant air trapping on expiration.     AIRWAYS: No significant filling defects.     PLEURA:  Unremarkable.     HEART/GREAT VESSELS:  Normal for age.     MEDIASTINUM AND IGNACIO:  Unremarkable.     CHEST WALL AND LOWER NECK: Unremarkable.     UPPER ABDOMEN:  2.3 cm low-attenuation lesion in the dome of the liver, 30 Hounsfield units, poorly demonstrated on the CTA of the chest 2 weeks ago due to streak artifact from contrast in the heart.     OSSEOUS STRUCTURES: Normal for age.     IMPRESSION:     Nothing to indicate interstitial lung disease with no change from the chest CT 2 weeks ago.     2.3 cm low-attenuation lesion in the dome of the liver. This may be a benign hemangioma but recommend further evaluation with a contrast-enhanced abdomen CT using the hemangioma protocol. Pulmonary function testing:     Jan 30, 2023     Study Interpretation:   • Normal lung volumes. • Mild airflow limitation. • Significant improvement in the airflow following the administration of bronchodilators. • Normal diffusion capacity. • Normal airway resistance as indicated by the specific airway conductance. EKG, Pathology, and Other Studies: I have personally reviewed pertinent reports. and I have personally reviewed pertinent films in PACS      •  Left Ventricle: Left ventricular cavity size is normal. Wall thickness is normal. The left ventricular ejection fraction is 60% by visual estimation. Systolic function is normal. Wall motion is normal.  •  Atrial Septum: No patent foramen ovale detected with provocation.     Findings    Left Ventricle Left ventricular cavity size is normal. Wall thickness is normal. The left ventricular ejection fraction is 60% by visual estimation. Systolic function is normal.  Wall motion is normal.   Atrial Septum No patent foramen ovale detected with provocation. Normal sinus rhythm  Incomplete right bundle branch block  No previous ECGs available    Norm Guerrier M.D.   [facility]  Answers for HPI/ROS submitted by the patient on 4/3/2023  Do you have chest tightness?: Yes  Do you have difficulty breathing?: Yes  Chronicity: chronic  When did you first notice your symptoms?: more than 1 month ago  How often do your symptoms occur?: constantly  Since you first noticed this problem, how has it changed?: unchanged  Do you have shortness of breath that occurs with effort or exertion?: Yes  Do you have ear congestion?: No  Do you have heartburn?: No  Do you have fatigue?: Yes  Do you have nasal congestion?: No  Do you have shortness of breath when lying flat?: No  Do you have shortness of breath when you wake up?: Yes  Do you have sweats?: No  Have you experienced weight loss?: No  Which of the following makes your symptoms worse?: change in weather, climbing stairs, exercise, exposure to fumes, exposure to smoke, strenuous activity  Which of the following makes your symptoms better?: beta-agonist, ipratropium, leukotriene antagonist, steroid inhaler  Risk factors for lung disease: animal exposure, occupational exposure      Answers for HPI/ROS submitted by the patient on 7/11/2023  Do you have chest tightness?: Yes  Chronicity: chronic  When did you first notice your symptoms?: more than 1 month ago  How often do your symptoms occur?: 2 to 4 times per day  Since you first noticed this problem, how has it changed?: unchanged  Do you have shortness of breath that occurs with effort or exertion?: No  Do you have ear congestion?: No  Do you have heartburn?: No  Do you have fatigue?: Yes  Do you have nasal congestion?: No  Do you have shortness of breath when lying flat?: No  Do you have shortness of breath when you wake up?: Yes  Do you have sweats?: No  Have you experienced weight loss?: No  Which of the following makes your symptoms worse?: change in weather, climbing stairs, exercise, exposure to smoke, minimal activity, strenuous activity, URI  Which of the following makes your symptoms better?: beta-agonist, leukotriene antagonist, rest

## 2023-09-12 NOTE — LETTER
September 12, 2023     Patient: Rad Cortes  YOB: 1985  Date of Visit: 9/12/2023      To Whom it May Concern:    Rad Cortes is under my professional care. Iris Clarke was seen in my office on 9/12/2023. If you have any questions or concerns, please don't hesitate to call.          Sincerely,          Kathie Marshall MD        CC:

## 2023-09-13 ENCOUNTER — TELEMEDICINE (OUTPATIENT)
Dept: PSYCHIATRY | Facility: CLINIC | Age: 38
End: 2023-09-13
Payer: COMMERCIAL

## 2023-09-13 DIAGNOSIS — F43.10 PTSD (POST-TRAUMATIC STRESS DISORDER): Primary | ICD-10-CM

## 2023-09-13 DIAGNOSIS — F33.1 MODERATE EPISODE OF RECURRENT MAJOR DEPRESSIVE DISORDER (HCC): ICD-10-CM

## 2023-09-13 PROCEDURE — 90834 PSYTX W PT 45 MINUTES: CPT

## 2023-09-13 NOTE — PSYCH
Behavioral Health Psychotherapy Progress Note    Psychotherapy Provided: Individual Psychotherapy     1. PTSD (post-traumatic stress disorder)        2. Moderate episode of recurrent major depressive disorder ([address])            Goals addressed in session: Goal 1     DATA:  Moustapha Jones shared that she went to the psychiatrist and may go on something for sleep. Therapist and Moustapha Jones did some processing for target of EMDR processing of 9/11. She shared in processing that she did know what to do during crisis which was finding her brother and trying to contact her mom, then deciding to walk home. She shared she remembered more negative than positive but remembered in EMDR processing she remembered finding her brothers and her mom got panicky and filled bottles with water for the basement. She recognized she was more competent than her mom at the time in terms of knowing what to do in an emergency, and that her negative cognition of that she is incompetent really belongs to her mom. She realized she is more competent than she realized before. Therapist led her through a resource development exercise of imagining how her life would have been different if she had realized her competence earlier and wouldn't have listened to her mother's fear about not going into the [place] [employer] or go to \[facility]\"".    During this session, this clinician used the following therapeutic modalities: EMDR (or other form of bilateral Stimulation)    Substance Abuse was not addressed during this session. If the client is diagnosed with a co-occurring substance use disorder, please indicate any changes in the frequency or amount of use: . Stage of change for addressing substance use diagnoses: No substance use/Not applicable    ASSESSMENT:  Bernadette Queen presents with a  mood. her affect is Normal range and intensity, which is congruent, with her mood and the content of the session. The client has made progress on their goals.      Bernadette Queen presents with a none risk of suicide, none risk of self-harm, and none risk of harm to others. For any risk assessment that surpasses a "low" rating, a safety plan must be developed. A safety plan was indicated: no  If yes, describe in detail     PLAN: Between sessions, Jess Farmer will practice defending her position and opinions. At the next session, the therapist will use EMDR (or other form of bilateral Stimulation) to address ptsd. Behavioral Health Treatment Plan and Discharge Planning: Jess Farmer is aware of and agrees to continue to work on their treatment plan. They have identified and are working toward their discharge goals. yes    Visit start and stop times:    09/13/23  Start Time: 1600  Stop Time: 1652  Total Visit Time: 52 minutes    Virtual Regular Visit    Verification of patient location:    Patient is located at Home in the following state in which I hold an active license [place]      Assessment/Plan:    Problem List Items Addressed This Visit        Other    Moderate episode of recurrent major depressive disorder (HCC)    PTSD (post-traumatic stress disorder) - Primary       Goals addressed in session: Goal 1          Reason for visit is No chief complaint on file. Encounter provider Evelyn Gross LCSW    Provider located at [address] [place] [address] Road [address] [phone]      Recent Visits  Date Type Provider Dept   09/08/23 Telemedicine Evelyn Gross LCSW [facility]   Showing recent visits within past 7 days and meeting all other requirements  Today's Visits  Date Type Provider Dept   09/13/23 Telemedicine Evelyn Gross LCSW [facility]   Showing today's visits and meeting all other requirements  Future Appointments  No visits were found meeting these conditions.   Showing future appointments within next 150 days and meeting all other requirements       The patient was identified by name and date of birth. Alfa Bentley was informed that this is a telemedicine visit and that the visit is being conducted throughthe [employer] platform. She agrees to proceed. .  My office door was closed. No one else was in the room. She acknowledged consent and understanding of privacy and security of the video platform. The patient has agreed to participate and understands they can discontinue the visit at any time. Patient is aware this is a billable service. Subjective  Alfa Bentley is a 45 y.o. female Laurann  appeared calm and cooperative and was able to process an EMDR target and to engage in resource development as well .       HPI     Past Medical History:   Diagnosis Date   • Allergic    • Anemia    • Anxiety    • Arthritis    • Asthma    • Chlamydia 2014    Unfaithful partner   • Depression    • Exposure to SARS-associated coronavirus 04/12/2020   • GERD (gastroesophageal reflux disease) 2010   • Gonorrhea 2014    Unfaithful partner   • Headache(784.0)    • Lumbar herniated disc    • Migraine 2012   • Pneumonia    • Urinary tract infection    • Urogenital trichomoniasis 2015    Unfaithful partner   • UTI (urinary tract infection)    • Varicella 1988   • Visual impairment        Past Surgical History:   Procedure Laterality Date   • FL INJECTION LEFT KNEE (ARTHROGRAM)  2/28/2022   • WISDOM TOOTH EXTRACTION         Current Outpatient Medications   Medication Sig Dispense Refill   • albuterol (2.5 mg/3 mL) 0.083 % nebulizer solution Take by nebulization every 6 (six) hours as needed     • albuterol (PROVENTIL HFA,VENTOLIN HFA) 90 mcg/act inhaler Inhale 2 puffs every 6 (six) hours as needed for wheezing 18 g 0   • Ascorbic Acid (vitamin C) 1000 MG tablet Take 1,000 mg by mouth daily     • azithromycin (ZITHROMAX) 250 mg tablet Take 2 tablets today then 1 tablet daily x 4 days 6 tablet 0   • B Complex-C (B-complex with vitamin C) tablet Take 1 tablet by mouth daily     • benzonatate (TESSALON PERLES) 100 mg capsule Take 1 capsule (100 mg total) by mouth 3 (three) times a day as needed for cough 20 capsule 0   • Biotin 10 MG TABS Take by mouth     • buPROPion (Wellbutrin XL) 300 mg 24 hr tablet Take 1 tablet (300 mg total) by mouth every morning 90 tablet 0   • calcium carbonate (OS-BERENICE) 600 MG tablet Take 600 mg by mouth 2 (two) times a day with meals     • cholecalciferol (VITAMIN D3) 1,000 units tablet Take 1,000 Units by mouth daily     • docusate sodium (COLACE) 100 mg capsule Take 100 mg by mouth 3 (three) times a day as needed for constipation     • Dupixent 200 MG/1. 14ML SOPN Inject 2 pens (400mg total) under the skin once for 1 dose. 180 mL 10   • EPINEPHrine (EPIPEN) 0.3 mg/0.3 mL SOAJ Inject 0.3 mL (0.3 mg total) into a muscle once for 1 dose 0.6 mL 0   • Ferrous Sulfate (RA IRON PO) Take by mouth     • FLUoxetine (PROzac) 20 MG tablet Take 1 tablet (20 mg total) by mouth daily 30 tablet 2   • fluticasone-umeclidinium-vilanterol 200-62.5-25 mcg/actuation AEPB inhaler Inhale 1 puff daily Rinse mouth after use. 1 each 0   • ipratropium-albuterol (DUO-NEB) 0.5-2.5 mg/3 mL nebulizer solution Take 3 mL by nebulization every 6 (six) hours as needed for wheezing or shortness of breath 60 mL 3   • Magnesium Oxide 500 MG TABS Take by mouth     • montelukast (SINGULAIR) 10 mg tablet Take 1 tablet (10 mg total) by mouth daily 90 tablet 0   • prazosin (MINIPRESS) 1 mg capsule Take 1 capsule (1 mg total) by mouth daily at bedtime (Patient not taking: Reported on 9/12/2023) 30 capsule 0   • Zinc 30 MG CAPS Take by mouth       No current facility-administered medications for this visit.         Allergies   Allergen Reactions   • Sulfa Antibiotics Anaphylaxis   • Levofloxacin      Other reaction(s): sensative   • Prednisone Other (See Comments)   • Tobramycin      Other reaction(s): sensative   • Pseudoephedrine Palpitations       Review of Systems    Video Exam    There were no vitals filed for this visit.     Physical Exam

## 2023-09-15 ENCOUNTER — APPOINTMENT (OUTPATIENT)
Dept: LAB | Facility: HOSPITAL | Age: 38
End: 2023-09-15
Attending: INTERNAL MEDICINE
Payer: COMMERCIAL

## 2023-09-15 ENCOUNTER — HOSPITAL ENCOUNTER (OUTPATIENT)
Dept: RADIOLOGY | Facility: HOSPITAL | Age: 38
End: 2023-09-15
Payer: COMMERCIAL

## 2023-09-15 DIAGNOSIS — J45.50 SEVERE PERSISTENT ASTHMA WITHOUT COMPLICATION: Primary | ICD-10-CM

## 2023-09-15 DIAGNOSIS — J45.50 SEVERE PERSISTENT ASTHMA WITHOUT COMPLICATION: ICD-10-CM

## 2023-09-15 LAB
BASOPHILS # BLD AUTO: 0.03 THOUSANDS/ÂΜL (ref 0–0.1)
BASOPHILS NFR BLD AUTO: 0 % (ref 0–1)
EOSINOPHIL # BLD AUTO: 0.03 THOUSAND/ÂΜL (ref 0–0.61)
EOSINOPHIL NFR BLD AUTO: 0 % (ref 0–6)
ERYTHROCYTE [DISTWIDTH] IN BLOOD BY AUTOMATED COUNT: 12.4 % (ref 11.6–15.1)
HCT VFR BLD AUTO: 38.6 % (ref 34.8–46.1)
HGB BLD-MCNC: 12.6 G/DL (ref 11.5–15.4)
IMM GRANULOCYTES # BLD AUTO: 0.08 THOUSAND/UL (ref 0–0.2)
IMM GRANULOCYTES NFR BLD AUTO: 1 % (ref 0–2)
LYMPHOCYTES # BLD AUTO: 1.71 THOUSANDS/ÂΜL (ref 0.6–4.47)
LYMPHOCYTES NFR BLD AUTO: 25 % (ref 14–44)
MCH RBC QN AUTO: 30.8 PG (ref 26.8–34.3)
MCHC RBC AUTO-ENTMCNC: 32.6 G/DL (ref 31.4–37.4)
MCV RBC AUTO: 94 FL (ref 82–98)
MONOCYTES # BLD AUTO: 0.51 THOUSAND/ÂΜL (ref 0.17–1.22)
MONOCYTES NFR BLD AUTO: 7 % (ref 4–12)
NEUTROPHILS # BLD AUTO: 4.55 THOUSANDS/ÂΜL (ref 1.85–7.62)
NEUTS SEG NFR BLD AUTO: 67 % (ref 43–75)
NRBC BLD AUTO-RTO: 0 /100 WBCS
PLATELET # BLD AUTO: 323 THOUSANDS/UL (ref 149–390)
PMV BLD AUTO: 9.4 FL (ref 8.9–12.7)
RBC # BLD AUTO: 4.09 MILLION/UL (ref 3.81–5.12)
WBC # BLD AUTO: 6.91 THOUSAND/UL (ref 4.31–10.16)

## 2023-09-15 PROCEDURE — 85025 COMPLETE CBC W/AUTO DIFF WBC: CPT

## 2023-09-15 PROCEDURE — 71046 X-RAY EXAM CHEST 2 VIEWS: CPT

## 2023-09-15 PROCEDURE — 36415 COLL VENOUS BLD VENIPUNCTURE: CPT

## 2023-09-15 RX ORDER — LEVALBUTEROL INHALATION SOLUTION 0.63 MG/3ML
0.63 SOLUTION RESPIRATORY (INHALATION) 3 TIMES DAILY
Qty: 30 ML | Refills: 1 | Status: SHIPPED | OUTPATIENT
Start: 2023-09-15

## 2023-10-02 ENCOUNTER — TELEMEDICINE (OUTPATIENT)
Dept: PSYCHIATRY | Facility: CLINIC | Age: 38
End: 2023-10-02
Payer: COMMERCIAL

## 2023-10-02 DIAGNOSIS — F33.1 MODERATE EPISODE OF RECURRENT MAJOR DEPRESSIVE DISORDER (HCC): Primary | ICD-10-CM

## 2023-10-02 DIAGNOSIS — F43.10 PTSD (POST-TRAUMATIC STRESS DISORDER): ICD-10-CM

## 2023-10-02 PROCEDURE — 90834 PSYTX W PT 45 MINUTES: CPT

## 2023-10-02 NOTE — PSYCH
Behavioral Health Psychotherapy Progress Note    Psychotherapy Provided: Individual Psychotherapy     1. Moderate episode of recurrent major depressive disorder ([address])        2. PTSD (post-traumatic stress disorder)            Goals addressed in session: Goal 1     DATA: Homero Guidry shared she doesn't have much to talk about and said she has been more mindful to the moment and not as introspective as she once was and that she isn't feeling as "sorry" and thinking back on things as much. She shared some information about her Theora Gull, that he is neurodivergent and angry and his mother is emotionally neglectful. Edmar doesn't say please and thank you and adults around him give him things to keep him quiet. Therapist and Homero Guidry talked about some parenting techniques to try with him as well as therapist and Homero Guidry talking about her being mindful about the possibility that she is triggered by Josh to her own past and traumas and to remind herself to remain calm and utilize flashback techniques if needed. During this session, this clinician used the following therapeutic modalities: Mindfulness-based Strategies and Supportive Psychotherapy    Substance Abuse was not addressed during this session. If the client is diagnosed with a co-occurring substance use disorder, please indicate any changes in the frequency or amount of use: . Stage of change for addressing substance use diagnoses: No substance use/Not applicable    ASSESSMENT:  Brad Rinne presents with a Euthymic/ normal mood. her affect is Normal range and intensity, which is congruent, with her mood and the content of the session. The client has made progress on their goals. Brad Rinne presents with a none risk of suicide, none risk of self-harm, and none risk of harm to others. For any risk assessment that surpasses a "low" rating, a safety plan must be developed.     A safety plan was indicated: no  If yes, describe in detail     PLAN: Between sessions, Cecilio Velasquez will utilize flashback management as needed. At the next session, the therapist will use Mindfulness-based Strategies and Supportive Psychotherapy to address interpersonal effectiveness, flashbacks. Behavioral Health Treatment Plan and Discharge Planning: Cecilio Velasquez is aware of and agrees to continue to work on their treatment plan. They have identified and are working toward their discharge goals. yes    Visit start and stop times:    10/02/23  Start Time: 1700  Stop Time: 1745  Total Visit Time: 45 minutes    Virtual Regular Visit    Verification of patient location:    Patient is located at Home in the following state in which I hold an active license [place]      Assessment/Plan:    Problem List Items Addressed This Visit        Other    Moderate episode of recurrent major depressive disorder ([address]) - Primary    PTSD (post-traumatic stress disorder)       Goals addressed in session: Goal 1          Reason for visit is No chief complaint on file. Encounter provider Felice Zhu LCSW    Provider located at [address] [place] [id number] [phone]      Recent Visits  No visits were found meeting these conditions. Showing recent visits within past 7 days and meeting all other requirements  Today's Visits  Date Type Provider Dept   10/02/23 Telemedicine Felice Zhu LCSW [facility]   Showing today's visits and meeting all other requirements  Future Appointments  No visits were found meeting these conditions. Showing future appointments within next 150 days and meeting all other requirements       The patient was identified by name and date of birth. Cecilio Velasquez was informed that this is a telemedicine visit and that the visit is being conducted throughthe [employer] platform. She agrees to proceed. .  My office door was closed.  No one else was in the room. She acknowledged consent and understanding of privacy and security of the video platform. The patient has agreed to participate and understands they can discontinue the visit at any time. Patient is aware this is a billable service. Subjective  Bernadette Queen is a 45 y.o. female Moustapha Vacay appeared calm and cooperative and was able to engage in open communication during session .       HPI     Past Medical History:   Diagnosis Date   • Allergic    • Anemia    • Anxiety    • Arthritis    • Asthma    • Chlamydia 2014    Unfaithful partner   • Depression    • Exposure to SARS-associated coronavirus 04/12/2020   • GERD (gastroesophageal reflux disease) 2010   • Gonorrhea 2014    Unfaithful partner   • Headache(784.0)    • Lumbar herniated disc    • Migraine 2012   • Pneumonia    • Urinary tract infection    • Urogenital trichomoniasis 2015    Unfaithful partner   • UTI (urinary tract infection)    • Varicella 1988   • Visual impairment        Past Surgical History:   Procedure Laterality Date   • FL INJECTION LEFT KNEE (ARTHROGRAM)  2/28/2022   • WISDOM TOOTH EXTRACTION         Current Outpatient Medications   Medication Sig Dispense Refill   • albuterol (2.5 mg/3 mL) 0.083 % nebulizer solution Take by nebulization every 6 (six) hours as needed     • albuterol (PROVENTIL HFA,VENTOLIN HFA) 90 mcg/act inhaler Inhale 2 puffs every 6 (six) hours as needed for wheezing 18 g 0   • Ascorbic Acid (vitamin C) 1000 MG tablet Take 1,000 mg by mouth daily     • B Complex-C (B-complex with vitamin C) tablet Take 1 tablet by mouth daily     • benzonatate (TESSALON PERLES) 100 mg capsule Take 1 capsule (100 mg total) by mouth 3 (three) times a day as needed for cough 20 capsule 0   • Biotin 10 MG TABS Take by mouth     • buPROPion (Wellbutrin XL) 300 mg 24 hr tablet Take 1 tablet (300 mg total) by mouth every morning 90 tablet 0   • calcium carbonate (OS-BERENICE) 600 MG tablet Take 600 mg by mouth 2 (two) times a day with meals     • cholecalciferol (VITAMIN D3) 1,000 units tablet Take 1,000 Units by mouth daily     • docusate sodium (COLACE) 100 mg capsule Take 100 mg by mouth 3 (three) times a day as needed for constipation     • Dupixent 200 MG/1. 14ML SOPN Inject 2 pens (400mg total) under the skin once for 1 dose. 180 mL 10   • EPINEPHrine (EPIPEN) 0.3 mg/0.3 mL SOAJ Inject 0.3 mL (0.3 mg total) into a muscle once for 1 dose 0.6 mL 0   • Ferrous Sulfate (RA IRON PO) Take by mouth     • FLUoxetine (PROzac) 20 MG tablet Take 1 tablet (20 mg total) by mouth daily 30 tablet 2   • fluticasone-umeclidinium-vilanterol 200-62.5-25 mcg/actuation AEPB inhaler Inhale 1 puff daily Rinse mouth after use. 1 each 0   • ipratropium-albuterol (DUO-NEB) 0.5-2.5 mg/3 mL nebulizer solution Take 3 mL by nebulization every 6 (six) hours as needed for wheezing or shortness of breath 60 mL 3   • levalbuterol (Xopenex) 0.63 mg/3 mL nebulizer solution Take 3 mL (0.63 mg total) by nebulization 3 (three) times a day 30 mL 1   • Magnesium Oxide 500 MG TABS Take by mouth     • montelukast (SINGULAIR) 10 mg tablet Take 1 tablet (10 mg total) by mouth daily 90 tablet 0   • prazosin (MINIPRESS) 1 mg capsule Take 1 capsule (1 mg total) by mouth daily at bedtime (Patient not taking: Reported on 9/12/2023) 30 capsule 0   • Zinc 30 MG CAPS Take by mouth       No current facility-administered medications for this visit. Allergies   Allergen Reactions   • Sulfa Antibiotics Anaphylaxis   • Levofloxacin      Other reaction(s): sensative   • Prednisone Other (See Comments)   • Tobramycin      Other reaction(s): sensative   • Pseudoephedrine Palpitations       Review of Systems    Video Exam    There were no vitals filed for this visit.     Physical Exam

## 2023-10-12 ENCOUNTER — TELEPHONE (OUTPATIENT)
Dept: FAMILY MEDICINE CLINIC | Facility: CLINIC | Age: 38
End: 2023-10-12

## 2023-10-12 NOTE — TELEPHONE ENCOUNTER
Pt called requesting office visit notes from 4/2023-present be sent to [facility] for STD appeal. Record request on file from February. Office notes faxed to claim specialist [phone] and email to [email or web address]

## 2023-10-18 ENCOUNTER — TELEMEDICINE (OUTPATIENT)
Dept: PSYCHIATRY | Facility: CLINIC | Age: 38
End: 2023-10-18
Payer: COMMERCIAL

## 2023-10-18 DIAGNOSIS — F33.1 MODERATE EPISODE OF RECURRENT MAJOR DEPRESSIVE DISORDER (HCC): Primary | ICD-10-CM

## 2023-10-18 DIAGNOSIS — F43.10 PTSD (POST-TRAUMATIC STRESS DISORDER): ICD-10-CM

## 2023-10-18 PROCEDURE — 90834 PSYTX W PT 45 MINUTES: CPT

## 2023-10-18 NOTE — PSYCH
Virtual Regular Visit    Verification of patient location:    Patient is located at {Amb Virtual Patient Location:[address]} in the following state in which I hold an active license { amb virtual patient location:[address]}      Assessment/Plan:    Problem List Items Addressed This Visit        Other    Moderate episode of recurrent major depressive disorder ([address]) - Primary    PTSD (post-traumatic stress disorder)       Goals addressed in session: {GOALS:89623}          Reason for visit is No chief complaint on file. Encounter provider Tanisha Hopkins LCSW    Provider located at [address] [place] [id number] [phone]      Recent Visits  No visits were found meeting these conditions. Showing recent visits within past 7 days and meeting all other requirements  Future Appointments  No visits were found meeting these conditions. Showing future appointments within next 150 days and meeting all other requirements       The patient was identified by name and date of birth. Mariano Ramon was informed that this is a telemedicine visit and that the visit is being conducted through{AMB VIRTUAL VISIT PXJFWP:99316}. {Telemedicine confidentiality :05271} {Telemedicine participants:72898}  She acknowledged consent and understanding of privacy and security of the video platform. The patient has agreed to participate and understands they can discontinue the visit at any time. Patient is aware this is a billable service. Subjective  Mariano Ramon is a 45 y.o. female *** .       HPI     Past Medical History:   Diagnosis Date   • Allergic    • Anemia    • Anxiety    • Arthritis    • Asthma    • Chlamydia 2014    Unfaithful partner   • Depression    • Exposure to SARS-associated coronavirus 04/12/2020   • GERD (gastroesophageal reflux disease) 2010   • Gonorrhea 2014    Unfaithful partner   • Headache(784.0)    • Lumbar herniated disc    • Migraine 2012   • Pneumonia    • Urinary tract infection    • Urogenital trichomoniasis 2015    Unfaithful partner   • UTI (urinary tract infection)    • Varicella 1988   • Visual impairment        Past Surgical History:   Procedure Laterality Date   • FL INJECTION LEFT KNEE (ARTHROGRAM)  2/28/2022   • WISDOM TOOTH EXTRACTION         Current Outpatient Medications   Medication Sig Dispense Refill   • albuterol (2.5 mg/3 mL) 0.083 % nebulizer solution Take by nebulization every 6 (six) hours as needed     • albuterol (PROVENTIL HFA,VENTOLIN HFA) 90 mcg/act inhaler Inhale 2 puffs every 6 (six) hours as needed for wheezing 18 g 0   • Ascorbic Acid (vitamin C) 1000 MG tablet Take 1,000 mg by mouth daily     • B Complex-C (B-complex with vitamin C) tablet Take 1 tablet by mouth daily     • benzonatate (TESSALON PERLES) 100 mg capsule Take 1 capsule (100 mg total) by mouth 3 (three) times a day as needed for cough 20 capsule 0   • Biotin 10 MG TABS Take by mouth     • buPROPion (Wellbutrin XL) 300 mg 24 hr tablet Take 1 tablet (300 mg total) by mouth every morning 90 tablet 0   • calcium carbonate (OS-BERENICE) 600 MG tablet Take 600 mg by mouth 2 (two) times a day with meals     • cholecalciferol (VITAMIN D3) 1,000 units tablet Take 1,000 Units by mouth daily     • docusate sodium (COLACE) 100 mg capsule Take 100 mg by mouth 3 (three) times a day as needed for constipation     • Dupixent 200 MG/1. 14ML SOPN Inject 2 pens (400mg total) under the skin once for 1 dose. 180 mL 10   • EPINEPHrine (EPIPEN) 0.3 mg/0.3 mL SOAJ Inject 0.3 mL (0.3 mg total) into a muscle once for 1 dose 0.6 mL 0   • Ferrous Sulfate (RA IRON PO) Take by mouth     • FLUoxetine (PROzac) 20 MG tablet Take 1 tablet (20 mg total) by mouth daily 30 tablet 2   • fluticasone-umeclidinium-vilanterol 200-62.5-25 mcg/actuation AEPB inhaler Inhale 1 puff daily Rinse mouth after use.  1 each 0   • ipratropium-albuterol (DUO-NEB) 0.5-2.5 mg/3 mL nebulizer solution Take 3 mL by nebulization every 6 (six) hours as needed for wheezing or shortness of breath 60 mL 3   • levalbuterol (Xopenex) 0.63 mg/3 mL nebulizer solution Take 3 mL (0.63 mg total) by nebulization 3 (three) times a day 30 mL 1   • Magnesium Oxide 500 MG TABS Take by mouth     • montelukast (SINGULAIR) 10 mg tablet Take 1 tablet (10 mg total) by mouth daily 90 tablet 0   • prazosin (MINIPRESS) 1 mg capsule Take 1 capsule (1 mg total) by mouth daily at bedtime (Patient not taking: Reported on 9/12/2023) 30 capsule 0   • Zinc 30 MG CAPS Take by mouth       No current facility-administered medications for this visit. Allergies   Allergen Reactions   • Sulfa Antibiotics Anaphylaxis   • Levofloxacin      Other reaction(s): sensative   • Prednisone Other (See Comments)   • Tobramycin      Other reaction(s): sensative   • Pseudoephedrine Palpitations       Review of Systems    Video Exam    There were no vitals filed for this visit.     Physical Exam     Visit Time    Visit Start Time: ***  Visit Stop Time: ***  Total Visit Duration: {Psych Total Visit Time:37552}

## 2023-10-18 NOTE — PSYCH
Virtual Regular Visit    Verification of patient location:    Patient is located at {Amb Virtual Patient Location:12[address]} in the following state in which I hold an active license { amb virtual patient location:[address]}      Assessment/Plan:    Problem List Items Addressed This Visit        Other    Moderate episode of recurrent major depressive disorder ([address]) - Primary    PTSD (post-traumatic stress disorder)       Goals addressed in session: {GOALS:90116}          Reason for visit is No chief complaint on file. Encounter provider Hina Guerrier LCSW    Provider located at [address] [place] [id number] [phone]      Recent Visits  No visits were found meeting these conditions. Showing recent visits within past 7 days and meeting all other requirements  Future Appointments  No visits were found meeting these conditions. Showing future appointments within next 150 days and meeting all other requirements       The patient was identified by name and date of birth. Alisha Rebolledo was informed that this is a telemedicine visit and that the visit is being conducted through{AMB VIRTUAL VISIT XNCOYD:33214}. {Telemedicine confidentiality :01483} {Telemedicine participants:04791}  She acknowledged consent and understanding of privacy and security of the video platform. The patient has agreed to participate and understands they can discontinue the visit at any time. Patient is aware this is a billable service. Subjective  Alisha Rebolledo is a 45 y.o. female *** .       HPI     Past Medical History:   Diagnosis Date   • Allergic    • Anemia    • Anxiety    • Arthritis    • Asthma    • Chlamydia 2014    Unfaithful partner   • Depression    • Exposure to SARS-associated coronavirus 04/12/2020   • GERD (gastroesophageal reflux disease) 2010   • Gonorrhea 2014    Unfaithful partner   • Headache(784.0)    • Lumbar herniated disc    • Migraine 2012   • Pneumonia    • Urinary tract infection    • Urogenital trichomoniasis 2015    Unfaithful partner   • UTI (urinary tract infection)    • Varicella 1988   • Visual impairment        Past Surgical History:   Procedure Laterality Date   • FL INJECTION LEFT KNEE (ARTHROGRAM)  2/28/2022   • WISDOM TOOTH EXTRACTION         Current Outpatient Medications   Medication Sig Dispense Refill   • albuterol (2.5 mg/3 mL) 0.083 % nebulizer solution Take by nebulization every 6 (six) hours as needed     • albuterol (PROVENTIL HFA,VENTOLIN HFA) 90 mcg/act inhaler Inhale 2 puffs every 6 (six) hours as needed for wheezing 18 g 0   • Ascorbic Acid (vitamin C) 1000 MG tablet Take 1,000 mg by mouth daily     • B Complex-C (B-complex with vitamin C) tablet Take 1 tablet by mouth daily     • benzonatate (TESSALON PERLES) 100 mg capsule Take 1 capsule (100 mg total) by mouth 3 (three) times a day as needed for cough 20 capsule 0   • Biotin 10 MG TABS Take by mouth     • buPROPion (Wellbutrin XL) 300 mg 24 hr tablet Take 1 tablet (300 mg total) by mouth every morning 90 tablet 0   • calcium carbonate (OS-BERENICE) 600 MG tablet Take 600 mg by mouth 2 (two) times a day with meals     • cholecalciferol (VITAMIN D3) 1,000 units tablet Take 1,000 Units by mouth daily     • docusate sodium (COLACE) 100 mg capsule Take 100 mg by mouth 3 (three) times a day as needed for constipation     • Dupixent 200 MG/1. 14ML SOPN Inject 2 pens (400mg total) under the skin once for 1 dose. 180 mL 10   • EPINEPHrine (EPIPEN) 0.3 mg/0.3 mL SOAJ Inject 0.3 mL (0.3 mg total) into a muscle once for 1 dose 0.6 mL 0   • Ferrous Sulfate (RA IRON PO) Take by mouth     • FLUoxetine (PROzac) 20 MG tablet Take 1 tablet (20 mg total) by mouth daily 30 tablet 2   • fluticasone-umeclidinium-vilanterol 200-62.5-25 mcg/actuation AEPB inhaler Inhale 1 puff daily Rinse mouth after use.  1 each 0   • ipratropium-albuterol (DUO-NEB) 0.5-2.5 mg/3 mL nebulizer solution Take 3 mL by nebulization every 6 (six) hours as needed for wheezing or shortness of breath 60 mL 3   • levalbuterol (Xopenex) 0.63 mg/3 mL nebulizer solution Take 3 mL (0.63 mg total) by nebulization 3 (three) times a day 30 mL 1   • Magnesium Oxide 500 MG TABS Take by mouth     • montelukast (SINGULAIR) 10 mg tablet Take 1 tablet (10 mg total) by mouth daily 90 tablet 0   • prazosin (MINIPRESS) 1 mg capsule Take 1 capsule (1 mg total) by mouth daily at bedtime (Patient not taking: Reported on 9/12/2023) 30 capsule 0   • Zinc 30 MG CAPS Take by mouth       No current facility-administered medications for this visit. Allergies   Allergen Reactions   • Sulfa Antibiotics Anaphylaxis   • Levofloxacin      Other reaction(s): sensative   • Prednisone Other (See Comments)   • Tobramycin      Other reaction(s): sensative   • Pseudoephedrine Palpitations       Review of Systems    Video Exam    There were no vitals filed for this visit.     Physical Exam     Visit Time    Visit Start Time: ***  Visit Stop Time: ***  Total Visit Duration: {Psych Total Visit Time:55906}
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
## 2023-10-30 ENCOUNTER — TELEPHONE (OUTPATIENT)
Dept: PULMONOLOGY | Facility: CLINIC | Age: 38
End: 2023-10-30

## 2023-10-30 ENCOUNTER — OFFICE VISIT (OUTPATIENT)
Dept: PSYCHIATRY | Facility: CLINIC | Age: 38
End: 2023-10-30
Payer: COMMERCIAL

## 2023-10-30 ENCOUNTER — OFFICE VISIT (OUTPATIENT)
Dept: PULMONOLOGY | Facility: CLINIC | Age: 38
End: 2023-10-30
Payer: COMMERCIAL

## 2023-10-30 VITALS
RESPIRATION RATE: 18 BRPM | HEART RATE: 70 BPM | DIASTOLIC BLOOD PRESSURE: 82 MMHG | HEIGHT: 66 IN | OXYGEN SATURATION: 98 % | SYSTOLIC BLOOD PRESSURE: 118 MMHG | BODY MASS INDEX: 28.48 KG/M2 | WEIGHT: 177.2 LBS

## 2023-10-30 VITALS — BODY MASS INDEX: 28.57 KG/M2 | WEIGHT: 177 LBS

## 2023-10-30 DIAGNOSIS — J45.50 SEVERE PERSISTENT ASTHMA WITHOUT COMPLICATION: Primary | ICD-10-CM

## 2023-10-30 DIAGNOSIS — F43.10 PTSD (POST-TRAUMATIC STRESS DISORDER): ICD-10-CM

## 2023-10-30 DIAGNOSIS — F33.1 MODERATE EPISODE OF RECURRENT MAJOR DEPRESSIVE DISORDER (HCC): Primary | ICD-10-CM

## 2023-10-30 LAB
DME PARACHUTE DELIVERY DATE REQUESTED: NORMAL
DME PARACHUTE ITEM DESCRIPTION: NORMAL
DME PARACHUTE ORDER STATUS: NORMAL
DME PARACHUTE SUPPLIER NAME: NORMAL
DME PARACHUTE SUPPLIER PHONE: NORMAL

## 2023-10-30 PROCEDURE — 99214 OFFICE O/P EST MOD 30 MIN: CPT

## 2023-10-30 PROCEDURE — 99215 OFFICE O/P EST HI 40 MIN: CPT | Performed by: INTERNAL MEDICINE

## 2023-10-30 RX ORDER — BUPROPION HYDROCHLORIDE 300 MG/1
300 TABLET ORAL EVERY MORNING
Qty: 90 TABLET | Refills: 0 | Status: SHIPPED | OUTPATIENT
Start: 2023-10-30 | End: 2024-01-28

## 2023-10-30 RX ORDER — MOMETASONE FUROATE AND FORMOTEROL FUMARATE DIHYDRATE 200; 5 UG/1; UG/1
2 AEROSOL RESPIRATORY (INHALATION) 2 TIMES DAILY
Qty: 39 G | Refills: 3 | Status: SHIPPED | OUTPATIENT
Start: 2023-10-30

## 2023-10-30 RX ORDER — FLUOXETINE 10 MG/1
10 TABLET, FILM COATED ORAL DAILY
Refills: 0
Start: 2023-10-30 | End: 2023-12-29

## 2023-10-30 RX ORDER — PRAZOSIN HYDROCHLORIDE 1 MG/1
1 CAPSULE ORAL
Qty: 90 CAPSULE | Refills: 0 | Status: SHIPPED | OUTPATIENT
Start: 2023-10-30

## 2023-10-30 NOTE — PSYCH
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 assessment and clinical criteria, Bernadette Queen contracts for safety and is not an imminent risk of harm to self or others. Outpatient level of care is deemed appropriate at this present time. Moustapha Jones understands that if they are no longer able to contract for safety, they need to call/contact the outpatient office including this writer, call/contact crisis and/orattend to the nearest Emergency Department for immediate evaluation. Risk of Harm to Self:  The following ratings are based on assessment at the time of the interview  Demographic risk factors include: White  Historical Risk Factors include: Chronic depressive symptoms, chronic anxiety symptoms  Recent Specific Risk Factors include: Diagnosis of depression, current depressive symptoms  Protective Factors: No current suicidal ideation, improved anxiety symptoms  Access to firearms: Patient reports that there are guns in her house, reporting her boyfriend owns a handgun and hunts. She states they are locked and secured and has no access to them. Terri Brittle has no history of arrests or violence pertaining to use of a deadly weapon. Based on today's assessment, Moustapha Jones presents the following risk of harm to self: Minimal     Risk of Harm to Others:   The following ratings are based on assessment at the time of the interview  Demographic Risk Factors include: Under age 36  Historical Risk Factors include: None  Recent Specific Risk Factors include: None  Protective Factors: No current homicidal ideation  Based on today's assessment, Moustapha Jones presents the following risk of harm to others: Minimal    Risks/Benefits Risks, Benefits And Possible Side Effects Of Medications:    Risks, benefits, and possible side effects of medications explained to Yasir and she verbalizes understanding and agreement for treatment. Controlled Medication Discussion:     Yasir has been filling controlled prescriptions on time as prescribed according to [facility]. At this time stimulant medication is being held due to the concerns for glaucoma. Psychotherapy Provided:     Individual psychotherapy provided: Yes  Cognitive therapy was utilized during the session. Yasir often finds herself making unfair comparisons to others. Cognitive behavioral therapy techniques were utilized in session to highlight these unfair comparisons to Yasir and to look at the evidence behind these comparisons (specifically how she does not know the full picture of the lives of others). Treatment Plan:    Completed and signed during the session: Not applicable - Treatment Plan not due at this session    Visit Time    Visit Start Time: 4:00 PM  Visit Stop Time: 5:00 PM  Total Visit Duration:  60 minutes    This note was shared with patient. Ady Hartman MD 10/30/23    This note has been constructed using a voice recognition system. There may be translation, syntax, or grammatical errors. If you have any questions, please contact the dictating provider.

## 2023-10-30 NOTE — PROGRESS NOTES
Pulmonary Follow-up   Quique Coley 45 y.o. female MRN: [id number]  10/30/2023      Assessment/plan:    Severe persistent asthma  Dyspneaon exertion  Previously controlled but worsened significantly since had COVID infectionHas had great improvement in terms of her asthma after the addition of Dupixent (June 2023). Highest eos 120, allergy panel negative, appears to be type 2 low asthma  Currently controlled on Dupixent with no rescue inhaler use for nearly 2 months and no exacerbations since starting the biologic    - Continue Dupixent,   - Stepdown Trelegy to high dose Dulera, pt says DPI leaves a bad taste and symptomc controlled  - Continue Singulair, albuterol prn  - follow up in 3 months  - rec yearly spirometry at next visit         History of Present Illness   HPI:  Quique Coley is a 45 y.o. female with a history of asthma who originally presented to the pulmonary office for evaluation of shortness of breath related to previous COVID infection in January 2022. Patient was seen in the emergency room on January 17 due to continued chest pain, wheezing, cough and shortness of breath. She was diagnosed with asthma as a child but it was previously well controlled prior to her episodes of COVID-19 infection. Previously never required hospitalization or intubation her asthma was historically induced by exercise. Since that time continues to have sob, wheezing, chest tightness despite use of flovent twice daily. She also uses duonebs and albuterol nebs. States that she has had covid-19 four times. Previous medications: flovent, singulair   Triggers exertion: exertion, cold dry air, wildfire smoke, road construction, hospital cleaning solution, spicy pepper cooking in stove  CBC from 1/2023 was normal.   Echo from 4/7 showed no evidence of shunt.      Started on [employer] in June    Interval Hx  Yes to need 6 albuterol a day + additional neb while on Qvar and Trelegy  One exacerbation this year, prior to starting Dupixent      Review of Systems   Constitutional:  Negative for chills, fatigue and fever. HENT:  Negative for congestion, nosebleeds, postnasal drip, rhinorrhea, sinus pressure and sore throat. Eyes:  Negative for discharge, redness and itching. Respiratory:  Positive for cough, chest tightness, shortness of breath and wheezing. Negative for choking and stridor. Cardiovascular:  Negative for chest pain, palpitations and leg swelling. Gastrointestinal:  Negative for blood in stool. Genitourinary:  Negative for difficulty urinating and dysuria. Musculoskeletal:  Negative for arthralgias, joint swelling and myalgias. Skin:  Negative for color change and rash. Neurological:  Negative for light-headedness and headaches. Hematological:  Negative for adenopathy.        Historical Information   Past Medical History:   Diagnosis Date    Allergic     Anemia     Anxiety     Arthritis     Asthma     Chlamydia 2014    Unfaithful partner    Depression     Exposure to SARS-associated coronavirus 04/12/2020    GERD (gastroesophageal reflux disease) 2010    Gonorrhea 2014    Unfaithful partner    Headache(784.0)     Lumbar herniated disc     Migraine 2012    Pneumonia     Urinary tract infection     Urogenital trichomoniasis 2015    Unfaithful partner    UTI (urinary tract infection)     Varicella 1988    Visual impairment      Past Surgical History:   Procedure Laterality Date    FL INJECTION LEFT KNEE (ARTHROGRAM)  2/28/2022    WISDOM TOOTH EXTRACTION       Family History   Problem Relation Age of Onset    BRCA2 Negative Mother     BRCA1 Negative Mother     Breast cancer additional onset Mother         Left Masectomy    Hypertension Mother     Diabetes Mother     Asthma Mother     Cancer Mother         breast    Breast cancer Mother         In 2019, left side. -brca    Migraines Mother     Mental illness Father         family history    Alcohol abuse Father     Hypertension Father Hyperlipidemia Father     Diabetes Father     Heart attack Father         Assumed. Found  at 61    Colon cancer Maternal Grandmother     Breast cancer additional onset Maternal Grandmother     Diabetes Maternal Grandmother     Cancer Maternal Grandmother         breast, colon    Breast cancer Maternal Grandmother     Prostate cancer Maternal Grandfather         lung    Cancer Maternal Grandfather         lung (smoker)    Completed Suicide  Maternal Grandfather     Lung cancer Maternal Grandfather     Asthma Brother     Asthma Brother        Social History   Places lived: [place]  Occupation: ER nurse. Tobacco: none smoker  Illicits:  None   Hobbies:   Pets: dogs       Meds/Allergies     Current Outpatient Medications:     albuterol (2.5 mg/3 mL) 0.083 % nebulizer solution, Take by nebulization every 6 (six) hours as needed, Disp: , Rfl:     albuterol (PROVENTIL HFA,VENTOLIN HFA) 90 mcg/act inhaler, Inhale 2 puffs every 6 (six) hours as needed for wheezing, Disp: 18 g, Rfl: 0    Ascorbic Acid (vitamin C) 1000 MG tablet, Take 1,000 mg by mouth daily, Disp: , Rfl:     B Complex-C (B-complex with vitamin C) tablet, Take 1 tablet by mouth daily, Disp: , Rfl:     benzonatate (TESSALON PERLES) 100 mg capsule, Take 1 capsule (100 mg total) by mouth 3 (three) times a day as needed for cough, Disp: 20 capsule, Rfl: 0    Biotin 10 MG TABS, Take by mouth, Disp: , Rfl:     buPROPion (Wellbutrin XL) 300 mg 24 hr tablet, Take 1 tablet (300 mg total) by mouth every morning, Disp: 90 tablet, Rfl: 0    calcium carbonate (OS-BERENICE) 600 MG tablet, Take 600 mg by mouth 2 (two) times a day with meals, Disp: , Rfl:     cholecalciferol (VITAMIN D3) 1,000 units tablet, Take 1,000 Units by mouth daily, Disp: , Rfl:     docusate sodium (COLACE) 100 mg capsule, Take 100 mg by mouth 3 (three) times a day as needed for constipation, Disp: , Rfl:     Dupixent 200 MG/1. 14ML SOPN, Inject 2 pens (400mg total) under the skin once for 1 dose. , Disp: 180 mL, Rfl: 10    EPINEPHrine (EPIPEN) 0.3 mg/0.3 mL SOAJ, Inject 0.3 mL (0.3 mg total) into a muscle once for 1 dose, Disp: 0.6 mL, Rfl: 0    Ferrous Sulfate (RA IRON PO), Take by mouth, Disp: , Rfl:     FLUoxetine (PROzac) 20 MG tablet, Take 1 tablet (20 mg total) by mouth daily, Disp: 30 tablet, Rfl: 2    fluticasone-umeclidinium-vilanterol 200-62.5-25 mcg/actuation AEPB inhaler, Inhale 1 puff daily Rinse mouth after use., Disp: 1 each, Rfl: 0    ipratropium-albuterol (DUO-NEB) 0.5-2.5 mg/3 mL nebulizer solution, Take 3 mL by nebulization every 6 (six) hours as needed for wheezing or shortness of breath, Disp: 60 mL, Rfl: 3    levalbuterol (Xopenex) 0.63 mg/3 mL nebulizer solution, Take 3 mL (0.63 mg total) by nebulization 3 (three) times a day, Disp: 30 mL, Rfl: 1    Magnesium Oxide 500 MG TABS, Take by mouth, Disp: , Rfl:     montelukast (SINGULAIR) 10 mg tablet, Take 1 tablet (10 mg total) by mouth daily, Disp: 90 tablet, Rfl: 0    prazosin (MINIPRESS) 1 mg capsule, Take 1 capsule (1 mg total) by mouth daily at bedtime (Patient not taking: Reported on 9/12/2023), Disp: 30 capsule, Rfl: 0    Zinc 30 MG CAPS, Take by mouth, Disp: , Rfl:   Allergies   Allergen Reactions    Sulfa Antibiotics Anaphylaxis    Levofloxacin      Other reaction(s): sensative    Prednisone Other (See Comments)    Tobramycin      Other reaction(s): sensative    Pseudoephedrine Palpitations       Vitals:   Vitals:    10/30/23 1308   BP:    Pulse:    Resp:    SpO2: 98%         Exam:   Appearance -- NAD, speaking full sentences  HEENT -- anicteric sclera, clear OP, MMM  Neck -- no JVD  Heart -- RRR, no murmurs  Lungs -- CTAB  Abdomen -- soft, NTND,  Extremities -- WWP, no c/c/e  Skin -- no rash  Neuro -- A&Ox3, wnl  Psych -- no obvious depression or hallucination        Labs: I have personally reviewed pertinent lab results.   Lab Results   Component Value Date    WBC 6.91 09/15/2023    HGB 12.6 09/15/2023    HCT 38.6 09/15/2023    MCV 94 09/15/2023     09/15/2023     Lab Results   Component Value Date    CALCIUM 9.2 01/16/2023    K 3.3 (L) 01/16/2023    CO2 23 01/16/2023     01/16/2023    BUN 12 01/16/2023    CREATININE 1.08 01/16/2023     Lab Results   Component Value Date    IGE 27.9 04/04/2023     Lab Results   Component Value Date    ALT 18 01/16/2023    AST 20 01/16/2023    ALKPHOS 69 01/16/2023       Imaging and other studies: I have personally reviewed pertinent reports. and I have personally reviewed pertinent films in PACS    CT Chest 2/2023:  LUNGS:  Nothing to suggest interstitial lung disease with no reticulation, traction bronchiectasis/bronchiolectasis, groundglass, or honeycombing. No significant air trapping on expiration. CXR 9/2023: normal     PFTs 1/30/23: No obstruction, + BD response.  No restriction, normal DLCO

## 2023-10-30 NOTE — TELEPHONE ENCOUNTER
Order for nebulizer was placed through [facility] for [address] [place] [address].

## 2023-10-30 NOTE — PATIENT INSTRUCTIONS
Please present for your previously scheduled appointment approximately 15 minutes prior to appointment time. If you are running late or are unable to attend your appointment, please call our [facility] office at [phone] or our [facility] office at [phone] if applicable to notify the office of your absence. If you are in psychological crisis including not limited to suicidal/homicidal thoughts or thoughts of self-injury, do not hesitate to call/contact your [facility] hotline (see below) or attend to the nearest emergency department.   [facility] Crisis: [address] Crisis: [phone]  [address] Crisis: [address] Crisis: [address] Crisis: [address] Crisis: [address] Crisis: [phone]  National Suicide Prevention Hotline: 9-759.311.7883

## 2023-11-07 ENCOUNTER — TELEPHONE (OUTPATIENT)
Dept: PSYCHIATRY | Facility: CLINIC | Age: 38
End: 2023-11-07

## 2023-11-07 DIAGNOSIS — F90.0 ATTENTION DEFICIT HYPERACTIVITY DISORDER, INATTENTIVE TYPE: Primary | ICD-10-CM

## 2023-11-07 NOTE — TELEPHONE ENCOUNTER
Patient LVM stating her eye doctor stated that they approve of her taking the Ritalin as discussed with medication management provider. Writer called and spoke to patient stating the provider would be made aware and the request to call back would be stated to discuss the next steps.

## 2023-11-08 ENCOUNTER — TELEMEDICINE (OUTPATIENT)
Dept: PSYCHIATRY | Facility: CLINIC | Age: 38
End: 2023-11-08
Payer: COMMERCIAL

## 2023-11-08 DIAGNOSIS — F43.10 PTSD (POST-TRAUMATIC STRESS DISORDER): ICD-10-CM

## 2023-11-08 DIAGNOSIS — F33.1 MODERATE EPISODE OF RECURRENT MAJOR DEPRESSIVE DISORDER (HCC): Primary | ICD-10-CM

## 2023-11-08 PROCEDURE — 90834 PSYTX W PT 45 MINUTES: CPT

## 2023-11-08 RX ORDER — METHYLPHENIDATE HYDROCHLORIDE 10 MG/1
10 CAPSULE, EXTENDED RELEASE ORAL EVERY MORNING
Refills: 0
Start: 2023-11-08 | End: 2023-11-13 | Stop reason: SDUPTHER

## 2023-11-08 NOTE — PSYCH
Behavioral Health Psychotherapy Progress Note    Psychotherapy Provided: Individual Psychotherapy     1. Moderate episode of recurrent major depressive disorder ([address])        2. PTSD (post-traumatic stress disorder)            Goals addressed in session: Goal 1     DATA: Ana Elliott shared she is feeling okay and has switched her medicine around and is not feeling much different so far. She shared she won her short term disability which is helpful. 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 Therapist and Ana Elliott talked about self compassion and her using self compassion for self talk. During this session, this clinician used the following therapeutic modalities: Cognitive Behavioral Therapy    Substance Abuse was not addressed during this session. If the client is diagnosed with a co-occurring substance use disorder, please indicate any changes in the frequency or amount of use: . Stage of change for addressing substance use diagnoses: No substance use/Not applicable    ASSESSMENT:  Karey Morley presents with a Euthymic/ normal mood. her affect is Normal range and intensity, which is congruent, with her mood and the content of the session. The client has made progress on their goals. Karey Morley presents with a none risk of suicide, none risk of self-harm, and none risk of harm to others. For any risk assessment that surpasses a "low" rating, a safety plan must be developed. A safety plan was indicated: no  If yes, describe in detail     PLAN: Between sessions, Karey Morley will utilize cognitive therapy and self compassion for improved mood and thought. At the next session, the therapist will use Cognitive Behavioral Therapy and Dialectical Behavior Therapy to address depression, negative thinking. Behavioral Health Treatment Plan and Discharge Planning: Karey Morley is aware of and agrees to continue to work on their treatment plan.  They have identified and are working toward their discharge goals. yes    Visit start and stop times:    11/08/23  Start Time: 1700  Stop Time: 1752  Total Visit Time: 52 minutes  Virtual Regular Visit    Verification of patient location:    Patient is located at Other in the following state in which I hold an active license [place]      Assessment/Plan:    Problem List Items Addressed This Visit       Moderate episode of recurrent major depressive disorder ([address]) - Primary    PTSD (post-traumatic stress disorder)       Goals addressed in session: Goal 1          Reason for visit is No chief complaint on file. Encounter provider María Mclaughlin LCSW    Provider located at [address] [place] [id number] [phone]      Recent Visits  No visits were found meeting these conditions. Showing recent visits within past 7 days and meeting all other requirements  Today's Visits  Date Type Provider Dept   11/08/23 Telemedicine María Mclaughlin LCSW [facility]   Showing today's visits and meeting all other requirements  Future Appointments  No visits were found meeting these conditions. Showing future appointments within next 150 days and meeting all other requirements       The patient was identified by name and date of birth. Bernadette Queen was informed that this is a telemedicine visit and that the visit is being conducted throughthe [employer] platform. She agrees to proceed. .  My office door was closed. No one else was in the room. She acknowledged consent and understanding of privacy and security of the video platform. The patient has agreed to participate and understands they can discontinue the visit at any time. Patient is aware this is a billable service. Subjective  Bernadette Queen is a 45 y.o. female  .       HPI     Past Medical History:   Diagnosis Date    Allergic     Anemia     Anxiety     Arthritis     Asthma Chlamydia 2014    Unfaithful partner    Depression     Exposure to SARS-associated coronavirus 04/12/2020    GERD (gastroesophageal reflux disease) 2010    Gonorrhea 2014    Unfaithful partner    Headache(784.0)     Lumbar herniated disc     Migraine 2012    Pneumonia     Urinary tract infection     Urogenital trichomoniasis 2015    Unfaithful partner    UTI (urinary tract infection)     Varicella 1988    Visual impairment        Past Surgical History:   Procedure Laterality Date    FL INJECTION LEFT KNEE (ARTHROGRAM)  2/28/2022    WISDOM TOOTH EXTRACTION         Current Outpatient Medications   Medication Sig Dispense Refill    albuterol (PROVENTIL HFA,VENTOLIN HFA) 90 mcg/act inhaler Inhale 2 puffs every 6 (six) hours as needed for wheezing 18 g 0    Ascorbic Acid (vitamin C) 1000 MG tablet Take 1,000 mg by mouth daily      B Complex-C (B-complex with vitamin C) tablet Take 1 tablet by mouth daily      buPROPion (Wellbutrin XL) 300 mg 24 hr tablet Take 1 tablet (300 mg total) by mouth every morning 90 tablet 0    calcium carbonate (OS-BERENICE) 600 MG tablet Take 600 mg by mouth 2 (two) times a day with meals      cholecalciferol (VITAMIN D3) 1,000 units tablet Take 1,000 Units by mouth daily      docusate sodium (COLACE) 100 mg capsule Take 100 mg by mouth 3 (three) times a day as needed for constipation      Dupixent 200 MG/1. 14ML SOPN Inject 2 pens (400mg total) under the skin once for 1 dose.  (Patient taking differently: 200mg every 2 weeks) 180 mL 10    EPINEPHrine (EPIPEN) 0.3 mg/0.3 mL SOAJ Inject 0.3 mL (0.3 mg total) into a muscle once for 1 dose 0.6 mL 0    Ferrous Sulfate (RA IRON PO) Take by mouth      FLUoxetine (PROzac) 10 MG tablet Take 1 tablet (10 mg total) by mouth daily  0    levalbuterol (Xopenex) 0.63 mg/3 mL nebulizer solution Take 3 mL (0.63 mg total) by nebulization 3 (three) times a day (Patient not taking: Reported on 10/30/2023) 30 mL 1    Magnesium Oxide 500 MG TABS Take by mouth methylphenidate (Ritalin LA) 10 MG 24 hr capsule Take 1 capsule (10 mg total) by mouth every morning Max Daily Amount: 10 mg  0    mometasone-formoterol (Dulera) 200-5 MCG/ACT inhaler Inhale 2 puffs 2 (two) times a day Rinse mouth after use. 39 g 3    montelukast (SINGULAIR) 10 mg tablet Take 1 tablet (10 mg total) by mouth daily 90 tablet 0    prazosin (MINIPRESS) 1 mg capsule Take 1 capsule (1 mg total) by mouth daily at bedtime 90 capsule 0    Zinc 30 MG CAPS Take by mouth       No current facility-administered medications for this visit. Allergies   Allergen Reactions    Sulfa Antibiotics Anaphylaxis    Levofloxacin      Other reaction(s): sensative    Prednisone Other (See Comments)    Tobramycin      Other reaction(s): sensative    Pseudoephedrine Palpitations       Review of Systems    Video Exam    There were no vitals filed for this visit.     Physical Exam

## 2023-11-08 NOTE — TELEPHONE ENCOUNTER
This writer called the patient Quique Coley today at 4:30 PM to discuss medication management. As detailed in past notes, Natalia Zhang was originally prescribed Ritalin LA 10 mg daily on 8/14/23. She visited her eye doctor 8/22/23 and due to concerns for glaucoma and in the setting of worsening angle closure, Ritalin LA was discontinued. Natalia Zhang recently had bilateral iridotomy procedures performed in October 2023. Following these iridotomy procedures she followed up with her eye doctor at [facility] and they are in agreement with cautiously taking Ritalin for ADHD symptomatology. At the time of the phone call, Natalia Zhang is in agreement with restarting Ritalin as well. She reports that she will continue to receive routine eye pressure checks and will notify the office should any concerns for increased eye pressure develop. Thus, the patient will be restarted on Ritalin 10 mg daily for symptoms of ADHD and will follow up at her next appointment on 12/4/23.     yLnn Nix MD

## 2023-11-08 NOTE — BH TREATMENT PLAN
[address]  1985     Date of Initial Psychotherapy Assessment: 6/30/2023  Date of Current Treatment Plan: 11/08/23  Treatment Plan Target Date: 11/08/2024  Treatment Plan Expiration Date: 5/08/2024    Diagnosis:   1. Moderate episode of recurrent major depressive disorder ([address])        2. PTSD (post-traumatic stress disorder)            Area(s) of Need: Need to work on improved sense of identity, caring less about what people think of me, and work on past trauma triggers    Long Term Goal 1 (in the client's own words): I want to stop being affected by the complex PTSD and develop a stronger sense of self    Stage of Change: Contemplation    Target Date for completion: to be determined     Anticipated therapeutic modalities: emdr, dbt     People identified to complete this goal: Therapist, prescriber, Fany Bone      Objective 1: (identify the means of measuring success in meeting the objective): Therapist will engage Fany  in EMDR therapy for resolving her past traumas including grounding, resourcing and processing. Client will demonstrate effective use of grounding and resourcing in at least 4 out of 5 situations. Client will engage in EMDR   Client and therapist will assess effectiveness of treatment and adjust treatment as needed. Update 11/8/2023:  Fany Bone has been able to process some EMDR targets for past trauma and reports an improvement in her TANNA level of at least 2 points and an improvement in her validity of positive cognitions by at least 2 points during processing sessions. Fany Bone will continue to process past trauma targets as well as negative thinking patterns to improve mood and thought. Objective 2: (identify the means of measuring success in meeting the objective):  Therapist will engage Fany Bone in [address] therapy to improve sense of self including mindfulness, distress tolerance, emotional regulation and interpersonal effectiveness Client will learn DBT concepts and skills. Client will practice skills and assess effectiveness with therapist biweekly and adjust skills acquisition as needed. Client will demonstrate successful use of DBT skills in at least 8 out of 10 challenging situations. Update 11/8/2023: Alfonso Berger has been able to learn DBT skills including mindfulness and distress tolerance skills and utilize them to improve mood at least 5 out of 10 challenging situations. Alfonso Berger will continue to learn DBT skills to improve mood and thought. I am currently under the care of a [facility] psychiatric provider: yes    My [facility] psychiatric provider is: Maryam Tran    I am currently taking psychiatric medications: Yes, as prescribed    I feel that I will be ready for discharge from mental health care when I reach the following (measurable goal/objective): when trauma is resolved and I have a firm sense of self    For children and adults who have a legal guardian:   Has there been any change to custody orders and/or guardianship status? NA. If yes, attach updated documentation. I have created my Crisis Plan and have been offered a copy of this plan    [facility]: Diagnosis and Treatment Plan explained to Marisela Sampsont acknowledges an understanding of their diagnosis. Jorge Luis Pederson agrees to this treatment plan. I have been offered a copy of this Treatment Plan. Yes    Jorge Luis Pederson, 1985, actively participated in the creation of this treatment plan during a virtual session, using the [employer] platform. Jorge Luis Pederson  provided verbal consent on 11/8/2023 at 2:30 PM. The treatment plan was transcribed into the [employer] Record at a later time. Jorge Luis Pederson, 1985, actively participated in the creation of this treatment plan during a virtual session, using the [employer] Now platform.    Jorge Luis Pederson  provided verbal consent on 11/8/2023 at 2:30 PM. The treatment plan was transcribed into the Electronic Health Record at a later time.

## 2023-11-11 PROBLEM — J06.9 UPPER RESPIRATORY INFECTION, ACUTE: Status: RESOLVED | Noted: 2023-09-12 | Resolved: 2023-11-11

## 2023-11-13 DIAGNOSIS — F90.0 ATTENTION DEFICIT HYPERACTIVITY DISORDER, INATTENTIVE TYPE: ICD-10-CM

## 2023-11-13 NOTE — TELEPHONE ENCOUNTER
Medication Refill Request     Name of Medication Ritalin LA  Dose/Frequency 10 mg / Take 1 capsule by mouth every morning  Quantity 15  Verified pharmacy   [x]  Verified ordering Provider   [x]  Does patient have enough for the next 3 days? Yes [] No [x]  Does patient have a follow-up appointment scheduled?  Yes [x] No []   If so when is appointment: 12/4/2023

## 2023-11-14 RX ORDER — METHYLPHENIDATE HYDROCHLORIDE 10 MG/1
10 CAPSULE, EXTENDED RELEASE ORAL EVERY MORNING
Qty: 30 CAPSULE | Refills: 0 | Status: SHIPPED | OUTPATIENT
Start: 2023-11-14

## 2023-11-14 NOTE — TELEPHONE ENCOUNTER
PDMP website reviewed. Litzy Becker has been appropriately adherent to controlled psychotropic medications without evidence of abuse or misuse. As such, will send 30-day refill for Ritalin to pharmacy of choice and follow up as necessary.     Alison Carson MD

## 2023-11-20 ENCOUNTER — TELEMEDICINE (OUTPATIENT)
Dept: PSYCHIATRY | Facility: CLINIC | Age: 38
End: 2023-11-20
Payer: COMMERCIAL

## 2023-11-20 DIAGNOSIS — F33.1 MODERATE EPISODE OF RECURRENT MAJOR DEPRESSIVE DISORDER (HCC): Primary | ICD-10-CM

## 2023-11-20 DIAGNOSIS — F43.10 PTSD (POST-TRAUMATIC STRESS DISORDER): ICD-10-CM

## 2023-11-20 PROCEDURE — 90834 PSYTX W PT 45 MINUTES: CPT

## 2023-11-20 NOTE — PSYCH
Behavioral Health Psychotherapy Progress Note    Psychotherapy Provided: Individual Psychotherapy     1. Moderate episode of recurrent major depressive disorder ([address])        2. PTSD (post-traumatic stress disorder)            Goals addressed in session: Goal 1     DATA:  Merline Shine shared she will put money towards taking her exam for [place] test and this will motivate her. Therapist and Merline Shine talked about ways to improve ADHD symptoms. She said that she remembers people giving her backhanded compliments including her mom who would criticize her for not being perfect. She said she is becoming more confident and her old self which was people pleasing is going away. Therapist and Merline Shine talked about her process of becoming assertive and more herself and says she still struggles with balancing asserting her confidence and others rights and feelings. During this session, this clinician used the following therapeutic modalities: Client-centered Therapy and Cognitive Behavioral Therapy    Substance Abuse was not addressed during this session. If the client is diagnosed with a co-occurring substance use disorder, please indicate any changes in the frequency or amount of use: . Stage of change for addressing substance use diagnoses: No substance use/Not applicable    ASSESSMENT:  Lizz Lynch presents with a Euthymic/ normal mood. her affect is Normal range and intensity, which is congruent, with her mood and the content of the session. The client has made progress on their goals. Lizz Lynch presents with a none risk of suicide, none risk of self-harm, and none risk of harm to others. For any risk assessment that surpasses a "low" rating, a safety plan must be developed. A safety plan was indicated: no  If yes, describe in detail     PLAN: Between sessions, Lizz Lynch will shrink her inner critic, practice grounding skills when triggered.  At the next session, the therapist will use Client-centered Therapy, Cognitive Behavioral Therapy, and Supportive Psychotherapy to address negative thinking, PTSD symptoms. Behavioral Health Treatment Plan and Discharge Planning: Lizz Lynch is aware of and agrees to continue to work on their treatment plan. They have identified and are working toward their discharge goals. yes    Visit start and stop times:    11/20/23  Start Time: 1700  Stop Time: 1752  Total Visit Time: 52 minutes  Virtual Regular Visit    Verification of patient location:    Patient is located at Home in the following state in which I hold an active license [place]      Assessment/Plan:    Problem List Items Addressed This Visit       Moderate episode of recurrent major depressive disorder ([address]) - Primary    PTSD (post-traumatic stress disorder)       Goals addressed in session: Goal 1          Reason for visit is No chief complaint on file. Encounter provider Terry Bird LCSW    Provider located at [address] [place] [id number] [phone]      Recent Visits  No visits were found meeting these conditions. Showing recent visits within past 7 days and meeting all other requirements  Today's Visits  Date Type Provider Dept   11/20/23 Telemedicine Terry Bird LCSW [facility]   Showing today's visits and meeting all other requirements  Future Appointments  No visits were found meeting these conditions. Showing future appointments within next 150 days and meeting all other requirements       The patient was identified by name and date of birth. Lizz Lynch was informed that this is a telemedicine visit and that the visit is being conducted throughthe [employer] platform. She agrees to proceed. .  My office door was closed. No one else was in the room. She acknowledged consent and understanding of privacy and security of the video platform.  The patient has agreed to participate and understands they can discontinue the visit at any time. Patient is aware this is a billable service. Subjective  Vipul Gagnon is a 45 y.o. female Jonda Coma appeared calm and cooperative and was able to engage in open communication about her thoughts and feelings and was able to identify inner critic thinking and engage in more positive and balanced thinking. HPI     Past Medical History:   Diagnosis Date    Allergic     Anemia     Anxiety     Arthritis     Asthma     Chlamydia 2014    Unfaithful partner    Depression     Exposure to SARS-associated coronavirus 04/12/2020    GERD (gastroesophageal reflux disease) 2010    Gonorrhea 2014    Unfaithful partner    Headache(784.0)     Lumbar herniated disc     Migraine 2012    Pneumonia     Urinary tract infection     Urogenital trichomoniasis 2015    Unfaithful partner    UTI (urinary tract infection)     Varicella 1988    Visual impairment        Past Surgical History:   Procedure Laterality Date    FL INJECTION LEFT KNEE (ARTHROGRAM)  2/28/2022    WISDOM TOOTH EXTRACTION         Current Outpatient Medications   Medication Sig Dispense Refill    albuterol (PROVENTIL HFA,VENTOLIN HFA) 90 mcg/act inhaler Inhale 2 puffs every 6 (six) hours as needed for wheezing 18 g 0    Ascorbic Acid (vitamin C) 1000 MG tablet Take 1,000 mg by mouth daily      B Complex-C (B-complex with vitamin C) tablet Take 1 tablet by mouth daily      buPROPion (Wellbutrin XL) 300 mg 24 hr tablet Take 1 tablet (300 mg total) by mouth every morning 90 tablet 0    calcium carbonate (OS-BERENICE) 600 MG tablet Take 600 mg by mouth 2 (two) times a day with meals      cholecalciferol (VITAMIN D3) 1,000 units tablet Take 1,000 Units by mouth daily      docusate sodium (COLACE) 100 mg capsule Take 100 mg by mouth 3 (three) times a day as needed for constipation      Dupixent 200 MG/1. 14ML SOPN Inject 2 pens (400mg total) under the skin once for 1 dose.  (Patient taking differently: 200mg every 2 weeks) 180 mL 10    EPINEPHrine (EPIPEN) 0.3 mg/0.3 mL SOAJ Inject 0.3 mL (0.3 mg total) into a muscle once for 1 dose 0.6 mL 0    Ferrous Sulfate (RA IRON PO) Take by mouth      FLUoxetine (PROzac) 10 MG tablet Take 1 tablet (10 mg total) by mouth daily  0    levalbuterol (Xopenex) 0.63 mg/3 mL nebulizer solution Take 3 mL (0.63 mg total) by nebulization 3 (three) times a day (Patient not taking: Reported on 10/30/2023) 30 mL 1    Magnesium Oxide 500 MG TABS Take by mouth      methylphenidate (Ritalin LA) 10 MG 24 hr capsule Take 1 capsule (10 mg total) by mouth every morning Max Daily Amount: 10 mg 30 capsule 0    mometasone-formoterol (Dulera) 200-5 MCG/ACT inhaler Inhale 2 puffs 2 (two) times a day Rinse mouth after use. 39 g 3    montelukast (SINGULAIR) 10 mg tablet Take 1 tablet (10 mg total) by mouth daily 90 tablet 0    prazosin (MINIPRESS) 1 mg capsule Take 1 capsule (1 mg total) by mouth daily at bedtime 90 capsule 0    Zinc 30 MG CAPS Take by mouth       No current facility-administered medications for this visit. Allergies   Allergen Reactions    Sulfa Antibiotics Anaphylaxis    Levofloxacin      Other reaction(s): sensative    Prednisone Other (See Comments)    Tobramycin      Other reaction(s): sensative    Pseudoephedrine Palpitations       Review of Systems    Video Exam    There were no vitals filed for this visit.     Physical Exam

## 2023-11-30 ENCOUNTER — TELEPHONE (OUTPATIENT)
Dept: PSYCHIATRY | Facility: CLINIC | Age: 38
End: 2023-11-30

## 2023-11-30 NOTE — TELEPHONE ENCOUNTER
Patient called regarding reminder for upcoming appt looking to reschedule.  Writer transferred call to resident line

## 2023-12-09 DIAGNOSIS — J40 BRONCHITIS: ICD-10-CM

## 2023-12-11 ENCOUNTER — TELEMEDICINE (OUTPATIENT)
Dept: PSYCHIATRY | Facility: CLINIC | Age: 38
End: 2023-12-11
Payer: COMMERCIAL

## 2023-12-11 DIAGNOSIS — F33.1 MODERATE EPISODE OF RECURRENT MAJOR DEPRESSIVE DISORDER (HCC): Primary | ICD-10-CM

## 2023-12-11 DIAGNOSIS — F43.10 PTSD (POST-TRAUMATIC STRESS DISORDER): ICD-10-CM

## 2023-12-11 PROCEDURE — 90834 PSYTX W PT 45 MINUTES: CPT

## 2023-12-11 RX ORDER — MONTELUKAST SODIUM 10 MG/1
10 TABLET ORAL DAILY
Qty: 90 TABLET | Refills: 0 | Status: SHIPPED | OUTPATIENT
Start: 2023-12-11 | End: 2024-03-10

## 2023-12-11 NOTE — PSYCH
Behavioral Health Psychotherapy Progress Note    Psychotherapy Provided: Individual Psychotherapy     1. Moderate episode of recurrent major depressive disorder ([address])        2. PTSD (post-traumatic stress disorder)            Goals addressed in session: Goal 1     DATA:  Mae Goldman shared she is doing "okay" and feels she wants to go off of the prozac due to sexual side effects. She shares she will talk to her doctor about this. Therapist encouraged Mae Goldman to do some things with Aleida Rouse including dates. Mae Goldman shared she has been frustrated with driving recently which she feels is an expression of general irritation. Mae Goldman shared some frustration over parenting with Aleida Rouse. She talked about her mother asking her to do things and trying to get her to do her own tasks. Therapist did the [employer] Game to help her know when or if to say no. She said this was not helpful. During this session, this clinician used the following therapeutic modalities: Dialectical Behavior Therapy    Substance Abuse was not addressed during this session. If the client is diagnosed with a co-occurring substance use disorder, please indicate any changes in the frequency or amount of use: . Stage of change for addressing substance use diagnoses: No substance use/Not applicable    ASSESSMENT:  Fortino Cortes presents with a Euthymic/ normal mood. her affect is Normal range and intensity, which is congruent, with her mood and the content of the session. The client has made progress on their goals. Fortino Cortes presents with a none risk of suicide, none risk of self-harm, and none risk of harm to others. For any risk assessment that surpasses a "low" rating, a safety plan must be developed. A safety plan was indicated: no  If yes, describe in detail     PLAN: Between sessions, Fortino Cortes will draw boundaries with her mom.  At the next session, the therapist will use Cognitive Behavioral Therapy and Dialectical Behavior Therapy to address depression, PTSD. Behavioral Health Treatment Plan and Discharge Planning: Ana Quezada is aware of and agrees to continue to work on their treatment plan. They have identified and are working toward their discharge goals. yes    Visit start and stop times:    12/11/23  Start Time: 1700  Stop Time: 1752  Total Visit Time: 52 minutes  Virtual Regular Visit    Verification of patient location:    Patient is located at Home in the following state in which I hold an active license [place]      Assessment/Plan:    Problem List Items Addressed This Visit       Moderate episode of recurrent major depressive disorder ([address]) - Primary    PTSD (post-traumatic stress disorder)       Goals addressed in session: Goal 1          Reason for visit is No chief complaint on file. Encounter provider Nicholas Todd LCSW    Provider located at [address] [place] [id number] [phone]      Recent Visits  No visits were found meeting these conditions. Showing recent visits within past 7 days and meeting all other requirements  Today's Visits  Date Type Provider Dept   12/11/23 Telemedicine Nicholas Todd LCSW [facility]   Showing today's visits and meeting all other requirements  Future Appointments  No visits were found meeting these conditions. Showing future appointments within next 150 days and meeting all other requirements       The patient was identified by name and date of birth. Ana Quezada was informed that this is a telemedicine visit and that the visit is being conducted throughthe [employer] platform. She agrees to proceed. .  My office door was closed. No one else was in the room. She acknowledged consent and understanding of privacy and security of the video platform. The patient has agreed to participate and understands they can discontinue the visit at any time.     Patient is aware this is a billable service. Yolanda Ramirez is a 45 y.o. female Hilario Pate  . HPI     Past Medical History:   Diagnosis Date    Allergic     Anemia     Anxiety     Arthritis     Asthma     Chlamydia 2014    Unfaithful partner    Depression     Exposure to SARS-associated coronavirus 04/12/2020    GERD (gastroesophageal reflux disease) 2010    Gonorrhea 2014    Unfaithful partner    Headache(784.0)     Lumbar herniated disc     Migraine 2012    Pneumonia     Urinary tract infection     Urogenital trichomoniasis 2015    Unfaithful partner    UTI (urinary tract infection)     Varicella 1988    Visual impairment        Past Surgical History:   Procedure Laterality Date    FL INJECTION LEFT KNEE (ARTHROGRAM)  2/28/2022    WISDOM TOOTH EXTRACTION         Current Outpatient Medications   Medication Sig Dispense Refill    albuterol (PROVENTIL HFA,VENTOLIN HFA) 90 mcg/act inhaler Inhale 2 puffs every 6 (six) hours as needed for wheezing 18 g 0    Ascorbic Acid (vitamin C) 1000 MG tablet Take 1,000 mg by mouth daily      B Complex-C (B-complex with vitamin C) tablet Take 1 tablet by mouth daily      buPROPion (Wellbutrin XL) 300 mg 24 hr tablet Take 1 tablet (300 mg total) by mouth every morning 90 tablet 0    calcium carbonate (OS-BERENICE) 600 MG tablet Take 600 mg by mouth 2 (two) times a day with meals      cholecalciferol (VITAMIN D3) 1,000 units tablet Take 1,000 Units by mouth daily      docusate sodium (COLACE) 100 mg capsule Take 100 mg by mouth 3 (three) times a day as needed for constipation      Dupixent 200 MG/1. 14ML SOPN Inject 2 pens (400mg total) under the skin once for 1 dose.  (Patient taking differently: 200mg every 2 weeks) 180 mL 10    EPINEPHrine (EPIPEN) 0.3 mg/0.3 mL SOAJ Inject 0.3 mL (0.3 mg total) into a muscle once for 1 dose 0.6 mL 0    Ferrous Sulfate (RA IRON PO) Take by mouth      FLUoxetine (PROzac) 10 MG tablet Take 1 tablet (10 mg total) by mouth daily  0    levalbuterol (Xopenex) 0.63 mg/3 mL nebulizer solution Take 3 mL (0.63 mg total) by nebulization 3 (three) times a day (Patient not taking: Reported on 10/30/2023) 30 mL 1    Magnesium Oxide 500 MG TABS Take by mouth      methylphenidate (Ritalin LA) 10 MG 24 hr capsule Take 1 capsule (10 mg total) by mouth every morning Max Daily Amount: 10 mg 30 capsule 0    mometasone-formoterol (Dulera) 200-5 MCG/ACT inhaler Inhale 2 puffs 2 (two) times a day Rinse mouth after use. 39 g 3    montelukast (SINGULAIR) 10 mg tablet Take 1 tablet (10 mg total) by mouth daily 90 tablet 0    prazosin (MINIPRESS) 1 mg capsule Take 1 capsule (1 mg total) by mouth daily at bedtime 90 capsule 0    Zinc 30 MG CAPS Take by mouth       No current facility-administered medications for this visit. Allergies   Allergen Reactions    Sulfa Antibiotics Anaphylaxis    Levofloxacin      Other reaction(s): sensative    Prednisone Other (See Comments)    Tobramycin      Other reaction(s): sensative    Pseudoephedrine Palpitations       Review of Systems    Video Exam    There were no vitals filed for this visit.     Physical Exam

## 2023-12-13 ENCOUNTER — TELEPHONE (OUTPATIENT)
Dept: PSYCHIATRY | Facility: CLINIC | Age: 38
End: 2023-12-13

## 2023-12-13 NOTE — TELEPHONE ENCOUNTER
Pt called to get her appt for 12/14/23 at 3:45pm switched to a virtual visit due to traveling after appointment. Writer switched appt.

## 2023-12-14 ENCOUNTER — TELEMEDICINE (OUTPATIENT)
Dept: PSYCHIATRY | Facility: CLINIC | Age: 38
End: 2023-12-14
Payer: COMMERCIAL

## 2023-12-14 DIAGNOSIS — F33.0 MILD EPISODE OF RECURRENT MAJOR DEPRESSIVE DISORDER (HCC): Primary | ICD-10-CM

## 2023-12-14 DIAGNOSIS — F90.0 ATTENTION DEFICIT HYPERACTIVITY DISORDER, INATTENTIVE TYPE: ICD-10-CM

## 2023-12-14 DIAGNOSIS — F43.10 PTSD (POST-TRAUMATIC STRESS DISORDER): ICD-10-CM

## 2023-12-14 PROCEDURE — 99214 OFFICE O/P EST MOD 30 MIN: CPT

## 2023-12-14 RX ORDER — PRAZOSIN HYDROCHLORIDE 1 MG/1
1 CAPSULE ORAL
Qty: 90 CAPSULE | Refills: 0 | Status: SHIPPED | OUTPATIENT
Start: 2023-12-14

## 2023-12-14 RX ORDER — METHYLPHENIDATE HYDROCHLORIDE 10 MG/1
10 CAPSULE, EXTENDED RELEASE ORAL EVERY MORNING
Qty: 30 CAPSULE | Refills: 0 | Status: SHIPPED | OUTPATIENT
Start: 2023-12-14

## 2023-12-14 RX ORDER — BUPROPION HYDROCHLORIDE 300 MG/1
300 TABLET ORAL EVERY MORNING
Qty: 90 TABLET | Refills: 0 | Status: SHIPPED | OUTPATIENT
Start: 2023-12-14 | End: 2024-03-13

## 2023-12-14 NOTE — PSYCH
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 to emotional blunting and sexual side effects    Continue prazosin 1 mg at bedtime for nightmares in the setting of PTSD and to promote sleep   PARQ was completed for prazosin (Minipress) including dizziness, sedation, blood pressure changes (including orthostatic hypotension and syncope), headache, medication interaction risk, GI distress, priapism in males, and others. Continue Wellbutrin  mg daily for mood, attention, and focus difficulties   PARQ was completed for bupropion (Wellbutrin) including nausea, insomnia, agitation/activation, weight loss, anxiety, palpitations, hypertension, decreased seizure threshold and risk with alcohol withdrawal or electrolyte disturbances. Patient screened negative for heavy alcohol use, history of seizures, and eating disorders. Continue Ritalin LA 10 mg daily for ADHD symptomatology  PARQ completed for the class of stimulant medications including anxiety/irritability, insomnia, appetite suppression/weight loss, abuse potential, elevated heart rate and blood pressure, seizures, activation/induction of belinda, unmasking of tic's, growth suppression in children, interactions with other medications, and risk of sudden death. For MALES- rare priapism.      Continue ongoing psychotherapy with Yordan Montes on a routine basis  Continue ongoing psychiatric medication management every 1 to 3 months  Are of the need to follow up with family physician for medical issues  However 24-hour and weekend coverage for urgent situations access by calling the [facility] Main practice on the    Risk of Harm to Self:  The following ratings are based on assessment at the time of the interview as well as review of medical records  Demographic risk factors include: White  Historical Risk Factors include: Chronic depressive symptoms, chronic anxiety symptoms  Recent Specific Risk Factors include: Diagnosis of depression, current mild depressive symptoms  Protective Factors: No current suicidal ideation, stable mood, improved anxiety symptoms, access to mental health treatment, taking psychiatric medications as prescribed  Access to firearms: Patient reports that there are guns in her house, reporting her boyfriend owns a handgun and hunts. She states they are locked and secured and has no access to them. Vladimir Singh has no history of arrests or violence pertaining to use of a deadly weapon. Based on today's assessment, Makenna Lopez presents the following risk of harm to self: Minimal     Risk of Harm to Others: The following ratings are based on assessment at the time of the interview as well as review of medical records  Demographic Risk Factors include: Under age 36  Historical Risk Factors include: None  Recent Specific Risk Factors include: None  Protective Factors: No current homicidal ideation  Based on today's assessment, Makenna Lopez presents the following risk of harm to others: Minimal     Although patient's acute lethality risk is low, long-term/chronic lethality risk is mildly elevated in the presence of mild symptoms of depression and moderate symptoms of anxiety. At the current moment, Makenna Lopez is future-oriented, forward-thinking, and demonstrates ability to act in a self-preserving manner as evidenced by volitionally presenting to the clinic today, seeking treatment. At this juncture, inpatient hospitalization is not currently warranted.  To mitigate future risk, patient should adhere to the recommendations of this writer, avoid alcohol/illicit substance use, utilize community-based resources and familiar support and prioritize mental health treatment. Based on today's assessment and clinical criteria, Kade Ramirez contracts for safety and is not an imminent risk of harm to self or others. Outpatient level of care is deemed appropriate at this present time. Hilario Pate understands that if they are no longer able to contract for safety, they need to call/contact the outpatient office including this writer, call/contact crisis and/orattend to the nearest Emergency Department for immediate evaluation. Risks/Benefits      Risks, Benefits And Possible Side Effects Of Medications:    Risks, benefits, and possible side effects of medications explained to Hilario Pate and she verbalizes understanding and agreement for treatment. Controlled Medication Discussion:     Hilario Pate has been filling controlled prescriptions on time as prescribed according to [address] [facility]    Psychotherapy Provided:     Individual psychotherapy provided: Yes  Recent stressor including medical issues, relationship issues, school stressors, and parenting stressors discussed with Hilario Pate. Supportive therapy provided. Treatment Plan:    Completed and signed during the session: Not applicable - Treatment Plan not due at this session    Visit Time    Visit Start Time: 3:45 PM  Visit Stop Time: 4:30 PM  Total Visit Duration: 45 minutes    This note was shared with patient. Muna Noguera MD 12/14/23    This note has been constructed using a voice recognition system. There may be translation, syntax, or grammatical errors. If you have any questions, please contact the dictating provider.

## 2023-12-14 NOTE — PATIENT INSTRUCTIONS
Please present for your previously scheduled appointment approximately 15 minutes prior to appointment time. If you are running late or are unable to attend your appointment, please call our [place] office at [phone] or our [place] ([place]) office at [phone] if applicable to notify the office of your absence. If you are in psychological crisis including not limited to suicidal/homicidal thoughts or thoughts of self-injury, do not hesitate to call/contact your [facility] hotline (see below) or attend to the nearest emergency department.   [facility] [place], [facility] Crisis: [address] Crisis: [phone]  [address] Crisis: [address] Crisis: [address] Crisis: [address] Crisis: [address] Crisis: [phone]  National Suicide Prevention Hotline: 1-978.581.4615

## 2023-12-19 ENCOUNTER — OFFICE VISIT (OUTPATIENT)
Dept: FAMILY MEDICINE CLINIC | Facility: CLINIC | Age: 38
End: 2023-12-19
Payer: COMMERCIAL

## 2023-12-19 VITALS
OXYGEN SATURATION: 99 % | BODY MASS INDEX: 28.28 KG/M2 | DIASTOLIC BLOOD PRESSURE: 78 MMHG | HEART RATE: 86 BPM | WEIGHT: 176 LBS | TEMPERATURE: 97.1 F | SYSTOLIC BLOOD PRESSURE: 104 MMHG | HEIGHT: 66 IN

## 2023-12-19 DIAGNOSIS — M79.652 THIGH PAIN, MUSCULOSKELETAL, LEFT: ICD-10-CM

## 2023-12-19 DIAGNOSIS — G43.709 CHRONIC MIGRAINE WITHOUT AURA WITHOUT STATUS MIGRAINOSUS, NOT INTRACTABLE: Primary | ICD-10-CM

## 2023-12-19 DIAGNOSIS — G43.829 MENSTRUAL MIGRAINE WITHOUT STATUS MIGRAINOSUS, NOT INTRACTABLE: ICD-10-CM

## 2023-12-19 DIAGNOSIS — J45.50 SEVERE PERSISTENT ASTHMA WITHOUT COMPLICATION: ICD-10-CM

## 2023-12-19 PROBLEM — R06.02 SHORTNESS OF BREATH: Status: RESOLVED | Noted: 2023-01-27 | Resolved: 2023-12-19

## 2023-12-19 PROCEDURE — 99213 OFFICE O/P EST LOW 20 MIN: CPT | Performed by: NURSE PRACTITIONER

## 2023-12-19 NOTE — PROGRESS NOTES
Name: Linda Hickman      : 1985      MRN: [id number]  Encounter Provider: JULIEN Thomson  Encounter Date: 2023   Encounter department: [facility] [address]    Assessment & Plan     1. Chronic migraine without aura without status migrainosus, not intractable  -     Ambulatory Referral to Neurology; Future  -     MRI brain wo contrast; Future; Expected date: 2023    2. Severe persistent asthma without complication  Assessment & Plan:  Patient on the dupixent injections and inhalers and is managing with pulmonary       3. Menstrual migraine without status migrainosus, not intractable  Assessment & Plan:  Will update MRI and referral placed to neurology      4. Thigh pain, musculoskeletal, left  Assessment & Plan:  DW patient footwear and stretches and call for any new or worsening symptoms              Subjective      Patient here today for follow up and reports that she is still having shortness of breath and is still on the steroids and is consistently on Dulera and prn Albuterol and Dupixent infusions and is tolerating and is getting at home and is injecting in stomach and thigh every two weeks and is following with pulmonary still having issues with SOB with stairs and is improving.     Patient also reports that she had an iridotomy at [facility] and was still having eye pain and was recommended to see a neurology. Patient was advised to see neurology and having daily headaches and is on the right side mainly. Patient is taking tylenol and motrin caffeine and benadryl and is usually effective and does go away.     Patient also reports that she is having some pain burning pain that radiates to her left hip and does not travel down her leg no abdominal symptoms and no pain with walking worse with position changes       Review of Systems   Constitutional:  Negative for activity change, appetite change, chills, diaphoresis, fatigue, fever and unexpected weight change.   HENT:   Negative for congestion, ear pain, hearing loss, postnasal drip, sinus pressure, sinus pain, sneezing and sore throat.    Eyes:  Negative for pain, redness and visual disturbance.   Respiratory:  Positive for shortness of breath. Negative for cough.    Cardiovascular:  Negative for chest pain and leg swelling.   Gastrointestinal:  Negative for abdominal pain, diarrhea, nausea and vomiting.   Endocrine: Negative.    Genitourinary: Negative.    Musculoskeletal:  Positive for arthralgias.   Skin: Negative.    Allergic/Immunologic: Negative.    Neurological:  Positive for headaches. Negative for dizziness and light-headedness.   Hematological: Negative.    Psychiatric/Behavioral:  Negative for behavioral problems and dysphoric mood.        Current Outpatient Medications on File Prior to Visit   Medication Sig   • albuterol (PROVENTIL HFA,VENTOLIN HFA) 90 mcg/act inhaler Inhale 2 puffs every 6 (six) hours as needed for wheezing   • Ascorbic Acid (vitamin C) 1000 MG tablet Take 1,000 mg by mouth daily   • B Complex-C (B-complex with vitamin C) tablet Take 1 tablet by mouth daily   • buPROPion (Wellbutrin XL) 300 mg 24 hr tablet Take 1 tablet (300 mg total) by mouth every morning   • calcium carbonate (OS-BERENICE) 600 MG tablet Take 600 mg by mouth 2 (two) times a day with meals   • cholecalciferol (VITAMIN D3) 1,000 units tablet Take 1,000 Units by mouth daily   • Dupixent 200 MG/1.14ML SOPN Inject 2 pens (400mg total) under the skin once for 1 dose. (Patient taking differently: 200mg every 2 weeks)   • EPINEPHrine (EPIPEN) 0.3 mg/0.3 mL SOAJ Inject 0.3 mL (0.3 mg total) into a muscle once for 1 dose   • Ferrous Sulfate (RA IRON PO) Take by mouth   • levalbuterol (Xopenex) 0.63 mg/3 mL nebulizer solution Take 3 mL (0.63 mg total) by nebulization 3 (three) times a day (Patient not taking: Reported on 10/30/2023)   • Magnesium Oxide 500 MG TABS Take by mouth   • methylphenidate (Ritalin LA) 10 MG 24 hr capsule Take 1 capsule  "(10 mg total) by mouth every morning Max Daily Amount: 10 mg   • mometasone-formoterol (Dulera) 200-5 MCG/ACT inhaler Inhale 2 puffs 2 (two) times a day Rinse mouth after use.   • montelukast (SINGULAIR) 10 mg tablet Take 1 tablet (10 mg total) by mouth daily   • prazosin (MINIPRESS) 1 mg capsule Take 1 capsule (1 mg total) by mouth daily at bedtime   • Zinc 30 MG CAPS Take by mouth   • [DISCONTINUED] docusate sodium (COLACE) 100 mg capsule Take 100 mg by mouth 3 (three) times a day as needed for constipation       Objective     /78 (BP Location: Left arm, Patient Position: Sitting, Cuff Size: Large)   Pulse 86   Temp (!) 97.1 °F (36.2 °C)   Ht 5' 6\" (1.676 m)   Wt 79.8 kg (176 lb)   SpO2 99%   BMI 28.41 kg/m²     Physical Exam  Vitals and nursing note reviewed.   Constitutional:       General: She is not in acute distress.     Appearance: She is well-developed.   HENT:      Head: Normocephalic and atraumatic.      Right Ear: Tympanic membrane normal.      Left Ear: Tympanic membrane normal.      Nose: Nose normal.      Mouth/Throat:      Mouth: Mucous membranes are moist.   Eyes:      Pupils: Pupils are equal, round, and reactive to light.   Neck:      Thyroid: No thyromegaly.   Cardiovascular:      Rate and Rhythm: Normal rate and regular rhythm.      Heart sounds: Normal heart sounds. No murmur heard.  Pulmonary:      Effort: Pulmonary effort is normal. No respiratory distress.      Breath sounds: Normal breath sounds. No wheezing.   Abdominal:      General: Bowel sounds are normal.      Palpations: Abdomen is soft.   Musculoskeletal:         General: Normal range of motion.      Cervical back: Normal range of motion.   Skin:     General: Skin is warm and dry.   Neurological:      General: No focal deficit present.      Mental Status: She is alert and oriented to person, place, and time.      GCS: GCS eye subscore is 4. GCS verbal subscore is 5. GCS motor subscore is 6.   Psychiatric:         " Attention and Perception: Attention normal.         Mood and Affect: Mood normal.         Speech: Speech normal.         Behavior: Behavior normal.         Thought Content: Thought content normal.         Cognition and Memory: Cognition normal.       JULIEN Thomson

## 2023-12-20 ENCOUNTER — TELEMEDICINE (OUTPATIENT)
Dept: PSYCHIATRY | Facility: CLINIC | Age: 38
End: 2023-12-20
Payer: COMMERCIAL

## 2023-12-20 ENCOUNTER — TELEPHONE (OUTPATIENT)
Dept: FAMILY MEDICINE CLINIC | Facility: CLINIC | Age: 38
End: 2023-12-20

## 2023-12-20 DIAGNOSIS — J45.50 SEVERE PERSISTENT ASTHMA WITHOUT COMPLICATION: ICD-10-CM

## 2023-12-20 DIAGNOSIS — F43.10 PTSD (POST-TRAUMATIC STRESS DISORDER): ICD-10-CM

## 2023-12-20 DIAGNOSIS — F33.1 MODERATE EPISODE OF RECURRENT MAJOR DEPRESSIVE DISORDER (HCC): Primary | ICD-10-CM

## 2023-12-20 DIAGNOSIS — G43.709 CHRONIC MIGRAINE WITHOUT AURA WITHOUT STATUS MIGRAINOSUS, NOT INTRACTABLE: Primary | ICD-10-CM

## 2023-12-20 PROCEDURE — 90834 PSYTX W PT 45 MINUTES: CPT

## 2023-12-20 NOTE — TELEPHONE ENCOUNTER
Scheduling called regarding the MRI for Linda. You ordered this with sedation. Linda does not want sedation, can you please correct your order?

## 2023-12-20 NOTE — PSYCH
"Behavioral Health Psychotherapy Progress Note    Psychotherapy Provided: Individual Psychotherapy     1. Moderate episode of recurrent major depressive disorder (HCC)        2. PTSD (post-traumatic stress disorder)            Goals addressed in session: Goal 1     DATA: Linda shared she is feeling upset by her mother who has gotten in debt with her house and credit cards and externalizes by asking others for help and screaming at others when they disagree with her.  She said she still has perfectionistic tendencies because of this. Therapist encouraged her to remember good things others have said about her and also comparing herself to herself in the past instead of to others.  She has talked about her mother being selfish, paranoid,and possibly having borderline personality disorder.  Therapist encouraged her to continue to hold boundaries with her mother when she asks for things.    During this session, this clinician used the following therapeutic modalities: Client-centered Therapy and Cognitive Behavioral Therapy    Substance Abuse was not addressed during this session. If the client is diagnosed with a co-occurring substance use disorder, please indicate any changes in the frequency or amount of use: . Stage of change for addressing substance use diagnoses: No substance use/Not applicable    ASSESSMENT:  Linda Hickman presents with a Euthymic/ normal mood.     her affect is Normal range and intensity, which is congruent, with her mood and the content of the session. The client has made progress on their goals.     Linda Hickman presents with a none risk of suicide, none risk of self-harm, and none risk of harm to others.    For any risk assessment that surpasses a \"low\" rating, a safety plan must be developed.    A safety plan was indicated: no  If yes, describe in detail     PLAN: Between sessions, Linda Hickman will continue to hold boundaries with her mom. At the next session, the therapist will use " Client-centered Therapy and Cognitive Behavioral Therapy to address boundaries with mom.    Behavioral Health Treatment Plan and Discharge Planning: Linda Hickman is aware of and agrees to continue to work on their treatment plan. They have identified and are working toward their discharge goals. yes    Visit start and stop times:    12/20/23  Start Time: 1200  Stop Time: 1252  Total Visit Time: 52 minutes  Virtual Regular Visit    Verification of patient location:    Patient is located at Home in the following state in which I hold an active license [place]      Assessment/Plan:    Problem List Items Addressed This Visit       Moderate episode of recurrent major depressive disorder (HCC) - Primary    PTSD (post-traumatic stress disorder)       Goals addressed in session: Goal 1          Reason for visit is No chief complaint on file.       Encounter provider Jannet Villatoro LCSW    Provider located at [address]  [facility] [place] [id number] [phone]      Recent Visits  Date Type Provider Dept   12/19/23 Office Visit [facility]   Showing recent visits within past 7 days and meeting all other requirements  Today's Visits  Date Type Provider Dept   12/20/23 Telemedicine [facility]ann[facility] Villator[facility] LCSW  [facility]   Showing today's visits and meeting all other requirements  Future Appointments  No visits were found meeting these conditions.  Showing future appointments within next 150 days and meeting all other requirements       The patient was identified by name and date of birth. Linda Hickman was informed that this is a telemedicine visit and that the visit is being conducted throughthe [employer] platform. She agrees to proceed..  My office door was closed. No one else was in the room.  She acknowledged consent and understanding of privacy and security of the video  platform. The patient has agreed to participate and understands they can discontinue the visit at any time.    Patient is aware this is a billable service.     Yolanda Hickman is a 38 y.o. female  .      HPI     Past Medical History:   Diagnosis Date    Allergic     Anemia     Anxiety     Arthritis     Asthma     Chlamydia 2014    Unfaithful partner    Depression     Exposure to SARS-associated coronavirus 04/12/2020    GERD (gastroesophageal reflux disease) 2010    Gonorrhea 2014    Unfaithful partner    Headache(784.0)     Lumbar herniated disc     Migraine 2012    Pneumonia     Urinary tract infection     Urogenital trichomoniasis 2015    Unfaithful partner    UTI (urinary tract infection)     Varicella 1988    Visual impairment        Past Surgical History:   Procedure Laterality Date    FL INJECTION LEFT KNEE (ARTHROGRAM)  2/28/2022    WISDOM TOOTH EXTRACTION         Current Outpatient Medications   Medication Sig Dispense Refill    albuterol (PROVENTIL HFA,VENTOLIN HFA) 90 mcg/act inhaler Inhale 2 puffs every 6 (six) hours as needed for wheezing 18 g 0    Ascorbic Acid (vitamin C) 1000 MG tablet Take 1,000 mg by mouth daily      B Complex-C (B-complex with vitamin C) tablet Take 1 tablet by mouth daily      buPROPion (Wellbutrin XL) 300 mg 24 hr tablet Take 1 tablet (300 mg total) by mouth every morning 90 tablet 0    calcium carbonate (OS-BERENICE) 600 MG tablet Take 600 mg by mouth 2 (two) times a day with meals      cholecalciferol (VITAMIN D3) 1,000 units tablet Take 1,000 Units by mouth daily      Dupixent 200 MG/1.14ML SOPN Inject 2 pens (400mg total) under the skin once for 1 dose. (Patient taking differently: 200mg every 2 weeks) 180 mL 10    EPINEPHrine (EPIPEN) 0.3 mg/0.3 mL SOAJ Inject 0.3 mL (0.3 mg total) into a muscle once for 1 dose 0.6 mL 0    Ferrous Sulfate (RA IRON PO) Take by mouth      levalbuterol (Xopenex) 0.63 mg/3 mL nebulizer solution Take 3 mL (0.63 mg total) by nebulization 3  (three) times a day (Patient not taking: Reported on 10/30/2023) 30 mL 1    Magnesium Oxide 500 MG TABS Take by mouth      methylphenidate (Ritalin LA) 10 MG 24 hr capsule Take 1 capsule (10 mg total) by mouth every morning Max Daily Amount: 10 mg 30 capsule 0    mometasone-formoterol (Dulera) 200-5 MCG/ACT inhaler Inhale 2 puffs 2 (two) times a day Rinse mouth after use. 39 g 3    montelukast (SINGULAIR) 10 mg tablet Take 1 tablet (10 mg total) by mouth daily 90 tablet 0    prazosin (MINIPRESS) 1 mg capsule Take 1 capsule (1 mg total) by mouth daily at bedtime 90 capsule 0    Zinc 30 MG CAPS Take by mouth       No current facility-administered medications for this visit.        Allergies   Allergen Reactions    Sulfa Antibiotics Anaphylaxis    Levofloxacin      Other reaction(s): sensative    Prednisone Other (See Comments)    Tobramycin      Other reaction(s): sensative    Pseudoephedrine Palpitations       Review of Systems    Video Exam    There were no vitals filed for this visit.    Physical Exam

## 2023-12-27 ENCOUNTER — TELEPHONE (OUTPATIENT)
Dept: PSYCHIATRY | Facility: CLINIC | Age: 38
End: 2023-12-27

## 2023-12-27 DIAGNOSIS — F90.0 ATTENTION DEFICIT HYPERACTIVITY DISORDER, INATTENTIVE TYPE: Primary | ICD-10-CM

## 2023-12-27 NOTE — TELEPHONE ENCOUNTER
Patient called regarding medication adderall, pts pharmacy's and all the pharmacies pt has called does not have adderall in stock. Pt would like to speak to provider to see if there is any alternative medications she can take in replace of adderall.

## 2023-12-28 ENCOUNTER — TELEPHONE (OUTPATIENT)
Dept: PULMONOLOGY | Facility: CLINIC | Age: 38
End: 2023-12-28

## 2023-12-28 NOTE — TELEPHONE ENCOUNTER
Toña from [employer] [employer] called in request for someone that handles Medication Prior Auths. Requested a call back at [phone] can speak with anyone.

## 2023-12-29 RX ORDER — DEXTROAMPHETAMINE SACCHARATE, AMPHETAMINE ASPARTATE MONOHYDRATE, DEXTROAMPHETAMINE SULFATE AND AMPHETAMINE SULFATE 2.5; 2.5; 2.5; 2.5 MG/1; MG/1; MG/1; MG/1
10 CAPSULE, EXTENDED RELEASE ORAL EVERY MORNING
Qty: 30 CAPSULE | Refills: 0 | Status: SHIPPED | OUTPATIENT
Start: 2023-12-29

## 2023-12-29 NOTE — TELEPHONE ENCOUNTER
This writer called the patient Linda Hickman 12/29/23 at 10:15 AM to discuss medication issues.    Unfortunately, Linda reports that at this time she has been unable to fill Ritalin LA 10 mg daily, which was prescribed at her last office appointment. She has been calling multiple pharmacies and unfortunately the pharmacies she has called have stated they do not have this medication in stock due to medication shortages.    Medication adjustments were discussed and Linda agreed to switch to Adderall XR 10 mg daily to see if this medication was in stock. PARQ completed for the class of stimulant medications including anxiety/irritability, insomnia, appetite suppression/weight loss, abuse potential, elevated heart rate and blood pressure, and seizures.    At the conclusion of the phone call Ritalin was discontinued and 30 pills of Adderall XR 10 mg daily was sent to the patient's pharmacy of choice.    PDMP website reviewed. Linda has been appropriately adherent to controlled psychotropic medications without evidence of abuse or misuse.    At the time of the phone call Linda reports that she has been feeling more depressed after discontinuing Prozac and has noticed more crying spells, however feels her symptoms are manageable. She denies thoughts of hurting herself or others and agrees to follow up at her next office appointment in January.    Mika Albarran MD

## 2024-01-03 ENCOUNTER — TELEMEDICINE (OUTPATIENT)
Dept: PSYCHIATRY | Facility: CLINIC | Age: 39
End: 2024-01-03
Payer: COMMERCIAL

## 2024-01-03 DIAGNOSIS — F43.10 PTSD (POST-TRAUMATIC STRESS DISORDER): ICD-10-CM

## 2024-01-03 DIAGNOSIS — F33.1 MODERATE EPISODE OF RECURRENT MAJOR DEPRESSIVE DISORDER (HCC): Primary | ICD-10-CM

## 2024-01-03 PROCEDURE — 90834 PSYTX W PT 45 MINUTES: CPT

## 2024-01-03 NOTE — PSYCH
"Behavioral Health Psychotherapy Progress Note    Psychotherapy Provided: Individual Psychotherapy     1. Moderate episode of recurrent major depressive disorder (HCC)        2. PTSD (post-traumatic stress disorder)            Goals addressed in session: Goal 1     DATA: Linda said she had good holidays but had a big fight with her boyfriend over his son's behaviors. Therapist and Linda talked about parenting her stepson. She shared she is feeling frustrated by the lack of consistency with her partner in coparenting.  Therapist provided information about oppositional defiant disorder as well as love and logic principles.  Therapist encouraged Linda to openly communicate her thoughts and feelings and to connect her reactions to her stepson to her own trauma and triggers.   During this session, this clinician used the following therapeutic modalities: Client-centered Therapy and parenting psychoeducation    Substance Abuse was not addressed during this session. If the client is diagnosed with a co-occurring substance use disorder, please indicate any changes in the frequency or amount of use: . Stage of change for addressing substance use diagnoses: No substance use/Not applicable    ASSESSMENT:  Linda Hickman presents with a Euthymic/ normal mood.     her affect is Normal range and intensity, which is congruent, with her mood and the content of the session. The client has made progress on their goals.     Linda Hickman presents with a none risk of suicide, none risk of self-harm, and none risk of harm to others.    For any risk assessment that surpasses a \"low\" rating, a safety plan must be developed.    A safety plan was indicated: no  If yes, describe in detail     PLAN: Between sessions, Linda Hickman will utilize stepping away if she is acting or talking in anger with her stepchild. At the next session, the therapist will use Client-centered Therapy and Dialectical Behavior Therapy to address parenting issues, " depression.    Behavioral Health Treatment Plan and Discharge Planning: Linda Hickman is aware of and agrees to continue to work on their treatment plan. They have identified and are working toward their discharge goals. yes    Visit start and stop times:    01/03/24  Start Time: 1700  Stop Time: 1752  Total Visit Time: 52 minutes  Virtual Regular Visit    Verification of patient location:    Patient is located at Home in the following state in which I hold an active license [place]      Assessment/Plan:    Problem List Items Addressed This Visit       Moderate episode of recurrent major depressive disorder (HCC) - Primary    PTSD (post-traumatic stress disorder)       Goals addressed in session: Goal 1          Reason for visit is No chief complaint on file.       Encounter provider Jannet Villatoro LCSW    Provider located at [address]  [place] [address] [phone]      Recent Visits  No visits were found meeting these conditions.  Showing recent visits within past 7 days and meeting all other requirements  Today's Visits  Date Type Provider Dept   01/03/24 Telemedicine Jannet Villatoro LCSW  [facility]   Showing today's visits and meeting all other requirements  Future Appointments  No visits were found meeting these conditions.  Showing future appointments within next 150 days and meeting all other requirements       The patient was identified by name and date of birth. Linda Hickman was informed that this is a telemedicine visit and that the visit is being conducted throughthe [employer] platform. She agrees to proceed..  My office door was closed. No one else was in the room.  She acknowledged consent and understanding of privacy and security of the video platform. The patient has agreed to participate and understands they can discontinue the visit at any time.    Patient is aware this  is a billable service.     Yolanda Hickman is a 38 y.o. female  .      HPI     Past Medical History:   Diagnosis Date    Allergic     Anemia     Anxiety     Arthritis     Asthma     Chlamydia 2014    Unfaithful partner    Depression     Exposure to SARS-associated coronavirus 04/12/2020    GERD (gastroesophageal reflux disease) 2010    Gonorrhea 2014    Unfaithful partner    Headache(784.0)     Lumbar herniated disc     Migraine 2012    Pneumonia     Urinary tract infection     Urogenital trichomoniasis 2015    Unfaithful partner    UTI (urinary tract infection)     Varicella 1988    Visual impairment        Past Surgical History:   Procedure Laterality Date    FL INJECTION LEFT KNEE (ARTHROGRAM)  2/28/2022    WISDOM TOOTH EXTRACTION         Current Outpatient Medications   Medication Sig Dispense Refill    albuterol (PROVENTIL HFA,VENTOLIN HFA) 90 mcg/act inhaler Inhale 2 puffs every 6 (six) hours as needed for wheezing 18 g 0    amphetamine-dextroamphetamine (ADDERALL XR, 10MG,) 10 MG 24 hr capsule Take 1 capsule (10 mg total) by mouth every morning Max Daily Amount: 10 mg 30 capsule 0    Ascorbic Acid (vitamin C) 1000 MG tablet Take 1,000 mg by mouth daily      B Complex-C (B-complex with vitamin C) tablet Take 1 tablet by mouth daily      buPROPion (Wellbutrin XL) 300 mg 24 hr tablet Take 1 tablet (300 mg total) by mouth every morning 90 tablet 0    calcium carbonate (OS-BERENICE) 600 MG tablet Take 600 mg by mouth 2 (two) times a day with meals      cholecalciferol (VITAMIN D3) 1,000 units tablet Take 1,000 Units by mouth daily      Dupixent 200 MG/1.14ML SOPN Inject 2 pens (400mg total) under the skin once for 1 dose. (Patient taking differently: 200mg every 2 weeks) 180 mL 10    EPINEPHrine (EPIPEN) 0.3 mg/0.3 mL SOAJ Inject 0.3 mL (0.3 mg total) into a muscle once for 1 dose 0.6 mL 0    Ferrous Sulfate (RA IRON PO) Take by mouth      levalbuterol (Xopenex) 0.63 mg/3 mL nebulizer solution Take 3 mL  (0.63 mg total) by nebulization 3 (three) times a day (Patient not taking: Reported on 10/30/2023) 30 mL 1    Magnesium Oxide 500 MG TABS Take by mouth      mometasone-formoterol (Dulera) 200-5 MCG/ACT inhaler Inhale 2 puffs 2 (two) times a day Rinse mouth after use. 39 g 3    montelukast (SINGULAIR) 10 mg tablet Take 1 tablet (10 mg total) by mouth daily 90 tablet 0    prazosin (MINIPRESS) 1 mg capsule Take 1 capsule (1 mg total) by mouth daily at bedtime 90 capsule 0    Zinc 30 MG CAPS Take by mouth       No current facility-administered medications for this visit.        Allergies   Allergen Reactions    Sulfa Antibiotics Anaphylaxis    Levofloxacin      Other reaction(s): sensative    Prednisone Other (See Comments)    Tobramycin      Other reaction(s): sensative    Pseudoephedrine Palpitations       Review of Systems    Video Exam    There were no vitals filed for this visit.    Physical Exam

## 2024-01-22 ENCOUNTER — TELEMEDICINE (OUTPATIENT)
Dept: PSYCHIATRY | Facility: CLINIC | Age: 39
End: 2024-01-22
Payer: COMMERCIAL

## 2024-01-22 DIAGNOSIS — F90.0 ATTENTION DEFICIT HYPERACTIVITY DISORDER, INATTENTIVE TYPE: ICD-10-CM

## 2024-01-22 DIAGNOSIS — F33.0 MILD EPISODE OF RECURRENT MAJOR DEPRESSIVE DISORDER (HCC): Primary | ICD-10-CM

## 2024-01-22 DIAGNOSIS — F43.10 PTSD (POST-TRAUMATIC STRESS DISORDER): ICD-10-CM

## 2024-01-22 PROCEDURE — 99214 OFFICE O/P EST MOD 30 MIN: CPT

## 2024-01-22 RX ORDER — BUPROPION HYDROCHLORIDE 300 MG/1
300 TABLET ORAL EVERY MORNING
Qty: 90 TABLET | Refills: 0 | Status: SHIPPED | OUTPATIENT
Start: 2024-01-22 | End: 2024-04-21

## 2024-01-22 RX ORDER — DEXTROAMPHETAMINE SACCHARATE, AMPHETAMINE ASPARTATE MONOHYDRATE, DEXTROAMPHETAMINE SULFATE AND AMPHETAMINE SULFATE 2.5; 2.5; 2.5; 2.5 MG/1; MG/1; MG/1; MG/1
10 CAPSULE, EXTENDED RELEASE ORAL EVERY MORNING
Qty: 30 CAPSULE | Refills: 0 | Status: SHIPPED | OUTPATIENT
Start: 2024-01-22

## 2024-01-22 RX ORDER — CHOLECALCIFEROL (VITAMIN D3) 125 MCG
5 CAPSULE ORAL
Start: 2024-01-22

## 2024-01-22 RX ORDER — PRAZOSIN HYDROCHLORIDE 1 MG/1
1 CAPSULE ORAL
Qty: 90 CAPSULE | Refills: 0 | Status: SHIPPED | OUTPATIENT
Start: 2024-01-22

## 2024-01-22 NOTE — PATIENT INSTRUCTIONS
Please present for your previously scheduled appointment approximately 15 minutes prior to appointment time. If you are running late or are unable to attend your appointment, please call our [place] office at [phone] or our [place] office at [phone] if applicable to notify the office of your absence.    If you are in psychological crisis including not limited to suicidal/homicidal thoughts or thoughts of self-injury, do not hesitate to call/contact your County Crisis hotline (see below) or attend to the nearest emergency department.  [facility] Crisis: [phone]  [facility] Crisis: [phone]  [facility] Crisis: [phone]  [facility] Crisis: [phone]  [facility] Crisis: [phone]  [facility] Crisis: [phone]  [facility] Crisis: [phone]  National Suicide Prevention Hotline: 1-469.346.2822

## 2024-01-22 NOTE — BH CRISIS PLAN
Client Name: Linda Hickman       Client YOB: 1985    [facility] Safety Plan      Creation Date: 1/22/24    Created By: Mika Albarran MD       Step 1: Warning Signs:   Warning Signs   When I feel hopeless, tearful, and that nothing is going right.   When I have vague suicidal thoughts.            Step 2: Internal Coping Strategies:   Internal Coping Strategies   Photography   Drawing   Watching movies   Taking naps            Step 3: People and social settings that provide distraction:   Name Contact Information   Itzel Magana [phone]   Jp Chappell [phone]    Places   Patient did not identify any places they can go to.           Step 4: People whom I can ask for help during a crisis:      Name Contact Information    Itzel Magana [phone]    Jp Chappell [phone]      Step 5: Professionals or agencies I can contact during a crisis:      [facility]/Agency Name Phone Emergency Contact    Dr. Albarran [phone]       Local Emergency Department Emergency Department Phone Emergency Department Address    [phone]        Crisis Phone Numbers:   Suicide Prevention Lifeline: Call or Text  203 Crisis Text Line: Text HOME to 379-625   Please note: Some [facility] do not have a separate number for Child/Adolescent specific crisis. If your county is not listed under Child/Adolescent, please call the adult number for your county      Adult Crisis Numbers: Child/Adolescent Crisis Numbers   [facility]: [phone] [facility]: [phone]   [facility]: [phone] [facility]: [phone]   [facility]: [phone] [place], [place]: [phone]   [facility]: [phone] [place] [facility]: [phone]   [place] [facility]: [phone]   [facility]: [phone]   [facility]: [phone]   [place] [facility]: [phone] (daytime) [phone] (after hours, weekends, holidays)      Step 6: Making the  environment safer (plan for lethal means safety):   Plan: Patient reports that there are guns in her house, reporting her boyfriend owns a handgun and hunts.  She states they are locked and secured and has no access to them.     Optional: What is most important to me and worth living for?   My biggest reason for living is my dog.     [employer] Safety Plan. Candy Hughes and Miller Flores. Used with permission of the authors.

## 2024-01-22 NOTE — BH TREATMENT PLAN
TREATMENT PLAN (Medication Management Only)        [facility]    Name and Date of Birth:  Linda Hickman 38 y.o. 1985  Date of Treatment Plan: January 22, 2024  Diagnosis/Diagnoses:    1. Mild episode of recurrent major depressive disorder (HCC)    2. Attention deficit hyperactivity disorder, inattentive type    3. PTSD (post-traumatic stress disorder)      Strengths/Personal Resources for Self-Care: supportive family, taking medications as prescribed, ability to adapt to life changes, ability to communicate well, ability to listen, average or above intelligence, financial security, general fund of knowledge, good understanding of illness, motivation for treatment, being resoureceful, stable employment, willingness to work on problems, work skills.  Area/Areas of need (in own words): Continue to improve symptoms of depression, continue to improve attention and concentration deficits  1. Long Term Goal: Improved symptoms of depression, improve attention and concentration  Target Date:6 months - 7/22/2024  Person/Persons responsible for completion of goal: Dr. Deion Mckeon  2.  Short Term Objective (s) - How will we reach this goal?:   A. Provider new recommended medication/dosage changes and/or continue medication(s): Continue current psychiatric medications as prescribed (Wellbutrin  mg daily, Adderall XR 10 mg daily, prazosin 1 mg daily at bedtime, melatonin 5 mg at bedtime as needed for insomnia).  Take medications as prescribed  Attend psychiatry appointments regularly  Continue psychotherapy regularly  Practice coping skills  Try sleep hygiene techniques  Avoid alcohol   Avoid drugs   Eat a healthy diet   Exercise regularly   Try relaxation techniques  Target Date:3 months - 4/22/2024  Person/Persons Responsible for Completion of Goal: Linda  Progress Towards Goals: stable, continuing treatment  Treatment Modality: Medication management every 1 to  3 months  Review due 180 days from date of this plan: 6 months - 7/22/2024  Expected length of service: ongoing treatment  My Physician/PA/NP and I have developed this plan together and I agree to work on the goals and objectives. I understand the treatment goals that were developed for my treatment.    This treatment plan was sent to the patient's MyChart for formal signature purposes as this was a virtual visit.

## 2024-01-22 NOTE — PSYCH
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 anxiety, Linda currently reports moderate symptoms of anxiety and she scores a 10 on the MICKI-7 (increase from her previous score of 8).   Presently, patient denies suicidal/homicidal ideation in addition to thoughts of self-injury.  At conclusion of evaluation, patient is amenable to the recommendations of this writer including: continue psychotropic medications as prescribed. .     Treatment Recommendations/Precautions:     No medication changes at this time    Continue Adderall XR 10 mg daily for ADHD symptomatology  PARQ completed for the class of stimulant medications including anxiety/irritability, insomnia, appetite suppression/weight loss, abuse potential, elevated heart rate and blood pressure, seizures, activation/induction of belinda, unmasking of tic's, growth suppression in children, interactions with other medications, and risk of sudden death.    Continue Wellbutrin  mg daily for mood, attention, and focus difficulties  PARQ was completed for bupropion (Wellbutrin) including nausea, insomnia, agitation/activation, weight loss, anxiety, palpitations, hypertension, decreased  seizure threshold and risk with alcohol withdrawal or electrolyte disturbances.  Patient screened negative for heavy alcohol use, history of seizures, and eating disorders.    Continue prazosin 1 mg at bedtime for nightmares in the setting of PTSD and to promote sleep  PARQ was completed for prazosin (Minipress) including dizziness, sedation, blood pressure changes (including orthostatic hypotension and syncope), headache, medication interaction risk, GI distress, priapism in males, and others.    Continue melatonin 5 mg at bedtime as needed for insomnia    Continue ongoing psychotherapy with Jannet Villatoro on a routine basis  Continue ongoing psychiatric medication management every 1 to 3 months  Aware of the need to follow up with family physician for medical issues  Aware of 24-hour and weekend coverage for urgent situations accessed by calling the [facility] Main practice number        Risk of Harm to Self:  The following ratings are based on assessment at the time of the interview as well as review of medical records  Demographic risk factors include: White  Historical Risk Factors include: Chronic depressive symptoms, chronic anxiety symptoms  Recent Specific Risk Factors include: Diagnosis of depression, current mild depressive symptoms  Protective Factors: No current suicidal ideation, improved mood, stable mood, improved anxiety symptoms, access to mental health treatment, taking psychiatric medications as prescribed  Access to firearms: Patient reports that there are guns in her house, reporting her boyfriend owns a handgun and hunts.  She states they are locked and secured and has no access to them. Linda Hickman has no history of arrests or violence pertaining to use of a deadly weapon.   Based on today's assessment, Linda presents the following risk of harm to self: Minimal     Risk of Harm to Others:  The following ratings are based on assessment at the time of the  interview as well as review of medical records  Demographic Risk Factors include: Under age 40  Historical Risk Factors include: None  Recent Specific Risk Factors include: None  Protective Factors: No current homicidal ideation  Based on today's assessment, Linda presents the following risk of harm to others: Minimal    Although patient's acute lethality risk is low, long-term/chronic lethality risk is mildly elevated in the presence of mild symptoms of depression and moderate symptoms of anxiety. At the current moment, Linda is future-oriented, forward-thinking, and demonstrates ability to act in a self-preserving manner as evidenced by volitionally presenting to the clinic today, seeking treatment. At this juncture, inpatient hospitalization is not currently warranted. To mitigate future risk, patient should adhere to the recommendations of this writer, avoid alcohol/illicit substance use, utilize community-based resources and familiar support and prioritize mental health treatment.     Based on today's assessment and clinical criteria, Linda Hickman contracts for safety and is not an imminent risk of harm to self or others. Outpatient level of care is deemed appropriate at this present time. Linda understands that if they are no longer able to contract for safety, they need to call/contact the outpatient office including this writer, call/contact crisis and/orattend to the nearest Emergency Department for immediate evaluation.    Risks/Benefits      Risks, Benefits And Possible Side Effects Of Medications:    Risks, benefits, and possible side effects of medications explained to Linda and she verbalizes understanding and agreement for treatment.    Controlled Medication Discussion:     Linda has been filling controlled prescriptions on time as prescribed according to [place] Prescription Drug Monitoring Program    Psychotherapy Provided:     Individual psychotherapy provided: Yes  Recent stressor including  job stressors, relationship stressors, medical stressors, and educational stressors discussed with Linda.   Supportive therapy provided.     Treatment Plan:    Completed and signed during the session: Yes - Treatment Plan done but not signed at time of office visit due to:  Plan reviewed by video and verbal consent given due to virtual visit. Treatment Plan sent to patient via [employer] for signature.    Visit Time    Visit Start Time: 8:15 AM  Visit Stop Time: 9:15 AM  Total Visit Duration: 60 minutes    This note was shared with patient.    Mika Albarran MD 01/22/24    This note has been constructed using a voice recognition system. There may be translation, syntax, or grammatical errors. If you have any questions, please contact the dictating provider.

## 2024-01-24 ENCOUNTER — TELEPHONE (OUTPATIENT)
Dept: NEUROLOGY | Facility: CLINIC | Age: 39
End: 2024-01-24

## 2024-01-25 ENCOUNTER — HOSPITAL ENCOUNTER (OUTPATIENT)
Dept: MRI IMAGING | Facility: HOSPITAL | Age: 39
End: 2024-01-25
Payer: COMMERCIAL

## 2024-01-25 DIAGNOSIS — J45.50 SEVERE PERSISTENT ASTHMA WITHOUT COMPLICATION: ICD-10-CM

## 2024-01-25 DIAGNOSIS — G43.709 CHRONIC MIGRAINE WITHOUT AURA WITHOUT STATUS MIGRAINOSUS, NOT INTRACTABLE: ICD-10-CM

## 2024-01-25 PROCEDURE — G1004 CDSM NDSC: HCPCS

## 2024-01-25 PROCEDURE — 70551 MRI BRAIN STEM W/O DYE: CPT

## 2024-02-05 ENCOUNTER — TELEMEDICINE (OUTPATIENT)
Dept: PSYCHIATRY | Facility: CLINIC | Age: 39
End: 2024-02-05
Payer: COMMERCIAL

## 2024-02-05 DIAGNOSIS — F33.1 MODERATE EPISODE OF RECURRENT MAJOR DEPRESSIVE DISORDER (HCC): Primary | ICD-10-CM

## 2024-02-05 PROCEDURE — 90834 PSYTX W PT 45 MINUTES: CPT

## 2024-02-05 NOTE — PSYCH
"Behavioral Health Psychotherapy Progress Note    Psychotherapy Provided: Individual Psychotherapy     1. Moderate episode of recurrent major depressive disorder (HCC)            Goals addressed in session: Goal 1     DATA:  Linda shared she is doing better with her stressor of trying to coparent with [place].  Linda talked about how the parenting of Orthodox has affected her and she has been thinking about how her childhood experiences still affect her in terms of her not knowing who she is.   During this session, this clinician used the following therapeutic modalities: Client-centered Therapy and Cognitive Behavioral Therapy    Substance Abuse was not addressed during this session. If the client is diagnosed with a co-occurring substance use disorder, please indicate any changes in the frequency or amount of use: . Stage of change for addressing substance use diagnoses: No substance use/Not applicable    ASSESSMENT:  Linda Hickman presents with a Euthymic/ normal mood.     her affect is Normal range and intensity, which is congruent, with her mood and the content of the session. The client has made progress on their goals.     Linda Hickman presents with a none risk of suicide, none risk of self-harm, and none risk of harm to others.    For any risk assessment that surpasses a \"low\" rating, a safety plan must be developed.    A safety plan was indicated: no  If yes, describe in detail     PLAN: Between sessions, Linda Hickman will utilize positive self talk when she is negative in her thinking. At the next session, the therapist will use Client-centered Therapy and Cognitive Behavioral Therapy to address anxiety.    Behavioral Health Treatment Plan and Discharge Planning: Linda Hickman is aware of and agrees to continue to work on their treatment plan. They have identified and are working toward their discharge goals. yes    Visit start and stop times:    02/05/24  Start Time: 1700  Stop Time: 1752  Total Visit " Time: 52 minutes  Virtual Regular Visit    Verification of patient location:    Patient is located at Home in the following state in which I hold an active license [place]      Assessment/Plan:    Problem List Items Addressed This Visit       Moderate episode of recurrent major depressive disorder (HCC) - Primary       Goals addressed in session: Goal 1          Reason for visit is No chief complaint on file.       Encounter provider Jannet Villatoro LCSW    Provider [facility] at [address]  [facility] [place] [address] [phone]      Recent Visits  No visits were found meeting these conditions.  Showing recent visits within past 7 days and meeting all other requirements  Today's Visits  Date Type Provider Dept   02/05/24 Telemedicine Jannet Villatoro LCSW [facility]   Showing today's visits and meeting all other requirements  Future Appointments  No visits were found meeting these conditions.  Showing future appointments within next 150 days and meeting all other requirements       The patient was identified by name and date of birth. Linda Hickman was informed that this is a telemedicine visit and that the visit is being conducted throughthe "[employer] platform. She agrees to proceed..  My office door was closed. No one else was in the room.  She acknowledged consent and understanding of privacy and security of the video platform. The patient has agreed to participate and understands they can discontinue the visit at any time.    Patient is aware this is a billable service.     Subjective  Linda Hickman is a 38 y.o. female  .      HPI     Past Medical History:   Diagnosis Date    Allergic     Anemia     Anxiety     Arthritis     Asthma     Chlamydia 2014    Unfaithful partner    Depression     Exposure to SARS-associated coronavirus 04/12/2020    GERD (gastroesophageal reflux disease) 2010    Gonorrhea  2014    Unfaithful partner    Headache(784.0)     Lumbar herniated disc     Migraine 2012    Pneumonia     Urinary tract infection     Urogenital trichomoniasis 2015    Unfaithful partner    UTI (urinary tract infection)     Varicella 1988    Visual impairment        Past Surgical History:   Procedure Laterality Date    FL INJECTION LEFT KNEE (ARTHROGRAM)  2/28/2022    WISDOM TOOTH EXTRACTION         Current Outpatient Medications   Medication Sig Dispense Refill    albuterol (PROVENTIL HFA,VENTOLIN HFA) 90 mcg/act inhaler Inhale 2 puffs every 6 (six) hours as needed for wheezing 18 g 0    amphetamine-dextroamphetamine (ADDERALL XR, 10MG,) 10 MG 24 hr capsule Take 1 capsule (10 mg total) by mouth every morning Max Daily Amount: 10 mg 30 capsule 0    Ascorbic Acid (vitamin C) 1000 MG tablet Take 1,000 mg by mouth daily      B Complex-C (B-complex with vitamin C) tablet Take 1 tablet by mouth daily      buPROPion (Wellbutrin XL) 300 mg 24 hr tablet Take 1 tablet (300 mg total) by mouth every morning 90 tablet 0    calcium carbonate (OS-BERENICE) 600 MG tablet Take 600 mg by mouth 2 (two) times a day with meals      cholecalciferol (VITAMIN D3) 1,000 units tablet Take 1,000 Units by mouth daily      Dupixent 200 MG/1.14ML SOPN Inject 2 pens (400mg total) under the skin once for 1 dose. (Patient taking differently: 200mg every 2 weeks) 180 mL 10    EPINEPHrine (EPIPEN) 0.3 mg/0.3 mL SOAJ Inject 0.3 mL (0.3 mg total) into a muscle once for 1 dose 0.6 mL 0    Ferrous Sulfate (RA IRON PO) Take by mouth      levalbuterol (Xopenex) 0.63 mg/3 mL nebulizer solution Take 3 mL (0.63 mg total) by nebulization 3 (three) times a day (Patient not taking: Reported on 10/30/2023) 30 mL 1    Magnesium Oxide 500 MG TABS Take by mouth      Melatonin 5 MG TABS Take 1 tablet (5 mg total) by mouth daily at bedtime as needed (insomnia)      mometasone-formoterol (Dulera) 200-5 MCG/ACT inhaler Inhale 2 puffs 2 (two) times a day Rinse mouth after  use. 39 g 3    montelukast (SINGULAIR) 10 mg tablet Take 1 tablet (10 mg total) by mouth daily 90 tablet 0    prazosin (MINIPRESS) 1 mg capsule Take 1 capsule (1 mg total) by mouth daily at bedtime 90 capsule 0    Zinc 30 MG CAPS Take by mouth       No current facility-administered medications for this visit.        Allergies   Allergen Reactions    Sulfa Antibiotics Anaphylaxis    Levofloxacin      Other reaction(s): sensative    Prednisone Other (See Comments)    Tobramycin      Other reaction(s): sensative    Pseudoephedrine Palpitations       Review of Systems    Video Exam    There were no vitals filed for this visit.    Physical Exam

## 2024-02-13 ENCOUNTER — TELEMEDICINE (OUTPATIENT)
Age: 39
End: 2024-02-13
Payer: COMMERCIAL

## 2024-02-13 VITALS
WEIGHT: 172 LBS | BODY MASS INDEX: 27.64 KG/M2 | OXYGEN SATURATION: 95 % | TEMPERATURE: 97.4 F | HEART RATE: 111 BPM | HEIGHT: 66 IN

## 2024-02-13 DIAGNOSIS — R06.02 SHORTNESS OF BREATH: ICD-10-CM

## 2024-02-13 DIAGNOSIS — J45.50 SEVERE PERSISTENT ASTHMA WITHOUT COMPLICATION: Primary | ICD-10-CM

## 2024-02-13 DIAGNOSIS — R05.3 CHRONIC COUGH: ICD-10-CM

## 2024-02-13 DIAGNOSIS — J40 BRONCHITIS: ICD-10-CM

## 2024-02-13 PROCEDURE — 99214 OFFICE O/P EST MOD 30 MIN: CPT | Performed by: INTERNAL MEDICINE

## 2024-02-13 NOTE — PROGRESS NOTES
Virtual Regular Visit    Verification of patient location:    Patient is located at Home in the following state in which I hold an active license [place]      Assessment/Plan:    Severe persistent asthma  Dyspnea on exertion  Chronic Cough  Previously controlled but worsened significantly since had COVID infection. Uncontrolled on step 4/5 therapy and high dose ICS/LAMA?LABA, started on biologic. Has had great improvement in terms of her asthma after the addition of Dupixent (June 2023).     Highest eos 120, allergy panel negative, appears to be type 2 low asthma  Currently controlled on Dupixent with no rescue inhaler use for weeks and no exacerbations since starting the biologic.     - Continue Dupixent,  - Continue high dose Dulera, next time can consider de-escalating to medium dose  - Continue albuterol prn  - stop singulair  - rec yearly spirometry at next visit    Follow up in 3 months         Reason for visit is   Chief Complaint   Patient presents with    Virtual Regular Visit          Encounter provider Carolyn Pike MD    Provider located at [facility] [address] [id number] [phone]      Recent Visits  No visits were found meeting these conditions.  Showing recent visits within past 7 days and meeting all other requirements  Today's Visits  Date Type Provider Dept   02/13/24 Telemedicine Carolyn Pike MD  [facility]   Showing today's visits and meeting all other requirements  Future Appointments  No visits were found meeting these conditions.  Showing future appointments within next 150 days and meeting all other requirements       The patient was identified by name and date of birth. Linda Hickman was informed that this is a telemedicine visit and that the visit is being conducted through the [employer] platform. She agrees to proceed..  My office door was closed. No  one else was in the room.  She acknowledged consent and understanding of privacy and security of the video platform. The patient has agreed to participate and understands they can discontinue the visit at any time.    Patient is aware this is a billable service.     Subjective  Linda Hickman is a 38 y.o. female history of severe persistent asthma since childhood that worsened after COVID. Has been on maximal (step 4/5) asthma therapy with high dose ICS?LAMA/LABA and started on Dupixent June 2023.     On last visit in Oct doing much better on Dupixent, had been stepped down from Trelegy to high dose Dulera due to DPI leaving a bad taste in her mouth.    Currently on Dupixent every 2 weeks  Did have a rash (re-flare) a few weeks ago near one of the injection sites from months ago  Dulera high dose working better than Trelegy  Using albuterol less frequently, less than once a week  Did have 1-2 colds, did not require prednisone  Did get COVID vaccine      Past Medical History:   Diagnosis Date    Allergic     Anemia     Anxiety     Arthritis     Asthma     Chlamydia 2014    Unfaithful partner    Depression     Exposure to SARS-associated coronavirus 04/12/2020    GERD (gastroesophageal reflux disease) 2010    Gonorrhea 2014    Unfaithful partner    Headache(784.0)     Lumbar herniated disc     Migraine 2012    Pneumonia     Urinary tract infection     Urogenital trichomoniasis 2015    Unfaithful partner    UTI (urinary tract infection)     Varicella 1988    Visual impairment        Past Surgical History:   Procedure Laterality Date    FL INJECTION LEFT KNEE (ARTHROGRAM)  2/28/2022    WISDOM TOOTH EXTRACTION         Current Outpatient Medications   Medication Sig Dispense Refill    albuterol (PROVENTIL HFA,VENTOLIN HFA) 90 mcg/act inhaler Inhale 2 puffs every 6 (six) hours as needed for wheezing 18 g 0    amphetamine-dextroamphetamine (ADDERALL XR, 10MG,) 10 MG 24 hr capsule Take 1 capsule (10 mg total) by mouth every  morning Max Daily Amount: 10 mg 30 capsule 0    Ascorbic Acid (vitamin C) 1000 MG tablet Take 1,000 mg by mouth daily      B Complex-C (B-complex with vitamin C) tablet Take 1 tablet by mouth daily      buPROPion (Wellbutrin XL) 300 mg 24 hr tablet Take 1 tablet (300 mg total) by mouth every morning 90 tablet 0    calcium carbonate (OS-BERENICE) 600 MG tablet Take 600 mg by mouth 2 (two) times a day with meals      cholecalciferol (VITAMIN D3) 1,000 units tablet Take 1,000 Units by mouth daily      Dupixent 200 MG/1.14ML SOPN Inject 2 pens (400mg total) under the skin once for 1 dose. (Patient taking differently: 200mg every 2 weeks) 180 mL 10    EPINEPHrine (EPIPEN) 0.3 mg/0.3 mL SOAJ Inject 0.3 mL (0.3 mg total) into a muscle once for 1 dose 0.6 mL 0    Ferrous Sulfate (RA IRON PO) Take by mouth      levalbuterol (Xopenex) 0.63 mg/3 mL nebulizer solution Take 3 mL (0.63 mg total) by nebulization 3 (three) times a day (Patient not taking: Reported on 10/30/2023) 30 mL 1    Magnesium Oxide 500 MG TABS Take by mouth      Melatonin 5 MG TABS Take 1 tablet (5 mg total) by mouth daily at bedtime as needed (insomnia)      mometasone-formoterol (Dulera) 200-5 MCG/ACT inhaler Inhale 2 puffs 2 (two) times a day Rinse mouth after use. 39 g 3    montelukast (SINGULAIR) 10 mg tablet Take 1 tablet (10 mg total) by mouth daily 90 tablet 0    prazosin (MINIPRESS) 1 mg capsule Take 1 capsule (1 mg total) by mouth daily at bedtime 90 capsule 0    Zinc 30 MG CAPS Take by mouth       No current facility-administered medications for this visit.        Allergies   Allergen Reactions    Sulfa Antibiotics Anaphylaxis    Levofloxacin      Other reaction(s): sensative    Prednisone Other (See Comments)    Tobramycin      Other reaction(s): sensative    Pseudoephedrine Palpitations         Video Exam    There were no vitals filed for this visit.    Physical Exam     Visit Time  Total Visit Duration: 25      ROS:  Answers submitted by the patient  for this visit:  Pulmonology Questionnaire (Submitted on 2/12/2024)  Chief Complaint: Primary symptoms  Do you have a cough?: Yes  Do you experience frequent throat clearing?: Yes  Do you have shortness of breath?: Yes  Chronicity: chronic  When did you first notice your symptoms?: more than 1 year ago  How often do your symptoms occur?: 2 to 4 times per day  Since you first noticed this problem, how has it changed?: gradually improving  Have you had a change in appetite?: Yes  Do you have chest pain?: No  Do you have shortness of breath that occurs with effort or exertion?: Yes  Do you have ear congestion?: No  Do you have ear pain?: No  Do you have a fever?: No  Do you have headaches?: No  Do you have heartburn?: No  Do you have fatigue?: Yes  Do you have muscle pain?: No  Do you have nasal congestion?: Yes  Do you have shortness of breath when lying flat?: No  Do you have shortness of breath when you wake up?: No  Do you have post-nasal drip?: Yes  Do you have a runny nose?: Yes  Do you have sneezing?: No  Do you have a sore throat?: No  Do you have sweats?: No  Do you have trouble swallowing?: No  Have you experienced weight loss?: No  Which of the following makes your symptoms worse?: change in weather, climbing stairs, exercise, exposure to fumes, exposure to smoke, strenuous activity, URI  Which of the following makes your symptoms better?: beta-agonist, ipratropium, OTC cough suppressant  Risk factors for lung disease: occupational exposure

## 2024-02-19 ENCOUNTER — TELEMEDICINE (OUTPATIENT)
Dept: PSYCHIATRY | Facility: CLINIC | Age: 39
End: 2024-02-19
Payer: COMMERCIAL

## 2024-02-19 DIAGNOSIS — F33.1 MODERATE EPISODE OF RECURRENT MAJOR DEPRESSIVE DISORDER (HCC): Primary | ICD-10-CM

## 2024-02-19 DIAGNOSIS — F43.10 PTSD (POST-TRAUMATIC STRESS DISORDER): ICD-10-CM

## 2024-02-19 PROCEDURE — 90834 PSYTX W PT 45 MINUTES: CPT

## 2024-02-19 NOTE — PSYCH
"Behavioral Health Psychotherapy Progress Note    Psychotherapy Provided: Individual Psychotherapy     1. Moderate episode of recurrent major depressive disorder (HCC)        2. PTSD (post-traumatic stress disorder)            Goals addressed in session: Goal 1   Data: Linda shared she has been feeling frustrated about coparenting with Iraj and having disagreements about traveling, getting engaged and having a child.  Therapist encouraged open communication about her future and her feelings.  During this session, this clinician used the following therapeutic modalities: Client-centered Therapy    Substance Abuse was not addressed during this session. If the client is diagnosed with a co-occurring substance use disorder, please indicate any changes in the frequency or amount of use: . Stage of change for addressing substance use diagnoses: No substance use/Not applicable    ASSESSMENT:  Linda Hickman presents with a Euthymic/ normal mood.     her affect is Normal range and intensity, which is congruent, with her mood and the content of the session. The client has made progress on their goals.     Linda Hickman presents with a none risk of suicide, none risk of self-harm, and none risk of harm to others.    For any risk assessment that surpasses a \"low\" rating, a safety plan must be developed.    A safety plan was indicated: no  If yes, describe in detail     PLAN: Between sessions, Linda Hickman will utilize vázquez mind meditation as needed for life decisions. At the next session, the therapist will use Client-centered Therapy to address anxiety, depression, life choices.    Behavioral Health Treatment Plan and Discharge Planning: Linda Hickman is aware of and agrees to continue to work on their treatment plan. They have identified and are working toward their discharge goals. yes    Visit start and stop times:    02/19/24  Start Time: 1700  Stop Time: 1752  Total Visit Time: 52 minutes  Virtual Regular " Visit    Verification of patient location:    Patient is located at Other in the following state in which I hold an active license [place]      Assessment/Plan:    Problem List Items Addressed This Visit       Moderate episode of recurrent major depressive disorder (HCC) - Primary    PTSD (post-traumatic stress disorder)       Goals addressed in session: Goal 1          Reason for visit is No chief complaint on file.       Encounter provider Jannet Villatoro LCSW    Provider located at [address]  [facility] [place] [address] [phone]      Recent Visits  No visits were found meeting these conditions.  Showing recent visits within past 7 days and meeting all other requirements  Today's Visits  Date Type Provider Dept   02/19/24 Telemedicine Jannet Villatoro LCSW  [facility]   Showing today's visits and meeting all other requirements  Future Appointments  No visits were found meeting these conditions.  Showing future appointments within next 150 days and meeting all other requirements       The patient was identified by name and date of birth. Linda Hickman was informed that this is a telemedicine visit and that the visit is being conducted throughthe [employer] platform. She agrees to proceed..  My office door was closed. No one else was in the room.  She acknowledged consent and understanding of privacy and security of the video platform. The patient has agreed to participate and understands they can discontinue the visit at any time.    Patient is aware this is a billable service.     Subjective  Linda Hickman is a 38 y.o. female  .      HPI     Past Medical History:   Diagnosis Date    Allergic     Anemia     Anxiety     Arthritis     Asthma     Chlamydia 2014    Unfaithful partner    Depression     Exposure to SARS-associated coronavirus 04/12/2020    GERD (gastroesophageal reflux disease) 2010     Gonorrhea 2014    Unfaithful partner    Headache(784.0)     Lumbar herniated disc     Migraine 2012    Pneumonia     Urinary tract infection     Urogenital trichomoniasis 2015    Unfaithful partner    UTI (urinary tract infection)     Varicella 1988    Visual impairment        Past Surgical History:   Procedure Laterality Date    FL INJECTION LEFT KNEE (ARTHROGRAM)  2/28/2022    WISDOM TOOTH EXTRACTION         Current Outpatient Medications   Medication Sig Dispense Refill    albuterol (PROVENTIL HFA,VENTOLIN HFA) 90 mcg/act inhaler Inhale 2 puffs every 6 (six) hours as needed for wheezing 18 g 0    amphetamine-dextroamphetamine (ADDERALL XR, 10MG,) 10 MG 24 hr capsule Take 1 capsule (10 mg total) by mouth every morning Max Daily Amount: 10 mg 30 capsule 0    Ascorbic Acid (vitamin C) 1000 MG tablet Take 1,000 mg by mouth daily      B Complex-C (B-complex with vitamin C) tablet Take 1 tablet by mouth daily      buPROPion (Wellbutrin XL) 300 mg 24 hr tablet Take 1 tablet (300 mg total) by mouth every morning 90 tablet 0    calcium carbonate (OS-BERENICE) 600 MG tablet Take 600 mg by mouth 2 (two) times a day with meals      cholecalciferol (VITAMIN D3) 1,000 units tablet Take 1,000 Units by mouth daily      Dupixent 200 MG/1.14ML SOPN Inject 2 pens (400mg total) under the skin once for 1 dose. (Patient taking differently: 200mg every 2 weeks) 180 mL 10    EPINEPHrine (EPIPEN) 0.3 mg/0.3 mL SOAJ Inject 0.3 mL (0.3 mg total) into a muscle once for 1 dose 0.6 mL 0    Ferrous Sulfate (RA IRON PO) Take by mouth      levalbuterol (Xopenex) 0.63 mg/3 mL nebulizer solution Take 3 mL (0.63 mg total) by nebulization 3 (three) times a day (Patient not taking: Reported on 10/30/2023) 30 mL 1    Magnesium Oxide 500 MG TABS Take by mouth      Melatonin 5 MG TABS Take 1 tablet (5 mg total) by mouth daily at bedtime as needed (insomnia)      mometasone-formoterol (Dulera) 200-5 MCG/ACT inhaler Inhale 2 puffs 2 (two) times a day Rinse  mouth after use. 39 g 3    prazosin (MINIPRESS) 1 mg capsule Take 1 capsule (1 mg total) by mouth daily at bedtime 90 capsule 0    Zinc 30 MG CAPS Take by mouth       No current facility-administered medications for this visit.        Allergies   Allergen Reactions    Sulfa Antibiotics Anaphylaxis    Levofloxacin      Other reaction(s): sensative    Prednisone Other (See Comments)    Tobramycin      Other reaction(s): sensative    Pseudoephedrine Palpitations       Review of Systems    Video Exam    There were no vitals filed for this visit.    Physical Exam

## 2024-02-21 ENCOUNTER — TELEPHONE (OUTPATIENT)
Dept: NEUROLOGY | Facility: CLINIC | Age: 39
End: 2024-02-21

## 2024-02-22 ENCOUNTER — CONSULT (OUTPATIENT)
Dept: NEUROLOGY | Facility: CLINIC | Age: 39
End: 2024-02-22
Payer: COMMERCIAL

## 2024-02-22 VITALS
OXYGEN SATURATION: 99 % | DIASTOLIC BLOOD PRESSURE: 72 MMHG | BODY MASS INDEX: 28.61 KG/M2 | HEART RATE: 88 BPM | WEIGHT: 178 LBS | SYSTOLIC BLOOD PRESSURE: 112 MMHG | HEIGHT: 66 IN

## 2024-02-22 DIAGNOSIS — G43.709 CHRONIC MIGRAINE WITHOUT AURA WITHOUT STATUS MIGRAINOSUS, NOT INTRACTABLE: Primary | ICD-10-CM

## 2024-02-22 PROCEDURE — 99245 OFF/OP CONSLTJ NEW/EST HI 55: CPT | Performed by: PSYCHIATRY & NEUROLOGY

## 2024-02-22 RX ORDER — ATOGEPANT 30 MG/1
30 TABLET ORAL DAILY
Qty: 90 TABLET | Refills: 3 | Status: SHIPPED | OUTPATIENT
Start: 2024-02-22

## 2024-02-22 RX ORDER — PROCHLORPERAZINE MALEATE 5 MG/1
5 TABLET ORAL EVERY 6 HOURS PRN
Qty: 30 TABLET | Refills: 0 | Status: SHIPPED | OUTPATIENT
Start: 2024-02-22

## 2024-02-22 NOTE — PROGRESS NOTES
NEUROLOGY OUTPATIENT - NEW PATIENT VISIT NOTE        NAME: Linda Hickman  MRN: [id number]  : 1985     TODAY'S DATE: 24         HISTORY OF PRESENT ILLNESS (HPI):    Ms. Hickman is a 38 y.o. 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    Quality of the pain: throbbing  Intensity of the headache: At present: 3/10;  At its worst:  10/10  Duration of the headaches:hour(s)-- if she takes medicine the migraine gets better. Sometimes they can last for days  Frequency of the headaches:about 1-2 days per week--variable in terms of migraine   Presence of aura:  No  Associated symptoms with headaches: +nausea, +vomiting , +light sensitivity , and +sound sensitivitySometime she had the facial numbness, pain, eye pain   Triggers:Yes, dehydration makes it worse, lack of sleep, hormone changes   Positional component: No   Alleviating factors: Yes, over the counter cocktail, Tylenol, motrin and zofran works. Mg helps. Water and caffeine helps.    Any specific time of the day when get the headaches: no particular time of day    Family history of migraine headaches: Yes  History of neck and head trauma: Yes  Smoking status: No  Oral contraceptive use: No    Life style factors:  -Hydration: adequate  -Sleep: adequate  -Caffeine intake: 2 cups of caffeinated coffee per day(s)  -Alcohol intake: socially      Impact of headches:  -Number of headaches in past 30 days: 12-15/30 days.   -Number of days missed per month because of headache: Noin the last 30 days.   -Number of ER visits for her headaches: No  in the last 30 days.       Treatment:  -Over the counter medications tried for headaches:   Tylenol been helpful and Ibuprofen been helpful  "    -Prescription medications:  Abortive or Rescue Medications used:   No rescue medicine tried in the past      Preventative or daily medications used for headaches:   No prophylactic medicine tried in the past         Other significant co morbidities:  Asthma   Plan to get pregnant     Red Flags for headache:  Age <5 or >50: No  First worst headaches: No  Maximal at intensity: No  Change in pattern: Yes  New headache in pregnancy, cancer or immunocompromised patient: No  Loss of consciousness or convulsions: No  Headache triggered by exertion, Valsalva maneuver or sexual activity: No  Abnormal exam: please see below in Neurological examination.        CURRENT OUTPATIENT MEDICATIONS:    Linda Hickman has a current medication list which includes the following prescription(s): albuterol, amphetamine-dextroamphetamine, b-complex with vitamin c, bupropion, calcium carbonate, cholecalciferol, dupixent, ferrous sulfate, magnesium oxide -mg supplement, melatonin, dulera, prazosin, zinc, vitamin c, epinephrine, and levalbuterol.       PHYSICAL EXAMINATION:   VITAL SIGNS:  height is 5' 6\" (1.676 m) and weight is 80.7 kg (178 lb). Her blood pressure is 112/72 and her pulse is 88. Her oxygen saturation is 99%.        NEUROLOGICAL EXAMINATION:    MENTAL STATUS EXAM: Alert, oriented to time place and person,     CRANIAL NERVE EXAMINATION:  I Not tested   II Normal visual fields to finger counting.   II, Ill,  IV, VI Pupils were symmetric, briskly reactive. No afferent pupillary defect.  Eye movements are full without nystagmus. No ptosis.   V Facial sensation were intact   VII Facial movements were symmetrical    VIII Hearing was intact   IX, X Intact   XI Shoulder shrug and head turn was normal   XII Tongue protrusion is in the mid line.          MOTOR EXAM:        Arm Right  Left Leg Right  Left   Deltoid 5/5 5/5 lliopsoas 5/5 5/5   Biceps 5/5 5/5 Quads 5/5 5/5   Triceps 5/5 5/5 Hamstrings 5/5 5/5   Wrist Extension 5/5 5/5 " "Ankle Dorsi Flexion 5/5 5/5   Wrist Flexion 5/5 5/5 Ankle Plantar Flexion 5/5 5/5               DEEP TENDON REFLEXES:     Brachioradialis Biceps Triceps Patellar Achilles   Right 2+ 2+ 2+ 2+ 1+   Left 2+ 2+ 2+ 2+ 1+            SENSORY EXAMINATION:   Sensation to dull touch Intact  Sensation to temperature Intact    COORDINATION:   Normal finger to nose testing  Finger tapping test was normal    GAIT/STATION:   normal        DIAGNOSTIC STUDIES:   PERTINENT LABS:     Lab Results   Component Value Date    WBC 6.91 09/15/2023     Lab Results   Component Value Date    HGB 12.6 09/15/2023     Lab Results   Component Value Date     09/15/2023     Lab Results   Component Value Date    LYMPHSABS 1.71 09/15/2023     No results found for: \"LABLYMP\"  Lab Results   Component Value Date    EOSABS 0.03 09/15/2023     No components found for: \"NEUTROPHILS\"    No results found for: \"LABMONO\"         No results found for: \"LABGLUC\"  Lab Results   Component Value Date    CREATININE 1.08 01/16/2023     Lab Results   Component Value Date    AST 20 01/16/2023     Lab Results   Component Value Date    ALT 18 01/16/2023     Lab Results   Component Value Date    ALKPHOS 69 01/16/2023     Lab Results   Component Value Date    AST 20 01/16/2023        No results found for: \"FOLATE\"  No results found for: \"DWCVEZGV81\"  No results found for: \"COPPER\"       Lab Results   Component Value Date    HEPBSAG Non-reactive 11/12/2018    HEPBSAB 137.54 11/12/2018    HEPCAB Non-reactive 06/24/2019            NEUROIMAGING:  Results for orders placed during the hospital encounter of 01/25/24    MRI brain wo contrast    Impression  No acute abnormality.    Few nonspecific foci of frontal white matter signal abnormality which can be seen in patients with complicated migraines..    Resident: STEVE BASS I, the attending radiologist, have reviewed the images and agree with the final report above.    Workstation performed: [id number]            "   ASSESSMENT AND PLAN:    Ms. Hickman is a 38 y.o.female  presenting with headache. Patient  has a past medical history of Allergic, Anemia, Anxiety, Arthritis, Asthma, Chlamydia (2014), Depression, Exposure to SARS-associated coronavirus (04/12/2020), GERD (gastroesophageal reflux disease) (2010), Gonorrhea (2014), Headache(784.0), Lumbar herniated disc, Migraine (2012), Pneumonia, Urinary tract infection, Urogenital trichomoniasis (2015), UTI (urinary tract infection), Varicella (1988), and Visual impairment..      1. Chronic migraine without aura without status migrainosus, not intractable    - Atogepant (Qulipta) 30 MG TABS; Take 30 mg by mouth in the morning  Dispense: 90 tablet; Refill: 3  - prochlorperazine (COMPAZINE) 5 mg tablet; Take 1 tablet (5 mg total) by mouth every 6 (six) hours as needed for nausea or vomiting  Dispense: 30 tablet; Refill: 0           Return in about 3 months (around 5/22/2024).       The management plan was discussed in detail with the patient and all questions were answered.     Ms. Hickman was encouraged to contact our office with any questions or concerns and to contact the clinic or go to the nearest emergency room if symptoms change or worsen.       I have spent a total of 62 min in reviewing and/or ordering tests, medications, or procedures, performing an examination or evaluation, reviewing pertinent history, counseling and educating the patient, referring and/or communicating with other health care professionals, documenting in the EMR and general coordination of care of Ms. Hickman  today.           Jennifer Marie MD.   Staff Neurologist,   Neuroimmunology and Neuroinfectious disease  02/22/24     This report has been created through the use of voice recognition/text compilation software.  Typographical and content errors may occur with this process.  While efforts are made to detect and correct such errors, in some cases errors will persist.  For this reason,  wording in this document should be considered in the proper context and not strictly verbatim.  If, when reviewing the document, an error is discovered, please let the office know at [phone]

## 2024-02-22 NOTE — PATIENT INSTRUCTIONS
Lifestyle adjustments. Irrespective of treatment modalities applied, trigger control and lifestyle modification are indispensable to the successful management of migraine. This is especially important in children, adolescents, or pregnant women where drug treatments must be especially limited.    Although there is no robust evidence for most of the recommendations, most are general health measures that, given the lack of adverse effects and the benefit for general well-being, we consider should be recommended in all patients.    Avoid triggers. Many patients attribute the onset or worsening of pain to specific triggers such as stress, sleep changes, food, or atmospheric changes, among others. It is even possible that the relationship occurs inversely, so that premonitory symptoms such as sleep disturbances and appetite 48 to 72 hours before the onset of pain can be misinterpreted by the patient as the trigger of migraine attacks. The therapeutic implications of this relationship are also unclear. There are possible triggers such as sleep deprivation, fasting, or certain foods that can be easily avoidable. But avoiding other triggers can lead to very restrictive lifestyles with a reduction in quality of life that does not outweigh the potential beneficial.    Sleep. Another complex relationship is headache and sleep. An excess or lack of sleep can trigger migraine attacks and at the same time rest is one of the most used treatments to improve the symptoms of the migraine attack. Additionally, migraine and other headaches occur comorbidly with sleep disorders. Patients with chronic migraine have a higher prevalence of sleep disorders, specifically poor sleep habits and nonrestorative rest.    Regarding general sleep measures for patients with headache, it is recommended to define regular sleep schedules that allow 8 hours of rest per day, insisting that they remain constant also during the weekend; have dinner 4 hours  before bedtime and avoid liquids in the last two hours; and eliminate naps and avoid using screens, television, reading, or listening to music in bed. A nonpharmacological intervention to improve sleep habits can improve headache frequency and even reverse chronic to episodic migraine.    Diet. In the scientific literature, but especially in the informative websites and magazines, multiple and varied diets are proposed that aim to reduce the frequency of headaches. There are two main approaches: elimination diets, which consist of suppressing potentially triggering foods such as chocolate, alcohol, cheese, nuts, or citrus fruits and diets that provide high or low amounts of certain components, ie, rich in vitamin B12, B6, or D or low in histamine, lactose, or fatty acids. The studies are not very rigorous and most do not have a control group (). In addition, it must be considered that food triggers were only associated with onset of headache in less than 10% of the participants.    Dietary recommendations for patients with migraine should be the same as for the general population with special emphasis on the prevention of obesity, which is a factor related to headache chronification. It is recommendable to have a varied diet, eating five meals a day to avoid periods of prolonged fasting and incorporating water intake to reach around 2.5 liters per day, which should be increased in case of physical activity or increase in temperature or humidity. Specific diets should be recommended solely based on whether there are other comorbidities in the patient.    Caffeine at moderate doses (< 400mg/day: equivalent to two cups of coffee) does not seem to have a negative effect on headache frequency although it should be taken regularly to avoid withdrawal headaches.    Although patients with migraine headaches and cluster headaches may be more susceptible to alcohol as a precipitant, there is no evidence to recommend  abstinence from alcohol in all patients. Individual predisposition and cultural factors must be considered.    Exercise. Aerobic exercise can prevent or reduce symptoms of multiple chronic diseases, including headache. With some methodological limitations, there are studies that demonstrate benefits of aerobic exercise as a therapeutic intervention to reduce the frequency and intensity of headaches, as well as the quality of life measured by questionnaires. Exercise can have a beneficial effect on headaches directly but also indirectly, improving sleep quality, mood, cardiovascular function, and preventing weight gain. In addition, it can improve the control of other diseases frequently comorbid with headache such as obesity, hypertension, anxiety, depression, or sleep disorders (). The clinical benefit of yoga as an add-on therapy in patients with episodic migraine has been demonstrated.       Vitamins for headache  Mg 400 mg daily for headache  RiboFlavin 400 mg daily for headache        Elavil/Pamelor is an oral pill. It is used for migraine and tension type headaches. We typical start it at a low dose of 10 mg and increase it to 20 mg. Common side effects include Fatigue-Dry mouth-Weight gain-Constipation- Drowsiness. It can also help with mood.       QULIPTA is an oral prescription medicine used for the preventive treatment of migraine in adults-Common side effects include, allergic reaction. If you have any kidney problems, liver problems, or plan to get pregnant than it is not recommended.       Ajovy is an injection, which is given once a month under you skin: AJOVY may cause allergic reactions, including itching, rash, and hives that can happen within hours and up to 1 month after receiving AJOVY. Call your healthcare provider or get emergency medical help right away if you have any symptoms of an allergic reaction: swelling of your face, mouth, tongue, throat, or if you have trouble breathing. Talk to your  doctor about stopping AJOVY if you have an allergic reaction.    Aimovig is an injection, which is given once a month under you skin. The common side effects include Allergic reactions, including rash or swelling can happen after receiving Aimovig®. This can happen within hours to days after using Aimovig®. Call your HCP or get emergency medical help right away if you have any of the following symptoms of an allergic reaction: swelling of the face, mouth, tongue or throat, or trouble breathing. Constipation with serious complications. Severe constipation can happen after receiving Aimovig®. In some cases people have been hospitalized or needed surgery. Contact your HCP if you have severe constipation or constipation associated with symptoms such as severe or constant belly pain, vomiting, swelling of belly or bloating.High blood pressure. High blood pressure or worsening of high blood pressure can happen after receiving Aimovig®. Contact your healthcare provider if you have an increase in blood pressure.    Emgality is an injection, which is given once a month under you skin. The initial does is 2 injections and then it is one injection a month there after. Common side effects include allergic reactions, such as itching, rash, hives, and trouble breathing. Allergic reactions can happen days after using Emgality. Call your healthcare provider or get emergency medical help right away if you have any of the following symptoms, which may be part of an allergic reaction: swelling of your face, mouth, tongue, or throat, or trouble breathing. Common side effects--The most common side effects of Emgality are injection site reactions.

## 2024-02-26 ENCOUNTER — TELEPHONE (OUTPATIENT)
Dept: NEUROLOGY | Facility: CLINIC | Age: 39
End: 2024-02-26

## 2024-02-26 NOTE — TELEPHONE ENCOUNTER
02-, 12:06:39 pm EST    Received vm from [facility] mail order pharmacy. They wanted to reach out regarding a medication recently prescribed by Dr. Marie. Patient is on bupropion XL That in combination with compazine can lower the seizure threshold and increase risk of seizures. This is listed as a moderate interaction.     They would like to get the approval from the provider prior to sending the medication out     #: [phone]

## 2024-02-27 NOTE — TELEPHONE ENCOUNTER
Called pharmacy, made the pharmacist aware patient is okay to proceed with compazine as she does not have a history of seizures

## 2024-02-27 NOTE — TELEPHONE ENCOUNTER
Thank you for your message, but the patient does not have any history of seizures.     Also I only added the Compazine for her nausea.     Thank you,     Dr. Frank MD.   02/27/24   10:19 AM

## 2024-03-04 ENCOUNTER — TELEPHONE (OUTPATIENT)
Dept: PSYCHIATRY | Facility: CLINIC | Age: 39
End: 2024-03-04

## 2024-03-04 NOTE — TELEPHONE ENCOUNTER
Linda Hickman called the office and  requested a call back to discuss rescheduling her appt.    They can be reached at P# [phone].       Thank you.

## 2024-03-05 NOTE — TELEPHONE ENCOUNTER
Patient called regarding appt tomorrow 3/6, writer transferred caller to resident line to assist.

## 2024-03-07 ENCOUNTER — TELEPHONE (OUTPATIENT)
Dept: NEUROLOGY | Facility: CLINIC | Age: 39
End: 2024-03-07

## 2024-03-07 DIAGNOSIS — G43.709 CHRONIC MIGRAINE WITHOUT AURA WITHOUT STATUS MIGRAINOSUS, NOT INTRACTABLE: Primary | ICD-10-CM

## 2024-03-07 NOTE — TELEPHONE ENCOUNTER
Received VM transcription from 3/6/24, 9:27 AM:    Hi, this is Linda Hickman. Date of birth, [birth year]. I called to check on a prior auth for a medication, Atogepant and they were telling me that there was no response from the physician who ordered it. I believe it was Dr. Marie. If you could give me a call back, I guess, or if you guys are able to try to run it through again, I'd appreciate it. Phone number is [phone]. Again, this is Linda Hickman, [birth year]. Thank you.  --------------------    Fax received from [facility] requesting PA for Qulipta.    Key: [id number]    PA submitted via [facility]. Awaiting determination.    Spoke with pt and informed of PA submission. Advised that we will call back once we receive outcome. Pt says she has not tried other alternatives due to being on medication that may interact with other preventative medications.

## 2024-03-11 ENCOUNTER — TELEMEDICINE (OUTPATIENT)
Dept: PSYCHIATRY | Facility: CLINIC | Age: 39
End: 2024-03-11
Payer: COMMERCIAL

## 2024-03-11 DIAGNOSIS — F33.1 MODERATE EPISODE OF RECURRENT MAJOR DEPRESSIVE DISORDER (HCC): Primary | ICD-10-CM

## 2024-03-11 DIAGNOSIS — F90.0 ATTENTION DEFICIT HYPERACTIVITY DISORDER, INATTENTIVE TYPE: ICD-10-CM

## 2024-03-11 DIAGNOSIS — F43.10 PTSD (POST-TRAUMATIC STRESS DISORDER): ICD-10-CM

## 2024-03-11 PROCEDURE — 99214 OFFICE O/P EST MOD 30 MIN: CPT

## 2024-03-11 PROCEDURE — 90834 PSYTX W PT 45 MINUTES: CPT

## 2024-03-11 RX ORDER — BUPROPION HYDROCHLORIDE 300 MG/1
300 TABLET ORAL EVERY MORNING
Qty: 90 TABLET | Refills: 0 | Status: SHIPPED | OUTPATIENT
Start: 2024-03-11 | End: 2024-06-09

## 2024-03-11 RX ORDER — PRAZOSIN HYDROCHLORIDE 1 MG/1
1 CAPSULE ORAL
Qty: 90 CAPSULE | Refills: 0 | Status: SHIPPED | OUTPATIENT
Start: 2024-03-11

## 2024-03-11 NOTE — PSYCH
"Behavioral Health Psychotherapy Progress Note    Psychotherapy Provided: Individual Psychotherapy     1. Moderate episode of recurrent major depressive disorder (HCC)        2. PTSD (post-traumatic stress disorder)            Goals addressed in session: Goal 1     DATA:   Linda shared she is struggling with her job and routine because of it being daily. Therapist and Linda went over a values and priorities exercise from DBT and she was able to identify some values and priorities for herself during this exercise.       During this session, this clinician used the following therapeutic modalities: Dialectical Behavior Therapy    Substance Abuse was not addressed during this session. If the client is diagnosed with a co-occurring substance use disorder, please indicate any changes in the frequency or amount of use: . Stage of change for addressing substance use diagnoses: No substance use/Not applicable    ASSESSMENT:  Linda Hickman presents with a Euthymic/ normal mood.     her affect is Normal range and intensity, which is congruent, with her mood and the content of the session. The client has made progress on their goals.     Linda Hickman presents with a none risk of suicide, none risk of self-harm, and none risk of harm to others.    For any risk assessment that surpasses a \"low\" rating, a safety plan must be developed.    A safety plan was indicated: no  If yes, describe in detail     PLAN: Between sessions, Linda Hickman will utilize values and priorities list to think about her life. At the next session, the therapist will use Dialectical Behavior Therapy to address depression.    Behavioral Health Treatment Plan and Discharge Planning: Linda Hickman is aware of and agrees to continue to work on their treatment plan. They have identified and are working toward their discharge goals. yes    Visit start and stop times:    03/11/24  Start Time: 1700  Stop Time: 1751  Total Visit Time: 51 minutes  Virtual " Regular Visit    Verification of patient location:    Patient is located at Home in the following state in which I hold an active license [place]      Assessment/Plan:    Problem List Items Addressed This Visit       Moderate episode of recurrent major depressive disorder (HCC) - Primary    PTSD (post-traumatic stress disorder)       Goals addressed in session: Goal 1          Reason for visit is No chief complaint on file.       Encounter provider Jannet Villatoro LCSW    Provider [facility] [address]  [facility] [place] [address] [phone]      Recent Visits  No visits were found meeting these conditions.  Showing recent visits within past 7 days and meeting all other requirements  Today's Visits  Date Type Provider Dept   03/11/24 Telemedicine Jannet Villatoro LCSW  [facility]   Showing today's visits and meeting all other requirements  Future Appointments  No visits were found meeting these conditions.  Showing future appointments within next 150 days and meeting all other requirements       The patient was identified by name and date of birth. Linda Hickman was informed that this is a telemedicine visit and that the visit is being conducted throughthe [employer] Zo[employer] platform. She agrees to proceed..  My office door was closed. No one else was in the room.  She acknowledged consent and understanding of privacy and security of the video platform. The patient has agreed to participate and understands they can discontinue the visit at any time.    Patient is aware this is a billable service.     Subjective  Linda Hickman is a 38 y.o. female  .      HPI     Past Medical History:   Diagnosis Date    Allergic     Anemia     Anxiety     Arthritis     Asthma     Chlamydia 2014    Unfaithful partner    Depression     Exposure to SARS-associated coronavirus 04/12/2020    GERD (gastroesophageal reflux disease)  2010    Gonorrhea 2014    Unfaithful partner    Headache(784.0)     Lumbar herniated disc     Migraine 2012    Pneumonia     Urinary tract infection     Urogenital trichomoniasis 2015    Unfaithful partner    UTI (urinary tract infection)     Varicella 1988    Visual impairment        Past Surgical History:   Procedure Laterality Date    FL INJECTION LEFT KNEE (ARTHROGRAM)  2/28/2022    WISDOM TOOTH EXTRACTION         Current Outpatient Medications   Medication Sig Dispense Refill    albuterol (PROVENTIL HFA,VENTOLIN HFA) 90 mcg/act inhaler Inhale 2 puffs every 6 (six) hours as needed for wheezing 18 g 0    amphetamine-dextroamphetamine (ADDERALL XR, 10MG,) 10 MG 24 hr capsule Take 1 capsule (10 mg total) by mouth every morning Max Daily Amount: 10 mg 30 capsule 0    Ascorbic Acid (vitamin C) 1000 MG tablet Take 1,000 mg by mouth daily (Patient not taking: Reported on 2/22/2024)      Atogepant (Qulipta) 30 MG TABS Take 30 mg by mouth in the morning 90 tablet 3    B Complex-C (B-complex with vitamin C) tablet Take 1 tablet by mouth daily      buPROPion (Wellbutrin XL) 300 mg 24 hr tablet Take 1 tablet (300 mg total) by mouth every morning 90 tablet 0    calcium carbonate (OS-BERENICE) 600 MG tablet Take 600 mg by mouth 2 (two) times a day with meals      cholecalciferol (VITAMIN D3) 1,000 units tablet Take 1,000 Units by mouth daily      Dupixent 200 MG/1.14ML SOPN Inject 2 pens (400mg total) under the skin once for 1 dose. (Patient taking differently: 200mg every 2 weeks) 180 mL 10    EPINEPHrine (EPIPEN) 0.3 mg/0.3 mL SOAJ Inject 0.3 mL (0.3 mg total) into a muscle once for 1 dose (Patient not taking: Reported on 2/22/2024) 0.6 mL 0    Ferrous Sulfate (RA IRON PO) Take by mouth      levalbuterol (Xopenex) 0.63 mg/3 mL nebulizer solution Take 3 mL (0.63 mg total) by nebulization 3 (three) times a day (Patient not taking: Reported on 10/30/2023) 30 mL 1    Magnesium Oxide 500 MG TABS Take by mouth      Melatonin 5 MG TABS  Take 1 tablet (5 mg total) by mouth daily at bedtime as needed (insomnia)      mometasone-formoterol (Dulera) 200-5 MCG/ACT inhaler Inhale 2 puffs 2 (two) times a day Rinse mouth after use. 39 g 3    prazosin (MINIPRESS) 1 mg capsule Take 1 capsule (1 mg total) by mouth daily at bedtime as needed (insomnia) Take 1 mg twice daily as needed for severe headaches. 90 capsule 0    prochlorperazine (COMPAZINE) 5 mg tablet Take 1 tablet (5 mg total) by mouth every 6 (six) hours as needed for nausea or vomiting 30 tablet 0    Zinc 30 MG CAPS Take by mouth       No current facility-administered medications for this visit.        Allergies   Allergen Reactions    Sulfa Antibiotics Anaphylaxis    Levofloxacin      Other reaction(s): sensative    Prednisone Other (See Comments)    Tobramycin      Other reaction(s): sensative    Pseudoephedrine Palpitations       Review of Systems    Video Exam    There were no vitals filed for this visit.    Physical Exam

## 2024-03-11 NOTE — PATIENT INSTRUCTIONS
Please present for your previously scheduled appointment approximately 15 minutes prior to appointment time. If you are running late or are unable to attend your appointment, please call our [place] office at [phone] or our [place] office at [phone] if applicable to notify the office of your absence.    If you are in psychological crisis including not limited to suicidal/homicidal thoughts or thoughts of self-injury, do not hesitate to call/contact your County Crisis hotline (see below) or attend to the nearest emergency department.  [facility] Crisis: [phone]  [facility] Crisis: [phone]  [facility] Crisis: [phone]  [facility] Crisis: [phone]  [facility] Crisis: [phone]  [facility] Crisis: [phone]  [facility] Crisis: [phone]  National Suicide Prevention Hotline: 1-581.431.1135

## 2024-03-11 NOTE — PSYCH
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 denies medication side effects. At the conclusion of the office visit, Linda reports she will confer with her ophthalmologist next week and, if her ophthalmologist is in agreement, we will plan to increase Adderall XR to 20 mg daily.     Treatment Recommendations/Precautions:     No medication changes at this time. At the conclusion of the office visit, Linda reports she will confer with her ophthalmologist next week and, if her ophthalmologist is in agreement, we will plan to increase Adderall XR to 20 mg daily.    For now, continue Adderall XR 10 mg daily for ADHD symptomatology  PARQ completed for the class of stimulant medications including anxiety/irritability, insomnia, appetite suppression/weight loss, abuse potential, elevated heart rate and blood pressure, seizures, activation/induction of belinda, unmasking of tic's, growth suppression in children, interactions with other medications, and risk of sudden death.      Continue Wellbutrin 300 mg daily for mood as well as attention and focus   PARQ was completed for bupropion (Wellbutrin) including nausea, insomnia, agitation/activation, weight  loss, anxiety, palpitations, hypertension, decreased seizure threshold and risk with alcohol withdrawal or electrolyte disturbances.      Continue prazosin 1 mg at bedtime as needed for insomnia and nightmares in the setting of PTSD  Patient was advised that she can utilize prazosin 1 mg twice daily as needed for headaches. It was discussed that prazosin is not FDA approved for this indication and the patient expressed understanding  PARQ was completed for prazosin (Minipress) including dizziness, sedation, blood pressure changes (including orthostatic hypotension and syncope), headache, medication interaction risk, GI distress, priapism in males, and others.     Continue melatonin 5 mg at bedtime as needed for insomnia     Continue ongoing psychotherapy with Jannet on a monthly basis  Continue ongoing psychiatric medication management every 1 to 3 months   Aware of the need to follow up with family physician for medical issues   Aware of 24-hour weekend coverage for urgent situations accessed by calling [facility] Main practice number     Risk of Harm to Self:  The following ratings are based on assessment at the time of the interview as well as review of medical records  Demographic risk factors include: White  Historical Risk Factors include: Chronic depressive symptoms, chronic anxiety symptoms  Recent Specific Risk Factors include: Diagnosis of depression, current mild depressive symptoms  Protective Factors: No current suicidal ideation, improved mood, stable mood, improved anxiety symptoms, access to mental health treatment, taking psychiatric medications as prescribed  Access to firearms: Patient reports that there are guns in her house, reporting her boyfriend owns a handgun and hunts.  She states they are locked and secured and has no access to them. Linda Hickman has no history of arrests or violence pertaining to use of a deadly weapon.   Based on today's assessment, Linda  presents the following risk of harm to self: Minimal     Risk of Harm to Others:  The following ratings are based on assessment at the time of the interview as well as review of medical records  Demographic Risk Factors include: Under age 40  Historical Risk Factors include: None  Recent Specific Risk Factors include: None  Protective Factors: No current homicidal ideation  Based on today's assessment, Linda presents the following risk of harm to others: Minimal    Although patient's acute lethality risk is low, long-term/chronic lethality risk is mildly elevated in the presence of moderate symptoms of depression and anxiety. At the current moment, Linda is future-oriented, forward-thinking, and demonstrates ability to act in a self-preserving manner as evidenced by volitionally presenting to the clinic today, seeking treatment. At this juncture, inpatient hospitalization is not currently warranted. To mitigate future risk, patient should adhere to the recommendations of this writer, avoid alcohol/illicit substance use, utilize community-based resources and familiar support and prioritize mental health treatment.     Based on today's assessment and clinical criteria, Linda Hickman contracts for safety and is not an imminent risk of harm to self or others. Outpatient level of care is deemed appropriate at this present time. Linda understands that if they are no longer able to contract for safety, they need to call/contact the outpatient office including this writer, call/contact crisis and/orattend to the nearest Emergency Department for immediate evaluation.    Risks/Benefits      Risks, Benefits And Possible Side Effects Of Medications:    Risks, benefits, and possible side effects of medications explained to Linda and she verbalizes understanding and agreement for treatment.    Controlled Medication Discussion:     Linda has been filling controlled prescriptions on time as prescribed according to [place]  Prescription Drug Monitoring Program    Psychotherapy Provided:     Individual psychotherapy provided: Yes  Recent stressor including medical stressors, relationship stressors, and work stressors discussed with Linda.   Supportive therapy provided.     Treatment Plan:    Completed and signed during the session: Not applicable - Treatment Plan not due at this session    Visit Time    Visit Start Time: 8:15 AM  Visit Stop Time: 9:00 AM  Total Visit Duration: 45 minutes    This note was shared with patient.    Mika Albarran MD 03/11/24    This note has been constructed using a voice recognition system. There may be translation, syntax, or grammatical errors. If you have any questions, please contact the dictating provider.

## 2024-03-11 NOTE — TELEPHONE ENCOUNTER
PA denied.    Dr. Marie - There is long list of reasons for denial. Please view denial letter scanned to chart. Please advise. Thank you!

## 2024-03-12 RX ORDER — RIZATRIPTAN BENZOATE 10 MG/1
10 TABLET ORAL AS NEEDED
Qty: 18 TABLET | Refills: 3 | Status: SHIPPED | OUTPATIENT
Start: 2024-03-12 | End: 2024-06-04

## 2024-03-12 NOTE — TELEPHONE ENCOUNTER
Thank you Neida I reviewed the denial notice.       We can probably try her on Maxalt first and go from there I will order the medication now.     1. Chronic migraine without aura without status migrainosus, not intractable    - rizatriptan (Maxalt) 10 mg tablet; Take 1 tablet (10 mg total) by mouth as needed for migraine Take at the onset of migraine; if symptoms continue or return, may take another dose at least 2 hours after first dose. Take no more than 2 doses in a day.  Dispense: 18 tablet; Refill: 3        Jennifer Marie MD.   Staff Neurologist  03/12/24   11:27 AM

## 2024-03-12 NOTE — TELEPHONE ENCOUNTER
Spoke with pt and informed her of denial. Pt says she is aware of denial and was in the process of writing to Dr. Marie a message on mychart. Pt says she researched other alternatives and says her condition of glaucoma and asthma really limits her options.     Pt does say she is willing to try rizatriptan. Pt to follow up if ineffective.    Called pharmacy to check if needs PA. Spoke with Elizabeth and she says rizatriptan shouldn't require PA.

## 2024-03-21 ENCOUNTER — TELEPHONE (OUTPATIENT)
Dept: NEUROLOGY | Facility: CLINIC | Age: 39
End: 2024-03-21

## 2024-03-21 NOTE — TELEPHONE ENCOUNTER
"Called patient unable to leave message \"mailbox full\" re : upcoming appointment on 6/4/24 with  will be in new permanent office in [place]. Mailed appointment card.   "

## 2024-03-22 ENCOUNTER — TELEPHONE (OUTPATIENT)
Dept: PSYCHIATRY | Facility: CLINIC | Age: 39
End: 2024-03-22

## 2024-03-22 DIAGNOSIS — F90.0 ATTENTION DEFICIT HYPERACTIVITY DISORDER, INATTENTIVE TYPE: ICD-10-CM

## 2024-03-22 RX ORDER — DEXTROAMPHETAMINE SACCHARATE, AMPHETAMINE ASPARTATE MONOHYDRATE, DEXTROAMPHETAMINE SULFATE AND AMPHETAMINE SULFATE 5; 5; 5; 5 MG/1; MG/1; MG/1; MG/1
20 CAPSULE, EXTENDED RELEASE ORAL EVERY MORNING
Qty: 30 CAPSULE | Refills: 0 | Status: SHIPPED | OUTPATIENT
Start: 2024-03-22

## 2024-03-22 NOTE — TELEPHONE ENCOUNTER
"This writer contacted the patient Linda Hickman today 3/22/24 at 9:15 AM as a follow up from her recent visit on 3/11/24.    As documented in her most recent office visit: \"At the conclusion of the office visit, Linda reports she will confer with her ophthalmologist next week and, if her ophthalmologist is in agreement, we will plan to increase Adderall XR to 20 mg daily.\"    Linda reports that this week she had her office appointment with [facility] and reports it went well. Linda reports that her eye pressures were within normal limits and there were no concerns. Moving forward she will be following with the ophthalmologist generally every year.    In this context, the patient's Adderall was increased to Adderall XR 20 mg daily.    Linda plans to have a follow up eye pressure check in 2 months with an optometrist.     Mika Albarran MD  "

## 2024-03-25 ENCOUNTER — TELEMEDICINE (OUTPATIENT)
Dept: PSYCHIATRY | Facility: CLINIC | Age: 39
End: 2024-03-25
Payer: COMMERCIAL

## 2024-03-25 DIAGNOSIS — F33.1 MODERATE EPISODE OF RECURRENT MAJOR DEPRESSIVE DISORDER (HCC): Primary | ICD-10-CM

## 2024-03-25 DIAGNOSIS — F43.10 PTSD (POST-TRAUMATIC STRESS DISORDER): ICD-10-CM

## 2024-03-25 PROCEDURE — 90834 PSYTX W PT 45 MINUTES: CPT

## 2024-03-25 NOTE — PSYCH
"Behavioral Health Psychotherapy Progress Note    Psychotherapy Provided: Individual Psychotherapy     1. Moderate episode of recurrent major depressive disorder (HCC)        2. PTSD (post-traumatic stress disorder)            Goals addressed in session: Goal 1     DATA: Linda and therapist did a values exercise and she said she values being straightforward, hard working, kind and curious.  She shared she is in a low motivation period right now in her mood and it's frustrating for her.  She talked about some things that she values in life by doing a values exercise, and also agreed to do some EMDR in next session to process some points where she feels \"stuck\" in life.  During this session, this clinician used the following therapeutic modalities: Client-centered Therapy and EMDR (or other form of bilateral Stimulation)    Substance Abuse was not addressed during this session. If the client is diagnosed with a co-occurring substance use disorder, please indicate any changes in the frequency or amount of use: . Stage of change for addressing substance use diagnoses: No substance use/Not applicable    ASSESSMENT:  Linda Hickman presents with a Euthymic/ normal mood.     her affect is Normal range and intensity, which is congruent, with her mood and the content of the session. The client has made progress on their goals.     Linda Hickman presents with a none risk of suicide, none risk of self-harm, and none risk of harm to others.    For any risk assessment that surpasses a \"low\" rating, a safety plan must be developed.    A safety plan was indicated: no  If yes, describe in detail     PLAN: Between sessions, Linda Hickman will think about her values and where she is \"stuck\". At the next session, the therapist will use EMDR (or other form of bilateral Stimulation) to address PTSD.    Behavioral Health Treatment Plan and Discharge Planning: Linda Hickman is aware of and agrees to continue to work on their treatment " plan. They have identified and are working toward their discharge goals. yes    Visit start and stop times:    03/25/24  Start Time: 1700  Stop Time: 1752  Total Visit Time: 52 minutes  Virtual Regular Visit    Verification of patient location:    Patient is located at Home in the following state in which I hold an active license [place]      Assessment/Plan:    Problem List Items Addressed This Visit       Moderate episode of recurrent major depressive disorder (HCC) - Primary    PTSD (post-traumatic stress disorder)       Goals addressed in session: Goal 1          Reason for visit is No chief complaint on file.       Encounter provider Jannet Villatoro LCSW    Provider [facility]  [address]  [place] [address] [phone]      Recent Visits  No visits were found meeting these conditions.  Showing recent visits within past 7 days and meeting all other requirements  Today's Visits  Date Type Provider Dept   03/25/24 Telemedicine Jannet Villatoro LCSW  Psychiatric [facility]   Showing today's visits and meeting all other requirements  Future Appointments  No visits were found meeting these conditions.  Showing future appointments within next 150 days and meeting all other requirements       The patient was identified by name and date of birth. Linda Hickman was informed that this is a telemedicine visit and that the visit is being conducted throughthe [employer] platform. She agrees to proceed..  My office door was closed. No one else was in the room.  She acknowledged consent and understanding of privacy and security of the video platform. The patient has agreed to participate and understands they can discontinue the visit at any time.    Patient is aware this is a billable service.     Subjective  Linda Hickman is a 38 y.o. female  .      HPI     Past Medical History:   Diagnosis Date    Allergic      Anemia     Anxiety     Arthritis     Asthma     Chlamydia 2014    Unfaithful partner    Depression     Exposure to SARS-associated coronavirus 04/12/2020    GERD (gastroesophageal reflux disease) 2010    Gonorrhea 2014    Unfaithful partner    Headache(784.0)     Lumbar herniated disc     Migraine 2012    Pneumonia     Urinary tract infection     Urogenital trichomoniasis 2015    Unfaithful partner    UTI (urinary tract infection)     Varicella 1988    Visual impairment        Past Surgical History:   Procedure Laterality Date    FL INJECTION LEFT KNEE (ARTHROGRAM)  2/28/2022    WISDOM TOOTH EXTRACTION         Current Outpatient Medications   Medication Sig Dispense Refill    albuterol (PROVENTIL HFA,VENTOLIN HFA) 90 mcg/act inhaler Inhale 2 puffs every 6 (six) hours as needed for wheezing 18 g 0    amphetamine-dextroamphetamine (ADDERALL XR, 20MG,) 20 MG 24 hr capsule Take 1 capsule (20 mg total) by mouth every morning Max Daily Amount: 20 mg 30 capsule 0    Ascorbic Acid (vitamin C) 1000 MG tablet Take 1,000 mg by mouth daily (Patient not taking: Reported on 2/22/2024)      B Complex-C (B-complex with vitamin C) tablet Take 1 tablet by mouth daily      buPROPion (Wellbutrin XL) 300 mg 24 hr tablet Take 1 tablet (300 mg total) by mouth every morning 90 tablet 0    calcium carbonate (OS-BERENICE) 600 MG tablet Take 600 mg by mouth 2 (two) times a day with meals      cholecalciferol (VITAMIN D3) 1,000 units tablet Take 1,000 Units by mouth daily      Dupixent 200 MG/1.14ML SOPN Inject 2 pens (400mg total) under the skin once for 1 dose. (Patient taking differently: 200mg every 2 weeks) 180 mL 10    EPINEPHrine (EPIPEN) 0.3 mg/0.3 mL SOAJ Inject 0.3 mL (0.3 mg total) into a muscle once for 1 dose (Patient not taking: Reported on 2/22/2024) 0.6 mL 0    Ferrous Sulfate (RA IRON PO) Take by mouth      levalbuterol (Xopenex) 0.63 mg/3 mL nebulizer solution Take 3 mL (0.63 mg total) by nebulization 3 (three) times a day  (Patient not taking: Reported on 10/30/2023) 30 mL 1    Magnesium Oxide 500 MG TABS Take by mouth      Melatonin 5 MG TABS Take 1 tablet (5 mg total) by mouth daily at bedtime as needed (insomnia)      mometasone-formoterol (Dulera) 200-5 MCG/ACT inhaler Inhale 2 puffs 2 (two) times a day Rinse mouth after use. 39 g 3    prazosin (MINIPRESS) 1 mg capsule Take 1 capsule (1 mg total) by mouth daily at bedtime as needed (insomnia) Take 1 mg twice daily as needed for severe headaches. 90 capsule 0    prochlorperazine (COMPAZINE) 5 mg tablet Take 1 tablet (5 mg total) by mouth every 6 (six) hours as needed for nausea or vomiting 30 tablet 0    rizatriptan (Maxalt) 10 mg tablet Take 1 tablet (10 mg total) by mouth as needed for migraine Take at the onset of migraine; if symptoms continue or return, may take another dose at least 2 hours after first dose. Take no more than 2 doses in a day. 18 tablet 3    Zinc 30 MG CAPS Take by mouth       No current facility-administered medications for this visit.        Allergies   Allergen Reactions    Sulfa Antibiotics Anaphylaxis    Levofloxacin      Other reaction(s): sensative    Prednisone Other (See Comments)    Tobramycin      Other reaction(s): sensative    Pseudoephedrine Palpitations       Review of Systems    Video Exam    There were no vitals filed for this visit.    Physical Exam

## 2024-04-08 ENCOUNTER — TELEMEDICINE (OUTPATIENT)
Dept: PSYCHIATRY | Facility: CLINIC | Age: 39
End: 2024-04-08

## 2024-04-08 DIAGNOSIS — F33.1 MODERATE EPISODE OF RECURRENT MAJOR DEPRESSIVE DISORDER (HCC): Primary | ICD-10-CM

## 2024-04-08 DIAGNOSIS — F43.10 PTSD (POST-TRAUMATIC STRESS DISORDER): ICD-10-CM

## 2024-04-08 DIAGNOSIS — F90.0 ATTENTION DEFICIT HYPERACTIVITY DISORDER, INATTENTIVE TYPE: ICD-10-CM

## 2024-04-08 RX ORDER — DEXTROAMPHETAMINE SACCHARATE, AMPHETAMINE ASPARTATE MONOHYDRATE, DEXTROAMPHETAMINE SULFATE AND AMPHETAMINE SULFATE 5; 5; 5; 5 MG/1; MG/1; MG/1; MG/1
20 CAPSULE, EXTENDED RELEASE ORAL EVERY MORNING
Qty: 30 CAPSULE | Refills: 0 | Status: SHIPPED | OUTPATIENT
Start: 2024-04-19

## 2024-04-08 RX ORDER — BUPROPION HYDROCHLORIDE 150 MG/1
150 TABLET ORAL DAILY
Qty: 90 TABLET | Refills: 0 | Status: SHIPPED | OUTPATIENT
Start: 2024-04-08

## 2024-04-08 NOTE — PSYCH
Virtual Regular Visit    Verification of patient location:    Patient is located at Home in the following state in which I hold an active license [place]      Assessment/Plan:    Problem List Items Addressed This Visit       Moderate episode of recurrent major depressive disorder (HCC) - Primary    PTSD (post-traumatic stress disorder)       Goals addressed in session: Goal 1          Reason for visit is No chief complaint on file.       Encounter provider Jannet Villatoro LCSW    Provider located at [address]  [place] [address] [phone]      Recent Visits  No visits were found meeting these conditions.  Showing recent visits within past 7 days and meeting all other requirements  Today's Visits  Date Type Provider Dept   04/08/24 Telemedicine Jannet Villatoro LCSW  Psychiatric [facility]   Showing today's visits and meeting all other requirements  Future Appointments  No visits were found meeting these conditions.  Showing future appointments within next 150 days and meeting all other requirements       The patient was identified by name and date of birth. Linda Hickman was informed that this is a telemedicine visit and that the visit is being conducted throughthe [email or web address] platform. She agrees to proceed..  My office door was closed. No one else was in the room.  She acknowledged consent and understanding of privacy and security of the video platform. The patient has agreed to participate and understands they can discontinue the visit at any time.    Patient is aware this is a billable service.     Subjective  Linda Hickman is a 38 y.o. female  .      HPI     Past Medical History:   Diagnosis Date    Allergic     Anemia     Anxiety     Arthritis     Asthma     Chlamydia 2014    Unfaithful partner    Depression     Exposure to SARS-associated coronavirus 04/12/2020    GERD (gastroesophageal reflux  disease) 2010    Gonorrhea 2014    Unfaithful partner    Headache(784.0)     Lumbar herniated disc     Migraine 2012    Pneumonia     Urinary tract infection     Urogenital trichomoniasis 2015    Unfaithful partner    UTI (urinary tract infection)     Varicella 1988    Visual impairment        Past Surgical History:   Procedure Laterality Date    FL INJECTION LEFT KNEE (ARTHROGRAM)  2/28/2022    WISDOM TOOTH EXTRACTION         Current Outpatient Medications   Medication Sig Dispense Refill    albuterol (PROVENTIL HFA,VENTOLIN HFA) 90 mcg/act inhaler Inhale 2 puffs every 6 (six) hours as needed for wheezing 18 g 0    [START ON 4/19/2024] amphetamine-dextroamphetamine (ADDERALL XR, 20MG,) 20 MG 24 hr capsule Take 1 capsule (20 mg total) by mouth every morning Max Daily Amount: 20 mg Do not start before April 19, 2024. 30 capsule 0    Ascorbic Acid (vitamin C) 1000 MG tablet Take 1,000 mg by mouth daily (Patient not taking: Reported on 2/22/2024)      B Complex-C (B-complex with vitamin C) tablet Take 1 tablet by mouth daily      buPROPion (Wellbutrin XL) 150 mg 24 hr tablet Take 1 tablet (150 mg total) by mouth daily - Take with Wellbutrin 300 mg XL for a total daily dose of 450 mg 90 tablet 0    buPROPion (Wellbutrin XL) 300 mg 24 hr tablet Take 1 tablet (300 mg total) by mouth every morning 90 tablet 0    calcium carbonate (OS-BERENICE) 600 MG tablet Take 600 mg by mouth 2 (two) times a day with meals      cholecalciferol (VITAMIN D3) 1,000 units tablet Take 1,000 Units by mouth daily      Dupixent 200 MG/1.14ML SOPN Inject 2 pens (400mg total) under the skin once for 1 dose. (Patient taking differently: 200mg every 2 weeks) 180 mL 10    EPINEPHrine (EPIPEN) 0.3 mg/0.3 mL SOAJ Inject 0.3 mL (0.3 mg total) into a muscle once for 1 dose (Patient not taking: Reported on 2/22/2024) 0.6 mL 0    Ferrous Sulfate (RA IRON PO) Take by mouth      levalbuterol (Xopenex) 0.63 mg/3 mL nebulizer solution Take 3 mL (0.63 mg total) by  nebulization 3 (three) times a day (Patient not taking: Reported on 10/30/2023) 30 mL 1    Magnesium Oxide 500 MG TABS Take by mouth      Melatonin 5 MG TABS Take 1 tablet (5 mg total) by mouth daily at bedtime as needed (insomnia)      mometasone-formoterol (Dulera) 200-5 MCG/ACT inhaler Inhale 2 puffs 2 (two) times a day Rinse mouth after use. 39 g 3    prazosin (MINIPRESS) 1 mg capsule Take 1 capsule (1 mg total) by mouth daily at bedtime as needed (insomnia) Take 1 mg twice daily as needed for severe headaches. 90 capsule 0    prochlorperazine (COMPAZINE) 5 mg tablet Take 1 tablet (5 mg total) by mouth every 6 (six) hours as needed for nausea or vomiting 30 tablet 0    rizatriptan (Maxalt) 10 mg tablet Take 1 tablet (10 mg total) by mouth as needed for migraine Take at the onset of migraine; if symptoms continue or return, may take another dose at least 2 hours after first dose. Take no more than 2 doses in a day. 18 tablet 3    Zinc 30 MG CAPS Take by mouth       No current facility-administered medications for this visit.        Allergies   Allergen Reactions    Sulfa Antibiotics Anaphylaxis    Levofloxacin      Other reaction(s): sensative    Prednisone Other (See Comments)    Tobramycin      Other reaction(s): sensative    Pseudoephedrine Palpitations       Review of Systems    Video Exam    There were no vitals filed for this visit.    Physical Exam           Behavioral Health Psychotherapy Progress Note    Psychotherapy Provided: Individual Psychotherapy     1. Moderate episode of recurrent major depressive disorder (HCC)        2. PTSD (post-traumatic stress disorder)            Goals addressed in session: Goal 1     DATA:   Linda shared she still is feeling depressed and her motivation has been low.  Therapist and Linda talked about possible EMDR target of her feeling she does not have a say or agency for making her own decisions because of some childhood experiences of her mother not respecting her  "privacy.  Therapist and Linda developed this target and Linda agreed to continue processing in next session.  During this session, this clinician used the following therapeutic modalities: EMDR (or other form of bilateral Stimulation)    Substance Abuse was not addressed during this session. If the client is diagnosed with a co-occurring substance use disorder, please indicate any changes in the frequency or amount of use: . Stage of change for addressing substance use diagnoses: No substance use/Not applicable    ASSESSMENT:  Linda Hickman presents with a Euthymic/ normal mood.     her affect is Normal range and intensity, which is congruent, with her mood and the content of the session. The client has made progress on their goals.     Linda Hickman presents with a none risk of suicide, none risk of self-harm, and none risk of harm to others.    For any risk assessment that surpasses a \"low\" rating, a safety plan must be developed.    A safety plan was indicated: no  If yes, describe in detail     PLAN: Between sessions, Linda Hickman will utilize calm place, identify more examples of memories associated with target for EMDR. At the next session, the therapist will use EMDR (or other form of bilateral Stimulation) to address PTSD.    Behavioral Health Treatment Plan and Discharge Planning: Linda Hickman is aware of and agrees to continue to work on their treatment plan. They have identified and are working toward their discharge goals. yes    Visit start and stop times:    04/08/24  Start Time: 1700  Stop Time: 1752  Total Visit Time: 52 minutes  "

## 2024-04-08 NOTE — PATIENT INSTRUCTIONS
Please present for your previously scheduled appointment approximately 15 minutes prior to appointment time. If you are running late or are unable to attend your appointment, please call our [place] office at [phone] or our [place] office at [phone] if applicable to notify the office of your absence.    If you are in psychological crisis including not limited to suicidal/homicidal thoughts or thoughts of self-injury, do not hesitate to call/contact your County Crisis hotline (see below) or attend to the nearest emergency department.  [facility] Crisis: [phone]  [facility] Crisis: [phone]  [facility] Crisis: [phone]  [facility] Crisis: [phone]  [facility] Crisis: [phone]  [facility] Crisis: [phone]  [facility] Crisis: [phone]  National Suicide Prevention Hotline: 1-137.315.1784

## 2024-04-08 NOTE — PSYCH
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 weight loss, anxiety, palpitations, hypertension, decreased seizure threshold and risk with alcohol withdrawal or electrolyte disturbances.       Continue Adderall XR 20 mg daily on productive days for ADHD symptomatology  PARQ completed for the class of stimulant medications including anxiety/irritability, insomnia, appetite suppression/weight loss, abuse potential, elevated heart rate and blood pressure, seizures, activation/induction of belinda, unmasking of tic's, growth suppression in children, interactions with other medications, and risk of sudden death. For MALES- rare priapism.     Continue prazosin 1 mg daily at bedtime as needed for insomnia and nightmares in the setting of PTSD as well as migraines  PARQ was completed for prazosin (Minipress) including dizziness, sedation, blood pressure changes (including orthostatic hypotension and syncope), headache, medication interaction risk, GI distress, and others     Continue melatonin 5 mg at bedtime as needed for insomnia     Continue ongoing psychotherapy with Jannet on a monthly basis  Continue ongoing psychiatric medication management  every 1 to 3 months   Aware of the need to follow up with family physician for medical issues  Aware of the 24 hour and weekend coverage for urgent situations accessed by calling [facility] Main practice number    Risk of Harm to Self:  The following ratings are based on assessment at the time of the interview as well as review of medical records  Demographic risk factors include: White  Historical Risk Factors include: Chronic depressive symptoms, chronic anxiety symptoms  Recent Specific Risk Factors include: Diagnosis of depression, current moderate depressive symptoms  Protective Factors: No current suicidal ideation, stable mood, improved anxiety symptoms, access to mental health treatment, taking psychiatric medications as prescribed  Access to firearms: Patient reports that there are guns in her house, reporting her boyfriend owns a handgun and hunts.  She states they are locked and secured and has no access to them. Linda Hickman has no history of arrests or violence pertaining to use of a deadly weapon.   Based on today's assessment, Linda presents the following risk of harm to self: Minimal     Risk of Harm to Others:  The following ratings are based on assessment at the time of the interview as well as review of medical records  Demographic Risk Factors include: Under age 40  Historical Risk Factors include: None  Recent Specific Risk Factors include: None  Protective Factors: No current homicidal ideation  Based on today's assessment, Linda presents the following risk of harm to others: Minimal     Although patient's acute lethality risk is low, long-term/chronic lethality risk is mildly elevated in the presence of moderate symptoms of depression and mild symptoms of anxiety. At the current moment, Linda is future-oriented, forward-thinking, and demonstrates ability to act in a self-preserving manner as evidenced by volitionally presenting to the clinic today, seeking  treatment. At this juncture, inpatient hospitalization is not currently warranted. To mitigate future risk, patient should adhere to the recommendations of this writer, avoid alcohol/illicit substance use, utilize community-based resources and familiar support and prioritize mental health treatment.     Based on today's assessment and clinical criteria, Linda Hickman contracts for safety and is not an imminent risk of harm to self or others. Outpatient level of care is deemed appropriate at this present time. Linda understands that if they are no longer able to contract for safety, they need to call/contact the outpatient office including this writer, call/contact crisis and/orattend to the nearest Emergency Department for immediate evaluation.    Risks/Benefits      Risks, Benefits And Possible Side Effects Of Medications:    Risks, benefits, and possible side effects of medications explained to Linda and she verbalizes understanding and agreement for treatment.    Controlled Medication Discussion:     Linda has been filling controlled prescriptions on time as prescribed according to [place] Prescription Drug Monitoring Program    Psychotherapy Provided:     Individual psychotherapy provided: Recent stressor including childcare stressors discussed with Linda.     Treatment Plan:    Completed and signed during the session: Not applicable - Treatment Plan not due at this session    Visit Time    Visit Start Time: 8:15 AM  Visit Stop Time: 9:15 AM  Total Visit Duration:  60 minutes    This note was shared with patient.    Mika Albarran MD 04/08/24    This note has been constructed using a voice recognition system. There may be translation, syntax, or grammatical errors. If you have any questions, please contact the dictating provider.

## 2024-04-22 DIAGNOSIS — J45.50 SEVERE PERSISTENT ASTHMA, UNSPECIFIED WHETHER COMPLICATED: ICD-10-CM

## 2024-04-29 ENCOUNTER — TELEMEDICINE (OUTPATIENT)
Dept: PSYCHIATRY | Facility: CLINIC | Age: 39
End: 2024-04-29
Payer: COMMERCIAL

## 2024-04-29 DIAGNOSIS — F33.1 MODERATE EPISODE OF RECURRENT MAJOR DEPRESSIVE DISORDER (HCC): Primary | ICD-10-CM

## 2024-04-29 DIAGNOSIS — F43.10 PTSD (POST-TRAUMATIC STRESS DISORDER): ICD-10-CM

## 2024-04-29 PROCEDURE — 90834 PSYTX W PT 45 MINUTES: CPT

## 2024-04-29 RX ORDER — DUPILUMAB 200 MG/1.14ML
INJECTION, SOLUTION SUBCUTANEOUS
Qty: 2 ML | Refills: 10 | Status: SHIPPED | OUTPATIENT
Start: 2024-04-29

## 2024-04-29 NOTE — PSYCH
"Behavioral Health Psychotherapy Progress Note    Psychotherapy Provided: Individual Psychotherapy     1. Moderate episode of recurrent major depressive disorder (HCC)        2. PTSD (post-traumatic stress disorder)            Goals addressed in session: Goal 1     DATA:   Linda agreed to work on an EMDR target of Iraj's parents coming over unannounced, that she feels she does not have a voice or a say.  Linda processed this issue with EMDR, using bilateral stimulation to process this target.    During this session, this clinician used the following therapeutic modalities: EMDR (or other form of bilateral Stimulation)    Substance Abuse was not addressed during this session. If the client is diagnosed with a co-occurring substance use disorder, please indicate any changes in the frequency or amount of use: . Stage of change for addressing substance use diagnoses: No substance use/Not applicable    ASSESSMENT:  Linda Hickman presents with a Euthymic/ normal mood.     her affect is Normal range and intensity, which is congruent, with her mood and the content of the session. The client has made progress on their goals.     Linda Hickman presents with a none risk of suicide, none risk of self-harm, and none risk of harm to others.    For any risk assessment that surpasses a \"low\" rating, a safety plan must be developed.    A safety plan was indicated: no  If yes, describe in detail     PLAN: Between sessions, Linda Hickman will utilize flashback management as well as assertiveness to improve her mood and relationships. At the next session, the therapist will use EMDR (or other form of bilateral Stimulation) to address PTSD.    Behavioral Health Treatment Plan and Discharge Planning: Linda Hickman is aware of and agrees to continue to work on their treatment plan. They have identified and are working toward their discharge goals. yes    Visit start and stop times:    04/29/24  Start Time: 1700  Stop Time: 1752  Total " Visit Time: 52 minutes  Virtual Regular Visit    Verification of patient location:    Patient is located at Home in the following state in which I hold an active license [place]      Assessment/Plan:    Problem List Items Addressed This Visit       Moderate episode of recurrent major depressive disorder (HCC) - Primary    PTSD (post-traumatic stress disorder)       Goals addressed in session: Goal 1          Reason for visit is No chief complaint on file.       Encounter provider Jannet Villatoro LCSW      Recent Visits  No visits were found meeting these conditions.  Showing recent visits within past 7 days and meeting all other requirements  Today's Visits  Date Type Provider Dept   04/29/24 Telemedicine Jannet Villatoro LCSW [facility]   Showing today's visits and meeting all other requirements  Future Appointments  No visits were found meeting these conditions.  Showing future appointments within next 150 days and meeting all other requirements       The patient was identified by name and date of birth. Linda Hickman was informed that this is a telemedicine visit and that the visit is being conducted throughthe [employer] platform. She agrees to proceed..  My office door was closed. No one else was in the room.  She acknowledged consent and understanding of privacy and security of the video platform. The patient has agreed to participate and understands they can discontinue the visit at any time.    Patient is aware this is a billable service.     Subjective  Linda Hickman is a 39 y.o. female  .      HPI     Past Medical History:   Diagnosis Date    Allergic     Anemia     Anxiety     Arthritis     Asthma     Chlamydia 2014    Unfaithful partner    Depression     Exposure to SARS-associated coronavirus 04/12/2020    GERD (gastroesophageal reflux disease) 2010    Gonorrhea 2014    Unfaithful partner    Headache(784.0)     Lumbar herniated disc     Migraine 2012    Pneumonia     Urinary  tract infection     Urogenital trichomoniasis 2015    Unfaithful partner    UTI (urinary tract infection)     Varicella 1988    Visual impairment        Past Surgical History:   Procedure Laterality Date    FL INJECTION LEFT KNEE (ARTHROGRAM)  2/28/2022    WISDOM TOOTH EXTRACTION         Current Outpatient Medications   Medication Sig Dispense Refill    albuterol (PROVENTIL HFA,VENTOLIN HFA) 90 mcg/act inhaler Inhale 2 puffs every 6 (six) hours as needed for wheezing 18 g 0    amphetamine-dextroamphetamine (ADDERALL XR, 20MG,) 20 MG 24 hr capsule Take 1 capsule (20 mg total) by mouth every morning Max Daily Amount: 20 mg Do not start before April 19, 2024. 30 capsule 0    Ascorbic Acid (vitamin C) 1000 MG tablet Take 1,000 mg by mouth daily (Patient not taking: Reported on 2/22/2024)      B Complex-C (B-complex with vitamin C) tablet Take 1 tablet by mouth daily      buPROPion (Wellbutrin XL) 150 mg 24 hr tablet Take 1 tablet (150 mg total) by mouth daily - Take with Wellbutrin 300 mg XL for a total daily dose of 450 mg 90 tablet 0    buPROPion (Wellbutrin XL) 300 mg 24 hr tablet Take 1 tablet (300 mg total) by mouth every morning 90 tablet 0    calcium carbonate (OS-BERENICE) 600 MG tablet Take 600 mg by mouth 2 (two) times a day with meals      cholecalciferol (VITAMIN D3) 1,000 units tablet Take 1,000 Units by mouth daily      Dupixent 200 MG/1.14ML SOPN Inject 1 pen ( 200mg total) under the skin once every 14 (fourteen) days. 2 mL 10    EPINEPHrine (EPIPEN) 0.3 mg/0.3 mL SOAJ Inject 0.3 mL (0.3 mg total) into a muscle once for 1 dose (Patient not taking: Reported on 2/22/2024) 0.6 mL 0    Ferrous Sulfate (RA IRON PO) Take by mouth      levalbuterol (Xopenex) 0.63 mg/3 mL nebulizer solution Take 3 mL (0.63 mg total) by nebulization 3 (three) times a day (Patient not taking: Reported on 10/30/2023) 30 mL 1    Magnesium Oxide 500 MG TABS Take by mouth      Melatonin 5 MG TABS Take 1 tablet (5 mg total) by mouth daily at  bedtime as needed (insomnia)      mometasone-formoterol (Dulera) 200-5 MCG/ACT inhaler Inhale 2 puffs 2 (two) times a day Rinse mouth after use. 39 g 3    prazosin (MINIPRESS) 1 mg capsule Take 1 capsule (1 mg total) by mouth daily at bedtime as needed (insomnia) Take 1 mg twice daily as needed for severe headaches. 90 capsule 0    prochlorperazine (COMPAZINE) 5 mg tablet Take 1 tablet (5 mg total) by mouth every 6 (six) hours as needed for nausea or vomiting 30 tablet 0    rizatriptan (Maxalt) 10 mg tablet Take 1 tablet (10 mg total) by mouth as needed for migraine Take at the onset of migraine; if symptoms continue or return, may take another dose at least 2 hours after first dose. Take no more than 2 doses in a day. 18 tablet 3    Zinc 30 MG CAPS Take by mouth       No current facility-administered medications for this visit.        Allergies   Allergen Reactions    Sulfa Antibiotics Anaphylaxis    Levofloxacin      Other reaction(s): sensative    Prednisone Other (See Comments)    Tobramycin      Other reaction(s): sensative    Pseudoephedrine Palpitations       Review of Systems    Video Exam    There were no vitals filed for this visit.    Physical Exam

## 2024-04-30 ENCOUNTER — ANNUAL EXAM (OUTPATIENT)
Age: 39
End: 2024-04-30
Payer: COMMERCIAL

## 2024-04-30 VITALS
HEIGHT: 66 IN | SYSTOLIC BLOOD PRESSURE: 114 MMHG | DIASTOLIC BLOOD PRESSURE: 82 MMHG | WEIGHT: 172 LBS | BODY MASS INDEX: 27.64 KG/M2

## 2024-04-30 DIAGNOSIS — Z12.31 BREAST CANCER SCREENING BY MAMMOGRAM: ICD-10-CM

## 2024-04-30 DIAGNOSIS — Z31.69 INFERTILITY COUNSELING: ICD-10-CM

## 2024-04-30 DIAGNOSIS — Z01.419 ENCOUNTER FOR GYNECOLOGICAL EXAMINATION WITHOUT ABNORMAL FINDING: Primary | ICD-10-CM

## 2024-04-30 DIAGNOSIS — Z80.3 FAMILY HISTORY OF BREAST CANCER IN MOTHER: ICD-10-CM

## 2024-04-30 PROBLEM — S83.412A SPRAIN OF MEDIAL COLLATERAL LIGAMENT OF LEFT KNEE: Status: RESOLVED | Noted: 2021-12-29 | Resolved: 2024-04-30

## 2024-04-30 PROBLEM — M79.652 THIGH PAIN, MUSCULOSKELETAL, LEFT: Status: RESOLVED | Noted: 2023-12-19 | Resolved: 2024-04-30

## 2024-04-30 PROBLEM — U07.1 COVID-19: Status: RESOLVED | Noted: 2023-01-27 | Resolved: 2024-04-30

## 2024-04-30 PROCEDURE — S0612 ANNUAL GYNECOLOGICAL EXAMINA: HCPCS | Performed by: NURSE PRACTITIONER

## 2024-04-30 RX ORDER — MAGNESIUM GLYCINATE 100 MG
200 CAPSULE ORAL 2 TIMES DAILY
COMMUNITY

## 2024-04-30 NOTE — PATIENT INSTRUCTIONS
Planning for Pregnancy   AMBULATORY CARE:   Why you should plan for pregnancy:  You can help get your body ready for a healthy pregnancy. A healthy pregnancy can improve your ability to have a healthy baby. The steps you need to take and the amount of time needed depends on your current health and habits. Work with your healthcare provider to help you plan a healthy pregnancy.  What you need to know about nutrition and exercise before pregnancy:   Eat a variety of healthy foods.  Healthy foods include fruits, vegetables, whole-grain breads, low-fat dairy foods, beans, lean meats, and fish. Limit foods high in sugar, fat, and sodium. Limit your intake of fish to 2 servings each week. Choose fish low in mercury such as canned light tuna, shrimp, salmon, cod, or tilapia. Do not  eat fish high in mercury such as swordfish, tilefish, idris mackerel, and shark.         Take 400 micrograms (mcg) of folic acid each day.  This will help to prevent birth defects of the brain and spine such as spina bifida. Most women should take folic acid before pregnancy and up to 12 weeks after getting pregnant.         Exercise for at least 30 minutes, 5 days a week.  Some examples of exercise include walking, biking, dancing, and swimming. Include muscle strengthening activities 2 days each week. Regular exercise provides many health benefits. It helps you manage your weight, and decreases your risk for type 2 diabetes, heart disease, stroke, and high blood pressure. Exercise can also help improve your mood. Ask your healthcare provider about the best exercise plan for you.       How weight affects pregnancy:   Obesity  can make it harder for you to get pregnant. It also increases your risk of health problems during pregnancy. Some of these health problems include gestational diabetes, high blood pressure, and infections. It can also increase your baby's risk of health problems such as birth defects. Your baby may also be large and harder  to deliver or be born prematurely (early). Your risk of miscarriage is also higher if you are obese. Work with your healthcare provider to reach a healthy weight before you try to get pregnant.    Being underweight  can also make it hard for you to get pregnant. It can also increase your risk of having a premature baby and miscarriage. Your baby may be born at a low birth weight.    What you need to know about smoking, alcohol, and drugs:   Smoking  increases your risk of a miscarriage and other health problems during pregnancy. Smoking can cause your baby to be born too early or weigh less at birth. Ask your healthcare provider for information if you need help quitting.    Alcohol  passes from your body to your baby through the placenta. It can affect your baby's brain development and cause fetal alcohol syndrome (FAS). FAS is a group of conditions that causes mental, behavior, and growth problems. Talk to your healthcare provider if you abuse alcohol and need help quitting before pregnancy.    Drugs , such as marijuana and cocaine, should not be used while you are trying to get pregnant or during pregnancy. They increase your risk of problems during pregnancy and increase the risk of having a baby with health problems. These include birth defects, premature birth, and infant death.    What you need to know about medicines and supplements:  Tell your healthcare provider about all the medicines and supplements you take. Certain medicines and supplements should not be used during pregnancy. These include over-the-counter medicines, prescription medicines, vitamins, and herbal supplements. He or she may recommend that you take different medicines that are safer during pregnancy.  What you need to know about immunizations:  Tell your healthcare provider about all the immunizations you have had. If you have missed any immunizations, your healthcare provider may recommend that you update your immunizations. These include  hepatitis B, influenza, MMR (measles, mumps, rubella), Tdap, and varicella immunizations. You should get 1 dose of the Tdap vaccine with each pregnancy. It is best to get the vaccine at 27 to 36 weeks of pregnancy.  Tests you may need before pregnancy:  Your healthcare provider may recommend that you have tests to screen for sexually transmitted infections. These include chlamydia, gonorrhea, herpes, HIV infection, syphilis, and tuberculosis. These infectious diseases should be treated before pregnancy, if needed.  What you need to know about toxic substances:  Toxic substances can harm a developing baby. Examples include cleaning products, paints, solvents, pesticides, and other chemical products. They can increase the risk of having a miscarriage, premature birth, and low-birth weight baby. They also increase the risk of developmental delay and childhood cancer. Avoid exposure to toxic substances and materials at work and home.  What you need to know about genetic testing:  Tell your healthcare provider about genetic disorders, developmental delays, or other disabilities. Include your family and your partner's family. Also tell your provider about any problems in past pregnancies. Your provider may recommend that you see a healthcare professional called a genetic counselor. He or she will talk to you about how genes, birth defects, and other medical conditions are passed down. He or she can also tell you about your risk for passing a genetic disease in a future pregnancy. A screening test may include blood tests to check your DNA or your partner's DNA. Genetic tests are not always accurate or complete. Your baby may be born with a genetic disorder that did not show up in the tests. Talk to your healthcare provider about any concerns you have with genetic testing.  How you can prepare for pregnancy if you have a medical condition:  Medical conditions such as diabetes, high blood pressure, asthma, seizure disorders,  and thyroid disorders should be managed before pregnancy. Mental health conditions, such as depression and anxiety, should also be treated. This will decrease your risk of having health problems during pregnancy. It will also decrease your baby's risk for medical problems. Medicines used to treat certain conditions are not safe to use during pregnancy and may need to be changed before you get pregnant. Ask your healthcare provider if it is safe for you to get pregnant if you have a medical condition.  Follow up with your doctor or obstetrician as directed:  Write down your questions so you remember to ask them during your visits.  © Copyright [employer] 2023 Information is for End User's use only and may not be sold, redistributed or otherwise used for commercial purposes.  The above information is an  only. It is not intended as medical advice for individual conditions or treatments. Talk to your doctor, nurse or pharmacist before following any medical regimen to see if it is safe and effective for you.  Breast Self Exam for Women   AMBULATORY CARE:   A breast self-exam (BSE)  is a way to check your breasts for lumps and other changes. Regular BSEs can help you know how your breasts normally look and feel. Most breast lumps or changes are not cancer, but you should always have them checked by a healthcare provider.  Why you should do a BSE:  Breast cancer is the most common type of cancer in women. Even if you have mammograms, you may still want to do a BSE regularly. If you know how your breasts normally feel and look, it may help you know when to contact your healthcare provider. Mammograms can miss some cancers. You may find a lump during a BSE that did not show up on a mammogram.  When you should do a BSE:  If you have periods, you may want to do your BSE 1 week after your period ends. This is the time when your breasts may be the least swollen, lumpy, or tender. You can do regular BSEs even if you  are breastfeeding or have breast implants.  Call your doctor if:   You find any lumps or changes in your breasts.    You have breast pain or fluid coming from your nipples.    You have questions or concerns about your condition or care.    How to do a BSE:       Look at your breasts in a mirror.  Look at the size and shape of each breast and nipple. Check for swelling, lumps, dimpling, scaly skin, or other skin changes. Look for nipple changes, such as a nipple that is painful or beginning to pull inward. Gently squeeze both nipples and check to see if fluid (that is not breast milk) comes out of them. If you find any of these or other breast changes, contact your healthcare provider. Check your breasts while you sit or  the following 3 positions:    Hang your arms down at your sides.    Raise your hands and join them behind your head.    Put firm pressure with your hands on your hips. Bend slightly forward while you look at your breasts in the mirror.    Lie down and feel your breasts.  When you lie down, your breast tissue spreads out evenly over your chest. This makes it easier for you to feel for lumps and anything that may not be normal for your breasts. Do a BSE on one breast at a time.    Place a small pillow or towel under your left shoulder.  Put your left arm behind your head.    Use the 3 middle fingers of your right hand.  Use your fingertip pads, on the top of your fingers. Your fingertip pad is the most sensitive part of your finger.    Use small circles to feel your breast tissue.  Use your fingertip pads to make dime-sized, overlapping circles on your breast and armpits. Use light, medium, and firm pressure. First, press lightly. Second, press with medium pressure to feel a little deeper into the breast. Last, use firm pressure to feel deep within your breast.    Examine your entire breast area.  Examine the breast area from above the breast to below the breast where you feel only ribs. Make  small circles with your fingertips, starting in the middle of your armpit. Make circles going up and down the breast area. Continue toward your breast and all the way across it. Examine the area from your armpit all the way over to the middle of your chest (breastbone). Stop at the middle of your chest.    Move the pillow or towel to your right shoulder, and put your right arm behind your head.  Use the 3 fingertip pads of your left hand, and repeat the above steps to do a BSE on your right breast.  What else you can do to check for breast problems or cancer:  Talk to your healthcare provider about mammograms. A mammogram is an x-ray of your breasts to screen for breast cancer or other problems. Your provider can tell you the benefits and risks of mammograms. The first mammogram is usually at age 45 or 50. Your provider may recommend you start at 40 or younger if your risk for breast cancer is high. Mammograms usually continue every 1 to 2 years until age 74.       Follow up with your doctor as directed:  Write down your questions so you remember to ask them during your visits.  © Copyright [employer] 2023 Information is for End User's use only and may not be sold, redistributed or otherwise used for commercial purposes.  The above information is an  only. It is not intended as medical advice for individual conditions or treatments. Talk to your doctor, nurse or pharmacist before following any medical regimen to see if it is safe and effective for you.

## 2024-04-30 NOTE — PROGRESS NOTES
Diagnoses and all orders for this visit:    Encounter for gynecological examination without abnormal finding    Family history of breast cancer in mother    Breast cancer screening by mammogram    Infertility counseling  -     Ambulatory referral to Infertility; Future  -     Will see them if no success after 6 months of trying.    Other orders  -     Menses calendar and PNV    Calcium/vit d/PNV inclusion in the diet discussed, call with any issues, SBE reinforced, all concerns addressed. Advised stress relief!        Pleasant 39 y.o. premenopausal female here for annual exam. She denies any issues with heavy bleeding or her menses. She reports regular monthly cycles every 28-30 days. They last 4-5 days. Denies history of abnormal pap smears. Last Pap done on 03/17/2022 was neg/neg, a pap was NOT done today. She denies vaginal issues. She denies pelvic pain. She denies dyspareunia. She is sexually active.  Mom was diagnosed with breast cancer at age 62 and MGM at age 40. She states it was estrogen driven and BRCA negative.  She declines a BCM. She is thinking about possible pregnancy, will start trying once she weans off Adderall, Minipress and her ADD injection (Duxipent). History of migraines without aura. Mammogram 07/06/2023 BR 1 negative.    Past Medical History:   Diagnosis Date    Allergic     Anemia     Anxiety     Arthritis     Asthma     Chlamydia 2014    Unfaithful partner    Depression     Exposure to SARS-associated coronavirus 04/12/2020    GERD (gastroesophageal reflux disease) 2010    Gonorrhea 2014    Unfaithful partner    Headache(784.0)     Lumbar herniated disc     Migraine 2012    Pneumonia     Urinary tract infection     Urogenital trichomoniasis 2015    Unfaithful partner    UTI (urinary tract infection)     Varicella 1988    Visual impairment      Past Surgical History:   Procedure Laterality Date    FL INJECTION LEFT KNEE (ARTHROGRAM)  2/28/2022    WISDOM TOOTH EXTRACTION       Family  History   Problem Relation Age of Onset    BRCA2 Negative Mother     BRCA1 Negative Mother     Breast cancer additional onset Mother         Left Masectomy    Hypertension Mother     Diabetes Mother     Asthma Mother     Cancer Mother         breast    Breast cancer Mother         In 2019, left side. -brca    Migraines Mother     Mental illness Father         family history    Alcohol abuse Father     Hypertension Father     Hyperlipidemia Father     Diabetes Father     Heart attack Father         Assumed. Found  at 63    Colon cancer Maternal Grandmother     Breast cancer additional onset Maternal Grandmother     Diabetes Maternal Grandmother     Cancer Maternal Grandmother         breast, colon    Breast cancer Maternal Grandmother     Prostate cancer Maternal Grandfather         lung    Cancer Maternal Grandfather         lung (smoker)    Completed Suicide  Maternal Grandfather     Lung cancer Maternal Grandfather     Asthma Brother     Asthma Brother      Social History     Tobacco Use    Smoking status: Never    Smokeless tobacco: Never   Vaping Use    Vaping status: Never Used   Substance Use Topics    Alcohol use: Yes     Comment: ~1 drinks Twice a month    Drug use: Never       Current Outpatient Medications:     albuterol (PROVENTIL HFA,VENTOLIN HFA) 90 mcg/act inhaler, Inhale 2 puffs every 6 (six) hours as needed for wheezing, Disp: 18 g, Rfl: 0    amphetamine-dextroamphetamine (ADDERALL XR, 20MG,) 20 MG 24 hr capsule, Take 1 capsule (20 mg total) by mouth every morning Max Daily Amount: 20 mg Do not start before 2024., Disp: 30 capsule, Rfl: 0    B Complex-C (B-complex with vitamin C) tablet, Take 1 tablet by mouth daily, Disp: , Rfl:     buPROPion (Wellbutrin XL) 150 mg 24 hr tablet, Take 1 tablet (150 mg total) by mouth daily - Take with Wellbutrin 300 mg XL for a total daily dose of 450 mg, Disp: 90 tablet, Rfl: 0    buPROPion (Wellbutrin XL) 300 mg 24 hr tablet, Take 1 tablet (300 mg  total) by mouth every morning, Disp: 90 tablet, Rfl: 0    calcium carbonate (OS-BERENICE) 600 MG tablet, Take 600 mg by mouth 2 (two) times a day with meals, Disp: , Rfl:     cholecalciferol (VITAMIN D3) 1,000 units tablet, Take 1,000 Units by mouth daily, Disp: , Rfl:     Dupixent 200 MG/1.14ML SOPN, Inject 1 pen ( 200mg total) under the skin once every 14 (fourteen) days., Disp: 2 mL, Rfl: 10    Ferrous Sulfate (RA IRON PO), Take by mouth, Disp: , Rfl:     levalbuterol (Xopenex) 0.63 mg/3 mL nebulizer solution, Take 3 mL (0.63 mg total) by nebulization 3 (three) times a day, Disp: 30 mL, Rfl: 1    Magnesium Glycinate 100 MG CAPS, Take 200 mg by mouth 2 (two) times a day, Disp: , Rfl:     Melatonin 5 MG TABS, Take 1 tablet (5 mg total) by mouth daily at bedtime as needed (insomnia), Disp: , Rfl:     mometasone-formoterol (Dulera) 200-5 MCG/ACT inhaler, Inhale 2 puffs 2 (two) times a day Rinse mouth after use., Disp: 39 g, Rfl: 3    prazosin (MINIPRESS) 1 mg capsule, Take 1 capsule (1 mg total) by mouth daily at bedtime as needed (insomnia) Take 1 mg twice daily as needed for severe headaches., Disp: 90 capsule, Rfl: 0    prochlorperazine (COMPAZINE) 5 mg tablet, Take 1 tablet (5 mg total) by mouth every 6 (six) hours as needed for nausea or vomiting, Disp: 30 tablet, Rfl: 0    rizatriptan (Maxalt) 10 mg tablet, Take 1 tablet (10 mg total) by mouth as needed for migraine Take at the onset of migraine; if symptoms continue or return, may take another dose at least 2 hours after first dose. Take no more than 2 doses in a day., Disp: 18 tablet, Rfl: 3    EPINEPHrine (EPIPEN) 0.3 mg/0.3 mL SOAJ, Inject 0.3 mL (0.3 mg total) into a muscle once for 1 dose (Patient not taking: Reported on 2/22/2024), Disp: 0.6 mL, Rfl: 0  Patient Active Problem List    Diagnosis Date Noted    Severe persistent asthma without complication 10/30/2023    Family history of breast cancer in mother 04/25/2023    PTSD (post-traumatic stress disorder)  "2023    Glaucoma suspect of both eyes 2021    Intervertebral disc prolapse 2021    Menstrual migraine without status migrainosus, not intractable 2019    Moderate episode of recurrent major depressive disorder (HCC) 09/10/2019       Allergies   Allergen Reactions    Sulfa Antibiotics Anaphylaxis    Levofloxacin      Other reaction(s): sensative    Prednisone Other (See Comments)    Tobramycin      Other reaction(s): sensative    Pseudoephedrine Palpitations       OB History    Para Term  AB Living   0 0 0 0 0 0   SAB IAB Ectopic Multiple Live Births   0 0 0 0 0     Works Case Mgmt now in the ER  She has a boyfriend for the past 5 years, he has a son.       Vitals:    24 1113   BP: 114/82   Weight: 78 kg (172 lb)   Height: 5' 6\" (1.676 m)       Body mass index is 27.76 kg/m².  Patient's last menstrual period was 2024 (approximate).    Review of Systems   Constitutional: Negative for chills, fever and unexpected weight change. +Fatigue  Respiratory: POSITIVE for shortness of breath/Long COVID.    Gastrointestinal: Negative for anal bleeding, blood in stool, constipation and diarrhea.   Genitourinary: Negative for difficulty urinating, dysuria and hematuria.     Physical Exam   Constitutional: She appears well-developed and well-nourished. No distress.   HENT: atraumatic,EOMI  Head: Normocephalic.   Neck: Normal range of motion. Neck supple.   Pulmonary: Effort normal.  Breasts: bilateral without masses, skin changes or nipple discharge. Bilaterally soft and warm to touch. No areas of erythema or pain.  Abdominal: Soft.   Pelvic exam was performed with patient supine. No labial fusion. There is no rash, tenderness, lesion or injury on the right labia. There is no rash, tenderness, lesion or injury on the left labia. Urethral meatus does not show any tenderness, inflammation or discharge. Palpation of midline bladder without pain or discomfort. Uterus is not deviated, " not enlarged, not fixed and not tender. Cervix exhibits no motion tenderness, no discharge and no friability. Right adnexum displays no mass, no tenderness and no fullness. Left adnexum displays no mass, no tenderness and no fullness. No erythema or tenderness in the vagina. No foreign body in the vagina. No signs of injury around the vagina. No vaginal discharge found. No signs of injury around the vagina or anus. Perineum without lesions, signs of injury, erythema or swelling.  Lymphadenopathy:        Right: No inguinal adenopathy present.        Left: No inguinal adenopathy present.

## 2024-05-13 ENCOUNTER — TELEMEDICINE (OUTPATIENT)
Dept: PSYCHIATRY | Facility: CLINIC | Age: 39
End: 2024-05-13
Payer: COMMERCIAL

## 2024-05-13 DIAGNOSIS — F33.1 MODERATE EPISODE OF RECURRENT MAJOR DEPRESSIVE DISORDER (HCC): Primary | ICD-10-CM

## 2024-05-13 DIAGNOSIS — F43.10 PTSD (POST-TRAUMATIC STRESS DISORDER): ICD-10-CM

## 2024-05-13 PROCEDURE — 90834 PSYTX W PT 45 MINUTES: CPT

## 2024-05-13 NOTE — PSYCH
"Behavioral Health Psychotherapy Progress Note    Psychotherapy Provided: Individual Psychotherapy     1. Moderate episode of recurrent major depressive disorder (HCC)        2. PTSD (post-traumatic stress disorder)            Goals addressed in session: Goal 1     DATA: Linda said she is struggling with her job recently and feels stressed out over it.  She talked about drawing boundaries with her in laws who keep showing up unannounced.  She shared she and Iraj decided to try for a baby.  Therapist and Linda talked about keeping boundaries with her in laws and also professionally and how to communicate assertively as well as play to her strength of speaking forthrightly.    During this session, this clinician used the following therapeutic modalities: Client-centered Therapy and Dialectical Behavior Therapy    Substance Abuse was not addressed during this session. If the client is diagnosed with a co-occurring substance use disorder, please indicate any changes in the frequency or amount of use: . Stage of change for addressing substance use diagnoses: No substance use/Not applicable    ASSESSMENT:  Linda Hickman presents with a Euthymic/ normal mood.     her affect is Normal range and intensity, which is congruent, with her mood and the content of the session. The client has made progress on their goals.     Linda Hickman presents with a none risk of suicide, none risk of self-harm, and none risk of harm to others.    For any risk assessment that surpasses a \"low\" rating, a safety plan must be developed.    A safety plan was indicated: no  If yes, describe in detail     PLAN: Between sessions, Linda Hickman will practice . At the next session, the therapist will use Dialectical Behavior Therapy to address assertiveness.    Behavioral Health Treatment Plan and Discharge Planning: Linda Hickman is aware of and agrees to continue to work on their treatment plan. They have identified and are working toward their " discharge goals. yes    Visit start and stop times:    05/13/24  Start Time: 1700  Stop Time: 1752  Total Visit Time: 52 minutes  Virtual Regular Visit    Verification of patient location:    Patient is located at Home in the following state in which I hold an active license [place]      Assessment/Plan:    Problem List Items Addressed This Visit       Moderate episode of recurrent major depressive disorder (HCC) - Primary    PTSD (post-traumatic stress disorder)       Goals addressed in session: Goal 1          Reason for visit is No chief complaint on file.       Encounter provider Jannet Villatoro LCSW      Recent Visits  No visits were found meeting these conditions.  Showing recent visits within past 7 days and meeting all other requirements  Today's Visits  Date Type Provider Dept   05/13/24 Telemedicine Jannet Villatoro LCSW [facility]   Showing today's visits and meeting all other requirements  Future Appointments  No visits were found meeting these conditions.  Showing future appointments within next 150 days and meeting all other requirements       The patient was identified by name and date of birth. Linda Hickman was informed that this is a telemedicine visit and that the visit is being conducted throughthe [employer] platform. She agrees to proceed..  My office door was closed. No one else was in the room.  She acknowledged consent and understanding of privacy and security of the video platform. The patient has agreed to participate and understands they can discontinue the visit at any time.    Patient is aware this is a billable service.     Subjective  Linda Hickman is a 39 y.o. female  .      HPI     Past Medical History:   Diagnosis Date    Allergic     Anemia     Anxiety     Arthritis     Asthma     Chlamydia 2014    Unfaithful partner    Depression     Exposure to SARS-associated coronavirus 04/12/2020    GERD (gastroesophageal reflux disease) 2010    Gonorrhea 2014     Unfaithful partner    Headache(784.0)     Lumbar herniated disc     Migraine 2012    Pneumonia     Urinary tract infection     Urogenital trichomoniasis 2015    Unfaithful partner    UTI (urinary tract infection)     Varicella 1988    Visual impairment        Past Surgical History:   Procedure Laterality Date    FL INJECTION LEFT KNEE (ARTHROGRAM)  2/28/2022    WISDOM TOOTH EXTRACTION         Current Outpatient Medications   Medication Sig Dispense Refill    albuterol (PROVENTIL HFA,VENTOLIN HFA) 90 mcg/act inhaler Inhale 2 puffs every 6 (six) hours as needed for wheezing 18 g 0    amphetamine-dextroamphetamine (ADDERALL XR, 20MG,) 20 MG 24 hr capsule Take 1 capsule (20 mg total) by mouth every morning Max Daily Amount: 20 mg Do not start before April 19, 2024. 30 capsule 0    B Complex-C (B-complex with vitamin C) tablet Take 1 tablet by mouth daily      buPROPion (Wellbutrin XL) 150 mg 24 hr tablet Take 1 tablet (150 mg total) by mouth daily - Take with Wellbutrin 300 mg XL for a total daily dose of 450 mg 90 tablet 0    buPROPion (Wellbutrin XL) 300 mg 24 hr tablet Take 1 tablet (300 mg total) by mouth every morning 90 tablet 0    calcium carbonate (OS-BERENICE) 600 MG tablet Take 600 mg by mouth 2 (two) times a day with meals      cholecalciferol (VITAMIN D3) 1,000 units tablet Take 1,000 Units by mouth daily      Dupixent 200 MG/1.14ML SOPN Inject 1 pen ( 200mg total) under the skin once every 14 (fourteen) days. 2 mL 10    EPINEPHrine (EPIPEN) 0.3 mg/0.3 mL SOAJ Inject 0.3 mL (0.3 mg total) into a muscle once for 1 dose (Patient not taking: Reported on 2/22/2024) 0.6 mL 0    Ferrous Sulfate (RA IRON PO) Take by mouth      levalbuterol (Xopenex) 0.63 mg/3 mL nebulizer solution Take 3 mL (0.63 mg total) by nebulization 3 (three) times a day 30 mL 1    Magnesium Glycinate 100 MG CAPS Take 200 mg by mouth 2 (two) times a day      Melatonin 5 MG TABS Take 1 tablet (5 mg total) by mouth daily at bedtime as needed  (insomnia)      mometasone-formoterol (Dulera) 200-5 MCG/ACT inhaler Inhale 2 puffs 2 (two) times a day Rinse mouth after use. 39 g 3    prazosin (MINIPRESS) 1 mg capsule Take 1 capsule (1 mg total) by mouth daily at bedtime as needed (insomnia) Take 1 mg twice daily as needed for severe headaches. 90 capsule 0    prochlorperazine (COMPAZINE) 5 mg tablet Take 1 tablet (5 mg total) by mouth every 6 (six) hours as needed for nausea or vomiting 30 tablet 0    rizatriptan (Maxalt) 10 mg tablet Take 1 tablet (10 mg total) by mouth as needed for migraine Take at the onset of migraine; if symptoms continue or return, may take another dose at least 2 hours after first dose. Take no more than 2 doses in a day. 18 tablet 3     No current facility-administered medications for this visit.        Allergies   Allergen Reactions    Sulfa Antibiotics Anaphylaxis    Levofloxacin      Other reaction(s): sensative    Prednisone Other (See Comments)    Tobramycin      Other reaction(s): sensative    Pseudoephedrine Palpitations       Review of Systems    Video Exam    There were no vitals filed for this visit.    Physical Exam

## 2024-05-23 ENCOUNTER — TELEPHONE (OUTPATIENT)
Dept: OTHER | Facility: OTHER | Age: 39
End: 2024-05-23

## 2024-05-24 NOTE — TELEPHONE ENCOUNTER
"Pt stated, \"I need to reschedule my appointment for 5/29/23.\"    Please call pt when office reopens to reschedule.   "

## 2024-05-29 ENCOUNTER — TELEMEDICINE (OUTPATIENT)
Dept: PSYCHIATRY | Facility: CLINIC | Age: 39
End: 2024-05-29

## 2024-05-29 DIAGNOSIS — F43.9 TRAUMA AND STRESSOR-RELATED DISORDER: ICD-10-CM

## 2024-05-29 DIAGNOSIS — F90.0 ATTENTION DEFICIT HYPERACTIVITY DISORDER, INATTENTIVE TYPE: ICD-10-CM

## 2024-05-29 DIAGNOSIS — F33.1 MODERATE EPISODE OF RECURRENT MAJOR DEPRESSIVE DISORDER (HCC): Primary | ICD-10-CM

## 2024-05-29 RX ORDER — BUPROPION HYDROCHLORIDE 300 MG/1
300 TABLET ORAL EVERY MORNING
Qty: 90 TABLET | Refills: 0 | Status: SHIPPED | OUTPATIENT
Start: 2024-05-29 | End: 2024-08-27

## 2024-05-29 RX ORDER — BUPROPION HYDROCHLORIDE 150 MG/1
150 TABLET ORAL DAILY
Qty: 90 TABLET | Refills: 0 | Status: SHIPPED | OUTPATIENT
Start: 2024-05-29

## 2024-05-29 RX ORDER — DEXTROAMPHETAMINE SACCHARATE, AMPHETAMINE ASPARTATE MONOHYDRATE, DEXTROAMPHETAMINE SULFATE AND AMPHETAMINE SULFATE 5; 5; 5; 5 MG/1; MG/1; MG/1; MG/1
20 CAPSULE, EXTENDED RELEASE ORAL EVERY MORNING
Qty: 30 CAPSULE | Refills: 0 | Status: SHIPPED | OUTPATIENT
Start: 2024-05-29

## 2024-05-29 RX ORDER — PRAZOSIN HYDROCHLORIDE 1 MG/1
1 CAPSULE ORAL
Qty: 90 CAPSULE | Refills: 0 | Status: SHIPPED | OUTPATIENT
Start: 2024-05-29

## 2024-05-29 NOTE — BH TREATMENT PLAN
TREATMENT PLAN (Medication Management Only)        [facility]    Name and Date of Birth:  Linda Hickman 39 y.o. 1985  Date of Treatment Plan: May 29, 2024  Diagnosis/Diagnoses:    1. Moderate episode of recurrent major depressive disorder (HCC)    2. Attention deficit hyperactivity disorder, inattentive type    3. Trauma and stressor-related disorder      Strengths/Personal Resources for Self-Care: Supportive, taking as prescribed, ability to adapt to life changes, ability to communicate well, ability to listen intelligence financial security, general fund of knowledge, good understanding of illness, motivation for treatment, being resourceful, stable employment, willingness to work on problems, work skills.  Area/Areas of need (in own words): Continue to improve control of symptoms of depression and anxiety, continue to improve attention and concentration.  Monitor for medication efficacy and safety.  1. Long Term Goal: Continue to improve control of symptoms of depression and anxiety, continue to improve attention and concentration.  Monitor for medication efficacy and safety..  Target Date:6 months - 11/29/2024  Person/Persons responsible for completion of goal: Dr. Deion Mckeon  2.  Short Term Objective (s) - How will we reach this goal?:   A. Provider new recommended medication/dosage changes and/or continue medication(s): Continue Wellbutrin 450 XL daily, continue Adderall 20 mg XR daily unproductive days, continue prazosin 1 mg at bedtime as needed for insomnia and migraines.  Take medications as prescribed  Attend psychiatry appointments regularly  Continue psychotherapy regularly  Practice coping skills  Avoid alcohol   Avoid drugs   Eat a healthy diet   Exercise regularly   Try relaxation techniques  Target Date:3 months - 8/29/2024  Person/Persons Responsible for Completion of Goal: Linda  Progress Towards Goals: stable, continuing  treatment  Treatment Modality: Medication management every 1 to 3 months  Review due 180 days from date of this plan: 6 months - 11/29/2024  Expected length of service: ongoing treatment  My Physician/PA/NP and I have developed this plan together and I agree to work on the goals and objectives. I understand the treatment goals that were developed for my treatment.

## 2024-05-29 NOTE — PSYCH
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 the developing fetus, untreated mental health conditions conditions and also have serious consequences for both the mother and the baby. Linda verbalized understanding. At this time, in discussion with Linda, she would like to continue on her current psychiatric medications as prescribed. 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       Treatment Recommendations/Precautions:    No medication changes at this time    Continue Wellbutrin 450 XL daily for mood as well as concentration   PARQ was completed for bupropion (Wellbutrin) including nausea, insomnia, agitation/activation, weight loss, anxiety, palpitations, hypertension, decreased seizure threshold and risk with alcohol withdrawal or electrolyte disturbances.  Patient screened negative for heavy alcohol use, history of seizures, and eating disorders.  Regarding Wellbutrin, it was discussed that there is insufficient data available regarding increased risk of miscarriage, congenital malformations, or potential long-term effects on the child's neurodevelopment.      Continue Adderall XR 20 mg daily unproductive days for ADHD symptomatology  PARQ completed for the class of stimulant medications including anxiety/irritability, insomnia, appetite suppression/weight loss, abuse potential, elevated heart rate and blood pressure, seizures, activation/induction of belinda, unmasking of tic's, growth suppression in children, interactions with other medications, and risk of sudden death.   It was discussed that there are potential risks associated with taking Adderall during pregnancy which include premature birth and low birth weight and  withdrawal symptoms.  Regarding Adderall, it was discussed that there is insufficient data available regarding increased risk of miscarriage, congenital malformations, or potential long-term effects on the child's neurodevelopment.      Continue prazosin 1 mg at bedtime as needed for insomnia in the setting of trauma and stress related disorder   Continue prazosin 1 mg daily as needed for migraines  PARQ was completed for prazosin (Minipress) including dizziness, sedation, blood pressure changes (including orthostatic hypotension and syncope), headache, medication interaction risk, GI distress.  It was discussed that there is limited data on the use of prazosin in pregnancy.    Continue melatonin 5 mg at bedtime as needed for insomnia     Continue ongoing psychotherapy with Jannet on a monthly basis  Continue ongoing psychiatric medication management every 1 to 3 months  Aware of the need to follow up with family physician for medical issues  Aware of the 24 hour and weekend coverage for urgent situations accessed by calling [facility] Main practice number     Risk of Harm to Self:  The following ratings are based on assessment at the time of the interview as well as review of medical records  Demographic risk factors include: White  Historical Risk Factors include: Chronic depressive symptoms, chronic anxiety  symptoms  Recent Specific Risk Factors include: Diagnosis of depression, current moderate depressive symptoms  Protective Factors: No current suicidal ideation, stable mood, improved depression symptoms, improved anxiety symptoms, access to mental health treatment, taking psychiatric medications as prescribed  Access to firearms: Patient reports that there are guns in her house, reporting her boyfriend owns a handgun and hunts.  She states they are locked and secured and has no access to them. Linda Hickman has no history of arrests or violence pertaining to use of a deadly weapon.   Based on today's assessment, Linda presents the following risk of harm to self: Minimal     Risk of Harm to Others:  The following ratings are based on assessment at the time of the interview as well as review of medical records  Demographic Risk Factors include: Under age 40  Historical Risk Factors include: None  Recent Specific Risk Factors include: None  Protective Factors: No current homicidal ideation  Based on today's assessment, Linda presents the following risk of harm to others: Minimal    Although patient's acute lethality risk is low, long-term/chronic lethality risk is mildly elevated in the presence of moderate symptoms of depression and anxiety. At the current moment, Linda is future-oriented, forward-thinking, and demonstrates ability to act in a self-preserving manner as evidenced by volitionally presenting to the clinic today, seeking treatment. At this juncture, inpatient hospitalization is not currently warranted. To mitigate future risk, patient should adhere to the recommendations of this writer, avoid alcohol/illicit substance use, utilize community-based resources and familiar support and prioritize mental health treatment.     Based on today's assessment and clinical criteria, Linda Hickman contracts for safety and is not an imminent risk of harm to self or others. Outpatient level of care is deemed  appropriate at this present time. Linda understands that if they are no longer able to contract for safety, they need to call/contact the outpatient office including this writer, call/contact crisis and/orattend to the nearest Emergency Department for immediate evaluation.    Risks/Benefits      Risks, Benefits And Possible Side Effects Of Medications:    Risks, benefits, and possible side effects of medications explained to Linda and she verbalizes understanding and agreement for treatment.    Controlled Medication Discussion:     Linda has been filling controlled prescriptions on time as prescribed according to [place] Prescription Drug Monitoring Program    Psychotherapy Provided:     Individual psychotherapy provided: Yes  Medications, treatment progress and treatment plan reviewed with Linda.  Recent stressors discussed with Linda including job stressors, childcare stressors, and medical stressors.  It was discussed with Linda that using medications during pregnancy involves a careful risk benefit analysis.  While some medications can pose a potential risk to the developing fetus, untreated mental health conditions conditions and also have serious consequences for both the mother and the baby. Linda verbalized understanding. At this time, in discussion with Linda, she would like to continue on her current psychiatric medications as prescribed. 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  Supportive therapy provided.     Treatment Plan:    Completed and signed during the session: Yes - Treatment Plan done but not signed at time of office visit due to:  Plan reviewed by video and verbal consent given due to virtual visit. Treatment Plan sent to patient via [employer] for signature.    Visit Time    Visit Start Time: 8:00 AM  Visit Stop Time: 9:00 AM  Total Visit Duration:   60 minutes    This note was shared with patient.    Mika Albarran MD 05/29/24    This note has been constructed using a voice recognition system. There may be translation, syntax, or grammatical errors. If you have any questions, please contact the dictating provider.

## 2024-05-31 ENCOUNTER — OFFICE VISIT (OUTPATIENT)
Age: 39
End: 2024-05-31
Payer: COMMERCIAL

## 2024-05-31 VITALS
HEART RATE: 86 BPM | HEIGHT: 66 IN | DIASTOLIC BLOOD PRESSURE: 80 MMHG | BODY MASS INDEX: 27.64 KG/M2 | WEIGHT: 172 LBS | SYSTOLIC BLOOD PRESSURE: 115 MMHG | OXYGEN SATURATION: 98 % | TEMPERATURE: 98.1 F | RESPIRATION RATE: 16 BRPM

## 2024-05-31 DIAGNOSIS — J45.50 SEVERE PERSISTENT ASTHMA WITHOUT COMPLICATION: ICD-10-CM

## 2024-05-31 DIAGNOSIS — U07.1 COVID-19: ICD-10-CM

## 2024-05-31 PROCEDURE — 99214 OFFICE O/P EST MOD 30 MIN: CPT | Performed by: INTERNAL MEDICINE

## 2024-05-31 RX ORDER — ALBUTEROL SULFATE 90 UG/1
2 AEROSOL, METERED RESPIRATORY (INHALATION) EVERY 6 HOURS PRN
Qty: 18 G | Refills: 3 | Status: SHIPPED | OUTPATIENT
Start: 2024-05-31

## 2024-05-31 RX ORDER — MOMETASONE FUROATE AND FORMOTEROL FUMARATE DIHYDRATE 200; 5 UG/1; UG/1
2 AEROSOL RESPIRATORY (INHALATION) 2 TIMES DAILY
Qty: 39 G | Refills: 2 | Status: SHIPPED | OUTPATIENT
Start: 2024-05-31 | End: 2024-08-29

## 2024-05-31 NOTE — PROGRESS NOTES
Pulmonary Follow-up   Linda Hickman 39 y.o. female MRN: [id number]  5/31/2024      Assessment/plan:     39 y.o. female with a history of asthma who originally presented to the pulmonary office for evaluation of shortness of breath related to previous COVID infection in January 2022.     Severe persistent asthma  Dyspnea on exertion  Chronic Cough  Previously controlled but worsened significantly since had COVID infection. Uncontrolled on step 4/5 therapy and high dose ICS/LAMA?LABA, started on biologic. Has had great improvement in terms of her asthma after the addition of Dupixent (June 2023).      Highest eos 120, allergy panel negative, appears to be type 2 low asthma  Currently controlled on Dupixent with no exacerbations since starting the biologic. Did have to use albuterol more as has been mountain biking for an hour several times a week .      - Continue Dupixent,  - Continue high dose Dulera, especially while increasing exercise next time can consider de-escalating to medium dose  - Continue albuterol prn  - rec yearly spirometry at next visit  - continue exercise regimen  - note: Linda and boyfriend will be trying for a baby, Dupixent is Category C but expert opinion rec staying on a biologic that he keeping patient's asthma controlled     Follow up in 3 months       _________________________________________  Interval Hx    Reports mild vesicular rash at dupixent injection site reaction. Improved with hydrocortisone and bacitracin over the last week   Has occasional missed doses of dulera   Started to cycle more outside and has to use more albuterol, up to 10 times on 90 min session   No exacerbations requiring prednisone  No infections    _________________________________________    History of Present Illness   HPI:  Linda Hickman is a 39 y.o. female with a history of asthma who originally presented to the pulmonary office for evaluation of shortness of breath related to previous COVID infection in  January 2022.  Patient was seen in the emergency room on January 17 due to continued chest pain, wheezing, cough and shortness of breath.    She was diagnosed with asthma as a child but it was previously well controlled prior to her episodes of COVID-19 infection.  Previously never required hospitalization or intubation her asthma was historically induced by exercise.    Since that time continues to have sob, wheezing, chest tightness despite use of flovent twice daily. She also uses duonebs and albuterol nebs.     States that she has had covid-19 four times.   Previous medications: flovent, singulair   Triggers exertion: exertion, cold dry air, wildfire smoke, road construction, hospital cleaning solution, spicy pepper cooking in stove  CBC from 1/2023 was normal.   Echo from 4/7 showed no evidence of shunt.     Started on Dupixent in June        Review of Systems   Constitutional:  Negative for chills, fatigue and fever.   HENT:  Negative for congestion, nosebleeds, postnasal drip, rhinorrhea, sinus pressure and sore throat.    Eyes:  Negative for discharge, redness and itching.   Respiratory:  Positive for cough, chest tightness, shortness of breath and wheezing. Negative for choking and stridor.    Cardiovascular:  Negative for chest pain, palpitations and leg swelling.   Gastrointestinal:  Negative for blood in stool.   Genitourinary:  Negative for difficulty urinating and dysuria.   Musculoskeletal:  Negative for arthralgias, joint swelling and myalgias.   Skin:  Negative for color change and rash.   Neurological:  Negative for light-headedness and headaches.   Hematological:  Negative for adenopathy.       Historical Information   Past Medical History:   Diagnosis Date    Allergic     Anemia     Anxiety     Arthritis     Asthma     Chlamydia 2014    Unfaithful partner    Depression     Exposure to SARS-associated coronavirus 04/12/2020    GERD (gastroesophageal reflux disease) 2010    Gonorrhea 2014     Unfaithful partner    Headache(784.0)     Lumbar herniated disc     Migraine     Pneumonia     Urinary tract infection     Urogenital trichomoniasis     Unfaithful partner    UTI (urinary tract infection)     Varicella 1988    Visual impairment      Past Surgical History:   Procedure Laterality Date    FL INJECTION LEFT KNEE (ARTHROGRAM)  2022    WISDOM TOOTH EXTRACTION       Family History   Problem Relation Age of Onset    BRCA2 Negative Mother     BRCA1 Negative Mother     Breast cancer additional onset Mother         Left Masectomy    Hypertension Mother     Diabetes Mother     Asthma Mother     Cancer Mother         breast    Breast cancer Mother         In 2019, left side. -brca    Migraines Mother     Mental illness Father         family history    Alcohol abuse Father     Hypertension Father     Hyperlipidemia Father     Diabetes Father     Heart attack Father         Assumed. Found  at 63    Colon cancer Maternal Grandmother     Breast cancer additional onset Maternal Grandmother     Diabetes Maternal Grandmother     Cancer Maternal Grandmother         breast, colon    Breast cancer Maternal Grandmother     Prostate cancer Maternal Grandfather         lung    Cancer Maternal Grandfather         lung (smoker)    Completed Suicide  Maternal Grandfather     Lung cancer Maternal Grandfather     Asthma Brother     Asthma Brother        Social History   Places lived: [place]  Occupation: ER nurse.   Tobacco: none smoker  Illicits:  None   Hobbies:   Pets: dogs       Meds/Allergies     Current Outpatient Medications:     albuterol (PROVENTIL HFA,VENTOLIN HFA) 90 mcg/act inhaler, Inhale 2 puffs every 6 (six) hours as needed for wheezing, Disp: 18 g, Rfl: 3    amphetamine-dextroamphetamine (ADDERALL XR, 20MG,) 20 MG 24 hr capsule, Take 1 capsule (20 mg total) by mouth every morning Max Daily Amount: 20 mg, Disp: 30 capsule, Rfl: 0    B Complex-C (B-complex with vitamin C) tablet, Take 1 tablet by  mouth daily, Disp: , Rfl:     buPROPion (Wellbutrin XL) 150 mg 24 hr tablet, Take 1 tablet (150 mg total) by mouth daily - Take with Wellbutrin 300 mg XL for a total daily dose of 450 mg, Disp: 90 tablet, Rfl: 0    buPROPion (Wellbutrin XL) 300 mg 24 hr tablet, Take 1 tablet (300 mg total) by mouth every morning, Disp: 90 tablet, Rfl: 0    calcium carbonate (OS-BERENICE) 600 MG tablet, Take 600 mg by mouth 2 (two) times a day with meals, Disp: , Rfl:     cholecalciferol (VITAMIN D3) 1,000 units tablet, Take 1,000 Units by mouth daily, Disp: , Rfl:     Dupixent 200 MG/1.14ML SOPN, Inject 1 pen ( 200mg total) under the skin once every 14 (fourteen) days., Disp: 2 mL, Rfl: 10    Ferrous Sulfate (RA IRON PO), Take by mouth, Disp: , Rfl:     levalbuterol (Xopenex) 0.63 mg/3 mL nebulizer solution, Take 3 mL (0.63 mg total) by nebulization 3 (three) times a day, Disp: 30 mL, Rfl: 1    Magnesium Glycinate 100 MG CAPS, Take 200 mg by mouth 2 (two) times a day, Disp: , Rfl:     Melatonin 5 MG TABS, Take 1 tablet (5 mg total) by mouth daily at bedtime as needed (insomnia), Disp: , Rfl:     mometasone-formoterol (Dulera) 200-5 MCG/ACT inhaler, Inhale 2 puffs 2 (two) times a day Rinse mouth after use., Disp: 39 g, Rfl: 2    prazosin (MINIPRESS) 1 mg capsule, Take 1 capsule (1 mg total) by mouth daily at bedtime as needed (insomnia) Take 1 mg twice daily as needed for severe headaches., Disp: 90 capsule, Rfl: 0    prochlorperazine (COMPAZINE) 5 mg tablet, Take 1 tablet (5 mg total) by mouth every 6 (six) hours as needed for nausea or vomiting, Disp: 30 tablet, Rfl: 0    rizatriptan (Maxalt) 10 mg tablet, Take 1 tablet (10 mg total) by mouth as needed for migraine Take at the onset of migraine; if symptoms continue or return, may take another dose at least 2 hours after first dose. Take no more than 2 doses in a day., Disp: 18 tablet, Rfl: 3    EPINEPHrine (EPIPEN) 0.3 mg/0.3 mL SOAJ, Inject 0.3 mL (0.3 mg total) into a muscle once for  "1 dose (Patient not taking: Reported on 2/22/2024), Disp: 0.6 mL, Rfl: 0  Allergies   Allergen Reactions    Sulfa Antibiotics Anaphylaxis    Levofloxacin      Other reaction(s): sensative    Prednisone Other (See Comments)    Tobramycin      Other reaction(s): sensative    Pseudoephedrine Palpitations       Vitals:   Visit Vitals  /80 (BP Location: Right arm, Patient Position: Sitting, Cuff Size: Large)   Pulse 86   Temp 98.1 °F (36.7 °C)   Resp 16   Ht 5' 6\" (1.676 m)   Wt 78 kg (172 lb)   SpO2 98%   BMI 27.76 kg/m²   OB Status Unknown   Smoking Status Never   BSA 1.88 m²        Exam:   Appearance -- NAD, speaking full sentences  HEENT -- anicteric sclera, clear OP, MMM  Neck -- no JVD  Heart -- RRR, no murmurs  Lungs -- CTAB  Abdomen -- soft, NTND,  Extremities -- WWP, no c/c/e  Skin -- faint macular rash on Left thigh. On abdomen, vesicles resolved, has dry, erythematous annular rash, improved from prior pictures  Neuro -- A&Ox3, wnl  Psych -- no obvious depression or hallucination        Labs: I have personally reviewed pertinent lab results.  Lab Results   Component Value Date    WBC 6.91 09/15/2023    HGB 12.6 09/15/2023    HCT 38.6 09/15/2023    MCV 94 09/15/2023     09/15/2023     Lab Results   Component Value Date    CALCIUM 9.2 01/16/2023    K 3.3 (L) 01/16/2023    CO2 23 01/16/2023     01/16/2023    BUN 12 01/16/2023    CREATININE 1.08 01/16/2023     Lab Results   Component Value Date    IGE 27.9 04/04/2023     Lab Results   Component Value Date    ALT 18 01/16/2023    AST 20 01/16/2023    ALKPHOS 69 01/16/2023       Imaging and other studies: I have personally reviewed pertinent reports.   and I have personally reviewed pertinent films in PACS    CT Chest 2/2023:  LUNGS:  Nothing to suggest interstitial lung disease with no reticulation, traction bronchiectasis/bronchiolectasis, groundglass, or honeycombing.  No significant air trapping on expiration.    CXR 9/2023: normal     PFTs " 1/30/23: No obstruction, + BD response. No restriction, normal DLCO

## 2024-06-03 ENCOUNTER — TELEMEDICINE (OUTPATIENT)
Dept: PSYCHIATRY | Facility: CLINIC | Age: 39
End: 2024-06-03
Payer: COMMERCIAL

## 2024-06-03 DIAGNOSIS — F33.1 MODERATE EPISODE OF RECURRENT MAJOR DEPRESSIVE DISORDER (HCC): Primary | ICD-10-CM

## 2024-06-03 DIAGNOSIS — F43.10 PTSD (POST-TRAUMATIC STRESS DISORDER): ICD-10-CM

## 2024-06-03 PROCEDURE — 90834 PSYTX W PT 45 MINUTES: CPT

## 2024-06-03 NOTE — BH TREATMENT PLAN
Outpatient Behavioral Health Psychotherapy Treatment Plan    Linda Hickman  1985     Date of Initial Psychotherapy Assessment: ***   Date of Current Treatment Plan: 06/03/24  Treatment Plan Target Date: ***  Treatment Plan Expiration Date: ***    Diagnosis:   1. Moderate episode of recurrent major depressive disorder (HCC)        2. PTSD (post-traumatic stress disorder)            Area(s) of Need: ***    Long Term Goal 1 (in the client's own words): ***    Stage of Change: {SL AMB PSYCH STAGE OF CHANGE:37922}    Target Date for completion: ***     Anticipated therapeutic modalities: ***     People identified to complete this goal: ***      Objective 1: (identify the means of measuring success in meeting the objective): ***      Objective 2: (identify the means of measuring success in meeting the objective): ***      Long Term Goal 2 (in the client's own words): ***    Stage of Change: {SL AMB PSYCH STAGE OF CHANGE:75528}    Target Date for completion: ***     Anticipated therapeutic modalities: ***     People identified to complete this goal: ***      Objective 1: (identify the means of measuring success in meeting the objective): ***      Objective 2: (identify the means of measuring success in meeting the objective): ***     Long Term Goal 3 (in the client's own words): ***    Stage of Change: {SL AMB PSYCH STAGE OF CHANGE:55313}    Target Date for completion: ***     Anticipated therapeutic modalities: ***     People identified to complete this goal: ***      Objective 1: (identify the means of measuring success in meeting the objective): ***      Objective 2: (identify the means of measuring success in meeting the objective): ***     I am currently under the care of a [facility] psychiatric provider: {YES/NO:20200}    My [facility] psychiatric provider is: ***    I am currently taking psychiatric medications: {SL AMB TAKING PSYCH MEDS:82896}    I feel that I will be ready for discharge from mental [facility]  care when I reach the following (measurable goal/objective): ***    For children and adults who have a legal guardian:   Has there been any change to custody orders and/or guardianship status? {Yes/No/NA:35050}. If yes, attach updated documentation.    I have { AMB PSYCH CREATED/UPDATED:52444} my Crisis Plan and have been offered a copy of this plan    Behavioral Health Treatment Plan [facility]: Diagnosis and Treatment Plan explained to Linda Hickman {acknowledge/does not acknowledge:16193} an understanding of their diagnosis. Linda Hickman { AMB PSYCH AGREE/DISAGREE:00684} this treatment plan.    I have been offered a copy of this Treatment Plan. {YES/NO:20200}

## 2024-06-03 NOTE — PSYCH
"Behavioral Health Psychotherapy Progress Note    Psychotherapy Provided: Individual Psychotherapy     1. Moderate episode of recurrent major depressive disorder (HCC)        2. PTSD (post-traumatic stress disorder)            Goals addressed in session: Goal 1     DATA: Linda shared she has been better depression-wise but has been feeling a hum of anxiety recently.  Therapist asked about her thoughts and she shared it makes her feel afraid to think about having a child because of the problems it might bring if she has a child with a disability. She shared that she has been more anxious but is not sure why.  Therapist led Linda through a focus shifting exercise for her to become more aware of her thoughts and feelings and she became aware of thoughts of not seeing friends socially and letting some interests going by the wayside including car racing and photography.  Therapist encouraged Linda to practice meditation on a daily basis as a way to improve her mood and reduce anxiety.  Therapist and Linda talked about engaging in an EMDR practice next session.  During this session, this clinician used the following therapeutic modalities: Client-centered Therapy and Mindfulness-based Strategies    Substance Abuse was not addressed during this session. If the client is diagnosed with a co-occurring substance use disorder, please indicate any changes in the frequency or amount of use: . Stage of change for addressing substance use diagnoses: No substance use/Not applicable    ASSESSMENT:  Linda Hickman presents with a Euthymic/ normal mood.     her affect is Normal range and intensity, which is congruent, with her mood and the content of the session. The client has made progress on their goals.     Linda Hickman presents with a none risk of suicide, none risk of self-harm, and none risk of harm to others.    For any risk assessment that surpasses a \"low\" rating, a safety plan must be developed.    A safety plan was " indicated: no  If yes, describe in detail     PLAN: Between sessions, Linda Hickman will utilize daily meditation, and think of an EMDR target for next time.. At the next session, the therapist will use Client-centered Therapy, EMDR (or other form of bilateral Stimulation), and Mindfulness-based Strategies to address anxiety, PTSD.    Behavioral Health Treatment Plan and Discharge Planning: Linda Hickman is aware of and agrees to continue to work on their treatment plan. They have identified and are working toward their discharge goals. yes    Visit start and stop times:    06/03/24  Start Time: 1700  Stop Time: 1752  Total Visit Time: 52 minutes  Virtual Regular Visit    Verification of patient location:    Patient is located at Other in the following state in which I hold an active license [place]      Assessment/Plan:    Problem List Items Addressed This Visit       Moderate episode of recurrent major depressive disorder (HCC) - Primary    PTSD (post-traumatic stress disorder)       Goals addressed in session: Goal 1          Reason for visit is No chief complaint on file.       Encounter provider Jannet Villatoro LCSW      Recent Visits  No visits were found meeting these conditions.  Showing recent visits within past 7 days and meeting all other requirements  Today's Visits  Date Type Provider Dept   06/03/24 Telemedicine Jannet Villatoro LCSW [facility]   Showing today's visits and meeting all other requirements  Future Appointments  No visits were found meeting these conditions.  Showing future appointments within next 150 days and meeting all other requirements       The patient was identified by name and date of birth. Linda Hickman was informed that this is a telemedicine visit and that the visit is being conducted throughthe [employer] platform. She agrees to proceed..  My office door was closed. No one else was in the room.  She acknowledged consent and understanding of  privacy and security of the video platform. The patient has agreed to participate and understands they can discontinue the visit at any time.    Patient is aware this is a billable service.     Yolanda Hickman is a 39 y.o. female  .      HPI     Past Medical History:   Diagnosis Date    Allergic     Anemia     Anxiety     Arthritis     Asthma     Chlamydia 2014    Unfaithful partner    Depression     Exposure to SARS-associated coronavirus 04/12/2020    GERD (gastroesophageal reflux disease) 2010    Gonorrhea 2014    Unfaithful partner    Headache(784.0)     Lumbar herniated disc     Migraine 2012    Pneumonia     Urinary tract infection     Urogenital trichomoniasis 2015    Unfaithful partner    UTI (urinary tract infection)     Varicella 1988    Visual impairment        Past Surgical History:   Procedure Laterality Date    FL INJECTION LEFT KNEE (ARTHROGRAM)  2/28/2022    WISDOM TOOTH EXTRACTION         Current Outpatient Medications   Medication Sig Dispense Refill    albuterol (PROVENTIL HFA,VENTOLIN HFA) 90 mcg/act inhaler Inhale 2 puffs every 6 (six) hours as needed for wheezing 18 g 3    amphetamine-dextroamphetamine (ADDERALL XR, 20MG,) 20 MG 24 hr capsule Take 1 capsule (20 mg total) by mouth every morning Max Daily Amount: 20 mg 30 capsule 0    B Complex-C (B-complex with vitamin C) tablet Take 1 tablet by mouth daily      buPROPion (Wellbutrin XL) 150 mg 24 hr tablet Take 1 tablet (150 mg total) by mouth daily - Take with Wellbutrin 300 mg XL for a total daily dose of 450 mg 90 tablet 0    buPROPion (Wellbutrin XL) 300 mg 24 hr tablet Take 1 tablet (300 mg total) by mouth every morning 90 tablet 0    calcium carbonate (OS-BERENICE) 600 MG tablet Take 600 mg by mouth 2 (two) times a day with meals      cholecalciferol (VITAMIN D3) 1,000 units tablet Take 1,000 Units by mouth daily      Dupixent 200 MG/1.14ML SOPN Inject 1 pen ( 200mg total) under the skin once every 14 (fourteen) days. 2 mL 10     EPINEPHrine (EPIPEN) 0.3 mg/0.3 mL SOAJ Inject 0.3 mL (0.3 mg total) into a muscle once for 1 dose (Patient not taking: Reported on 2/22/2024) 0.6 mL 0    Ferrous Sulfate (RA IRON PO) Take by mouth      levalbuterol (Xopenex) 0.63 mg/3 mL nebulizer solution Take 3 mL (0.63 mg total) by nebulization 3 (three) times a day 30 mL 1    Magnesium Glycinate 100 MG CAPS Take 200 mg by mouth 2 (two) times a day      Melatonin 5 MG TABS Take 1 tablet (5 mg total) by mouth daily at bedtime as needed (insomnia)      mometasone-formoterol (Dulera) 200-5 MCG/ACT inhaler Inhale 2 puffs 2 (two) times a day Rinse mouth after use. 39 g 2    prazosin (MINIPRESS) 1 mg capsule Take 1 capsule (1 mg total) by mouth daily at bedtime as needed (insomnia) Take 1 mg twice daily as needed for severe headaches. 90 capsule 0    prochlorperazine (COMPAZINE) 5 mg tablet Take 1 tablet (5 mg total) by mouth every 6 (six) hours as needed for nausea or vomiting 30 tablet 0    rizatriptan (Maxalt) 10 mg tablet Take 1 tablet (10 mg total) by mouth as needed for migraine Take at the onset of migraine; if symptoms continue or return, may take another dose at least 2 hours after first dose. Take no more than 2 doses in a day. 18 tablet 3     No current facility-administered medications for this visit.        Allergies   Allergen Reactions    Sulfa Antibiotics Anaphylaxis    Levofloxacin      Other reaction(s): sensative    Prednisone Other (See Comments)    Tobramycin      Other reaction(s): sensative    Pseudoephedrine Palpitations       Review of Systems    Video Exam    There were no vitals filed for this visit.    Physical Exam

## 2024-06-04 ENCOUNTER — OFFICE VISIT (OUTPATIENT)
Dept: FAMILY MEDICINE CLINIC | Facility: CLINIC | Age: 39
End: 2024-06-04
Payer: COMMERCIAL

## 2024-06-04 ENCOUNTER — OFFICE VISIT (OUTPATIENT)
Age: 39
End: 2024-06-04
Payer: COMMERCIAL

## 2024-06-04 VITALS
HEART RATE: 90 BPM | WEIGHT: 174.4 LBS | HEIGHT: 66 IN | DIASTOLIC BLOOD PRESSURE: 82 MMHG | TEMPERATURE: 97.8 F | OXYGEN SATURATION: 99 % | BODY MASS INDEX: 28.03 KG/M2 | SYSTOLIC BLOOD PRESSURE: 118 MMHG

## 2024-06-04 VITALS
WEIGHT: 175 LBS | HEART RATE: 90 BPM | DIASTOLIC BLOOD PRESSURE: 68 MMHG | OXYGEN SATURATION: 98 % | HEIGHT: 66 IN | SYSTOLIC BLOOD PRESSURE: 116 MMHG | BODY MASS INDEX: 28.12 KG/M2

## 2024-06-04 DIAGNOSIS — H40.003 GLAUCOMA SUSPECT OF BOTH EYES: ICD-10-CM

## 2024-06-04 DIAGNOSIS — G43.709 CHRONIC MIGRAINE WITHOUT AURA WITHOUT STATUS MIGRAINOSUS, NOT INTRACTABLE: Primary | ICD-10-CM

## 2024-06-04 DIAGNOSIS — Z00.00 ANNUAL PHYSICAL EXAM: Primary | ICD-10-CM

## 2024-06-04 DIAGNOSIS — J45.50 SEVERE PERSISTENT ASTHMA WITHOUT COMPLICATION: ICD-10-CM

## 2024-06-04 PROCEDURE — 99395 PREV VISIT EST AGE 18-39: CPT | Performed by: NURSE PRACTITIONER

## 2024-06-04 PROCEDURE — 99215 OFFICE O/P EST HI 40 MIN: CPT | Performed by: PSYCHIATRY & NEUROLOGY

## 2024-06-04 NOTE — PROGRESS NOTES
Adult Annual Physical  Name: Linda Hickman      : 1985      MRN: [id number]  Encounter Provider: JULIEN Thomson  Encounter Date: 2024   Encounter department: [facility]    Assessment & Plan     Immunizations and preventive care screenings were discussed with patient today. Appropriate education was printed on patient's after visit summary.    Counseling:  Injury prevention: discussed safety/seat belts, safety helmets, smoke detectors, carbon dioxide detectors, and smoking near bedding or upholstery.  Exercise: the importance of regular exercise/physical activity was discussed. Recommend exercise 3-5 times per week for at least 30 minutes.          History of Present Illness   {Disappearing Hyperlinks I Encounters * My Last Note * Since Last Visit * History :35226}  Adult Annual Physical:  Patient presents for annual physical. Patient here today for her annual and reports that she has ongoing issues with her reactive airway disease and is having triggered by cold and chemicals and exertion. She is on immunologics and is following with pulmonary and seeing every three months. Patient was doing stationary bike and trying to do hospital stairs and trying to stay active. She is planning to get back out on the trails. Patient is working with case management currently. Following with GYN and plans for 2024 LMP.     Diet and Physical Activity:  - Diet/Nutrition: well balanced diet and consuming 3-5 servings of fruits/vegetables daily.  - Exercise: walking.    Depression Screening:    - PHQ-9 Score: 10    General Health:  - Sleep: sleeps poorly and 7-8 hours of sleep on average.  - Hearing: normal hearing bilateral ears.  - Vision: goes for regular eye exams and wears glasses.  - Dental: regular dental visits.    /GYN Health:  - Follows with GYN: yes.   - Menopause: premenopausal.     Review of Systems   Constitutional:  Positive for fatigue. Negative for activity change,  appetite change, chills, diaphoresis, fever and unexpected weight change.   HENT:  Negative for congestion, ear pain, hearing loss, postnasal drip, sinus pressure, sinus pain, sneezing and sore throat.    Eyes:  Negative for pain, redness and visual disturbance.   Respiratory:  Positive for shortness of breath. Negative for cough.    Cardiovascular:  Negative for chest pain and leg swelling.   Gastrointestinal:  Negative for abdominal pain, diarrhea, nausea and vomiting.   Endocrine: Negative.    Genitourinary: Negative.    Musculoskeletal:  Positive for back pain. Negative for arthralgias.   Skin: Negative.    Allergic/Immunologic: Negative.    Neurological:  Negative for dizziness and light-headedness.   Hematological: Negative.    Psychiatric/Behavioral:  Negative for behavioral problems and dysphoric mood.      Pertinent Medical History         Medical History Reviewed by provider this encounter:       Past Medical History   Past Medical History:   Diagnosis Date   • Allergic    • Anemia    • Anxiety    • Arthritis    • Asthma    • Chlamydia 2014    Unfaithful partner   • Depression    • Exposure to SARS-associated coronavirus 04/12/2020   • GERD (gastroesophageal reflux disease) 2010   • Gonorrhea 2014    Unfaithful partner   • Headache(784.0)    • Lumbar herniated disc    • Migraine 2012   • Pneumonia    • Urinary tract infection    • Urogenital trichomoniasis 2015    Unfaithful partner   • UTI (urinary tract infection)    • Varicella 1988   • Visual impairment      Past Surgical History:   Procedure Laterality Date   • FL INJECTION LEFT KNEE (ARTHROGRAM)  2/28/2022   • WISDOM TOOTH EXTRACTION       Family History   Problem Relation Age of Onset   • BRCA2 Negative Mother    • BRCA1 Negative Mother    • Breast cancer additional onset Mother         Left Masectomy   • Hypertension Mother    • Diabetes Mother    • Asthma Mother    • Cancer Mother         breast   • Breast cancer Mother         In 2019, left side.  -brca   • Migraines Mother    • Mental illness Father         family history   • Alcohol abuse Father    • Hypertension Father    • Hyperlipidemia Father    • Diabetes Father    • Heart attack Father         Assumed. Found  at 63   • Colon cancer Maternal Grandmother    • Breast cancer additional onset Maternal Grandmother    • Diabetes Maternal Grandmother    • Cancer Maternal Grandmother         breast, colon   • Breast cancer Maternal Grandmother    • Prostate cancer Maternal Grandfather         lung   • Cancer Maternal Grandfather         lung (smoker)   • Completed Suicide  Maternal Grandfather    • Lung cancer Maternal Grandfather    • Asthma Brother    • Asthma Brother      Current Outpatient Medications on File Prior to Visit   Medication Sig Dispense Refill   • albuterol (PROVENTIL HFA,VENTOLIN HFA) 90 mcg/act inhaler Inhale 2 puffs every 6 (six) hours as needed for wheezing 18 g 3   • amphetamine-dextroamphetamine (ADDERALL XR, 20MG,) 20 MG 24 hr capsule Take 1 capsule (20 mg total) by mouth every morning Max Daily Amount: 20 mg 30 capsule 0   • B Complex-C (B-complex with vitamin C) tablet Take 1 tablet by mouth daily     • buPROPion (Wellbutrin XL) 150 mg 24 hr tablet Take 1 tablet (150 mg total) by mouth daily - Take with Wellbutrin 300 mg XL for a total daily dose of 450 mg 90 tablet 0   • buPROPion (Wellbutrin XL) 300 mg 24 hr tablet Take 1 tablet (300 mg total) by mouth every morning 90 tablet 0   • calcium carbonate (OS-BERENICE) 600 MG tablet Take 600 mg by mouth 2 (two) times a day with meals     • cholecalciferol (VITAMIN D3) 1,000 units tablet Take 1,000 Units by mouth daily     • Dupixent 200 MG/1.14ML SOPN Inject 1 pen ( 200mg total) under the skin once every 14 (fourteen) days. 2 mL 10   • Ferrous Sulfate (RA IRON PO) Take by mouth     • levalbuterol (Xopenex) 0.63 mg/3 mL nebulizer solution Take 3 mL (0.63 mg total) by nebulization 3 (three) times a day 30 mL 1   • Magnesium Glycinate 100  "MG CAPS Take 200 mg by mouth 2 (two) times a day     • Melatonin 5 MG TABS Take 1 tablet (5 mg total) by mouth daily at bedtime as needed (insomnia)     • mometasone-formoterol (Dulera) 200-5 MCG/ACT inhaler Inhale 2 puffs 2 (two) times a day Rinse mouth after use. 39 g 2   • prazosin (MINIPRESS) 1 mg capsule Take 1 capsule (1 mg total) by mouth daily at bedtime as needed (insomnia) Take 1 mg twice daily as needed for severe headaches. 90 capsule 0   • Prenatal MV-Min-Fe Fum-FA-DHA (PRENATAL+DHA PO) Take by mouth     • prochlorperazine (COMPAZINE) 5 mg tablet Take 1 tablet (5 mg total) by mouth every 6 (six) hours as needed for nausea or vomiting 30 tablet 0   • rizatriptan (Maxalt) 10 mg tablet Take 1 tablet (10 mg total) by mouth as needed for migraine Take at the onset of migraine; if symptoms continue or return, may take another dose at least 2 hours after first dose. Take no more than 2 doses in a day. 18 tablet 3   • [DISCONTINUED] EPINEPHrine (EPIPEN) 0.3 mg/0.3 mL SOAJ Inject 0.3 mL (0.3 mg total) into a muscle once for 1 dose (Patient not taking: Reported on 2/22/2024) 0.6 mL 0     No current facility-administered medications on file prior to visit.     Allergies   Allergen Reactions   • Sulfa Antibiotics Anaphylaxis   • Levofloxacin      Other reaction(s): sensative   • Prednisone Other (See Comments)   • Tobramycin      Other reaction(s): sensative   • Pseudoephedrine Palpitations        Objective   {Disappearing Hyperlinks   Review Vitals * Enter New Vitals * Results Review * Labs * Imaging * Cardiology * Procedures * Lung Cancer Screening :72865}  /82 (BP Location: Left arm, Patient Position: Sitting)   Pulse 90   Temp 97.8 °F (36.6 °C) (Temporal)   Ht 5' 6\" (1.676 m)   Wt 79.1 kg (174 lb 6.4 oz)   SpO2 99%   BMI 28.15 kg/m²     Physical Exam  Vitals and nursing note reviewed.   Constitutional:       General: She is not in acute distress.     Appearance: She is well-developed.   HENT:      " Head: Normocephalic and atraumatic.   Eyes:      Pupils: Pupils are equal, round, and reactive to light.   Neck:      Thyroid: No thyromegaly.   Cardiovascular:      Rate and Rhythm: Normal rate and regular rhythm.      Heart sounds: Normal heart sounds. No murmur heard.  Pulmonary:      Effort: Pulmonary effort is normal. No respiratory distress.      Breath sounds: Normal breath sounds. No wheezing.   Abdominal:      General: Bowel sounds are normal.      Palpations: Abdomen is soft.   Musculoskeletal:         General: Normal range of motion.      Cervical back: Normal range of motion.   Skin:     General: Skin is warm and dry.   Neurological:      Mental Status: She is alert and oriented to person, place, and time.     Depression Screening Follow-up Plan: Patient's depression screening was positive with a PHQ-2 score of . Their PHQ-9 score was 10. Patient assessed for underlying major depression. They have no active suicidal ideations. Brief counseling provided and recommend additional follow-up/re-evaluation next office visit.   PHQ-2/9 Depression Screening    Little interest or pleasure in doing things: 1 - several days  Feeling down, depressed, or hopeless: 1 - several days  Trouble falling or staying asleep, or sleeping too much: 1 - several days  Feeling tired or having little energy: 2 - more than half the days  Poor appetite or overeatin - several days  Feeling bad about yourself - or that you are a failure or have let yourself or your family down: 3 - nearly every day  Trouble concentrating on things, such as reading the newspaper or watching television: 1 - several days  Moving or speaking so slowly that other people could have noticed. Or the opposite - being so fidgety or restless that you have been moving around a lot more than usual: 0 - not at all  Thoughts that you would be better off dead, or of hurting yourself in some way: 0 - not at all  PHQ-9 Score: 10  PHQ-9 Interpretation: Moderate  depression        Administrative Statements {Disappearing Hyperlinks I  Level of Service * PCMH/PCSP:69782}

## 2024-06-04 NOTE — PROGRESS NOTES
NEUROLOGY OUTPATIENT - FOLLOW UP PATIENT VISIT NOTE        NAME: Linda Hickman  MRN: [id number]  : 1985     TODAY'S DATE: 24         HISTORY OF PRESENT ILLNESS (HPI):    Ms. Hickman is a 39 y.o. female presenting with Migraine        INTERIM HISTORY:  Frequency of headaches days : low grade annoying headaches.--every 2-3 weeks she would get one headaches.   Intensity of the headaches days: Last night was 5/10 and she had a bad headaches about 8/10. She took a gram of tylenol and Ibuprofen.     She has not taken more fluids and she feels that might be contributing towards her headaches.     Medicine for headaches: Maxalt (she had a few break through headaches)     Side effects from the medications. She did mentioned that she had some numbness and tingling on her face after she took the Maxalt.     Failed medications: none    Any imaging including CTH or MRI of the brain: MRI brain was showing non specific T 2 spots likely from her migraine headaches.     Works as a  for the ER.   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    Quality of the pain: throbbing  Intensity of the headache: At present: 3/10;  At its worst:  10/10  Duration of the headaches:hour(s)-- if she takes medicine the migraine gets better. Sometimes they can last for days  Frequency of the headaches:about 1-2 days per week--variable in terms of migraine   Presence of aura:  No  Associated symptoms with headaches: +nausea, +vomiting , +light sensitivity , and +sound sensitivitySometime she had the facial numbness, pain, eye pain   Triggers:Yes, dehydration makes it worse, lack of sleep, hormone changes   Positional component: No   Alleviating factors: Yes, over the counter  "cocktail, Tylenol, motrin and zofran works. Mg helps. Water and caffeine helps.    Any specific time of the day when get the headaches: no particular time of day    Family history of migraine headaches: Yes  History of neck and head trauma: Yes  Smoking status: No  Oral contraceptive use: No    Life style factors:  -Hydration: adequate  -Sleep: adequate  -Caffeine intake: 2 cups of caffeinated coffee per day(s)  -Alcohol intake: socially      Impact of headches:  -Number of headaches in past 30 days: 12-15/30 days.   -Number of days missed per month because of headache: None in the last 30 days.   -Number of ER visits for her headaches: None  in the last 30 days.       Treatment:  -Over the counter medications tried for headaches:   Tylenol been helpful and Ibuprofen been helpful     -Prescription medications:  Abortive or Rescue Medications used:   No rescue medicine tried in the past      Preventative or daily medications used for headaches:   No prophylactic medicine tried in the past         Other significant co morbidities:  Asthma   Plan to get pregnant     Red Flags for headache:  Age <5 or >50: No  First worst headaches: No  Maximal at intensity: No  Change in pattern: Yes  New headache in pregnancy, cancer or immunocompromised patient: No  Loss of consciousness or convulsions: No  Headache triggered by exertion, Valsalva maneuver or sexual activity: No  Abnormal exam: please see below in Neurological examination.        CURRENT OUTPATIENT MEDICATIONS:    Linda Hickman has a current medication list which includes the following prescription(s): albuterol, amphetamine-dextroamphetamine, b-complex with vitamin c, bupropion, bupropion, calcium carbonate, cholecalciferol, dupixent, ferrous sulfate, levalbuterol, magnesium glycinate, melatonin, dulera, prenatal mv-min-fe fum-fa-dha, prochlorperazine, rimegepant sulfate, and prazosin.       PHYSICAL EXAMINATION:   VITAL SIGNS:  height is 5' 6\" (1.676 m) and weight " "is 79.4 kg (175 lb). Her blood pressure is 116/68 and her pulse is 90. Her oxygen saturation is 98%.        NEUROLOGICAL EXAMINATION:    MENTAL STATUS EXAM: Alert, oriented to time place and person,     CRANIAL NERVE EXAMINATION:  I Not tested   II Normal visual fields to finger counting.   II, Ill,  IV, VI Pupils were symmetric, briskly reactive. No afferent pupillary defect.  Eye movements are full without nystagmus. No ptosis.   V Facial sensation were intact   VII Facial movements were symmetrical    VIII Hearing was intact   IX, X Intact   XI Shoulder shrug and head turn was normal   XII Tongue protrusion is in the mid line.          MOTOR EXAM:        Arm Right  Left Leg Right  Left   Deltoid 5/5 5/5 lliopsoas 5/5 5/5   Biceps 5/5 5/5 Quads 5/5 5/5   Triceps 5/5 5/5 Hamstrings 5/5 5/5   Wrist Extension 5/5 5/5 Ankle Dorsi Flexion 5/5 5/5   Wrist Flexion 5/5 5/5 Ankle Plantar Flexion 5/5 5/5               DEEP TENDON REFLEXES:     Brachioradialis Biceps Triceps Patellar Achilles   Right 2+ 2+ 2+ 2+ 1+   Left 2+ 2+ 2+ 2+ 1+            SENSORY EXAMINATION:   Sensation to dull touch Intact  Sensation to temperature Intact    COORDINATION:   Normal finger to nose testing  Finger tapping test was normal    GAIT/STATION:   normal     Some portion of the neurological have been copied from before and updated accordingly   DIAGNOSTIC STUDIES:   PERTINENT LABS:     Lab Results   Component Value Date    WBC 6.91 09/15/2023     Lab Results   Component Value Date    HGB 12.6 09/15/2023     Lab Results   Component Value Date     09/15/2023     Lab Results   Component Value Date    LYMPHSABS 1.71 09/15/2023     No results found for: \"LABLYMP\"  Lab Results   Component Value Date    EOSABS 0.03 09/15/2023     No components found for: \"NEUTROPHILS\"    No results found for: \"LABMONO\"         No results found for: \"LABGLUC\"  Lab Results   Component Value Date    CREATININE 1.08 01/16/2023     Lab Results   Component Value " "Date    AST 20 01/16/2023     Lab Results   Component Value Date    ALT 18 01/16/2023     Lab Results   Component Value Date    ALKPHOS 69 01/16/2023     Lab Results   Component Value Date    AST 20 01/16/2023        No results found for: \"FOLATE\"  No results found for: \"WCMXLLES37\"  No results found for: \"COPPER\"       Lab Results   Component Value Date    HEPBSAG Non-reactive 11/12/2018    HEPBSAB 137.54 11/12/2018    HEPCAB Non-reactive 06/24/2019            NEUROIMAGING:  Results for orders placed during the hospital encounter of 01/25/24    MRI brain wo contrast    Impression  No acute abnormality.    Few nonspecific foci of frontal white matter signal abnormality which can be seen in patients with complicated migraines..    Resident: STEVE BASS I, the attending radiologist, have reviewed the images and agree with the final report above.    Workstation performed: [id number]              ASSESSMENT AND PLAN:    Ms. Hickman is a 39 y.o.female  presenting with headache follow up. Patient  has a past medical history of Allergic, Anemia, Anxiety, Arthritis, Asthma, Chlamydia (2014), Depression, Exposure to SARS-associated coronavirus (04/12/2020), GERD (gastroesophageal reflux disease) (2010), Gonorrhea (2014), Headache(784.0), Lumbar herniated disc, Migraine (2012), Pneumonia, Urinary tract infection, Urogenital trichomoniasis (2015), UTI (urinary tract infection), Varicella (1988), and Visual impairment..Neurological exam is stable, MRI brain was normal (the T 2 spots are likely from her migraine headaches)      1. Chronic migraine without aura without status migrainosus, not intractable  During her last visit she was prescribed Qulipta but the insurance denied and she was started on Maxalt which she has been taking but more recently she had a few breakthrough headaches due to which I would recommend starting her on Nurtec on as-needed basis for her migraine headaches    If the insurance denies the " Nurtec we can consider going back on the Maxalt then.            Return in about 3 months (around 9/4/2024), or Tele medicine.       The management plan was discussed in detail with the patient and all questions were answered.     Ms. Hickman was encouraged to contact our office with any questions or concerns and to contact the clinic or go to the nearest emergency room if symptoms change or worsen.       I have spent a total of 42 min in reviewing and/or ordering tests, medications, or procedures, performing an examination or evaluation, reviewing pertinent history, counseling and educating the patient, referring and/or communicating with other health care professionals, documenting in the EMR and general coordination of care of Ms. Hickman  today.           Jennifer Marie MD.   Staff Neurologist,   Neuroimmunology and Neuroinfectious disease  06/04/24     This report has been created through the use of voice recognition/text compilation software.  Typographical and content errors may occur with this process.  While efforts are made to detect and correct such errors, in some cases errors will persist.  For this reason, wording in this document should be considered in the proper context and not strictly verbatim.  If, when reviewing the document, an error is discovered, please let the office know at [phone]

## 2024-06-11 DIAGNOSIS — T78.2XXA ANAPHYLAXIS, INITIAL ENCOUNTER: Primary | ICD-10-CM

## 2024-06-11 RX ORDER — EPINEPHRINE 0.3 MG/.3ML
0.3 INJECTION SUBCUTANEOUS ONCE
Qty: 0.6 ML | Refills: 0 | Status: SHIPPED | OUTPATIENT
Start: 2024-06-11 | End: 2024-06-11

## 2024-06-28 ENCOUNTER — TELEPHONE (OUTPATIENT)
Age: 39
End: 2024-06-28

## 2024-06-28 NOTE — TELEPHONE ENCOUNTER
Received fax from pharmacy requesting PA for [facility].    Key: [id number]    PA submitted via CMM. Awaiting determination.

## 2024-07-01 ENCOUNTER — TELEMEDICINE (OUTPATIENT)
Dept: PSYCHIATRY | Facility: CLINIC | Age: 39
End: 2024-07-01
Payer: COMMERCIAL

## 2024-07-01 DIAGNOSIS — F33.1 MODERATE EPISODE OF RECURRENT MAJOR DEPRESSIVE DISORDER (HCC): Primary | ICD-10-CM

## 2024-07-01 DIAGNOSIS — F43.10 PTSD (POST-TRAUMATIC STRESS DISORDER): ICD-10-CM

## 2024-07-01 PROCEDURE — 90834 PSYTX W PT 45 MINUTES: CPT

## 2024-07-01 NOTE — TELEPHONE ENCOUNTER
PA approved from 6/28/2024 - 6/28/2025.    Pharmacy notified. Medication processed successfully.    Pt notified via [employer] message.

## 2024-07-01 NOTE — PSYCH
Virtual Regular Visit    Verification of patient location:    Patient is located at Home in the following state in which I hold an active license [place]      Assessment/Plan:    Problem List Items Addressed This Visit       Moderate episode of recurrent major depressive disorder (HCC) - Primary    PTSD (post-traumatic stress disorder)       Goals addressed in session: Goal 1          Reason for visit is No chief complaint on file.       Encounter provider Jannet Villatoro LCSW      Recent Visits  No visits were found meeting these conditions.  Showing recent visits within past 7 days and meeting all other requirements  Today's Visits  Date Type Provider Dept   07/01/24 Telemedicine Jannet Villatoro LCSW [facility]   Showing today's visits and meeting all other requirements  Future Appointments  No visits were found meeting these conditions.  Showing future appointments within next 150 days and meeting all other requirements       The patient was identified by name and date of birth. Linda Hickman was informed that this is a telemedicine visit and that the visit is being conducted throughthe [employer] platform. She agrees to proceed..  My office door was closed. No one else was in the room.  She acknowledged consent and understanding of privacy and security of the video platform. The patient has agreed to participate and understands they can discontinue the visit at any time.    Patient is aware this is a billable service.     Subjective  Linda Hickman is a 39 y.o. female  .      HPI     Past Medical History:   Diagnosis Date    Allergic     Anemia     Anxiety     Arthritis     Asthma     Chlamydia 2014    Unfaithful partner    Depression     Exposure to SARS-associated coronavirus 04/12/2020    GERD (gastroesophageal reflux disease) 2010    Gonorrhea 2014    Unfaithful partner    Headache(784.0)     Lumbar herniated disc     Migraine 2012    Pneumonia     Urinary tract infection      Urogenital trichomoniasis 2015    Unfaithful partner    UTI (urinary tract infection)     Varicella 1988    Visual impairment        Past Surgical History:   Procedure Laterality Date    FL INJECTION LEFT KNEE (ARTHROGRAM)  2/28/2022    WISDOM TOOTH EXTRACTION         Current Outpatient Medications   Medication Sig Dispense Refill    albuterol (PROVENTIL HFA,VENTOLIN HFA) 90 mcg/act inhaler Inhale 2 puffs every 6 (six) hours as needed for wheezing 18 g 3    amphetamine-dextroamphetamine (ADDERALL XR, 20MG,) 20 MG 24 hr capsule Take 1 capsule (20 mg total) by mouth every morning Max Daily Amount: 20 mg 30 capsule 0    B Complex-C (B-complex with vitamin C) tablet Take 1 tablet by mouth daily      buPROPion (Wellbutrin XL) 150 mg 24 hr tablet Take 1 tablet (150 mg total) by mouth daily - Take with Wellbutrin 300 mg XL for a total daily dose of 450 mg 90 tablet 0    buPROPion (Wellbutrin XL) 300 mg 24 hr tablet Take 1 tablet (300 mg total) by mouth every morning 90 tablet 0    calcium carbonate (OS-BERENICE) 600 MG tablet Take 600 mg by mouth 2 (two) times a day with meals      cholecalciferol (VITAMIN D3) 1,000 units tablet Take 1,000 Units by mouth daily      Dupixent 200 MG/1.14ML SOPN Inject 1 pen ( 200mg total) under the skin once every 14 (fourteen) days. 2 mL 10    EPINEPHrine (EPIPEN) 0.3 mg/0.3 mL SOAJ Inject 0.3 mL (0.3 mg total) into a muscle once for 1 dose 0.6 mL 0    Ferrous Sulfate (RA IRON PO) Take by mouth      levalbuterol (Xopenex) 0.63 mg/3 mL nebulizer solution Take 3 mL (0.63 mg total) by nebulization 3 (three) times a day 30 mL 1    Magnesium Glycinate 100 MG CAPS Take 200 mg by mouth 2 (two) times a day      Melatonin 5 MG TABS Take 1 tablet (5 mg total) by mouth daily at bedtime as needed (insomnia)      mometasone-formoterol (Dulera) 200-5 MCG/ACT inhaler Inhale 2 puffs 2 (two) times a day Rinse mouth after use. 39 g 2    Prenatal MV-Min-Fe Fum-FA-DHA (PRENATAL+DHA PO) Take by mouth       prochlorperazine (COMPAZINE) 5 mg tablet Take 1 tablet (5 mg total) by mouth every 6 (six) hours as needed for nausea or vomiting 30 tablet 0    rimegepant sulfate (NURTEC) 75 mg TBDP Take 1 tablet (75 mg total) by mouth daily as needed (migraine headaches) 8 tablet 3     No current facility-administered medications for this visit.        Allergies   Allergen Reactions    Sulfa Antibiotics Anaphylaxis    Levofloxacin      Other reaction(s): sensative    Prednisone Other (See Comments)    Tobramycin      Other reaction(s): sensative    Pseudoephedrine Palpitations       Review of Systems    Video Exam    There were no vitals filed for this visit.    Physical Exam     Behavioral Health Psychotherapy Progress Note    Psychotherapy Provided: Individual Psychotherapy     1. Moderate episode of recurrent major depressive disorder (HCC)        2. PTSD (post-traumatic stress disorder)            Goals addressed in session: Goal 1     DATA: Linda talked about feeling frustrated by raising her stepson with her boyfriend because he doesn't hold the son accountable for things and she fears he will become narcissistic and destructive.  She does not want this to happen and wants to prevent his from becoming selfish.  Therapist helped her to balance her thinking about his behaviors and that the worst case scenario may not happen.  She talked about her [employer] business and her negative thinking, and therapist encouraged her to balance her thinking with positive experiences she has had.  She was able to do so.  She openly communicated her thoughts and feelings about her career and she was able to do so.  Therapist encouraged her to examine reactions that seem out of proportion to what is going on.  During this session, this clinician used the following therapeutic modalities: Client-centered Therapy and Cognitive Behavioral Therapy    Substance Abuse was not addressed during this session. If the client is diagnosed with a  "co-occurring substance use disorder, please indicate any changes in the frequency or amount of use: . Stage of change for addressing substance use diagnoses: No substance use/Not applicable    ASSESSMENT:  Linda Hickman presents with a Euthymic/ normal mood.     her affect is Normal range and intensity, which is congruent, with her mood and the content of the session. The client has made progress on their goals.     Linda Hickman presents with a none risk of suicide, none risk of self-harm, and none risk of harm to others.    For any risk assessment that surpasses a \"low\" rating, a safety plan must be developed.    A safety plan was indicated: no  If yes, describe in detail     PLAN: Between sessions, Linda Hickman will utilize cognitive balancing. At the next session, the therapist will use Client-centered Therapy and Cognitive Behavioral Therapy to address depression, ptsd.    Behavioral Health Treatment Plan and Discharge Planning: Linda Hickman is aware of and agrees to continue to work on their treatment plan. They have identified and are working toward their discharge goals. yes    Visit start and stop times:    07/01/24  Start Time: 1700  Stop Time: 1752  Total Visit Time: 52 minutes        "

## 2024-07-02 ENCOUNTER — TELEMEDICINE (OUTPATIENT)
Dept: PSYCHIATRY | Facility: CLINIC | Age: 39
End: 2024-07-02

## 2024-07-02 DIAGNOSIS — F33.0 MILD EPISODE OF RECURRENT MAJOR DEPRESSIVE DISORDER (HCC): Primary | ICD-10-CM

## 2024-07-02 DIAGNOSIS — F90.0 ATTENTION DEFICIT HYPERACTIVITY DISORDER, INATTENTIVE TYPE: ICD-10-CM

## 2024-07-02 PROCEDURE — NC001 PR NO CHARGE

## 2024-07-02 RX ORDER — DEXTROAMPHETAMINE SACCHARATE, AMPHETAMINE ASPARTATE MONOHYDRATE, DEXTROAMPHETAMINE SULFATE AND AMPHETAMINE SULFATE 5; 5; 5; 5 MG/1; MG/1; MG/1; MG/1
CAPSULE, EXTENDED RELEASE ORAL
Qty: 30 CAPSULE | Refills: 0 | Status: SHIPPED | OUTPATIENT
Start: 2024-08-01

## 2024-07-02 RX ORDER — DEXTROAMPHETAMINE SACCHARATE, AMPHETAMINE ASPARTATE MONOHYDRATE, DEXTROAMPHETAMINE SULFATE AND AMPHETAMINE SULFATE 5; 5; 5; 5 MG/1; MG/1; MG/1; MG/1
CAPSULE, EXTENDED RELEASE ORAL
Qty: 30 CAPSULE | Refills: 0 | Status: SHIPPED | OUTPATIENT
Start: 2024-07-02

## 2024-07-02 NOTE — PATIENT INSTRUCTIONS
Please present for your previously scheduled appointment approximately 15 minutes prior to appointment time. If you are running late or are unable to attend your appointment, please call our [place] office at [phone] or our [place] office at [phone] if applicable to notify the office of your absence.    If you are in psychological crisis including not limited to suicidal/homicidal thoughts or thoughts of self-injury, do not hesitate to call/contact your County Crisis hotline (see below) or attend to the nearest emergency department.  [facility] Crisis: [phone]  [facility] Crisis: [phone]  [facility] Crisis: [phone]  [facility] Crisis: [phone]  [facility] Crisis: [phone]  [facility] Crisis: [phone]  [facility] Crisis: [phone]  National Suicide Prevention Hotline: 1-214.774.1340

## 2024-07-02 NOTE — PSYCH
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 during pregnancy involves a careful risk and benefit analysis. Linda verbalized understanding. Linda reports that she would like to continue on both Wellbutrin and Adderall at the present time. Linda reports that she will call the office in the event that she becomes pregnant to discuss the potential of medication adjustments.    Treatment Recommendations/Precautions:     Discontinued prazosin as the patient reports she has not been utilizing this medication and reports that she has been consistently sleeping well at that time    Continue Wellbutrin 450 XL daily for mood as well as concentration   PARQ was completed for bupropion (Wellbutrin) including nausea, insomnia, agitation/activation, weight loss, anxiety, palpitations, hypertension, decreased seizure threshold and risk with alcohol withdrawal or electrolyte disturbances.  Patient screened negative for heavy alcohol use, history of seizures, and eating disorders.    Continue Adderall XR 20 mg daily on productive days for symptoms of ADHD   PARQ completed for the class of stimulant medications  including anxiety/irritability, insomnia, appetite suppression/weight loss, abuse potential, elevated heart rate and blood pressure, seizures, activation/induction of belinda, unmasking of tic's, growth suppression in children, interactions with other medications, and risk of sudden death.    Like the last office visit, it was discussed that there are potential risks associated with taking Adderall during pregnancy which include premature birth and low birth weight and  withdrawal symptoms.  Regarding both Wellbutrin and Adderall, it was discussed that there is insufficient data available regarding increased risk of miscarriage, congenital malformations, or potential long-term effects on the child's neurodevelopment. While some medications can pose a potential risk to the developing fetus, untreated mental health conditions also have serious consequences for both the mother and baby.  It was discussed that the use of medications during pregnancy involves a careful risk and benefit analysis.     Continue melatonin 5 mg at bedtime as needed for insomnia     Continue ongoing psychotherapy with Jannet on a monthly basis  Continue ongoing psychiatric medication management every 1 to 3 months  Aware of the need to follow up with family physician for medical issues  Aware of the 24 hour and weekend coverage for urgent situations accessed by calling [facility] Main practice number    Risk of Harm to Self:  The following ratings are based on assessment at the time of the interview as well as review of medical records  Demographic risk factors include: White  Historical Risk Factors include: Chronic depressive symptoms, chronic anxiety symptoms  Recent Specific Risk Factors include: Diagnosis of depression, current mild depressive symptoms  Protective Factors: No current suicidal ideation, stable mood, improved depression symptoms, improved anxiety symptoms, access to mental health treatment,  taking psychiatric medications as prescribed  Access to firearms: Patient reports that there are guns in her house, reporting her boyfriend owns a handgun and hunts.  She states they are locked and secured and has no access to them. Linda Hickman has no history of arrests or violence pertaining to use of a deadly weapon.   Based on today's assessment, Linda presents the following risk of harm to self: Minimal     Risk of Harm to Others:  The following ratings are based on assessment at the time of the interview as well as review of medical records  Demographic Risk Factors include: Under age 40  Historical Risk Factors include: None  Recent Specific Risk Factors include: None  Protective Factors: No current homicidal ideation  Based on today's assessment, Linda presents the following risk of harm to others: Minimal    Although patient's acute lethality risk is low, long-term/chronic lethality risk is mildly elevated in the presence of mild symptoms of depression and anxiety. At the current moment, Linda is future-oriented, forward-thinking, and demonstrates ability to act in a self-preserving manner as evidenced by volitionally presenting to the clinic today, seeking treatment. At this juncture, inpatient hospitalization is not currently warranted. To mitigate future risk, patient should adhere to the recommendations of this writer, avoid alcohol/illicit substance use, utilize community-based resources and familiar support and prioritize mental health treatment.     Based on today's assessment and clinical criteria, Linda Hickman contracts for safety and is not an imminent risk of harm to self or others. Outpatient level of care is deemed appropriate at this present time. Linda understands that if they are no longer able to contract for safety, they need to call/contact the outpatient office including this writer, call/contact crisis and/orattend to the nearest Emergency Department for immediate  evaluation.    Risks/Benefits      Risks, Benefits And Possible Side Effects Of Medications:    Risks, benefits, and possible side effects of medications explained to Linda and she verbalizes understanding and agreement for treatment.    Controlled Medication Discussion:     Linda has been filling controlled prescriptions on time as prescribed according to [place] Prescription Drug Monitoring Program    Psychotherapy Provided:     Individual psychotherapy provided: Yes  Cognitive Behavioral therapy techniques were utilized  At this time, Linda reports that overall things are copacetic with her boyfriend Iraj, however she does report they have ongoing struggles with deciding on a unified plan with regards to parenting his 11 year old son Edmar. iLnda was encouraged to set aside routine time to discuss planned boundaries for Edmar, the consequences for violating these boundaries, and staying consistent with the enforcement these boundaries. Linda was encouraged to discuss with Iraj how she views his passive parenting style as detrimental to their relationship and was encouraged to see if he could possibly attend one of her therapy sessions with her therapist Jannet. At this time Linda reports that as a couple her and her boyfriend have not been as sexually active and states right now there are some feelings of ambivalence with regards to having a child together in the future.    Treatment Plan:    Completed and signed during the session: Not applicable - Treatment Plan not due at this session    Visit Time    Visit Start Time: 4:00 PM  Visit Stop Time: 4:30 PM  Total Visit Duration:  30 minutes    This note was shared with patient.    Mika Albarran MD 07/02/24    This note has been constructed using a voice recognition system. There may be translation, syntax, or grammatical errors. If you have any questions, please contact the dictating provider.

## 2024-07-11 ENCOUNTER — OFFICE VISIT (OUTPATIENT)
Age: 39
End: 2024-07-11
Payer: COMMERCIAL

## 2024-07-11 VITALS — TEMPERATURE: 96.9 F | BODY MASS INDEX: 27.97 KG/M2 | HEIGHT: 66 IN | WEIGHT: 174 LBS

## 2024-07-11 DIAGNOSIS — L82.1 SEBORRHEIC KERATOSIS: ICD-10-CM

## 2024-07-11 DIAGNOSIS — L70.0 ACNE VULGARIS: ICD-10-CM

## 2024-07-11 DIAGNOSIS — D18.01 CHERRY ANGIOMA: ICD-10-CM

## 2024-07-11 DIAGNOSIS — Z13.89 SCREENING FOR SKIN CONDITION: Primary | ICD-10-CM

## 2024-07-11 DIAGNOSIS — D23.9 DERMATOFIBROMA: ICD-10-CM

## 2024-07-11 DIAGNOSIS — D22.9 NEVUS: ICD-10-CM

## 2024-07-11 PROCEDURE — 99244 OFF/OP CNSLTJ NEW/EST MOD 40: CPT | Performed by: DERMATOLOGY

## 2024-07-11 NOTE — PATIENT INSTRUCTIONS
"MELANOCYTIC NEVI (\"Moles\")    Melanocytic nevi (\"moles\") are tan or brown, raised or flat areas of the skin which have an increased number of melanocytes. Melanocytes are the cells in our body which make pigment and account for skin color.    Some moles are present at birth (I.e., \"congenital nevi\"), while others come up later in life (i.e., \"acquired nevi\").  The sun can stimulate the body to make more moles.  Sunburns are not the only thing that triggers more moles.  Chronic sun exposure can do it too.     Clinically distinguishing a healthy mole from melanoma may be difficult, even for experienced dermatologists. The \"ABCDE's\" of moles have been suggested as a means of helping to alert a person to a suspicious mole and the possible increased risk of melanoma.  The suggestions for raising alert are as follows:    Asymmetry: Healthy moles tend to be symmetric, while melanomas are often asymmetric.  Asymmetry means if you draw a line through the mole, the two halves do not match in color, size, shape, or surface texture. Asymmetry can be a result of rapid enlargement of a mole, the development of a raised area on a previously flat lesion, scaling, ulceration, bleeding or scabbing within the mole.  Any mole that starts to demonstrate \"asymmetry\" should be examined promptly by a board certified dermatologist.     Border: Healthy moles tend to have discrete, even borders.  The border of a melanoma often blends into the normal skin and does not sharply delineate the mole from normal skin.  Any mole that starts to demonstrate \"uneven borders\" should be examined promptly by a board certified dermatologist.     Color: Healthy moles tend to be one color throughout.  Melanomas tend to be made up of different colors ranging from dark black, blue, white, or red.  Any mole that demonstrates a color change should be examined promptly by a board certified dermatologist.     Diameter: Healthy moles tend to be smaller than 0.6 cm " "in size; an exception are \"congenital nevi\" that can be larger.  Melanomas tend to grow and can often be greater than 0.6 cm (1/4 of an inch, or the size of a pencil eraser). This is only a guideline, and many normal moles may be larger than 0.6 cm without being unhealthy.  Any mole that starts to change in size (small to bigger or bigger to smaller) should be examined promptly by a board certified dermatologist.     Evolving: Healthy moles tend to \"stay the same.\"  Melanomas may often show signs of change or evolution such as a change in size, shape, color, or elevation.  Any mole that starts to itch, bleed, crust, burn, hurt, or ulcerate or demonstrate a change or evolution should be examined promptly by a board certified dermatologist.      Dysplastic Nevi  Dysplastic moles are moles that fit the ABCDE rules of melanoma but are not identified as melanomas when examined under the microscope.  They may indicate an increased risk of melanoma in that person. If there is a family history of melanoma, most experts agree that the person may be at an increased risk for developing a melanoma.  Experts still do not agree on what dysplastic moles mean in patients without a personal or family history of melanoma.  Dysplastic moles are usually larger than common moles and have different colors within it with irregular borders. The appearance can be very similar to a melanoma. Biopsies of dysplastic moles may show abnormalities which are different from a regular mole.      Melanoma  Malignant melanoma is a type of skin cancer that can be deadly if it spreads throughout the body. The incidence of melanoma in the [place] is growing faster than any other cancer. Melanoma usually grows near the surface of the skin for a period of time, and then begins to grow deeper into the skin. Once it grows deeper into the skin, the risk of spread to other organs greatly increases. Therefore, early detection and removal of a malignant " "melanoma may result in a better chance at a complete cure; removal after the tumor has spread may not be as effective, leading to worse clinical outcomes such as death.    The true rate of nevus transformation into a melanoma is unknown. It has been estimated that the lifetime risk for any acquired melanocytic nevus on any 20-year-old individual transforming into melanoma by age 80 is 0.03% (1 in 3,164) for men and 0.009% (1 in 10,800) for women.     The appearance of a \"new mole\" remains one of the most reliable methods for identifying a malignant melanoma.  Occasionally, melanomas appear as rapidly growing, blue-black, dome-shaped bumps within a previous mole or previous area of normal skin.  Other times, melanomas are suspected when a mole suddenly appears or changes. Itching, burning, or pain in a pigmented lesion should increase suspicion, but most patients with early melanoma have no skin discomfort whatsoever.  Melanoma can occur anywhere on the skin, including areas that are difficult for self-examination. Many melanomas are first noticed by other family members.  Suspicious-looking moles may be removed for microscopic examination.       You may be able to prevent death from melanoma by doing two simple things:    Try to avoid unnecessary sun exposure and protect your skin when it is exposed to the sun.  People who live near the equator, people who have intermittent exposures to large amounts of sun, and people who have had sunburns in childhood or adolescence have an increased risk for melanoma. Sun sense and vigilant sun protection may be keys to helping to prevent melanoma.  We recommend wearing UPF-rated sun protective clothing and sunglasses whenever possible and applying a moisturizer-sunscreen combination product (SPF 50+) such as Neutrogena Daily Defense to sun exposed areas of skin at least three times a day.    Have your moles regularly examined by a board certified dermatologist AND by yourself or " "a family member/friend at home.  We recommend that you have your moles examined at least once a year by a board certified dermatologist.  Use your birthday as an annual reminder to have your \"Birthday Suit\" (I.e., your skin) examined; it is a nice birthday gift to yourself to know that your skin is healthy appearing!  Additionally, at-home self examinations may be helpful for detecting a possible melanoma.  Use the ABCDEs we discussed and check your moles once a month at home.        SEBORRHEIC KERATOSIS    A seborrheic keratosis is a harmless warty spot that appears during adult life as a common sign of skin aging.  Seborrheic keratoses can arise on any area of skin, covered or uncovered, with the usual exception of the palms and soles. They do not arise from mucous membranes. Seborrheic keratoses can have highly variable appearance.      Seborrheic keratoses are extremely common. It has been estimated that over 90% of adults over the age of 60 years have one or more of them. They occur in males and females of all races, typically beginning to erupt in the 30s or 40s. They are uncommon under the age of 20 years.  The precise cause of seborrhoeic keratoses is not known.  Seborrhoeic keratoses are considered degenerative in nature. As time goes by, seborrheic keratoses tend to become more numerous. Some people inherit a tendency to develop a very large number of them; some people may have hundreds of them.    The name \"seborrheic keratosis\" is misleading, because these lesions are not limited to a seborrhoeic distribution (scalp, mid-face, chest, upper back), nor are they formed from sebaceous glands, nor are they associated with sebum -- which is greasy.  Seborrheic keratosis may also be called \"SK,\" \"Seb K,\" \"basal cell papilloma,\" \"senile wart,\" or \"barnacle.\"      There is no easy way to remove multiple lesions on a single occasion.  Unless a specific lesion is \"inflamed\" and is causing pain or stinging/burning " "or is bleeding, most insurance companies do not authorize treatment.      ANGIOMA (\"CHERRY ANGIOMA\")    [place] angiomas markedly increase in number from about the age of 40, so it has been estimated that 75% of people over 75 years of age have them. Although they also called \"senile angiomas,\" they can occur in young people too - 5% of adolescents have been found to have them.     Cherry angiomas are very common in males and females of any age or race, with no difference in sexes or races affected. They are however more noticeable in white skin than in skin of colour.  There may be a family history of similar lesions. Eruptive (very large number appearing in a short period of time) cherry angiomas have been rarely reported to be associated with internal malignancy and pregnancy.       DERMATOFIBROMA    Assessment and Plan:  Based on a thorough discussion of this condition and the management approach to it (including a comprehensive discussion of the known risks, side effects and potential benefits of treatment), the patient (family) agrees to implement the following specific plan:  Reassured benign    Assessment and Plan:  A dermatofibroma is a common benign fibrous nodule that most often arises on the skin of the lower legs.  A dermatofibroma is also called a \"cutaneous fibrous histiocytoma.\"  Dermatofibromas occur at all ages and in people of every ethnicity. They are more common in women than in men.    It is not clear if dermatofibroma is a reactive process or if it is a neoplasm. The lesions are made up of proliferating fibroblasts. Histiocytes may also be involved.  They are sometimes attributed to an insect bite or ingrownhair or local trauma, but not consistently. They may be more numerous in patients with altered immunity.    Dermatofibromas most often occur on the legs and arms, but may also arise on the trunk or any site of the body.  Typical clinical features include the following:  People may have 1 or " "up to 15 lesions.  Size varies from 0.5-1.5 cm diameter; most lesions are 7-10 mm diameter.  They are firm nodules tethered to the skin surface and mobile over subcutaneous tissue.  The skin \"dimples\" on pinching the lesion.  Color may be pink to light brown in white skin, and dark brown to black in dark skin; some appear paler in the center.  They do not usually cause symptoms, but they are sometimes painful or itchy.  Because they are often raised lesions, they may be traumatized, for example by a razor.  Occasionally dozens may erupt within a few months, usually in the setting of immunosuppression (for example autoimmune disease, cancer or certain medications).  Dermatofibroma does not give rise to cancer. However, occasionally, it may be mistaken for dermatofibrosarcoma or desmoplastic melanoma.    A dermatofibroma is harmless and seldom causes any symptoms. Usually, only reassurance is needed. If it is nuisance or causing concern, the lesion can be removed surgically, resulting in a scar that is, by definition, usually longer in diameter than the widest portion of the dermatofibroma.  Cryotherapy, shave biopsy and laser surgery are rarely completely successful.  Skin punch biopsy or incisional biopsy may be undertaken if there is an atypical feature such as recent enlargement, ulceration, or asymmetrical structures and colours on dermatoscopy.    ACNE VULGARIS     TODAY'S PLAN:     PRESCRIPTION MANAGEMENT:  Several treatment options were discussed including topical retinoids and their side effects.     Skin Hygiene:      Wash affected areas (face, chest, and back) TWICE A DAY with a mild cleanser such as Cerave and Cetaphil.  Use only mild cleansers (hypoallergenic and without fragrances) and fragrance free detergent (not \"unscented\" products which contain a masking agent); we discussed avoiding irritants/fragranced products.  Apply a good oil-free facial moisturizer AT LEAST TWO TIMES A DAY \" such as Cerave or " "Cetaphil.  Minimize the application of oils and cosmetics to the affected skin.  This includes HAIR PRODUCTS such as \"leave in\" conditioners.  Unless the product specifically states that it \"won't cause acne,\" \"won't clog pores,\" and/or \"is non-comedogenic\" then it may actually CAUSE acne.  If you smoke, STOP. Nicotine increases sebum retention and increased scale within the follicles, forming comedones (blackheads and whiteheads).  Abrasive treatments such as dermabrasion and spa facials may aggravate inflammatory acne.  Do NOT scratch or pick your acne bumps.  The evidence that diet directly affects acne remains weak.  However, diet does affect your overall health.  Eat plenty of fresh fruit and vegetables.  Avoid protein or amino acid supplements, particularly if they contain leucine. Consider a low-glycemic, low-protein and low-dairy diet.  Be mindful that certain medications may cause of aggravate acne.  Make sure to tell your Dermatologist if you start a new prescription, nutritional supplement, and/or herbal remedy.      MORNING Topical Regimen:      NONE.      EVENING Topical Regimen:      Tretinoin 0.025% cream AT LEAST 1 HOUR BEFORE BEDTIME:  Evenly spread a SINGLE pea-sized amount of this medication over your entire face, avoiding the eyes and corners of the mouth.      SYSTEMIC Strategies:      NONE        MEDICAL DECISION MAKING  Treatment Goal:  Resolution of the CHRONIC condition.       Chronic condition is NOT at treatment goal.  It is progressing along its expected course OR is poorly-controlled.          "

## 2024-07-11 NOTE — PROGRESS NOTES
"[facility] Dermatology Clinic Note     Patient Name: Linda Hickman  Encounter Date: 07/11/2024     Have you been cared for by a [facility] Dermatologist in the last 3 years and, if so, which description applies to you?    NO.   I am considered a \"new\" patient and must complete all patient intake questions. I am FEMALE/of child-bearing potential.    REVIEW OF SYSTEMS:  Have you recently had or currently have any of the following? Recent fever or chills? No  Any non-healing wound? No  Are you pregnant or planning to become pregnant? Yes  Are you currently or planning to be nursing or breast feeding? No,    PAST MEDICAL HISTORY:  Have you personally ever had or currently have any of the following?  If \"YES,\" then please provide more detail. Skin cancer (such as Melanoma, Basal Cell Carcinoma, Squamous Cell Carcinoma?  No  Tuberculosis, HIV/AIDS, Hepatitis B or C: No  Radiation Treatment No   HISTORY OF IMMUNOSUPPRESSION:   Do you have a history of any of the following:  Systemic Immunosuppression such as Diabetes, Biologic or Immunotherapy, Chemotherapy, Organ Transplantation, Bone Marrow Transplantation?  No    Answering \"YES\" requires the addition of the dotphrase \"IMMUNOSUPPRESSED\" as the first diagnosis of the patient's visit.   FAMILY HISTORY:  Any \"first degree relatives\" (parent, brother, sister, or child) with the following?    Skin Cancer, Pancreatic or Other Cancer? YES, maternal grandmother breast cancer and colon cancer, mother- breast cancer.  Maternal grandfather- lung cancer.    PATIENT EXPERIENCE:    Do you want the Dermatologist to perform a COMPLETE skin exam today including a clinical examination under the \"bra and underwear\" areas?  Yes  If necessary, do we have your permission to call and leave a detailed message on your Preferred Phone number that includes your specific medical information?  Yes      Allergies   Allergen Reactions    Sulfa Antibiotics Anaphylaxis    Levofloxacin      Other " reaction(s): sensative    Prednisone Other (See Comments)    Tobramycin      Other reaction(s): sensative    Pseudoephedrine Palpitations      Current Outpatient Medications:     albuterol (PROVENTIL HFA,VENTOLIN HFA) 90 mcg/act inhaler, Inhale 2 puffs every 6 (six) hours as needed for wheezing, Disp: 18 g, Rfl: 3    amphetamine-dextroamphetamine (ADDERALL XR, 20MG,) 20 MG 24 hr capsule, Take 1 capsule (20 mg total) by mouth in the morning on productive days, Disp: 30 capsule, Rfl: 0    [START ON 8/1/2024] amphetamine-dextroamphetamine (ADDERALL XR, 20MG,) 20 MG 24 hr capsule, Take 1 capsule (20 mg total) by mouth in the morning on productive days Do not start before August 1, 2024., Disp: 30 capsule, Rfl: 0    B Complex-C (B-complex with vitamin C) tablet, Take 1 tablet by mouth daily, Disp: , Rfl:     buPROPion (Wellbutrin XL) 150 mg 24 hr tablet, Take 1 tablet (150 mg total) by mouth daily - Take with Wellbutrin 300 mg XL for a total daily dose of 450 mg, Disp: 90 tablet, Rfl: 0    buPROPion (Wellbutrin XL) 300 mg 24 hr tablet, Take 1 tablet (300 mg total) by mouth every morning, Disp: 90 tablet, Rfl: 0    calcium carbonate (OS-BERENICE) 600 MG tablet, Take 600 mg by mouth 2 (two) times a day with meals, Disp: , Rfl:     cholecalciferol (VITAMIN D3) 1,000 units tablet, Take 1,000 Units by mouth daily, Disp: , Rfl:     Dupixent 200 MG/1.14ML SOPN, Inject 1 pen ( 200mg total) under the skin once every 14 (fourteen) days., Disp: 2 mL, Rfl: 10    EPINEPHrine (EPIPEN) 0.3 mg/0.3 mL SOAJ, Inject 0.3 mL (0.3 mg total) into a muscle once for 1 dose, Disp: 0.6 mL, Rfl: 0    Ferrous Sulfate (RA IRON PO), Take by mouth, Disp: , Rfl:     levalbuterol (Xopenex) 0.63 mg/3 mL nebulizer solution, Take 3 mL (0.63 mg total) by nebulization 3 (three) times a day, Disp: 30 mL, Rfl: 1    Magnesium Glycinate 100 MG CAPS, Take 200 mg by mouth 2 (two) times a day, Disp: , Rfl:     Melatonin 5 MG TABS, Take 1 tablet (5 mg total) by mouth  "daily at bedtime as needed (insomnia), Disp: , Rfl:     mometasone-formoterol (Dulera) 200-5 MCG/ACT inhaler, Inhale 2 puffs 2 (two) times a day Rinse mouth after use., Disp: 39 g, Rfl: 2    Prenatal MV-Min-Fe Fum-FA-DHA (PRENATAL+DHA PO), Take by mouth, Disp: , Rfl:     prochlorperazine (COMPAZINE) 5 mg tablet, Take 1 tablet (5 mg total) by mouth every 6 (six) hours as needed for nausea or vomiting, Disp: 30 tablet, Rfl: 0    rimegepant sulfate (NURTEC) 75 mg TBDP, Take 1 tablet (75 mg total) by mouth daily as needed (migraine headaches), Disp: 8 tablet, Rfl: 3          Whom besides the patient is providing clinical information about today's encounter?   NO ADDITIONAL HISTORIAN (patient alone provided history)    39 year old female new patient present for overall skin exam. Patient has a spot of concern and her scalp and left cheek.     Physical Exam and Assessment/Plan by Diagnosis:    MELANOCYTIC NEVI (\"Moles\")    Physical Exam:  Anatomic Location Affected: Mostly on sun-exposed areas of the body  Morphological Description:  Scattered, 1-4mm round to ovoid, symmetrical-appearing, even bordered, skin colored to dark brown macules/papules, mostly in sun-exposed areas    Additional History of Present Condition:  present on exam.     Assessment and Plan:  Based on a thorough discussion of this condition and the management approach to it (including a comprehensive discussion of the known risks, side effects and potential benefits of treatment), the patient (family) agrees to implement the following specific plan:  Provided handout with information regarding the ABCDE's of moles   Recommend routine skin exams every year   Sun avoidance, protective clothing (known as UPF clothing), and the use of at least SPF 30 sunscreens is advised. Sunscreen should be reapplied every two hours when outside.       SEBORRHEIC KERATOSIS; NON-INFLAMED    Physical Exam:  Anatomic Location Affected:  scattered across trunk, extremities,  " "face  Morphological Description:  Flat and raised, waxy, smooth to warty textured, yellow to brownish-grey to dark brown to blackish, discrete, \"stuck-on\" appearing papules.    Additional History of Present Condition:  Patient reports new bumps on the skin.  Denies itch, burn, pain, bleeding or ulceration.  Present constantly; nothing seems to make it worse or better.  No prior treatment.      Assessment and Plan:  Based on a thorough discussion of this condition and the management approach to it (including a comprehensive discussion of the known risks, side effects and potential benefits of treatment), the patient (family) agrees to implement the following specific plan:  Reassured benign      ANGIOMA (\"CHERRY ANGIOMA\")    Physical Exam:  Anatomic Location: scattered across sun exposed areas of the trunk and extremities   Morphologic Description: Firm red to reddish-blue discrete papules    Additional History of Present Condition:  Present on exam.     Assessment and Plan:  Reassured benign        DERMATOFIBROMA    Physical Exam:  Anatomic Location Affected:  right thigh  Morphological Description:  dome shape papule with positive pinch sign    Additional History of Present Condition:  present for a while.     Assessment and Plan:  Based on a thorough discussion of this condition and the management approach to it (including a comprehensive discussion of the known risks, side effects and potential benefits of treatment), the patient (family) agrees to implement the following specific plan:  Reassured benign    Assessment and Plan:  A dermatofibroma is a common benign fibrous nodule that most often arises on the skin of the lower legs.  A dermatofibroma is also called a \"cutaneous fibrous histiocytoma.\"  Dermatofibromas occur at all ages and in people of every ethnicity. They are more common in women than in men.    It is not clear if dermatofibroma is a reactive process or if it is a neoplasm. The lesions are made " "up of proliferating fibroblasts. Histiocytes may also be involved.  They are sometimes attributed to an insect bite or ingrownhair or local trauma, but not consistently. They may be more numerous in patients with altered immunity.    Dermatofibromas most often occur on the legs and arms, but may also arise on the trunk or any site of the body.  Typical clinical features include the following:  People may have 1 or up to 15 lesions.  Size varies from 0.5-1.5 cm diameter; most lesions are 7-10 mm diameter.  They are firm nodules tethered to the skin surface and mobile over subcutaneous tissue.  The skin \"dimples\" on pinching the lesion.  Color may be pink to light brown in white skin, and dark brown to black in dark skin; some appear paler in the center.  They do not usually cause symptoms, but they are sometimes painful or itchy.  Because they are often raised lesions, they may be traumatized, for example by a razor.  Occasionally dozens may erupt within a few months, usually in the setting of immunosuppression (for example autoimmune disease, cancer or certain medications).  Dermatofibroma does not give rise to cancer. However, occasionally, it may be mistaken for dermatofibrosarcoma or desmoplastic melanoma.    A dermatofibroma is harmless and seldom causes any symptoms. Usually, only reassurance is needed. If it is nuisance or causing concern, the lesion can be removed surgically, resulting in a scar that is, by definition, usually longer in diameter than the widest portion of the dermatofibroma.  Cryotherapy, shave biopsy and laser surgery are rarely completely successful.  Skin punch biopsy or incisional biopsy may be undertaken if there is an atypical feature such as recent enlargement, ulceration, or asymmetrical structures and colours on dermatoscopy.      ACNE VULGARIS    Physical Exam:  Anatomic Locations Involved: Face  Global Assessment: ALMOST CLEAR: A few scattered comedones and a few small inflammatory " "papules.   Scarring Present? Yes; Atrophic scarring:  ALMOST CLEAR    Additional History of Present Condition:  present since teen age years.        TODAY'S PLAN:     PRESCRIPTION MANAGEMENT:  Several treatment options were discussed including topical retinoids and their side effects.     Skin Hygiene:      Wash affected areas (face, chest, and back) TWICE A DAY with a mild cleanser such as Cerave and Cetaphil.  Use only mild cleansers (hypoallergenic and without fragrances) and fragrance free detergent (not \"unscented\" products which contain a masking agent); we discussed avoiding irritants/fragranced products.  Apply a good oil-free facial moisturizer AT LEAST TWO TIMES A DAY \" such as Cerave or Cetaphil.  Minimize the application of oils and cosmetics to the affected skin.  This includes HAIR PRODUCTS such as \"leave in\" conditioners.  Unless the product specifically states that it \"won't cause acne,\" \"won't clog pores,\" and/or \"is non-comedogenic\" then it may actually CAUSE acne.  If you smoke, STOP. Nicotine increases sebum retention and increased scale within the follicles, forming comedones (blackheads and whiteheads).  Abrasive treatments such as dermabrasion and spa facials may aggravate inflammatory acne.  Do NOT scratch or pick your acne bumps.  The evidence that diet directly affects acne remains weak.  However, diet does affect your overall health.  Eat plenty of fresh fruit and vegetables.  Avoid protein or amino acid supplements, particularly if they contain leucine. Consider a low-glycemic, low-protein and low-dairy diet.  Be mindful that certain medications may cause of aggravate acne.  Make sure to tell your Dermatologist if you start a new prescription, nutritional supplement, and/or herbal remedy.      MORNING Topical Regimen:      NONE.      EVENING Topical Regimen:      Tretinoin 0.025% cream AT LEAST 1 HOUR BEFORE BEDTIME:  Evenly spread a SINGLE pea-sized amount of this medication over your " entire face, avoiding the eyes and corners of the mouth.      SYSTEMIC Strategies:      NONE        MEDICAL DECISION MAKING  Treatment Goal:  Resolution of the CHRONIC condition.       Chronic condition is NOT at treatment goal.  It is progressing along its expected course OR is poorly-controlled.            Scribe Attestation      I,:  Lani Gleason am acting as a scribe while in the presence of the attending physician.:       I,:  Mikael Álvarez MD personally performed the services described in this documentation    as scribed in my presence.:

## 2024-07-15 ENCOUNTER — TELEMEDICINE (OUTPATIENT)
Dept: PSYCHIATRY | Facility: CLINIC | Age: 39
End: 2024-07-15
Payer: COMMERCIAL

## 2024-07-15 DIAGNOSIS — F43.10 PTSD (POST-TRAUMATIC STRESS DISORDER): ICD-10-CM

## 2024-07-15 DIAGNOSIS — F33.1 MODERATE EPISODE OF RECURRENT MAJOR DEPRESSIVE DISORDER (HCC): Primary | ICD-10-CM

## 2024-07-15 PROCEDURE — 90834 PSYTX W PT 45 MINUTES: CPT
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
## 2024-07-17 ENCOUNTER — HOSPITAL ENCOUNTER (OUTPATIENT)
Dept: MAMMOGRAPHY | Facility: CLINIC | Age: 39
Discharge: HOME | End: 2024-07-17
Payer: COMMERCIAL

## 2024-07-17 DIAGNOSIS — Z12.31 BREAST CANCER SCREENING BY MAMMOGRAM: ICD-10-CM

## 2024-07-17 PROCEDURE — 77067 SCR MAMMO BI INCL CAD: CPT

## 2024-07-17 PROCEDURE — 77063 BREAST TOMOSYNTHESIS BI: CPT

## 2024-07-19 ENCOUNTER — OFFICE VISIT (OUTPATIENT)
Dept: URGENT CARE | Facility: CLINIC | Age: 39
End: 2024-07-19
Payer: COMMERCIAL

## 2024-07-19 VITALS
RESPIRATION RATE: 18 BRPM | BODY MASS INDEX: 28.25 KG/M2 | WEIGHT: 175 LBS | TEMPERATURE: 98.1 F | SYSTOLIC BLOOD PRESSURE: 120 MMHG | OXYGEN SATURATION: 97 % | DIASTOLIC BLOOD PRESSURE: 76 MMHG | HEART RATE: 95 BPM

## 2024-07-19 DIAGNOSIS — B37.0 ORAL CANDIDIASIS: Primary | ICD-10-CM

## 2024-07-19 DIAGNOSIS — B37.0 THRUSH: Primary | ICD-10-CM

## 2024-07-19 DIAGNOSIS — J02.9 SORE THROAT: ICD-10-CM

## 2024-07-19 LAB — S PYO AG THROAT QL: NEGATIVE

## 2024-07-19 PROCEDURE — 99213 OFFICE O/P EST LOW 20 MIN: CPT

## 2024-07-19 PROCEDURE — 87880 STREP A ASSAY W/OPTIC: CPT

## 2024-07-19 NOTE — PROGRESS NOTES
[facility]        NAME: Linda Hickman is a 39 y.o. female  : 1985    MRN: [id number]  DATE: 2024  TIME: 6:12 PM    Assessment and Plan   Oral candidiasis [B37.0]  1. Oral candidiasis        2. Sore throat  POCT rapid ANTIGEN strepA        Rapid Strep negative. Suspect symptoms are secondary to thrush. Patient already prescribed nystatin by pulmonologist but has not yet started it. VSS in clinic, appears in no acute distress. Educated on use of OTC products for additional relief of symptoms. Advised close follow-up with PCP or to report to the ER if symptoms worsen. Patient verbalizes understanding and agreeable to plan.       Patient Instructions     Start nystatin as previously prescribed. Ensure you are thoroughly rinsing your mouth out with each inhaler use. Follow-up with PCP in 3-5 days if no improvement of symptoms. Report to the ER sooner if symptoms or unable to tolerate oral intake for >24 hours      Chief Complaint     Chief Complaint   Patient presents with    Sore Throat     Sore throat with white spots in back of mouth started today. Monday she states she had burning while swallowing and felt like something was stuck in throat. Has not taken anything         History of Present Illness       39 year old female presents for evaluation of sore throat that started Monday. She waking up in the middle of the night feeling like something was stuck in her throat. She noticed white spots on the back of her throat today and reached out to her pulmonologist who prescribed her nystatin for possible thrush, which she has not yet started.  She does have a h/o thrush secondary to inhaler use and relates symptoms feel similar to that time but she was advised to be evaluated for Strep prior to starting the medication. She denies fevers, cough, congestion, or NVD. She has not yet tried any interventions for symptoms.     Sore Throat   This is a new problem. The current episode started in the  past 7 days. Neither side of throat is experiencing more pain than the other. There has been no fever. The pain is at a severity of 5/10. The pain is moderate. Pertinent negatives include no abdominal pain, congestion, coughing, diarrhea, drooling, ear discharge, ear pain, headaches, hoarse voice, plugged ear sensation, neck pain, shortness of breath, stridor, swollen glands, trouble swallowing or vomiting. She has had no exposure to strep or mono. She has tried nothing for the symptoms. The treatment provided no relief.       Review of Systems   Review of Systems   Constitutional:  Negative for activity change, appetite change, chills, fatigue and fever.   HENT:  Positive for mouth sores and sore throat. Negative for congestion, drooling, ear discharge, ear pain, hoarse voice, postnasal drip, rhinorrhea, sinus pressure, sinus pain, sneezing and trouble swallowing.    Eyes:  Negative for visual disturbance.   Respiratory:  Negative for cough, chest tightness, shortness of breath and stridor.    Gastrointestinal:  Negative for abdominal pain, constipation, diarrhea, nausea and vomiting.   Musculoskeletal:  Negative for arthralgias, back pain, myalgias and neck pain.   Skin:  Negative for color change and pallor.   Allergic/Immunologic: Negative for environmental allergies and food allergies.   Neurological:  Negative for dizziness, light-headedness and headaches.         Current Medications       Current Outpatient Medications:     albuterol (PROVENTIL HFA,VENTOLIN HFA) 90 mcg/act inhaler, Inhale 2 puffs every 6 (six) hours as needed for wheezing, Disp: 18 g, Rfl: 3    amphetamine-dextroamphetamine (ADDERALL XR, 20MG,) 20 MG 24 hr capsule, Take 1 capsule (20 mg total) by mouth in the morning on productive days, Disp: 30 capsule, Rfl: 0    buPROPion (Wellbutrin XL) 150 mg 24 hr tablet, Take 1 tablet (150 mg total) by mouth daily - Take with Wellbutrin 300 mg XL for a total daily dose of 450 mg, Disp: 90 tablet, Rfl:  0    buPROPion (Wellbutrin XL) 300 mg 24 hr tablet, Take 1 tablet (300 mg total) by mouth every morning, Disp: 90 tablet, Rfl: 0    calcium carbonate (OS-BERENICE) 600 MG tablet, Take 600 mg by mouth 2 (two) times a day with meals, Disp: , Rfl:     cholecalciferol (VITAMIN D3) 1,000 units tablet, Take 1,000 Units by mouth daily, Disp: , Rfl:     Dupixent 200 MG/1.14ML SOPN, Inject 1 pen ( 200mg total) under the skin once every 14 (fourteen) days., Disp: 2 mL, Rfl: 10    Ferrous Sulfate (RA IRON PO), Take by mouth, Disp: , Rfl:     Magnesium Glycinate 100 MG CAPS, Take 200 mg by mouth 2 (two) times a day, Disp: , Rfl:     Melatonin 5 MG TABS, Take 1 tablet (5 mg total) by mouth daily at bedtime as needed (insomnia), Disp: , Rfl:     mometasone-formoterol (Dulera) 200-5 MCG/ACT inhaler, Inhale 2 puffs 2 (two) times a day Rinse mouth after use., Disp: 39 g, Rfl: 2    Prenatal MV-Min-Fe Fum-FA-DHA (PRENATAL+DHA PO), Take by mouth, Disp: , Rfl:     tretinoin (RETIN-A) 0.025 % cream, Apply topically daily at bedtime, Disp: 45 g, Rfl: 2    [START ON 8/1/2024] amphetamine-dextroamphetamine (ADDERALL XR, 20MG,) 20 MG 24 hr capsule, Take 1 capsule (20 mg total) by mouth in the morning on productive days Do not start before August 1, 2024. (Patient not taking: Reported on 7/19/2024 Do not start before August 1, 2024.), Disp: 30 capsule, Rfl: 0    B Complex-C (B-complex with vitamin C) tablet, Take 1 tablet by mouth daily (Patient not taking: Reported on 7/11/2024), Disp: , Rfl:     EPINEPHrine (EPIPEN) 0.3 mg/0.3 mL SOAJ, Inject 0.3 mL (0.3 mg total) into a muscle once for 1 dose, Disp: 0.6 mL, Rfl: 0    levalbuterol (Xopenex) 0.63 mg/3 mL nebulizer solution, Take 3 mL (0.63 mg total) by nebulization 3 (three) times a day (Patient not taking: Reported on 7/19/2024), Disp: 30 mL, Rfl: 1    nystatin (MYCOSTATIN) 500,000 units/5 mL suspension, Apply 5 mL (500,000 Units total) to the mouth or throat 4 (four) times a day for 7 days,  Disp: 140 mL, Rfl: 0    prochlorperazine (COMPAZINE) 5 mg tablet, Take 1 tablet (5 mg total) by mouth every 6 (six) hours as needed for nausea or vomiting (Patient not taking: Reported on 7/19/2024), Disp: 30 tablet, Rfl: 0    rimegepant sulfate (NURTEC) 75 mg TBDP, Take 1 tablet (75 mg total) by mouth daily as needed (migraine headaches) (Patient not taking: Reported on 7/19/2024), Disp: 8 tablet, Rfl: 3    Current Allergies     Allergies as of 07/19/2024 - Reviewed 07/19/2024   Allergen Reaction Noted    Sulfa antibiotics Anaphylaxis 08/10/2017    Levofloxacin  08/10/2017    Prednisone Other (See Comments) 08/17/2021    Tobramycin Other (See Comments) 08/10/2017    Pseudoephedrine Palpitations 07/12/2023            The following portions of the patient's history were reviewed and updated as appropriate: allergies, current medications, past family history, past medical history, past social history, past surgical history and problem list.     Past Medical History:   Diagnosis Date    Allergic     Anemia     Anxiety     Arthritis     Asthma     Chlamydia 2014    Unfaithful partner    Depression     Exposure to SARS-associated coronavirus 04/12/2020    GERD (gastroesophageal reflux disease) 2010    Gonorrhea 2014    Unfaithful partner    Headache(784.0)     Lumbar herniated disc     Migraine 2012    Pneumonia     Urinary tract infection     Urogenital trichomoniasis 2015    Unfaithful partner    UTI (urinary tract infection)     Varicella 1988    Visual impairment        Past Surgical History:   Procedure Laterality Date    FL INJECTION LEFT KNEE (ARTHROGRAM)  2/28/2022    WISDOM TOOTH EXTRACTION         Family History   Problem Relation Age of Onset    BRCA2 Negative Mother     BRCA1 Negative Mother     Breast cancer additional onset Mother         Left Masectomy    Hypertension Mother     Diabetes Mother     Asthma Mother     Cancer Mother         breast    Breast cancer Mother         In 2019, left side. -brca     Migraines Mother     Mental illness Father         family history    Alcohol abuse Father     Hypertension Father     Hyperlipidemia Father     Diabetes Father     Heart attack Father         Assumed. Found  at 63    Colon cancer Maternal Grandmother     Breast cancer additional onset Maternal Grandmother     Diabetes Maternal Grandmother     Cancer Maternal Grandmother         breast, colon    Breast cancer Maternal Grandmother     Prostate cancer Maternal Grandfather         lung    Cancer Maternal Grandfather         lung (smoker)    Completed Suicide  Maternal Grandfather     Lung cancer Maternal Grandfather     Asthma Brother     Asthma Brother          Medications have been verified.        Objective   /76   Pulse 95   Temp 98.1 °F (36.7 °C)   Resp 18   Wt 79.4 kg (175 lb)   LMP 2024 (Exact Date)   SpO2 97%   BMI 28.25 kg/m²        Physical Exam     Physical Exam  Vitals and nursing note reviewed.   Constitutional:       General: She is awake. She is not in acute distress.     Appearance: Normal appearance. She is well-developed and normal weight.   HENT:      Head: Normocephalic and atraumatic.      Right Ear: Hearing, tympanic membrane, ear canal and external ear normal.      Left Ear: Hearing, tympanic membrane, ear canal and external ear normal.      Nose: No congestion or rhinorrhea.      Mouth/Throat:      Lips: Pink.      Mouth: Mucous membranes are moist. Oral lesions present.      Pharynx: Oropharynx is clear. Uvula midline. No oropharyngeal exudate or posterior oropharyngeal erythema.      Tonsils: No tonsillar exudate or tonsillar abscesses. 2+ on the right. 2+ on the left.        Comments: White patches present over soft palate. Airway intact.   Eyes:      Conjunctiva/sclera: Conjunctivae normal.   Cardiovascular:      Rate and Rhythm: Normal rate and regular rhythm.      Pulses: Normal pulses.      Heart sounds: Normal heart sounds.   Pulmonary:      Effort: Pulmonary  effort is normal.      Breath sounds: Normal breath sounds.   Skin:     General: Skin is warm and dry.   Neurological:      General: No focal deficit present.      Mental Status: She is alert and oriented to person, place, and time.   Psychiatric:         Mood and Affect: Mood normal.         Behavior: Behavior normal. Behavior is cooperative.         Thought Content: Thought content normal.         Judgment: Judgment normal.

## 2024-07-19 NOTE — PATIENT INSTRUCTIONS
Start nystatin as previously prescribed. Ensure you are thoroughly rinsing your mouth out with each inhaler use. Follow-up with PCP in 3-5 days if no improvement of symptoms. Report to the ER sooner if symptoms or unable to tolerate oral intake for >24 hours.

## 2024-08-05 ENCOUNTER — TELEMEDICINE (OUTPATIENT)
Dept: PSYCHIATRY | Facility: CLINIC | Age: 39
End: 2024-08-05
Payer: COMMERCIAL

## 2024-08-05 DIAGNOSIS — F33.1 MODERATE EPISODE OF RECURRENT MAJOR DEPRESSIVE DISORDER (HCC): Primary | ICD-10-CM

## 2024-08-05 DIAGNOSIS — F43.10 PTSD (POST-TRAUMATIC STRESS DISORDER): ICD-10-CM

## 2024-08-05 PROCEDURE — 90834 PSYTX W PT 45 MINUTES: CPT

## 2024-08-05 NOTE — PSYCH
Virtual Regular Visit    Verification of patient location:    Patient is located at Home in the following state in which I hold an active license [place]      Assessment/Plan:    Problem List Items Addressed This Visit       Moderate episode of recurrent major depressive disorder (HCC) - Primary    PTSD (post-traumatic stress disorder)       Goals addressed in session: Goal 1          Reason for visit is No chief complaint on file.       Encounter provider Jannet Villatoro LCSW      Recent Visits  No visits were found meeting these conditions.  Showing recent visits within past 7 days and meeting all other requirements  Today's Visits  Date Type Provider Dept   08/05/24 Telemedicine Jannet Villatoro LCSW [facility]   Showing today's visits and meeting all other requirements  Future Appointments  No visits were found meeting these conditions.  Showing future appointments within next 150 days and meeting all other requirements       The patient was identified by name and date of birth. Linda Hickman was informed that this is a telemedicine visit and that the visit is being conducted throughthe [employer] platform. She agrees to proceed..  My office door was closed. No one else was in the room.  She acknowledged consent and understanding of privacy and security of the video platform. The patient has agreed to participate and understands they can discontinue the visit at any time.    Patient is aware this is a billable service.     Subjective  Linda Hickman is a 39 y.o. female  .      HPI     Past Medical History:   Diagnosis Date    Allergic     Anemia     Anxiety     Arthritis     Asthma     Chlamydia 2014    Unfaithful partner    Depression     Exposure to SARS-associated coronavirus 04/12/2020    GERD (gastroesophageal reflux disease) 2010    Gonorrhea 2014    Unfaithful partner    Headache(784.0)     Lumbar herniated disc     Migraine 2012    Pneumonia     Urinary tract infection      Urogenital trichomoniasis 2015    Unfaithful partner    UTI (urinary tract infection)     Varicella 1988    Visual impairment        Past Surgical History:   Procedure Laterality Date    FL INJECTION LEFT KNEE (ARTHROGRAM)  2/28/2022    WISDOM TOOTH EXTRACTION         Current Outpatient Medications   Medication Sig Dispense Refill    albuterol (PROVENTIL HFA,VENTOLIN HFA) 90 mcg/act inhaler Inhale 2 puffs every 6 (six) hours as needed for wheezing 18 g 3    amphetamine-dextroamphetamine (ADDERALL XR, 20MG,) 20 MG 24 hr capsule Take 1 capsule (20 mg total) by mouth in the morning on productive days 30 capsule 0    amphetamine-dextroamphetamine (ADDERALL XR, 20MG,) 20 MG 24 hr capsule Take 1 capsule (20 mg total) by mouth in the morning on productive days Do not start before August 1, 2024. (Patient not taking: Reported on 7/19/2024 Do not start before August 1, 2024.) 30 capsule 0    B Complex-C (B-complex with vitamin C) tablet Take 1 tablet by mouth daily (Patient not taking: Reported on 7/11/2024)      buPROPion (Wellbutrin XL) 150 mg 24 hr tablet Take 1 tablet (150 mg total) by mouth daily - Take with Wellbutrin 300 mg XL for a total daily dose of 450 mg 90 tablet 0    buPROPion (Wellbutrin XL) 300 mg 24 hr tablet Take 1 tablet (300 mg total) by mouth every morning 90 tablet 0    calcium carbonate (OS-BERENICE) 600 MG tablet Take 600 mg by mouth 2 (two) times a day with meals      cholecalciferol (VITAMIN D3) 1,000 units tablet Take 1,000 Units by mouth daily      Dupixent 200 MG/1.14ML SOPN Inject 1 pen ( 200mg total) under the skin once every 14 (fourteen) days. 2 mL 10    EPINEPHrine (EPIPEN) 0.3 mg/0.3 mL SOAJ Inject 0.3 mL (0.3 mg total) into a muscle once for 1 dose 0.6 mL 0    Ferrous Sulfate (RA IRON PO) Take by mouth      levalbuterol (Xopenex) 0.63 mg/3 mL nebulizer solution Take 3 mL (0.63 mg total) by nebulization 3 (three) times a day (Patient not taking: Reported on 7/19/2024) 30 mL 1    Magnesium  Glycinate 100 MG CAPS Take 200 mg by mouth 2 (two) times a day      Melatonin 5 MG TABS Take 1 tablet (5 mg total) by mouth daily at bedtime as needed (insomnia)      mometasone-formoterol (Dulera) 200-5 MCG/ACT inhaler Inhale 2 puffs 2 (two) times a day Rinse mouth after use. 39 g 2    Prenatal MV-Min-Fe Fum-FA-DHA (PRENATAL+DHA PO) Take by mouth      prochlorperazine (COMPAZINE) 5 mg tablet Take 1 tablet (5 mg total) by mouth every 6 (six) hours as needed for nausea or vomiting (Patient not taking: Reported on 7/19/2024) 30 tablet 0    rimegepant sulfate (NURTEC) 75 mg TBDP Take 1 tablet (75 mg total) by mouth daily as needed (migraine headaches) (Patient not taking: Reported on 7/19/2024) 8 tablet 3    tretinoin (RETIN-A) 0.025 % cream Apply topically daily at bedtime 45 g 2     No current facility-administered medications for this visit.        Allergies   Allergen Reactions    Sulfa Antibiotics Anaphylaxis    Levofloxacin      Other reaction(s): sensative    Prednisone Other (See Comments)    Tobramycin Other (See Comments)     Other reaction(s): sensative. Ototoxic    Pseudoephedrine Palpitations       Review of Systems    Video Exam    There were no vitals filed for this visit.    Physical Exam     Behavioral Health Psychotherapy Progress Note    Psychotherapy Provided: Individual Psychotherapy     1. Moderate episode of recurrent major depressive disorder (HCC)        2. PTSD (post-traumatic stress disorder)            Goals addressed in session: Goal 1     DATA: Linda shared she has had a migraine for a week and has been fighting it, and has been feeling up and down somewhat because of her circumstances and her partner's circumstances. 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  Therapist and Linda also  "identified an EMDR target for next session around her not being as hopeful about things because of not believing good things will happen to her.  During this session, this clinician used the following therapeutic modalities: Cognitive Behavioral Therapy and EMDR (or other form of bilateral Stimulation)    Substance Abuse was not addressed during this session. If the client is diagnosed with a co-occurring substance use disorder, please indicate any changes in the frequency or amount of use: . Stage of change for addressing substance use diagnoses: No substance use/Not applicable    ASSESSMENT:  Linda Hickman presents with a Euthymic/ normal mood.     her affect is Normal range and intensity, which is congruent, with her mood and the content of the session. The client has made progress on their goals.     Linda Hickman presents with a none risk of suicide, none risk of self-harm, and none risk of harm to others.    For any risk assessment that surpasses a \"low\" rating, a safety plan must be developed.    A safety plan was indicated: no  If yes, describe in detail     PLAN: Between sessions, Linda Hickman will utilize positive thinking to improve mood. At the next session, the therapist will use Cognitive Behavioral Therapy and EMDR (or other form of bilateral Stimulation) to address PTSD.    Behavioral Health Treatment Plan and Discharge Planning: Linda Hickman is aware of and agrees to continue to work on their treatment plan. They have identified and are working toward their discharge goals. yes    Visit start and stop times:    08/05/24  Start Time: 1700  Stop Time: 1752  Total Visit Time: 52 minutes      "

## 2024-08-09 ENCOUNTER — TELEPHONE (OUTPATIENT)
Dept: PSYCHIATRY | Facility: CLINIC | Age: 39
End: 2024-08-09

## 2024-08-09 DIAGNOSIS — B37.0 THRUSH: Primary | ICD-10-CM

## 2024-08-09 DIAGNOSIS — B37.0 THRUSH: ICD-10-CM

## 2024-08-09 DIAGNOSIS — F90.0 ATTENTION DEFICIT HYPERACTIVITY DISORDER, INATTENTIVE TYPE: Primary | ICD-10-CM

## 2024-08-09 RX ORDER — CLOTRIMAZOLE 10 MG/1
LOZENGE ORAL
Qty: 70 TROCHE | Refills: 0 | Status: SHIPPED | OUTPATIENT
Start: 2024-08-09

## 2024-08-09 RX ORDER — DEXTROAMPHETAMINE SACCHARATE, AMPHETAMINE ASPARTATE MONOHYDRATE, DEXTROAMPHETAMINE SULFATE AND AMPHETAMINE SULFATE 2.5; 2.5; 2.5; 2.5 MG/1; MG/1; MG/1; MG/1
CAPSULE, EXTENDED RELEASE ORAL
Qty: 60 CAPSULE | Refills: 0 | Status: SHIPPED | OUTPATIENT
Start: 2024-08-09

## 2024-08-09 NOTE — TELEPHONE ENCOUNTER
I received a notification that the patient Linda Hickman has recently been unable to obtain Adderall XR 20 mg daily on productive days due to this medication being on back order. The pharmacist informed me that Adderall XR 10 mg is currently available.    PDMP website reviewed. Linda has been appropriately adherent to controlled psychotropic medications without evidence of abuse or misuse. As such, will send 30-day refill to pharmacy of choice and follow up as necessary.    Medication Sent:    amphetamine-dextroamphetamine (ADDERALL XR, 10MG,) 10 MG 24 hr capsule          Take 2 capsules (20 mg total) by mouth in the morning on productive days     Mika Albarran MD

## 2024-08-15 ENCOUNTER — APPOINTMENT (OUTPATIENT)
Dept: LAB | Facility: HOSPITAL | Age: 39
End: 2024-08-15

## 2024-08-15 DIAGNOSIS — Z00.8 HEALTH EXAMINATION IN POPULATION SURVEY: ICD-10-CM

## 2024-08-15 LAB
CHOLEST SERPL-MCNC: 150 MG/DL
EST. AVERAGE GLUCOSE BLD GHB EST-MCNC: 97 MG/DL
HBA1C MFR BLD: 5 %
HDLC SERPL-MCNC: 49 MG/DL
LDLC SERPL CALC-MCNC: 90 MG/DL (ref 0–100)
NONHDLC SERPL-MCNC: 101 MG/DL
TRIGL SERPL-MCNC: 53 MG/DL

## 2024-08-15 PROCEDURE — 83036 HEMOGLOBIN GLYCOSYLATED A1C: CPT

## 2024-08-15 PROCEDURE — 80061 LIPID PANEL: CPT

## 2024-08-15 PROCEDURE — 36415 COLL VENOUS BLD VENIPUNCTURE: CPT

## 2024-08-20 ENCOUNTER — TELEMEDICINE (OUTPATIENT)
Dept: PSYCHIATRY | Facility: CLINIC | Age: 39
End: 2024-08-20
Payer: COMMERCIAL

## 2024-08-20 DIAGNOSIS — F33.1 MODERATE EPISODE OF RECURRENT MAJOR DEPRESSIVE DISORDER (HCC): Primary | ICD-10-CM

## 2024-08-20 DIAGNOSIS — F43.10 PTSD (POST-TRAUMATIC STRESS DISORDER): ICD-10-CM

## 2024-08-20 PROCEDURE — 90834 PSYTX W PT 45 MINUTES: CPT

## 2024-08-20 NOTE — PSYCH
Virtual Regular Visit    Verification of patient location:    Patient is located at Home in the following state in which I hold an active license [place]      Assessment/Plan:    Problem List Items Addressed This Visit       Moderate episode of recurrent major depressive disorder (HCC) - Primary    PTSD (post-traumatic stress disorder)       Goals addressed in session: Goal 1          Reason for visit is No chief complaint on file.       Encounter provider Jannet Villatoro LCSW      Recent Visits  No visits were found meeting these conditions.  Showing recent visits within past 7 days and meeting all other requirements  Today's Visits  Date Type Provider Dept   08/20/24 Telemedicine Jannet Villatoro LCSW [facility]   Showing today's visits and meeting all other requirements  Future Appointments  No visits were found meeting these conditions.  Showing future appointments within next 150 days and meeting all other requirements       The patient was identified by name and date of birth. Linda Hickman was informed that this is a telemedicine visit and that the visit is being conducted throughthe [employer] platform. She agrees to proceed..  My office door was closed. No one else was in the room.  She acknowledged consent and understanding of privacy and security of the video platform. The patient has agreed to participate and understands they can discontinue the visit at any time.    Patient is aware this is a billable service.     Subjective  Linda Hickman is a 39 y.o. female  .      HPI     Past Medical History:   Diagnosis Date    Allergic     Anemia     Anxiety     Arthritis     Asthma     Chlamydia 2014    Unfaithful partner    Depression     Exposure to SARS-associated coronavirus 04/12/2020    GERD (gastroesophageal reflux disease) 2010    Gonorrhea 2014    Unfaithful partner    Headache(784.0)     Lumbar herniated disc     Migraine 2012    Pneumonia     Urinary tract infection      Urogenital trichomoniasis 2015    Unfaithful partner    UTI (urinary tract infection)     Varicella 1988    Visual impairment        Past Surgical History:   Procedure Laterality Date    FL INJECTION LEFT KNEE (ARTHROGRAM)  2/28/2022    WISDOM TOOTH EXTRACTION         Current Outpatient Medications   Medication Sig Dispense Refill    albuterol (PROVENTIL HFA,VENTOLIN HFA) 90 mcg/act inhaler Inhale 2 puffs every 6 (six) hours as needed for wheezing 18 g 3    amphetamine-dextroamphetamine (ADDERALL XR, 10MG,) 10 MG 24 hr capsule Take 2 capsules (20 mg total) by mouth in the morning on productive days 60 capsule 0    B Complex-C (B-complex with vitamin C) tablet Take 1 tablet by mouth daily (Patient not taking: Reported on 7/11/2024)      buPROPion (Wellbutrin XL) 150 mg 24 hr tablet Take 1 tablet (150 mg total) by mouth daily - Take with Wellbutrin 300 mg XL for a total daily dose of 450 mg 90 tablet 0    buPROPion (Wellbutrin XL) 300 mg 24 hr tablet Take 1 tablet (300 mg total) by mouth every morning 90 tablet 0    calcium carbonate (OS-BERENICE) 600 MG tablet Take 600 mg by mouth 2 (two) times a day with meals      cholecalciferol (VITAMIN D3) 1,000 units tablet Take 1,000 Units by mouth daily      clotrimazole (MYCELEX) 10 mg mary grace dissolve 1 mary grace by mouth five times a day 70 Mary Grace 0    Dupixent 200 MG/1.14ML SOPN Inject 1 pen ( 200mg total) under the skin once every 14 (fourteen) days. 2 mL 10    EPINEPHrine (EPIPEN) 0.3 mg/0.3 mL SOAJ Inject 0.3 mL (0.3 mg total) into a muscle once for 1 dose 0.6 mL 0    Ferrous Sulfate (RA IRON PO) Take by mouth      levalbuterol (Xopenex) 0.63 mg/3 mL nebulizer solution Take 3 mL (0.63 mg total) by nebulization 3 (three) times a day (Patient not taking: Reported on 7/19/2024) 30 mL 1    Magnesium Glycinate 100 MG CAPS Take 200 mg by mouth 2 (two) times a day      Melatonin 5 MG TABS Take 1 tablet (5 mg total) by mouth daily at bedtime as needed (insomnia)       mometasone-formoterol (Dulera) 200-5 MCG/ACT inhaler Inhale 2 puffs 2 (two) times a day Rinse mouth after use. 39 g 2    Prenatal MV-Min-Fe Fum-FA-DHA (PRENATAL+DHA PO) Take by mouth      prochlorperazine (COMPAZINE) 5 mg tablet Take 1 tablet (5 mg total) by mouth every 6 (six) hours as needed for nausea or vomiting (Patient not taking: Reported on 7/19/2024) 30 tablet 0    rimegepant sulfate (NURTEC) 75 mg TBDP Take 1 tablet (75 mg total) by mouth daily as needed (migraine headaches) (Patient not taking: Reported on 7/19/2024) 8 tablet 3    tretinoin (RETIN-A) 0.025 % cream Apply topically daily at bedtime 45 g 2     No current facility-administered medications for this visit.        Allergies   Allergen Reactions    Sulfa Antibiotics Anaphylaxis    Levofloxacin      Other reaction(s): sensative    Prednisone Other (See Comments)    Tobramycin Other (See Comments)     Other reaction(s): sensative. Ototoxic    Pseudoephedrine Palpitations       Review of Systems    Video Exam    There were no vitals filed for this visit.    Physical Exam     Behavioral Health Psychotherapy Progress Note    Psychotherapy Provided: Individual Psychotherapy     1. Moderate episode of recurrent major depressive disorder (HCC)        2. PTSD (post-traumatic stress disorder)            Goals addressed in session: Goal 1     DATA: Linda shared she has been feeling better and has been doing better in her relationship with her SO.  She has said she is happier with her stepson since she is putting her foot down with [employer]'s manners.  Therapist and Linda identified a target issue and processed it, that she gives up on being hopeful.  She was able to process this and to find some relief in her body at the end of the process.  During this session, this clinician used the following therapeutic modalities: EMDR (or other form of bilateral Stimulation)    Substance Abuse was addressed during this session. If the client is diagnosed with a  "co-occurring substance use disorder, please indicate any changes in the frequency or amount of use: . Stage of change for addressing substance use diagnoses: No substance use/Not applicable    ASSESSMENT:  Linda Hickman presents with a Euthymic/ normal mood.     her affect is Normal range and intensity, which is congruent, with her mood and the content of the session. The client has made progress on their goals.     Linda Hickman presents with a none risk of suicide, none risk of self-harm, and none risk of harm to others.    For any risk assessment that surpasses a \"low\" rating, a safety plan must be developed.    A safety plan was indicated: no  If yes, describe in detail     PLAN: Between sessions, Linda Hickman will utilize calm place . At the next session, the therapist will use EMDR (or other form of bilateral Stimulation) to address PTSD.    Behavioral Health Treatment Plan and Discharge Planning: Linda Hickman is aware of and agrees to continue to work on their treatment plan. They have identified and are working toward their discharge goals. yes    Visit start and stop times:    08/20/24  Start Time: 1700  Stop Time: 1752  Total Visit Time: 52 minutes        "

## 2024-08-27 ENCOUNTER — TELEMEDICINE (OUTPATIENT)
Dept: PSYCHIATRY | Facility: CLINIC | Age: 39
End: 2024-08-27

## 2024-08-27 DIAGNOSIS — F43.9 TRAUMA AND STRESSOR-RELATED DISORDER: ICD-10-CM

## 2024-08-27 DIAGNOSIS — F90.0 ATTENTION DEFICIT HYPERACTIVITY DISORDER, INATTENTIVE TYPE: ICD-10-CM

## 2024-08-27 DIAGNOSIS — F33.1 MODERATE EPISODE OF RECURRENT MAJOR DEPRESSIVE DISORDER (HCC): Primary | ICD-10-CM

## 2024-08-27 PROCEDURE — NC001 PR NO CHARGE

## 2024-08-27 RX ORDER — BUPROPION HYDROCHLORIDE 150 MG/1
150 TABLET ORAL DAILY
Qty: 90 TABLET | Refills: 0 | Status: SHIPPED | OUTPATIENT
Start: 2024-08-27

## 2024-08-27 RX ORDER — DEXTROAMPHETAMINE SACCHARATE, AMPHETAMINE ASPARTATE MONOHYDRATE, DEXTROAMPHETAMINE SULFATE AND AMPHETAMINE SULFATE 2.5; 2.5; 2.5; 2.5 MG/1; MG/1; MG/1; MG/1
CAPSULE, EXTENDED RELEASE ORAL
Qty: 60 CAPSULE | Refills: 0 | Status: SHIPPED | OUTPATIENT
Start: 2024-09-13

## 2024-08-27 RX ORDER — PRAZOSIN HYDROCHLORIDE 1 MG/1
1 CAPSULE ORAL
Start: 2024-08-27

## 2024-08-27 RX ORDER — BUPROPION HYDROCHLORIDE 300 MG/1
300 TABLET ORAL EVERY MORNING
Qty: 90 TABLET | Refills: 0 | Status: SHIPPED | OUTPATIENT
Start: 2024-08-27 | End: 2024-11-25

## 2024-08-27 NOTE — PSYCH
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 nighttime anxiety and insomnia was discussed (as this medication has been beneficial for the patient in the past).  The topic of utilizing Tenex (1 mg twice daily as needed for anxiety and insomnia) was also discussed.  Following a thorough conversation, Linda agreed to restarting prazosin 1 mg at bedtime and she stated that she would call the office should the prazosin be ineffective and should she like to trial guanfacine (Tenex).    Treatment Recommendations/Precautions:     Restarted prazosin 1 mg at bedtime for nighttime anxiety and insomnia in the setting of trauma and stress related disorder  PARQ was completed for prazosin (Minipress) including dizziness, sedation, blood pressure changes (including orthostatic hypotension and syncope), headache, medication interaction risk, GI distress, priapism in males, and others.    Following a thorough conversation, Linda agreed to restarting prazosin 1 mg at bedtime and she stated that she would call the office should the prazosin be ineffective and should she like to trial guanfacine (Tenex)    Continue Wellbutrin 450 mg XL daily for mood as well as concentration  PARQ was completed for bupropion  (Wellbutrin) including nausea, insomnia, agitation/activation, weight loss, anxiety, palpitations, hypertension, decreased seizure threshold and risk with alcohol withdrawal or electrolyte disturbances.  Patient screened negative for heavy alcohol use, history of seizures, and eating disorders.     Continue Adderall XR 20 mg daily on productive days for symptoms of ADHD  PARQ completed for the class of stimulant medications including anxiety/irritability, insomnia, appetite suppression/weight loss, abuse potential, elevated heart rate and blood pressure, seizures, activation/induction of belinda, unmasking of tic's, growth suppression in children, interactions with other medications, and risk of sudden death.      Continue melatonin 10 mg at bedtime for sleep cycle regulation    Continue ongoing psychotherapy with Jannet on a monthly basis   Continue ongoing psychiatric medication management every 1 to 3 months   Aware of the need to follow-up with family physician for medical issues   Aware of 24 hour weekend coverage for urgent situations access by calling [facility] Main practice number    Risk of Harm to Self:  The following ratings are based on assessment at the time of the interview as well as review of medical records  Demographic risk factors include: White  Historical Risk Factors include: Chronic depressive symptoms, chronic anxiety symptoms  Recent Specific Risk Factors include: Diagnosis of depression, current mild depressive symptoms  Protective Factors: No current suicidal ideation, stable mood, improved depression symptoms, improved anxiety symptoms, access to mental health treatment, taking psychiatric medications as prescribed  Access to firearms: Patient reports that there are guns in her house, reporting her boyfriend owns a handgun and hunts.  She states they are locked and secured and has no access to them. Linda Hickman has no history of arrests or violence pertaining  to use of a deadly weapon.   Based on today's assessment, Linda presents the following risk of harm to self: Minimal     Risk of Harm to Others:  The following ratings are based on assessment at the time of the interview as well as review of medical records  Demographic Risk Factors include: Under age 40  Historical Risk Factors include: None  Recent Specific Risk Factors include: None  Protective Factors: No current homicidal ideation  Based on today's assessment, Linda presents the following risk of harm to others: Minimal    Although patient's acute lethality risk is low, long-term/chronic lethality risk is mildly elevated in the presence of moderate symptoms of depression and anxiety. At the current moment, Linda is future-oriented, forward-thinking, and demonstrates ability to act in a self-preserving manner as evidenced by volitionally presenting to the clinic today, seeking treatment. At this juncture, inpatient hospitalization is not currently warranted. To mitigate future risk, patient should adhere to the recommendations of this writer, avoid alcohol/illicit substance use, utilize community-based resources and familiar support and prioritize mental health treatment.     Based on today's assessment and clinical criteria, Linda Hickman contracts for safety and is not an imminent risk of harm to self or others. Outpatient level of care is deemed appropriate at this present time. Linda understands that if they are no longer able to contract for safety, they need to call/contact the outpatient office including this writer, call/contact crisis and/orattend to the nearest Emergency Department for immediate evaluation.    Risks/Benefits      Risks, Benefits And Possible Side Effects Of Medications:    Risks, benefits, and possible side effects of medications explained to Linda and she verbalizes understanding and agreement for treatment.    Controlled Medication Discussion:     Linda has been filling controlled  prescriptions on time as prescribed according to [place] Prescription Drug Monitoring Program    Psychotherapy Provided:     Individual psychotherapy provided: Yes  Recent stressor including relationship stressors, parenting stressors, job stressors discussed with Linda.   Supportive therapy provided    Treatment Plan:    Completed and signed during the session: Not applicable - Treatment Plan not due at this session    Visit Time    Visit Start Time: 4:00 PM  Visit Stop Time: 4:30 PM  Total Visit Duration: 30 minutes    This note was shared with patient.    Mika Albarran MD 08/27/24    This note has been constructed using a voice recognition system. There may be translation, syntax, or grammatical errors. If you have any questions, please contact the dictating provider.

## 2024-08-27 NOTE — PATIENT INSTRUCTIONS
Please present for your previously scheduled appointment approximately 15 minutes prior to appointment time. If you are running late or are unable to attend your appointment, please call our [place] office at [phone] or our [place] office at [phone] if applicable to notify the office of your absence.    If you are in psychological crisis including not limited to suicidal/homicidal thoughts or thoughts of self-injury, do not hesitate to call/contact your County Crisis hotline (see below) or attend to the nearest emergency department.  [facility] Crisis: [phone]  [facility] Crisis: [phone]  [facility] Crisis: [phone]  [facility] Crisis: [phone]  [facility] Crisis: [phone]  [facility] Crisis: [phone]  [facility] Crisis: [phone]  National Suicide Prevention Hotline: 1-379.792.8677

## 2024-09-05 ENCOUNTER — TELEMEDICINE (OUTPATIENT)
Dept: PSYCHIATRY | Facility: CLINIC | Age: 39
End: 2024-09-05
Payer: COMMERCIAL

## 2024-09-05 DIAGNOSIS — F43.10 PTSD (POST-TRAUMATIC STRESS DISORDER): ICD-10-CM

## 2024-09-05 DIAGNOSIS — F33.1 MODERATE EPISODE OF RECURRENT MAJOR DEPRESSIVE DISORDER (HCC): Primary | ICD-10-CM

## 2024-09-05 PROCEDURE — 90834 PSYTX W PT 45 MINUTES: CPT

## 2024-09-05 NOTE — PSYCH
Virtual Regular Visit    Verification of patient location:    Patient is located at Home in the following state in which I hold an active license [place]      Assessment/Plan:    Problem List Items Addressed This Visit       Moderate episode of recurrent major depressive disorder (HCC) - Primary    PTSD (post-traumatic stress disorder)       Goals addressed in session: Goal 1          Reason for visit is No chief complaint on file.       Encounter provider Jannet Villatoro LCSW      Recent Visits  No visits were found meeting these conditions.  Showing recent visits within past 7 days and meeting all other requirements  Today's Visits  Date Type Provider Dept   09/05/24 Telemedicine Jannet Villatoro LCSW [facility]   Showing today's visits and meeting all other requirements  Future Appointments  No visits were found meeting these conditions.  Showing future appointments within next 150 days and meeting all other requirements       The patient was identified by name and date of birth. Linda Hickman was informed that this is a telemedicine visit and that the visit is being conducted throughthe [employer] platform. She agrees to proceed..  My office door was closed. No one else was in the room.  She acknowledged consent and understanding of privacy and security of the video platform. The patient has agreed to participate and understands they can discontinue the visit at any time.    Patient is aware this is a billable service.     Subjective  Linda Hickman is a 39 y.o. female Behavioral Health Psychotherapy Progress Note    Psychotherapy Provided: Individual Psychotherapy     1. Moderate episode of recurrent major depressive disorder (HCC)        2. PTSD (post-traumatic stress disorder)            Goals addressed in session: Goal 1     DATA: Linda shared she is still struggling with trying to find the right medication dosage and type to help with attention and feelings of depression.  She  "shared she is struggling with her stepson ignoring adults and getting what he wants.  Therapist encouraged open communication and was able to help Linda to think about some compromises she might make with her boyfriend over the way that they approach coparenting.  She said she would talk with Iraj and help to change the pattern of interacting with her stepson in order to improve the co caregiver alliance.  Therapist coached Linda on parenting skills and compromise.  During this session, this clinician used the following therapeutic modalities: Supportive Psychotherapy and parenting skills    Substance Abuse was not addressed during this session. If the client is diagnosed with a co-occurring substance use disorder, please indicate any changes in the frequency or amount of use: . Stage of change for addressing substance use diagnoses: No substance use/Not applicable    ASSESSMENT:  Linda Hickman presents with a Euthymic/ normal mood.     her affect is Normal range and intensity, which is congruent, with her mood and the content of the session. The client has made progress on their goals.     Linda Hickman presents with a none risk of suicide, none risk of self-harm, and none risk of harm to others.    For any risk assessment that surpasses a \"low\" rating, a safety plan must be developed.    A safety plan was indicated: no  If yes, describe in detail     PLAN: Between sessions, Linda Hickman will utilize compromise and communication skills with her partner. At the next session, the therapist will use EMDR (or other form of bilateral Stimulation) and Supportive Psychotherapy to address depression, PTSD.    Behavioral Health Treatment Plan and Discharge Planning: Linda Hickman is aware of and agrees to continue to work on their treatment plan. They have identified and are working toward their discharge goals. yes    Visit start and stop times:    09/05/24  Start Time: 1700  Stop Time: 1752  Total Visit Time: 52 " minutes .      HPI     Past Medical History:   Diagnosis Date    Allergic     Anemia     Anxiety     Arthritis     Asthma     Chlamydia 2014    Unfaithful partner    Depression     Exposure to SARS-associated coronavirus 04/12/2020    GERD (gastroesophageal reflux disease) 2010    Gonorrhea 2014    Unfaithful partner    Headache(784.0)     Lumbar herniated disc     Migraine 2012    Pneumonia     Urinary tract infection     Urogenital trichomoniasis 2015    Unfaithful partner    UTI (urinary tract infection)     Varicella 1988    Visual impairment        Past Surgical History:   Procedure Laterality Date    FL INJECTION LEFT KNEE (ARTHROGRAM)  2/28/2022    WISDOM TOOTH EXTRACTION         Current Outpatient Medications   Medication Sig Dispense Refill    albuterol (PROVENTIL HFA,VENTOLIN HFA) 90 mcg/act inhaler Inhale 2 puffs every 6 (six) hours as needed for wheezing 18 g 3    [START ON 9/13/2024] amphetamine-dextroamphetamine (ADDERALL XR, 10MG,) 10 MG 24 hr capsule Take 2 capsules (20 mg total) by mouth in the morning on productive days Do not start before September 13, 2024. 60 capsule 0    B Complex-C (B-complex with vitamin C) tablet Take 1 tablet by mouth daily (Patient not taking: Reported on 7/11/2024)      buPROPion (Wellbutrin XL) 150 mg 24 hr tablet Take 1 tablet (150 mg total) by mouth daily - Take with Wellbutrin 300 mg XL for a total daily dose of 450 mg 90 tablet 0    buPROPion (Wellbutrin XL) 300 mg 24 hr tablet Take 1 tablet (300 mg total) by mouth every morning 90 tablet 0    calcium carbonate (OS-BERENICE) 600 MG tablet Take 600 mg by mouth 2 (two) times a day with meals      cholecalciferol (VITAMIN D3) 1,000 units tablet Take 1,000 Units by mouth daily      clotrimazole (MYCELEX) 10 mg mary grace dissolve 1 mary grace by mouth five times a day 70 Mary Grace 0    Dupixent 200 MG/1.14ML SOPN Inject 1 pen ( 200mg total) under the skin once every 14 (fourteen) days. 2 mL 10    EPINEPHrine (EPIPEN) 0.3 mg/0.3 mL SOAJ  Inject 0.3 mL (0.3 mg total) into a muscle once for 1 dose 0.6 mL 0    Ferrous Sulfate (RA IRON PO) Take by mouth      levalbuterol (Xopenex) 0.63 mg/3 mL nebulizer solution Take 3 mL (0.63 mg total) by nebulization 3 (three) times a day (Patient not taking: Reported on 7/19/2024) 30 mL 1    Magnesium Glycinate 100 MG CAPS Take 200 mg by mouth 2 (two) times a day      Melatonin 5 MG TABS Take 1 tablet (5 mg total) by mouth daily at bedtime as needed (insomnia)      mometasone-formoterol (Dulera) 200-5 MCG/ACT inhaler Inhale 2 puffs 2 (two) times a day Rinse mouth after use. 39 g 2    prazosin (MINIPRESS) 1 mg capsule Take 1 capsule (1 mg total) by mouth daily at bedtime      Prenatal MV-Min-Fe Fum-FA-DHA (PRENATAL+DHA PO) Take by mouth      prochlorperazine (COMPAZINE) 5 mg tablet Take 1 tablet (5 mg total) by mouth every 6 (six) hours as needed for nausea or vomiting (Patient not taking: Reported on 7/19/2024) 30 tablet 0    rimegepant sulfate (NURTEC) 75 mg TBDP Take 1 tablet (75 mg total) by mouth daily as needed (migraine headaches) (Patient not taking: Reported on 7/19/2024) 8 tablet 3    tretinoin (RETIN-A) 0.025 % cream Apply topically daily at bedtime 45 g 2     No current facility-administered medications for this visit.        Allergies   Allergen Reactions    Sulfa Antibiotics Anaphylaxis    Levofloxacin      Other reaction(s): sensative    Prednisone Other (See Comments)    Tobramycin Other (See Comments)     Other reaction(s): sensative. Ototoxic    Pseudoephedrine Palpitations       Review of Systems    Video Exam    There were no vitals filed for this visit.    Physical Exam

## 2024-09-06 ENCOUNTER — NURSE TRIAGE (OUTPATIENT)
Age: 39
End: 2024-09-06

## 2024-09-06 NOTE — TELEPHONE ENCOUNTER
Regarding: Thrush  ----- Message from Ade HENNING sent at 9/6/2024  2:18 PM EDT -----  Pt states she has developed thrush again from her inhaler. Please advise.

## 2024-09-06 NOTE — TELEPHONE ENCOUNTER
"Reason for Disposition  • All other mouth symptoms (Exceptions: dry mouth from not drinking enough liquids, chapped lips)    Answer Assessment - Initial Assessment Questions  1. SYMPTOM: \"What's the main symptom you're concerned about?\" (e.g., dry mouth. chapped lips, lump)      \"Feeling of something stuck\"  2. ONSET: \"When did the symptoms start?\"      Yesterday  3. PAIN: \"Is there any pain?\" If Yes, ask: \"How bad is it?\" (Scale: 1-10; mild, moderate, severe)      No  4. CAUSE: \"What do you think is causing the symptoms?\"      Inhaler?  5. OTHER SYMPTOMS: \"Do you have any other symptoms?\" (e.g., fever, sore throat, toothache, swelling)      No    Protocols used: Mouth Symptoms-ADULT-[place]    "

## 2024-09-06 NOTE — TELEPHONE ENCOUNTER
Pt stated Pulm Provider: Dr. Pike    Actionable item: Nystatin?    What is the reason for the call/chief complaint?    Pt calling regarding concern for thrush again. Pt states beginning last night she began to feel as if there is something stuck in her throat. She states this has happened when she has had oral thrush twice recently.  She denies any white spots on the back of tongue currently. No symptoms aside from the feeling described above. It appears she has had symptoms of thrush 3 times since July. Pt has not discussed this with any other physician as of yet. She does rinse after using the Dulera, recently started rinsing with mouthwash instead of water in hopes this would help. Please advise of recommendations.

## 2024-09-08 DIAGNOSIS — B37.0 THRUSH: ICD-10-CM

## 2024-09-08 RX ORDER — CLOTRIMAZOLE 10 MG/1
10 LOZENGE ORAL 4 TIMES DAILY
Qty: 70 TROCHE | Refills: 0 | Status: SHIPPED | OUTPATIENT
Start: 2024-09-08

## 2024-09-16 ENCOUNTER — TELEMEDICINE (OUTPATIENT)
Age: 39
End: 2024-09-16
Payer: COMMERCIAL

## 2024-09-16 VITALS — BODY MASS INDEX: 26.52 KG/M2 | WEIGHT: 165 LBS | HEIGHT: 66 IN

## 2024-09-16 DIAGNOSIS — G43.709 CHRONIC MIGRAINE WITHOUT AURA WITHOUT STATUS MIGRAINOSUS, NOT INTRACTABLE: Primary | ICD-10-CM

## 2024-09-16 PROCEDURE — 99215 OFFICE O/P EST HI 40 MIN: CPT | Performed by: PSYCHIATRY & NEUROLOGY

## 2024-09-16 NOTE — PROGRESS NOTES
NEUROLOGY OUTPATIENT - TELEMEDICINE FOLLOW UP PATIENT VISIT NOTE        NAME: Linda Hickman  MRN: [id number]  : 1985     TODAY'S DATE: 24      The patient was identified by name and date of birth.   Ms. Hickman  was informed that this is a telemedicine visit and that the visit is being conducted through the Epic Embedded platform.  Ms. Hickman    agrees to proceed..  My office door was closed. No one else was in the room.   Ms. Hickman   acknowledged consent and understanding of privacy and security of the video platform.  Ms. Hickman   agreed to participate and understands they can discontinue the visit at any time.     Verification of patient location:Patient is located at home in the following state in which I hold an active license; [place].     Patient is aware this is a billable service.     HISTORY OF PRESENT ILLNESS (HPI):    Ms. Hickman is a 39 y.o. female presenting with Migraine        INTERIM HISTORY:  Frequency of headaches days : 4-5 headache days a month--since her last clinic visit, she had another headache about 6 weeks later. She was in the heat and taking working . 4-5 days long--She took the Triptan (made it dull but never took away). She does feel that the headaches are better.     Intensity of the headaches days: intensity is less as compared     Medicine for headaches: Nurtec (has not used it yet) , Tylenol, Mg seems to be helping.     Side effects from the medications.NA    Failed medications:  Maxalt did not help much with her headaches.   Denied by her insurance: [employer]    3-4 liters of water every day.     Any imaging including CTH or MRI of the brain: MRI brain was showing non specific T 2 spots likely from her migraine headaches.         PRIOR NOTES:  Frequency of headaches days : low grade annoying headaches.--every 2-3 weeks she would get one headaches.   Intensity of the headaches days: Last night was 5/10 and she had a bad headaches about 8/10. She took a  gram of tylenol and Ibuprofen.     She has not taken more fluids and she feels that might be contributing towards her headaches.     Medicine for headaches: Maxalt (she had a few break through headaches)     Side effects from the medications. She did mentioned that she had some numbness and tingling on her face after she took the Maxalt.     Failed medications: none    Any imaging including CTH or MRI of the brain: MRI brain was showing non specific T 2 spots likely from her migraine headaches.     Works as a  for the ER.     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    Quality of the pain: throbbing  Intensity of the headache: At present: 3/10;  At its worst:  10/10  Duration of the headaches:hour(s)-- if she takes medicine the migraine gets better. Sometimes they can last for days  Frequency of the headaches:about 1-2 days per week--variable in terms of migraine   Presence of aura:  No  Associated symptoms with headaches: +nausea, +vomiting , +light sensitivity , and +sound sensitivitySometime she had the facial numbness, pain, eye pain   Triggers:Yes, dehydration makes it worse, lack of sleep, hormone changes   Positional component: No   Alleviating factors: Yes, over the counter cocktail, Tylenol, motrin and zofran works. Mg helps. Water and caffeine helps.    Any specific time of the day when get the headaches: no particular time of day    Family history of migraine headaches: Yes  History of neck and head trauma: Yes  Smoking status: No  Oral contraceptive use: No    Life style factors:  -Hydration: adequate  -Sleep: adequate  -Caffeine intake: 2 cups of caffeinated coffee per day(s)  -Alcohol intake: socially      Impact of headches:  -Number of headaches in past 30  "days: 12-15/30 days.   -Number of days missed per month because of headache: None in the last 30 days.   -Number of ER visits for her headaches: None  in the last 30 days.       Treatment:  -Over the counter medications tried for headaches:   Tylenol been helpful and Ibuprofen been helpful     -Prescription medications:  Abortive or Rescue Medications used:   No rescue medicine tried in the past      Preventative or daily medications used for headaches:   No prophylactic medicine tried in the past         Other significant co morbidities:  Asthma   Plan to get pregnant     Red Flags for headache:  Age <5 or >50: No  First worst headaches: No  Maximal at intensity: No  Change in pattern: Yes  New headache in pregnancy, cancer or immunocompromised patient: No  Loss of consciousness or convulsions: No  Headache triggered by exertion, Valsalva maneuver or sexual activity: No  Abnormal exam: please see below in Neurological examination.        CURRENT OUTPATIENT MEDICATIONS:    Linda Hickman has a current medication list which includes the following prescription(s): albuterol, amphetamine-dextroamphetamine, b-complex with vitamin c, bupropion, bupropion, calcium carbonate, cholecalciferol, clotrimazole, dupixent, epinephrine, ferrous sulfate, levalbuterol, magnesium glycinate, melatonin, dulera, prazosin, prenatal mv-min-fe fum-fa-dha, prochlorperazine, rimegepant sulfate, and tretinoin.       PHYSICAL EXAMINATION:   VITAL SIGNS:  height is 5' 6\" (1.676 m).        NEUROLOGICAL EXAMINATION:    MENTAL STATUS EXAM: Alert, oriented to time place and person,     Unable to assess as this is a telemedicine visit    DIAGNOSTIC STUDIES:   PERTINENT LABS:     Lab Results   Component Value Date    WBC 6.91 09/15/2023     Lab Results   Component Value Date    HGB 12.6 09/15/2023     Lab Results   Component Value Date     09/15/2023     Lab Results   Component Value Date    LYMPHSABS 1.71 09/15/2023     No results found for: " "\"LABLYMP\"  Lab Results   Component Value Date    EOSABS 0.03 09/15/2023     No components found for: \"NEUTROPHILS\"    No results found for: \"LABMONO\"         No results found for: \"LABGLUC\"  Lab Results   Component Value Date    CREATININE 1.08 01/16/2023     Lab Results   Component Value Date    AST 20 01/16/2023     Lab Results   Component Value Date    ALT 18 01/16/2023     Lab Results   Component Value Date    ALKPHOS 69 01/16/2023     Lab Results   Component Value Date    AST 20 01/16/2023        No results found for: \"FOLATE\"  No results found for: \"[id number]\"  No results found for: \"COPPER\"       Lab Results   Component Value Date    HEPBSAG Non-reactive 11/12/2018    HEPBSAB 137.54 11/12/2018    HEPCAB Non-reactive 06/24/2019            NEUROIMAGING:  Results for orders placed during the hospital encounter of 01/25/24    MRI brain wo contrast    Impression  No acute abnormality.    Few nonspecific foci of frontal white matter signal abnormality which can be seen in patients with complicated migraines..    Resident: STEVE BASS I, the attending radiologist, have reviewed the images and agree with the final report above.    Workstation performed: [id number]              ASSESSMENT AND PLAN:    Ms. Hickman is a 39 y.o.female  presenting with headache follow up. Patient  has a past medical history of Allergic, Anemia, Anxiety, Arthritis, Asthma, Chlamydia (2014), Depression, Exposure to SARS-associated coronavirus (04/12/2020), GERD (gastroesophageal reflux disease) (2010), Gonorrhea (2014), Headache(784.0), Lumbar herniated disc, Migraine (2012), Pneumonia, Urinary tract infection, Urogenital trichomoniasis (2015), UTI (urinary tract infection), Varicella (1988), and Visual impairment.. MRI brain was normal (the T 2 spots are likely from her migraine headaches).  Likely etiology of her headache seems to be a combination of tension type with some breakthrough migraine headaches.  She definitely feels " improvement in terms of the headaches affecting her quality of life but she is concerned as at what point she has to take the rescue medication for headaches as some of these headaches would just be a low-grade annoying headaches but certain headaches would progress further causing status migrainosus.  I told her that if she feels that a low-grade headache is affecting her quality of life and is preventing her from doing her daily activities she can consider taking the Nurtec.  She can also continue to use the Tylenol or other NSAIDs but not more than 3 times a week.       1. Chronic migraine without aura without status migrainosus, not intractable  Her insurance approved her Nurtec as a rescue medication for her migraine headaches.  I would recommend that she can start using it on as needed basis as a rescue medication for her migraine headaches.  Continue using magnesium as a daily supplement  Can also use Tylenol or other NSAIDs but not more than 3 times a week          Return in about 6 months (around 3/16/2025).       The management plan was discussed in detail with the patient and all questions were answered.     Ms. Hickman was encouraged to contact our office with any questions or concerns and to contact the clinic or go to the nearest emergency room if symptoms change or worsen.       I have spent a total of 41 min in reviewing and/or ordering tests, medications, or procedures, performing an examination or evaluation, reviewing pertinent history, counseling and educating the patient, referring and/or communicating with other health care professionals, documenting in the EMR and general coordination of care of Ms. Hickman  today.           Jennifer Marie MD.   Staff Neurologist,   Neuroimmunology and Neuroinfectious disease  09/16/24     This report has been created through the use of voice recognition/text compilation software.  Typographical and content errors may occur with this process.  While  efforts are made to detect and correct such errors, in some cases errors will persist.  For this reason, wording in this document should be considered in the proper context and not strictly verbatim.  If, when reviewing the document, an error is discovered, please let the office know at [phone]

## 2024-09-23 ENCOUNTER — OFFICE VISIT (OUTPATIENT)
Age: 39
End: 2024-09-23
Payer: COMMERCIAL

## 2024-09-23 VITALS
HEIGHT: 66 IN | HEART RATE: 90 BPM | WEIGHT: 168 LBS | RESPIRATION RATE: 16 BRPM | BODY MASS INDEX: 27 KG/M2 | OXYGEN SATURATION: 98 % | TEMPERATURE: 98.4 F | DIASTOLIC BLOOD PRESSURE: 76 MMHG | SYSTOLIC BLOOD PRESSURE: 122 MMHG

## 2024-09-23 DIAGNOSIS — J45.50 SEVERE PERSISTENT ASTHMA WITHOUT COMPLICATION: Primary | ICD-10-CM

## 2024-09-23 DIAGNOSIS — R06.02 SHORTNESS OF BREATH: ICD-10-CM

## 2024-09-23 PROCEDURE — 99213 OFFICE O/P EST LOW 20 MIN: CPT

## 2024-09-23 RX ORDER — MOMETASONE FUROATE AND FORMOTEROL FUMARATE DIHYDRATE 200; 5 UG/1; UG/1
2 AEROSOL RESPIRATORY (INHALATION) 2 TIMES DAILY
Qty: 39 G | Refills: 2 | Status: SHIPPED | OUTPATIENT
Start: 2024-09-23 | End: 2024-12-22

## 2024-09-23 NOTE — PROGRESS NOTES
Pulmonary Follow Up Note  Linda Hickman 39 y.o. female MRN: [id number]  9/24/2024    Assessment/Plan:    Severe persistent asthma without complication  -On high dose Dulera, Dupixent, and albuterol  -Reports symptoms worse since COVID infection in June 2023, now symptoms have plateaued  -Still unable to return to her baseline exercise regimen due to increased dyspnea with exertion  -ACT score 16 today, partially controlled  -No exacerbations this past year    Plan:  -Repeat PFTs  -Talked about adding LAMA, but patient tried this in the past with no benefit  -Continue high-dose Dulera  -Continue Dupixent, can consider switching to different biologic  -Follow up in 3 months or sooner if needed    Shortness of breath  -Secondary to severe asthma, prior COVID-19 infection  -Also may have cardiovascular symptoms- gets SOB/lightheaded/tachycardic with positional changes, she will f/u with her PCP for this for additional testing  -Continue current inhaler regimen as mentioned above         Diagnoses and all orders for this visit:    Severe persistent asthma without complication  -     Complete PFT with post Bronchodilator and Six Minute walk; Future  -     mometasone-formoterol (Dulera) 200-5 MCG/ACT inhaler; Inhale 2 puffs 2 (two) times a day Rinse mouth after use.    Shortness of breath        Return in about 3 months (around 12/23/2024).        History of Present Illness     Chief Complaint:   Chief Complaint   Patient presents with    Follow-up       Patient ID: Linda is a 39 y.o. y.o. female has a past medical history of Allergic, Anemia, Anxiety, Arthritis, Asthma, Chlamydia (2014), Depression, Exposure to SARS-associated coronavirus (04/12/2020), GERD (gastroesophageal reflux disease) (2010), Gonorrhea (2014), Headache(784.0), Lumbar herniated disc, Migraine (2012), Pneumonia, Urinary tract infection, Urogenital trichomoniasis (2015), UTI (urinary tract infection), Varicella (1988), and Visual  impairment.      9/23/2024  HPI: Linda Hickman is a 39 y.o. female who is here today for a follow-up visit for asthma.  Her symptoms became uncontrolled since her COVID-19 infection in June 2023.  She was started on Dupixent in June 2023 and has been maintained on high-dose Dulera.  Patient tells me her symptoms are about the same.  Feels like they have plateaued over the past year.  Denies any exacerbations this past year requiring steroids or antibiotics.  Her triggers are to cold weather, can fire smoke, running, chemical fumes.  Patient has also been experiencing tachycardia, lightheadedness, and shortness of breath with positional changes.  She has not been able to return to her baseline exercise regimen prior to her COVID-19 infection.          Review of Systems   Constitutional:  Negative for activity change, chills, diaphoresis, fever and unexpected weight change.   HENT:  Negative for congestion, postnasal drip, rhinorrhea, sore throat, trouble swallowing and voice change.    Respiratory:  Positive for shortness of breath and wheezing. Negative for cough and chest tightness.    Cardiovascular:  Negative for chest pain, palpitations and leg swelling.   Allergic/Immunologic: Negative.        Historical Information   Past Medical History:   Diagnosis Date    Allergic     Anemia     Anxiety     Arthritis     Asthma     Chlamydia 2014    Unfaithful partner    Depression     Exposure to SARS-associated coronavirus 04/12/2020    GERD (gastroesophageal reflux disease) 2010    Gonorrhea 2014    Unfaithful partner    Headache(784.0)     Lumbar herniated disc     Migraine 2012    Pneumonia     Urinary tract infection     Urogenital trichomoniasis 2015    Unfaithful partner    UTI (urinary tract infection)     Varicella 1988    Visual impairment      Past Surgical History:   Procedure Laterality Date    FL INJECTION LEFT KNEE (ARTHROGRAM)  2/28/2022    WISDOM TOOTH EXTRACTION       Family History   Problem Relation  Age of Onset    BRCA2 Negative Mother     BRCA1 Negative Mother     Breast cancer additional onset Mother         Left Masectomy    Hypertension Mother     Diabetes Mother     Asthma Mother     Cancer Mother         breast    Breast cancer Mother         In 2019, left side. -brca    Migraines Mother     Mental illness Father         family history    Alcohol abuse Father     Hypertension Father     Hyperlipidemia Father     Diabetes Father     Heart attack Father         Assumed. Found  at 63    Colon cancer Maternal Grandmother     Breast cancer additional onset Maternal Grandmother     Diabetes Maternal Grandmother     Cancer Maternal Grandmother         breast, colon    Breast cancer Maternal Grandmother     Prostate cancer Maternal Grandfather         lung    Cancer Maternal Grandfather         lung (smoker)    Completed Suicide  Maternal Grandfather     Lung cancer Maternal Grandfather     Asthma Brother     Asthma Brother        Smoking history: She reports that she has never smoked. She has never used smokeless tobacco.    Occupational History: RN Case Manager    Immunization History   Administered Date(s) Administered    COVID-19 MODERNA VACC 0.5 ML IM 2020, 2021    COVID-19 Moderna mRNA Vaccine 12 Yr+ 50 mcg/0.5 mL (Spikevax) 2024    Hep B, adult 2011, 2011, 2011    INFLUENZA 10/29/2019, 10/01/2021    Influenza, injectable, quadrivalent, preservative free 0.5 mL 2022    Pneumococcal Conjugate 13-Valent 2023    Tdap 2011, 10/11/2020    Tuberculin Skin Test-PPD Intradermal 2011, 2011       Meds/Allergies     Current Outpatient Medications:     albuterol (PROVENTIL HFA,VENTOLIN HFA) 90 mcg/act inhaler, Inhale 2 puffs every 6 (six) hours as needed for wheezing, Disp: 18 g, Rfl: 3    amphetamine-dextroamphetamine (ADDERALL XR, 10MG,) 10 MG 24 hr capsule, Take 2 capsules (20 mg total) by mouth in the morning on productive days Do not start  before September 13, 2024., Disp: 60 capsule, Rfl: 0    buPROPion (Wellbutrin XL) 150 mg 24 hr tablet, Take 1 tablet (150 mg total) by mouth daily - Take with Wellbutrin 300 mg XL for a total daily dose of 450 mg, Disp: 90 tablet, Rfl: 0    buPROPion (Wellbutrin XL) 300 mg 24 hr tablet, Take 1 tablet (300 mg total) by mouth every morning, Disp: 90 tablet, Rfl: 0    cholecalciferol (VITAMIN D3) 1,000 units tablet, Take 1,000 Units by mouth daily, Disp: , Rfl:     clotrimazole (MYCELEX) 10 mg mary grace, Take 1 tablet (10 mg total) by mouth 4 (four) times a day, Disp: 70 Mary Grace, Rfl: 0    Dupixent 200 MG/1.14ML SOPN, Inject 1 pen ( 200mg total) under the skin once every 14 (fourteen) days., Disp: 2 mL, Rfl: 10    Ferrous Sulfate (RA IRON PO), Take by mouth, Disp: , Rfl:     levalbuterol (Xopenex) 0.63 mg/3 mL nebulizer solution, Take 3 mL (0.63 mg total) by nebulization 3 (three) times a day, Disp: 30 mL, Rfl: 1    Magnesium Glycinate 100 MG CAPS, Take 200 mg by mouth 2 (two) times a day, Disp: , Rfl:     Melatonin 5 MG TABS, Take 1 tablet (5 mg total) by mouth daily at bedtime as needed (insomnia), Disp: , Rfl:     mometasone-formoterol (Dulera) 200-5 MCG/ACT inhaler, Inhale 2 puffs 2 (two) times a day Rinse mouth after use., Disp: 39 g, Rfl: 2    Prenatal MV-Min-Fe Fum-FA-DHA (PRENATAL+DHA PO), Take by mouth, Disp: , Rfl:     prochlorperazine (COMPAZINE) 5 mg tablet, Take 1 tablet (5 mg total) by mouth every 6 (six) hours as needed for nausea or vomiting, Disp: 30 tablet, Rfl: 0    rimegepant sulfate (NURTEC) 75 mg TBDP, Take 1 tablet (75 mg total) by mouth daily as needed (migraine headaches), Disp: 8 tablet, Rfl: 3    tretinoin (RETIN-A) 0.025 % cream, Apply topically daily at bedtime, Disp: 45 g, Rfl: 2    B Complex-C (B-complex with vitamin C) tablet, Take 1 tablet by mouth daily (Patient not taking: Reported on 7/11/2024), Disp: , Rfl:     calcium carbonate (OS-BERENICE) 600 MG tablet, Take 600 mg by mouth 2 (two) times  "a day with meals, Disp: , Rfl:     EPINEPHrine (EPIPEN) 0.3 mg/0.3 mL SOAJ, Inject 0.3 mL (0.3 mg total) into a muscle once for 1 dose (Patient not taking: Reported on 9/16/2024), Disp: 0.6 mL, Rfl: 0    prazosin (MINIPRESS) 1 mg capsule, Take 1 capsule (1 mg total) by mouth daily at bedtime (Patient not taking: Reported on 9/23/2024), Disp: , Rfl:     Allergies:   Allergies   Allergen Reactions    Sulfa Antibiotics Anaphylaxis    Levofloxacin      Other reaction(s): sensative    Prednisone Other (See Comments)    Tobramycin Other (See Comments)     Other reaction(s): sensative. Ototoxic    Pseudoephedrine Palpitations         Vitals:  Vitals:    09/23/24 1604 09/23/24 1606   BP: 122/76    BP Location: Right arm    Patient Position: Sitting    Cuff Size: Large    Pulse: 90    Resp: 16    Temp: 98.4 °F (36.9 °C)    SpO2: 98% 98%   Weight: 76.2 kg (168 lb)    Height: 5' 6\" (1.676 m)    Oxygen Therapy  SpO2: 98 %  Oxygen Therapy: None (Room air)  .  Wt Readings from Last 3 Encounters:   09/23/24 76.2 kg (168 lb)   09/16/24 74.8 kg (165 lb)   07/19/24 79.4 kg (175 lb)     Body mass index is 27.12 kg/m².    Physical Exam  Vitals and nursing note reviewed.   Constitutional:       General: She is not in acute distress.     Appearance: Normal appearance. She is well-developed.   Cardiovascular:      Rate and Rhythm: Normal rate and regular rhythm.      Heart sounds: Normal heart sounds, S1 normal and S2 normal. No murmur heard.  Pulmonary:      Effort: Pulmonary effort is normal.      Breath sounds: Normal breath sounds. No decreased breath sounds, wheezing, rhonchi or rales.   Musculoskeletal:         General: No swelling.      Right lower leg: No edema.      Left lower leg: No edema.   Neurological:      Mental Status: She is alert.   Psychiatric:         Mood and Affect: Mood and affect normal.         Behavior: Behavior normal. Behavior is cooperative.           Labs: I have personally reviewed pertinent lab " results.  Lab Results   Component Value Date    WBC 6.91 09/15/2023    HGB 12.6 09/15/2023    HCT 38.6 09/15/2023    MCV 94 09/15/2023     09/15/2023     Lab Results   Component Value Date    CALCIUM 9.2 01/16/2023    K 3.3 (L) 01/16/2023    CO2 23 01/16/2023     01/16/2023    BUN 12 01/16/2023    CREATININE 1.08 01/16/2023     Lab Results   Component Value Date    IGE 27.9 04/04/2023     Lab Results   Component Value Date    ALT 18 01/16/2023    AST 20 01/16/2023    ALKPHOS 69 01/16/2023

## 2024-09-24 NOTE — ASSESSMENT & PLAN NOTE
-Secondary to severe asthma, prior COVID-19 infection  -Also may have cardiovascular symptoms- gets SOB/lightheaded/tachycardic with positional changes, she will f/u with her PCP for this for additional testing  -Continue current inhaler regimen as mentioned above

## 2024-10-10 ENCOUNTER — TELEMEDICINE (OUTPATIENT)
Dept: PSYCHIATRY | Facility: CLINIC | Age: 39
End: 2024-10-10
Payer: COMMERCIAL

## 2024-10-10 DIAGNOSIS — F33.1 MODERATE EPISODE OF RECURRENT MAJOR DEPRESSIVE DISORDER (HCC): Primary | ICD-10-CM

## 2024-10-10 DIAGNOSIS — F43.10 PTSD (POST-TRAUMATIC STRESS DISORDER): ICD-10-CM

## 2024-10-10 PROCEDURE — 90834 PSYTX W PT 45 MINUTES: CPT

## 2024-10-10 NOTE — BH TREATMENT PLAN
Outpatient Behavioral Health Psychotherapy Treatment Plan    Linda Hickman  1985     Date of Initial Psychotherapy Assessment: 6/30/2023  Date of Current Treatment Plan: 10/10/24  Treatment Plan Target Date: 10/10/2025  Treatment Plan Expiration Date: 4/10/2025    Diagnosis:   1. Moderate episode of recurrent major depressive disorder (HCC)        2. PTSD (post-traumatic stress disorder)            Area(s) of Need: Need to work on improved sense of identity, caring less about what people think of me, and make peace with past trauma.  I still feel anger when there's a sense of no control or a voice.  I have overreaction to others intruding on my boundaries.  I get confrontational about it - in my relationship I tell Iraj why I'm angry and don't resolve it quickly.     Long Term Goal 1 (in the client's own words): I want to stop being affected by the complex PTSD, identify triggers, ground myself and develop a stronger sense of self    Stage of Change: Contemplation    Target Date for completion: to be determined     Anticipated therapeutic modalities: emdr, dbt     People identified to complete this goal: Therapist, prescriber, Linda      Objective 1: (identify the means of measuring success in meeting the objective): Therapist will engage Linda in EMDR therapy for resolving her past traumas including grounding, resourcing and processing.  Client will demonstrate effective use of grounding and resourcing in at least 4 out of 5 situations.  Client will engage in EMDR   Client and therapist will assess effectiveness of treatment and adjust treatment as needed.    Update 11/8/2023:  Linda has been able to process some EMDR targets for past trauma and reports an improvement in her TANNA level of at least 2 points and an improvement in her validity of positive cognitions by at least 2 points during processing sessions.  Linda will continue to process past trauma targets as well as negative thinking patterns to improve  mood and thought.      Objective 2: (identify the means of measuring success in meeting the objective): Therapist will engage Linda in DBT therapy to improve sense of self including mindfulness, distress tolerance, emotional regulation and interpersonal effectiveness Client will learn DBT concepts and skills.   Client will practice skills and assess effectiveness with therapist biweekly and adjust skills acquisition as needed. Client will demonstrate successful use of DBT skills in at least 8 out of 10 challenging situations.      Update 11/8/2023: Linda has been able to learn DBT skills including mindfulness and distress tolerance skills and utilize them to improve mood at least 5 out of 10 challenging situations.  Linda will continue to learn DBT skills to improve mood and thought.                 I am currently under the care of a [facility] psychiatric provider: yes    My [facility] psychiatric provider is: Ruben    I am currently taking psychiatric medications: Yes, as prescribed    I feel that I will be ready for discharge from mental health care when I reach the following (measurable goal/objective): when trauma is resolved and I have a firm sense of self    For children and adults who have a legal guardian:   Has there been any change to custody orders and/or guardianship status? NA. If yes, attach updated documentation.    I have created my Crisis Plan and have been offered a copy of this plan    Behavioral Health Treatment Plan [facility]: Diagnosis and Treatment Plan explained to Linda Vick Linda Hickman acknowledges an understanding of their diagnosis. Linda Hickman agrees to this treatment plan.    I have been offered a copy of this Treatment Plan. Yes    Linda Logangerty, 1985, actively participated in the creation of this treatment plan during a virtual session, using the [employer] platform.   Linda Hickman  provided verbal consent on 10/10/2024 at 2:30 PM. The treatment plan was  transcribed into the Electronic Health Record at a later time.     Linda Hickman, 1985, actively participated in the creation of this treatment plan during a virtual session, using the [employer] platform.   Linda Hickman  provided verbal consent on 10/10/2024 at 2:30 PM. The treatment plan was transcribed into the Electronic Health Record at a later time.

## 2024-10-10 NOTE — PSYCH
Virtual Regular Visit    Verification of patient location:    Patient is located at Home in the following state in which I hold an active license [place]      Assessment/Plan:    Problem List Items Addressed This Visit       Moderate episode of recurrent major depressive disorder (HCC) - Primary    PTSD (post-traumatic stress disorder)       Goals addressed in session: Goal 1          Reason for visit is No chief complaint on file.       Encounter provider Jannet Villatoro LCSW      Recent Visits  No visits were found meeting these conditions.  Showing recent visits within past 7 days and meeting all other requirements  Today's Visits  Date Type Provider Dept   10/10/24 Telemedicine Jannet Villatoro LCSW [facility]   Showing today's visits and meeting all other requirements  Future Appointments  No visits were found meeting these conditions.  Showing future appointments within next 150 days and meeting all other requirements       The patient was identified by name and date of birth. Linda Hickman was informed that this is a telemedicine visit and that the visit is being conducted throughthe [employer] platform. She agrees to proceed..  My office door was closed. No one else was in the room.  She acknowledged consent and understanding of privacy and security of the video platform. The patient has agreed to participate and understands they can discontinue the visit at any time.    Patient is aware this is a billable service.     Subjective  Linda Hickman is a 39 y.o. female  .      HPI     Past Medical History:   Diagnosis Date    Allergic     Anemia     Anxiety     Arthritis     Asthma     Chlamydia 2014    Unfaithful partner    Depression     Exposure to SARS-associated coronavirus 04/12/2020    GERD (gastroesophageal reflux disease) 2010    Gonorrhea 2014    Unfaithful partner    Headache(784.0)     Lumbar herniated disc     Migraine 2012    Pneumonia     Urinary tract infection      Urogenital trichomoniasis 2015    Unfaithful partner    UTI (urinary tract infection)     Varicella 1988    Visual impairment        Past Surgical History:   Procedure Laterality Date    FL INJECTION LEFT KNEE (ARTHROGRAM)  2/28/2022    WISDOM TOOTH EXTRACTION         Current Outpatient Medications   Medication Sig Dispense Refill    albuterol (PROVENTIL HFA,VENTOLIN HFA) 90 mcg/act inhaler Inhale 2 puffs every 6 (six) hours as needed for wheezing 18 g 3    amphetamine-dextroamphetamine (ADDERALL XR, 10MG,) 10 MG 24 hr capsule Take 2 capsules (20 mg total) by mouth in the morning on productive days Do not start before September 13, 2024. 60 capsule 0    B Complex-C (B-complex with vitamin C) tablet Take 1 tablet by mouth daily (Patient not taking: Reported on 7/11/2024)      buPROPion (Wellbutrin XL) 150 mg 24 hr tablet Take 1 tablet (150 mg total) by mouth daily - Take with Wellbutrin 300 mg XL for a total daily dose of 450 mg 90 tablet 0    buPROPion (Wellbutrin XL) 300 mg 24 hr tablet Take 1 tablet (300 mg total) by mouth every morning 90 tablet 0    calcium carbonate (OS-BERENICE) 600 MG tablet Take 600 mg by mouth 2 (two) times a day with meals      cholecalciferol (VITAMIN D3) 1,000 units tablet Take 1,000 Units by mouth daily      clotrimazole (MYCELEX) 10 mg mary grace Take 1 tablet (10 mg total) by mouth 4 (four) times a day 70 Mary Grace 0    Dupixent 200 MG/1.14ML SOPN Inject 1 pen ( 200mg total) under the skin once every 14 (fourteen) days. 2 mL 10    EPINEPHrine (EPIPEN) 0.3 mg/0.3 mL SOAJ Inject 0.3 mL (0.3 mg total) into a muscle once for 1 dose (Patient not taking: Reported on 9/16/2024) 0.6 mL 0    Ferrous Sulfate (RA IRON PO) Take by mouth      levalbuterol (Xopenex) 0.63 mg/3 mL nebulizer solution Take 3 mL (0.63 mg total) by nebulization 3 (three) times a day 30 mL 1    Magnesium Glycinate 100 MG CAPS Take 200 mg by mouth 2 (two) times a day      Melatonin 5 MG TABS Take 1 tablet (5 mg total) by mouth daily  "at bedtime as needed (insomnia)      mometasone-formoterol (Dulera) 200-5 MCG/ACT inhaler Inhale 2 puffs 2 (two) times a day Rinse mouth after use. 39 g 2    prazosin (MINIPRESS) 1 mg capsule Take 1 capsule (1 mg total) by mouth daily at bedtime (Patient not taking: Reported on 9/23/2024)      Prenatal MV-Min-Fe Fum-FA-DHA (PRENATAL+DHA PO) Take by mouth      prochlorperazine (COMPAZINE) 5 mg tablet Take 1 tablet (5 mg total) by mouth every 6 (six) hours as needed for nausea or vomiting 30 tablet 0    rimegepant sulfate (NURTEC) 75 mg TBDP Take 1 tablet (75 mg total) by mouth daily as needed (migraine headaches) 8 tablet 3    tretinoin (RETIN-A) 0.025 % cream Apply topically daily at bedtime 45 g 2     No current facility-administered medications for this visit.        Allergies   Allergen Reactions    Sulfa Antibiotics Anaphylaxis    Levofloxacin      Other reaction(s): sensative    Prednisone Other (See Comments)    Tobramycin Other (See Comments)     Other reaction(s): sensative. Ototoxic    Pseudoephedrine Palpitations       Review of Systems    Video Exam    There were no vitals filed for this visit.    Physical Exam     Behavioral Health Psychotherapy Progress Note    Psychotherapy Provided: Individual Psychotherapy     1. Moderate episode of recurrent major depressive disorder (HCC)        2. PTSD (post-traumatic stress disorder)            Goals addressed in session: Goal 1     DATA: Linda shared she has been feeling triggered in her relationship.  She said she gets triggered when Iraj does not listen and it reminds her of her childhood when her mom told her she shouldn't be the way she was, that she was ignored in school and felt invisible, and others crossing boundaries with her and not knowing where to have the boundaries.  She said she feels her relationship is a \"safe space\" and she feels heard and they listen to each other.  Therapist went over flashback management with Linda reminding her to ground " "herself in the moment and challenging some of the fears she has in the moment.  She said she would do so.  During this session, this clinician used the following therapeutic modalities: Mindfulness-based Strategies and Supportive Psychotherapy    Substance Abuse was not addressed during this session. If the client is diagnosed with a co-occurring substance use disorder, please indicate any changes in the frequency or amount of use: . Stage of change for addressing substance use diagnoses: No substance use/Not applicable    ASSESSMENT:  Linda Hickman presents with a Euthymic/ normal mood.     her affect is Normal range and intensity, which is congruent, with her mood and the content of the session. The client has made progress on their goals.     Linda Hickman presents with a none risk of suicide, none risk of self-harm, and none risk of harm to others.    For any risk assessment that surpasses a \"low\" rating, a safety plan must be developed.    A safety plan was indicated: no  If yes, describe in detail     PLAN: Between sessions, Linda Hickman will utilize flashback management. At the next session, the therapist will use Mindfulness-based Strategies and Supportive Psychotherapy to address PTSD, depression.    Behavioral Health Treatment Plan and Discharge Planning: Linda Hickman is aware of and agrees to continue to work on their treatment plan. They have identified and are working toward their discharge goals. yes    Visit start and stop times:    10/10/24  Start Time: 1700  Stop Time: 1752  Total Visit Time: 52 minutes        "

## 2024-10-11 ENCOUNTER — TELEMEDICINE (OUTPATIENT)
Dept: PSYCHIATRY | Facility: CLINIC | Age: 39
End: 2024-10-11

## 2024-10-11 DIAGNOSIS — F90.0 ATTENTION DEFICIT HYPERACTIVITY DISORDER, INATTENTIVE TYPE: ICD-10-CM

## 2024-10-11 DIAGNOSIS — F33.1 MODERATE EPISODE OF RECURRENT MAJOR DEPRESSIVE DISORDER (HCC): Primary | ICD-10-CM

## 2024-10-11 DIAGNOSIS — F43.9 TRAUMA AND STRESSOR-RELATED DISORDER: ICD-10-CM

## 2024-10-11 PROCEDURE — NC001 PR NO CHARGE

## 2024-10-11 RX ORDER — DEXTROAMPHETAMINE SULFATE, DEXTROAMPHETAMINE SACCHARATE, AMPHETAMINE SULFATE AND AMPHETAMINE ASPARTATE 5; 5; 5; 5 MG/1; MG/1; MG/1; MG/1
CAPSULE, EXTENDED RELEASE ORAL
Qty: 30 CAPSULE | Refills: 0 | Status: SHIPPED | OUTPATIENT
Start: 2024-10-11

## 2024-10-11 RX ORDER — GUANFACINE 1 MG/1
1 TABLET, EXTENDED RELEASE ORAL
Qty: 10 TABLET | Refills: 0 | Status: SHIPPED | OUTPATIENT
Start: 2024-10-11

## 2024-10-11 RX ORDER — DEXTROAMPHETAMINE SACCHARATE, AMPHETAMINE ASPARTATE MONOHYDRATE, DEXTROAMPHETAMINE SULFATE AND AMPHETAMINE SULFATE 2.5; 2.5; 2.5; 2.5 MG/1; MG/1; MG/1; MG/1
CAPSULE, EXTENDED RELEASE ORAL
Qty: 60 CAPSULE | Refills: 0 | Status: SHIPPED | OUTPATIENT
Start: 2024-10-11 | End: 2024-10-11

## 2024-10-11 NOTE — BH TREATMENT PLAN
TREATMENT PLAN (Medication Management Only)        [facility]    Name and Date of Birth:  Linda Hickman 39 y.o. 1985  Date of Treatment Plan: October 11, 2024  Diagnosis/Diagnoses:    1. Moderate episode of recurrent major depressive disorder (HCC)    2. Attention deficit hyperactivity disorder, inattentive type    3. Trauma and stressor-related disorder      Strengths/Personal Resources for Self-Care: supportive family, supportive friends, taking medications as prescribed, ability to communicate needs, ability to communicate well, ability to understand psychiatric illness, general fund of knowledge, good understanding of illness, motivation for treatment, being resoureceful, self-reliance, stable employment, well educated, willingness to work on problems, work skills.  Area/Areas of need (in own words): Continue to improve depression and anxiety symptoms.  Continue to improve attention and concentration.   1. Long Term Goal: Continue to improve depression and anxiety symptoms.  Continue to improve attention and concentration. .  Target Date:6 months - 4/11/2025  Person/Persons responsible for completion of goal: Dr. Deion Mckeon  2.  Short Term Objective (s) - How will we reach this goal?:   A. Provider new recommended medication/dosage changes and/or continue medication(s): Continue Wellbutrin 450 mg XL daily, continue Adderall 20 mg XR daily on productive days, started Intuniv 1 mg at bedtime.  Take medications as prescribed  Attend psychiatry appointments regularly  Continue psychotherapy regularly  Practice coping skills  Try sleep hygiene techniques  Avoid alcohol   Avoid drugs   Eat a healthy diet   Exercise regularly  Try relaxation techniques  Target Date:3 months - 1/11/2025  Person/Persons Responsible for Completion of Goal: Linda  Progress Towards Goals: Stable, continuing treatment  Treatment Modality: Medication management every 1 to 3  months  Review due 180 days from date of this plan: 6 months - 4/11/2025  Expected length of service: ongoing treatment  My Physician/PA/NP and I have developed this plan together and I agree to work on the goals and objectives. I understand the treatment goals that were developed for my treatment.

## 2024-10-11 NOTE — PATIENT INSTRUCTIONS
Please present for your previously scheduled appointment approximately 15 minutes prior to appointment time. If you are running late or are unable to attend your appointment, please call our [place] office at [phone] or our [place] office at [phone] if applicable to notify the office of your absence.    If you are in psychological crisis including not limited to suicidal/homicidal thoughts or thoughts of self-injury, do not hesitate to call/contact your County Crisis hotline (see below) or attend to the nearest emergency department.  [facility] Crisis: [phone]  [facility] Crisis: [phone]  [place] [facility] Crisis: [phone]  [facility] Crisis: [phone]  [facility] Crisis: [phone]  [facility] Crisis: [phone]  [facility] Crisis: [phone]  National Suicide Prevention Hotline: 1-194.147.5893

## 2024-10-11 NOTE — PSYCH
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 her boyfriend owns a handgun and hunts.  She states they are locked and secured and has no access to them. Linda Hickman has no history of arrests or violence pertaining to use of a deadly weapon.   Based on today's assessment, Linda presents the following risk of harm to self: Minimal     Risk of Harm to Others:  The following ratings are based on assessment at the time of the interview as well as review of medical records  Demographic Risk Factors include: Under age 40  Historical Risk Factors include: None  Recent Specific Risk Factors include: None  Protective Factors: No current homicidal ideation  Based on today's assessment, Linda presents the following risk of harm to others: Minimal    Continue ongoing medication management every 1 to 3 months  Aware of need to follow up with family physician for medical issues  Aware of 24 hour and weekend coverage for urgent situations accessed by calling [facility] main practice number    Although patient's acute lethality risk is low, long-term/chronic lethality risk is mildly elevated in the presence of moderate symptoms of depression and mild symptoms of anxiety. At the  "current moment, Linda is future-oriented, forward-thinking, and demonstrates ability to act in a self-preserving manner as evidenced by volitionally presenting to the clinic today, seeking treatment. At this juncture, inpatient hospitalization is not currently warranted. To mitigate future risk, patient should adhere to the recommendations of this writer, avoid alcohol/illicit substance use, utilize community-based resources and familiar support and prioritize mental health treatment.     Based on today's assessment and clinical criteria, Linda Hickman contracts for safety and is not an imminent risk of harm to self or others. Outpatient level of care is deemed appropriate at this present time. Linda understands that if they are no longer able to contract for safety, they need to call/contact the outpatient office including this writer, call/contact crisis and/orattend to the nearest Emergency Department for immediate evaluation.    Risks/Benefits      Risks, Benefits And Possible Side Effects Of Medications:    Risks, benefits, and possible side effects of medications explained to Linda and she verbalizes understanding and agreement for treatment.    Controlled Medication Discussion:     Linda has been filling controlled prescriptions on time as prescribed according to [place] Prescription Drug Monitoring Program    Psychotherapy Provided:     Individual psychotherapy provided: Yes  At the time of office visit today supportive therapy was provided and cognitive behavioral therapy techniques were utilized.  In session today, Linda expresses at times she feels like \"an invisible person\" in her relationship. Linda has been together with her boyfriend for a year and a half and currently describes their relationship as \"task oriented.\"  She described recent examples of struggles with coparenting, highlighting differences in opinion about setting boundaries for Iraj's son. She noted a recent argument where a unified " front was not maintained, allowing his son to stay up later than normal. Moving forward, Linda plans to address these issues by checking in with Iraj more regularly. Linda was encouraged to reflect on the relationship and consider if there are ways in which she can reach out to him with the goal of rekindling romance in their relationship.  Linda expressed understanding. Linda continues to journal as a personal coping mechanism.    Treatment Plan:    Completed and signed during the session: Yes - Treatment Plan done but not signed at time of office visit due to:  Plan reviewed by video and verbal consent given due to virtual visit. Treatment Plan sent to patient via [employer] for signature.    Visit Time    Visit Start Time: 8:00 AM  Visit Stop Time: 8:30 AM  Total Visit Duration:  30 minutes    This note was shared with patient.    Mika Albarran MD 10/11/24    This note has been constructed using a voice recognition system. There may be translation, syntax, or grammatical errors. If you have any questions, please contact the dictating provider.

## 2024-10-11 NOTE — ASSESSMENT & PLAN NOTE
Status: Stable, not at goal    Continue Wellbutrin 450 mg XL daily for mood as well as attention and concentration  PARQ was completed for bupropion (Wellbutrin) including nausea, insomnia, agitation/activation, weight loss, anxiety, palpitations, hypertension, decreased seizure threshold and risk with alcohol withdrawal or electrolyte disturbances.  Patient screened negative for heavy alcohol use, history of seizures, and eating disorders    Continue routine psychotherapy with own therapist Jannet (generally monthly)  Continue to work on setting healthy relationship boundaries and healthy parenting boundaries

## 2024-10-24 ENCOUNTER — TELEMEDICINE (OUTPATIENT)
Dept: PSYCHIATRY | Facility: CLINIC | Age: 39
End: 2024-10-24
Payer: COMMERCIAL

## 2024-10-24 DIAGNOSIS — F43.10 PTSD (POST-TRAUMATIC STRESS DISORDER): ICD-10-CM

## 2024-10-24 DIAGNOSIS — F33.1 MODERATE EPISODE OF RECURRENT MAJOR DEPRESSIVE DISORDER (HCC): Primary | ICD-10-CM

## 2024-10-24 PROCEDURE — 90834 PSYTX W PT 45 MINUTES: CPT

## 2024-10-24 NOTE — PSYCH
Virtual Regular Visit    Verification of patient location:    Patient is located at Home in the following state in which I hold an active license [place]      Assessment/Plan:    Problem List Items Addressed This Visit       Moderate episode of recurrent major depressive disorder (HCC) - Primary    PTSD (post-traumatic stress disorder)       Goals addressed in session: Goal 1          Reason for visit is No chief complaint on file.       Encounter provider Jannet Villatoro LCSW      Recent Visits  No visits were found meeting these conditions.  Showing recent visits within past 7 days and meeting all other requirements  Today's Visits  Date Type Provider Dept   10/24/24 Telemedicine Jannet Villatoro LCSW [facility]   Showing today's visits and meeting all other requirements  Future Appointments  No visits were found meeting these conditions.  Showing future appointments within next 150 days and meeting all other requirements       The patient was identified by name and date of birth. Linda Hickman was informed that this is a telemedicine visit and that the visit is being conducted throughthe [employer] platform. She agrees to proceed..  My office door was closed. No one else was in the room.  She acknowledged consent and understanding of privacy and security of the video platform. The patient has agreed to participate and understands they can discontinue the visit at any time.    Patient is aware this is a billable service.     Subjective  Linda Hickman is a 39 y.o. female  .      HPI     Past Medical History:   Diagnosis Date    Allergic     Anemia     Anxiety     Arthritis     Asthma     Chlamydia 2014    Unfaithful partner    Depression     Exposure to SARS-associated coronavirus 04/12/2020    GERD (gastroesophageal reflux disease) 2010    Gonorrhea 2014    Unfaithful partner    Headache(784.0)     Lumbar herniated disc     Migraine 2012    Pneumonia     Urinary tract infection      Urogenital trichomoniasis 2015    Unfaithful partner    UTI (urinary tract infection)     Varicella 1988    Visual impairment        Past Surgical History:   Procedure Laterality Date    FL INJECTION LEFT KNEE (ARTHROGRAM)  2/28/2022    WISDOM TOOTH EXTRACTION         Current Outpatient Medications   Medication Sig Dispense Refill    ADDERALL XR, 20MG, 20 MG 24 hr capsule Take 1 tablet (20 mg) daily on productive days 30 capsule 0    albuterol (PROVENTIL HFA,VENTOLIN HFA) 90 mcg/act inhaler Inhale 2 puffs every 6 (six) hours as needed for wheezing 18 g 3    B Complex-C (B-complex with vitamin C) tablet Take 1 tablet by mouth daily (Patient not taking: Reported on 7/11/2024)      buPROPion (Wellbutrin XL) 150 mg 24 hr tablet Take 1 tablet (150 mg total) by mouth daily - Take with Wellbutrin 300 mg XL for a total daily dose of 450 mg 90 tablet 0    buPROPion (Wellbutrin XL) 300 mg 24 hr tablet Take 1 tablet (300 mg total) by mouth every morning 90 tablet 0    calcium carbonate (OS-BERENICE) 600 MG tablet Take 600 mg by mouth 2 (two) times a day with meals      cholecalciferol (VITAMIN D3) 1,000 units tablet Take 1,000 Units by mouth daily      clotrimazole (MYCELEX) 10 mg mary grace Take 1 tablet (10 mg total) by mouth 4 (four) times a day 70 Mary Grace 0    Dupixent 200 MG/1.14ML SOPN Inject 1 pen ( 200mg total) under the skin once every 14 (fourteen) days. 2 mL 10    EPINEPHrine (EPIPEN) 0.3 mg/0.3 mL SOAJ Inject 0.3 mL (0.3 mg total) into a muscle once for 1 dose (Patient not taking: Reported on 9/16/2024) 0.6 mL 0    Ferrous Sulfate (RA IRON PO) Take by mouth      guanFACINE HCl ER (INTUNIV) 1 MG TB24 Take 1 tablet (1 mg total) by mouth daily at bedtime 10 tablet 0    levalbuterol (Xopenex) 0.63 mg/3 mL nebulizer solution Take 3 mL (0.63 mg total) by nebulization 3 (three) times a day 30 mL 1    Magnesium Glycinate 100 MG CAPS Take 200 mg by mouth 2 (two) times a day      Melatonin 5 MG TABS Take 1 tablet (5 mg total) by mouth  daily at bedtime as needed (insomnia)      mometasone-formoterol (Dulera) 200-5 MCG/ACT inhaler Inhale 2 puffs 2 (two) times a day Rinse mouth after use. 39 g 2    Prenatal MV-Min-Fe Fum-FA-DHA (PRENATAL+DHA PO) Take by mouth      prochlorperazine (COMPAZINE) 5 mg tablet Take 1 tablet (5 mg total) by mouth every 6 (six) hours as needed for nausea or vomiting 30 tablet 0    rimegepant sulfate (NURTEC) 75 mg TBDP Take 1 tablet (75 mg total) by mouth daily as needed (migraine headaches) 8 tablet 3    tretinoin (RETIN-A) 0.025 % cream Apply topically daily at bedtime 45 g 2     No current facility-administered medications for this visit.        Allergies   Allergen Reactions    Sulfa Antibiotics Anaphylaxis    Levofloxacin      Other reaction(s): sensative    Prednisone Other (See Comments)    Tobramycin Other (See Comments)     Other reaction(s): sensative. Ototoxic    Pseudoephedrine Palpitations       Review of Systems    Video Exam    There were no vitals filed for this visit.    Physical Exam     Behavioral Health Psychotherapy Progress Note    Psychotherapy Provided: Individual Psychotherapy     1. Moderate episode of recurrent major depressive disorder (HCC)        2. PTSD (post-traumatic stress disorder)            Goals addressed in session: Goal 1     DATA: Linda shared she is doing well, and she had been thinking about her anxiety and reactivity to [employer] and has been modulating her responses to him.  She shared her relationship with Iraj is better because they are both trying.  Iraj has been taking more ownership of parenting and has been standing up to his parents.  She shared she has been still in a rut and feeling she does not have energy to do things like paying attention to her dog, fixing up her car, being social, picking up the house, and doing photography.Therapist suggested possibly using a chalkboard or vision board.  She shared thoughts and feelings about having kids with Iraj and his reservations  "about it and her feelings about wanting to have children soon.  Therapist encouraged her to list her top priorities and she listed them as finding an NP job, paying off her loans and making plans for her friends [employer] party.  Linda shared she has been working on modulating her emotions when she is triggered in her relationship, and this has been helping to improve her relationship dynamics and her mood.  During this session, this clinician used the following therapeutic modalities: Client-centered Therapy    Substance Abuse was not addressed during this session. If the client is diagnosed with a co-occurring substance use disorder, please indicate any changes in the frequency or amount of use: . Stage of change for addressing substance use diagnoses: No substance use/Not applicable    ASSESSMENT:  Linda Hickman presents with a Euthymic/ normal mood.     her affect is Normal range and intensity, which is congruent, with her mood and the content of the session. The client has made progress on their goals.     Linda Hickman presents with a none risk of suicide, none risk of self-harm, and none risk of harm to others.    For any risk assessment that surpasses a \"low\" rating, a safety plan must be developed.    A safety plan was indicated: no  If yes, describe in detail     PLAN: Between sessions, Linda Hickman will utilize flashback management for her relationship conflicts. At the next session, the therapist will use Client-centered Therapy to address depression, relationship conflicts.    Behavioral Health Treatment Plan and Discharge Planning: Linda Hickman is aware of and agrees to continue to work on their treatment plan. They have identified and are working toward their discharge goals. yes    Visit start and stop times:    10/24/24  Start Time: 1700  Stop Time: 1752  Total Visit Time: 52 minutes        "

## 2024-11-11 ENCOUNTER — TELEMEDICINE (OUTPATIENT)
Dept: PSYCHIATRY | Facility: CLINIC | Age: 39
End: 2024-11-11
Payer: COMMERCIAL

## 2024-11-11 DIAGNOSIS — F43.10 PTSD (POST-TRAUMATIC STRESS DISORDER): ICD-10-CM

## 2024-11-11 DIAGNOSIS — F33.1 MODERATE EPISODE OF RECURRENT MAJOR DEPRESSIVE DISORDER (HCC): Primary | ICD-10-CM

## 2024-11-11 PROCEDURE — 90834 PSYTX W PT 45 MINUTES: CPT

## 2024-11-11 NOTE — PSYCH
Virtual Regular Visit    Verification of patient location:    Patient is located at Home in the following state in which I hold an active license [place]      Assessment/Plan:    Problem List Items Addressed This Visit       Moderate episode of recurrent major depressive disorder (HCC) - Primary    PTSD (post-traumatic stress disorder)       Goals addressed in session: Goal 1          Reason for visit is No chief complaint on file.       Encounter provider Jannet Villatoro LCSW      Recent Visits  No visits were found meeting these conditions.  Showing recent visits within past 7 days and meeting all other requirements  Today's Visits  Date Type Provider Dept   11/11/24 Telemedicine Jannet Villatoro LCSW [facility]   Showing today's visits and meeting all other requirements  Future Appointments  No visits were found meeting these conditions.  Showing future appointments within next 150 days and meeting all other requirements       The patient was identified by name and date of birth. Linda Hickman was informed that this is a telemedicine visit and that the visit is being conducted throughthe [employer] platform. She agrees to proceed..  My office door was closed. No one else was in the room.  She acknowledged consent and understanding of privacy and security of the video platform. The patient has agreed to participate and understands they can discontinue the visit at any time.    Patient is aware this is a billable service.     Subjective  Linda Hickman is a 39 y.o. female  .      HPI     Past Medical History:   Diagnosis Date    Allergic     Anemia     Anxiety     Arthritis     Asthma     Chlamydia 2014    Unfaithful partner    Depression     Exposure to SARS-associated coronavirus 04/12/2020    GERD (gastroesophageal reflux disease) 2010    Gonorrhea 2014    Unfaithful partner    Headache(784.0)     Lumbar herniated disc     Migraine 2012    Pneumonia     Urinary tract infection      Urogenital trichomoniasis 2015    Unfaithful partner    UTI (urinary tract infection)     Varicella 1988    Visual impairment        Past Surgical History:   Procedure Laterality Date    FL INJECTION LEFT KNEE (ARTHROGRAM)  2/28/2022    WISDOM TOOTH EXTRACTION         Current Outpatient Medications   Medication Sig Dispense Refill    ADDERALL XR, 20MG, 20 MG 24 hr capsule Take 1 tablet (20 mg) daily on productive days 30 capsule 0    albuterol (PROVENTIL HFA,VENTOLIN HFA) 90 mcg/act inhaler Inhale 2 puffs every 6 (six) hours as needed for wheezing 18 g 3    B Complex-C (B-complex with vitamin C) tablet Take 1 tablet by mouth daily (Patient not taking: Reported on 7/11/2024)      buPROPion (Wellbutrin XL) 150 mg 24 hr tablet Take 1 tablet (150 mg total) by mouth daily - Take with Wellbutrin 300 mg XL for a total daily dose of 450 mg 90 tablet 0    buPROPion (Wellbutrin XL) 300 mg 24 hr tablet Take 1 tablet (300 mg total) by mouth every morning 90 tablet 0    calcium carbonate (OS-BERENICE) 600 MG tablet Take 600 mg by mouth 2 (two) times a day with meals      cholecalciferol (VITAMIN D3) 1,000 units tablet Take 1,000 Units by mouth daily      clotrimazole (MYCELEX) 10 mg mary grace Take 1 tablet (10 mg total) by mouth 4 (four) times a day 70 Mary Grace 0    Dupixent 200 MG/1.14ML SOPN Inject 1 pen ( 200mg total) under the skin once every 14 (fourteen) days. 2 mL 10    EPINEPHrine (EPIPEN) 0.3 mg/0.3 mL SOAJ Inject 0.3 mL (0.3 mg total) into a muscle once for 1 dose (Patient not taking: Reported on 9/16/2024) 0.6 mL 0    Ferrous Sulfate (RA IRON PO) Take by mouth      guanFACINE HCl ER (INTUNIV) 1 MG TB24 Take 1 tablet (1 mg total) by mouth daily at bedtime 10 tablet 0    levalbuterol (Xopenex) 0.63 mg/3 mL nebulizer solution Take 3 mL (0.63 mg total) by nebulization 3 (three) times a day 30 mL 1    Magnesium Glycinate 100 MG CAPS Take 200 mg by mouth 2 (two) times a day      Melatonin 5 MG TABS Take 1 tablet (5 mg total) by mouth  "daily at bedtime as needed (insomnia)      mometasone-formoterol (Dulera) 200-5 MCG/ACT inhaler Inhale 2 puffs 2 (two) times a day Rinse mouth after use. 39 g 2    Prenatal MV-Min-Fe Fum-FA-DHA (PRENATAL+DHA PO) Take by mouth      prochlorperazine (COMPAZINE) 5 mg tablet Take 1 tablet (5 mg total) by mouth every 6 (six) hours as needed for nausea or vomiting 30 tablet 0    rimegepant sulfate (NURTEC) 75 mg TBDP Take 1 tablet (75 mg total) by mouth daily as needed (migraine headaches) 8 tablet 3    tretinoin (RETIN-A) 0.025 % cream Apply topically daily at bedtime 45 g 2     No current facility-administered medications for this visit.        Allergies   Allergen Reactions    Sulfa Antibiotics Anaphylaxis    Levofloxacin      Other reaction(s): sensative    Prednisone Other (See Comments)    Tobramycin Other (See Comments)     Other reaction(s): sensative. Ototoxic    Pseudoephedrine Palpitations       Review of Systems    Video Exam    There were no vitals filed for this visit.    Physical Exam     Behavioral Health Psychotherapy Progress Note    Psychotherapy Provided: Individual Psychotherapy     1. Moderate episode of recurrent major depressive disorder (HCC)        2. PTSD (post-traumatic stress disorder)            Goals addressed in session: Goal 1     DATA: Linda shared some thoughts and feelings about the election including that she is afraid of trying for having a child because of the possible new laws. Therapist gave some mental health care suggestions for her for these times including taking breaks from media, find sources of alfa and happiness, and socialize with people who are like minded.  She gave feedback that she is trying these things.  She talked about her past and still feeling upset over what happened with her ex  Danial and is not kind to herself at times because she blames herself for \"falling for\" Ray's lying and fooling her.   Therapist encouraged self-compassion for herself when she " "thinks about her past.    During this session, this clinician used the following therapeutic modalities: Client-centered Therapy and Cognitive Behavioral Therapy    Substance Abuse was not addressed during this session. If the client is diagnosed with a co-occurring substance use disorder, please indicate any changes in the frequency or amount of use: . Stage of change for addressing substance use diagnoses: No substance use/Not applicable    ASSESSMENT:  Linda Hickman presents with a Euthymic/ normal mood.     her affect is Normal range and intensity, which is congruent, with her mood and the content of the session. The client has made progress on their goals.     Linda Hickman presents with a none risk of suicide, none risk of self-harm, and none risk of harm to others.    For any risk assessment that surpasses a \"low\" rating, a safety plan must be developed.    A safety plan was indicated: no  If yes, describe in detail     PLAN: Between sessions, Linda Hickman will utilize self-compassion for herself in the past. At the next session, the therapist will use Client-centered Therapy and Cognitive Behavioral Therapy to address depression.    Behavioral Health Treatment Plan and Discharge Planning: Linda Hickman is aware of and agrees to continue to work on their treatment plan. They have identified and are working toward their discharge goals. yes    Visit start and stop times:    11/11/24  Start Time: 1700  Stop Time: 1752  Total Visit Time: 52 minutes        "

## 2024-11-13 ENCOUNTER — TELEMEDICINE (OUTPATIENT)
Dept: PSYCHIATRY | Facility: CLINIC | Age: 39
End: 2024-11-13

## 2024-11-13 DIAGNOSIS — F43.9 TRAUMA AND STRESSOR-RELATED DISORDER: ICD-10-CM

## 2024-11-13 DIAGNOSIS — F90.0 ATTENTION DEFICIT HYPERACTIVITY DISORDER, INATTENTIVE TYPE: ICD-10-CM

## 2024-11-13 DIAGNOSIS — F33.1 MODERATE EPISODE OF RECURRENT MAJOR DEPRESSIVE DISORDER (HCC): Primary | ICD-10-CM

## 2024-11-13 PROCEDURE — NC001 PR NO CHARGE

## 2024-11-13 RX ORDER — GUANFACINE 2 MG/1
2 TABLET, EXTENDED RELEASE ORAL
Qty: 30 TABLET | Refills: 1 | Status: SHIPPED | OUTPATIENT
Start: 2024-11-13

## 2024-11-13 RX ORDER — DEXTROAMPHETAMINE SACCHARATE, AMPHETAMINE ASPARTATE MONOHYDRATE, DEXTROAMPHETAMINE SULFATE AND AMPHETAMINE SULFATE 2.5; 2.5; 2.5; 2.5 MG/1; MG/1; MG/1; MG/1
10 CAPSULE, EXTENDED RELEASE ORAL DAILY
Qty: 20 CAPSULE | Refills: 0 | Status: SHIPPED | OUTPATIENT
Start: 2024-11-13

## 2024-11-13 NOTE — ASSESSMENT & PLAN NOTE
Status: Controlled, at goal    Continue ongoing medication management every 1 to 3 months  Continue ongoing psychotherapy with therapist Jannet (on generally a biweekly basis)

## 2024-11-13 NOTE — ASSESSMENT & PLAN NOTE
Status: Improving, not at goal    Continue Wellbutrin 450 mg XL daily for symptoms of depression as well as for attention and concentration  PARQ was completed for bupropion (Wellbutrin) including nausea, insomnia, agitation/activation, weight loss, anxiety, palpitations, hypertension, decreased seizure threshold and risk with alcohol withdrawal or electrolyte disturbances.  Patient screened negative for heavy alcohol use, history of seizures, and eating disorders.

## 2024-11-13 NOTE — PATIENT INSTRUCTIONS
Please present for your previously scheduled appointment approximately 15 minutes prior to appointment time. If you are running late or are unable to attend your appointment, please call our [place] office at [phone] or our [place] office at [phone] if applicable to notify the office of your absence.    If you are in psychological crisis including not limited to suicidal/homicidal thoughts or thoughts of self-injury, do not hesitate to call/contact your County Crisis hotline (see below) or attend to the nearest emergency department.  [facility] Crisis: [phone]  [facility] Crisis: [phone]  [facility] Crisis: [phone]  [facility] Crisis: [phone]  [facility] Crisis: [phone]  [facility] Crisis: [phone]  [facility] Crisis: [phone]  National Suicide Prevention Hotline: 1-501.755.4471

## 2024-11-13 NOTE — PSYCH
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  Attention deficit hyperactivity disorder, inattentive type  Status: Improving, not at goal    Increased Adderall to 30 mg XR daily for attention and concentration  PARQ completed for the class of stimulant medications including anxiety/irritability, insomnia, appetite suppression/weight loss, abuse potential, elevated heart rate and blood pressure, seizures, activation/induction of belinda, unmasking of tic's, growth suppression in children, interactions with other medications, and risk of sudden death    Continue Wellbutrin 450 mg XL daily for symptoms of depression as well as for attention and concentration  PARQ was completed for bupropion (Wellbutrin) including nausea, insomnia, agitation/activation, weight loss, anxiety, palpitations, hypertension, decreased seizure threshold and risk with alcohol withdrawal or electrolyte disturbances.  Patient screened negative for heavy alcohol use, history of seizures, and eating disorders.    Increase Intuniv to 2 mg at bedtime for ongoing struggles with insomnia as well as for impulsivity in the setting of ADHD  PARQ completed for Intuniv including  dizziness, sedation, blood pressure changes, including orthostatic hypotension), headache, medication interaction risk, GI distress    Trauma and stressor-related disorder  Status: Controlled, at goal    Continue ongoing medication management every 1 to 3 months  Continue ongoing psychotherapy with therapist Jannet (on generally a biweekly basis)      Aware of need to follow up with family physician for medical issues  Aware of 24 hour and weekend coverage for urgent situations accessed by calling [facility] main practice number    Although patient's acute lethality risk is low, long-term/chronic lethality risk is mildly elevated in the presence of moderate symptoms of depression and anxiety. At the current moment, Linda is future-oriented, forward-thinking, and demonstrates ability to act in a self-preserving manner as evidenced by volitionally presenting to the clinic today, seeking treatment. At this juncture, inpatient hospitalization is not currently warranted. To mitigate future risk, patient should adhere to the recommendations of this writer, avoid alcohol/illicit substance use, utilize community-based resources and familiar support and prioritize mental health treatment.     Based on today's assessment and clinical criteria, Linda Hickman contracts for safety and is not an imminent risk of harm to self or others. Outpatient level of care is deemed appropriate at this present time. Linda understands that if they are no longer able to contract for safety, they need to call/contact the outpatient office including this writer, call/contact crisis and/orattend to the nearest Emergency Department for immediate evaluation.    Risks/Benefits      Risks, Benefits And Possible Side Effects Of Medications:    Risks, benefits, and possible side effects of medications explained to Linda and she verbalizes understanding and agreement for treatment.    Controlled Medication Discussion:     Linda  has been filling controlled prescriptions on time as prescribed according to [place] Prescription Drug Monitoring Program    Psychotherapy Provided:     Individual psychotherapy provided: Yes  Recent stressor including financial stressors, family and relationship stressors discussed with Linda.   Supportive therapy provided.     Treatment Plan:    Completed and signed during the session: Not applicable - Treatment Plan not due at this session    Visit Time    Visit Start Time: 8:45 AM  Visit Stop Time: 9:15 AM  Total Visit Duration: 30 minutes    This note was shared with patient.    Mika Albarran MD 11/13/24    This note has been constructed using a voice recognition system. There may be translation, syntax, or grammatical errors. If you have any questions, please contact the dictating provider.

## 2024-11-22 ENCOUNTER — APPOINTMENT (OUTPATIENT)
Dept: RADIOLOGY | Facility: CLINIC | Age: 39
End: 2024-11-22
Payer: COMMERCIAL

## 2024-11-22 ENCOUNTER — OFFICE VISIT (OUTPATIENT)
Dept: FAMILY MEDICINE CLINIC | Facility: CLINIC | Age: 39
End: 2024-11-22
Payer: COMMERCIAL

## 2024-11-22 VITALS
OXYGEN SATURATION: 100 % | BODY MASS INDEX: 26.2 KG/M2 | SYSTOLIC BLOOD PRESSURE: 138 MMHG | DIASTOLIC BLOOD PRESSURE: 70 MMHG | HEIGHT: 66 IN | WEIGHT: 163 LBS | HEART RATE: 104 BPM | TEMPERATURE: 97 F

## 2024-11-22 DIAGNOSIS — J45.901 EXACERBATION OF ASTHMA, UNSPECIFIED ASTHMA SEVERITY, UNSPECIFIED WHETHER PERSISTENT: ICD-10-CM

## 2024-11-22 DIAGNOSIS — J45.901 EXACERBATION OF ASTHMA, UNSPECIFIED ASTHMA SEVERITY, UNSPECIFIED WHETHER PERSISTENT: Primary | ICD-10-CM

## 2024-11-22 PROCEDURE — 93000 ELECTROCARDIOGRAM COMPLETE: CPT

## 2024-11-22 PROCEDURE — 99214 OFFICE O/P EST MOD 30 MIN: CPT

## 2024-11-22 PROCEDURE — 71046 X-RAY EXAM CHEST 2 VIEWS: CPT

## 2024-11-22 RX ORDER — METHYLPREDNISOLONE 4 MG/1
TABLET ORAL
Qty: 21 EACH | Refills: 0 | Status: SHIPPED | OUTPATIENT
Start: 2024-11-22

## 2024-11-22 RX ORDER — METHYLPREDNISOLONE 4 MG/1
TABLET ORAL
Qty: 21 EACH | Refills: 0 | Status: SHIPPED | OUTPATIENT
Start: 2024-11-22 | End: 2024-11-22

## 2024-11-22 NOTE — PROGRESS NOTES
Name: Linda Hickman      : 1985      MRN: [id number]  Encounter Provider: Reba Melendez PA-C  Encounter Date: 2024   Encounter department: [facility]  :  Assessment & Plan  Exacerbation of asthma, unspecified asthma severity, unspecified whether persistent  Patient presents for evaluation of cough, congestion, sore throat, chest tightness x 1.5 weeks, feels her asthma has been worse   She took an at home covid test which was negative  Physical exam today reveals scattered wheezing in lung fields, O2 100% on room air and afebrile -- mild nasal congestion and posterior pharyngeal erythema  EKG completed today due to chest tightness -- showed NSR, no abnormalities   Based on symptoms and exam findings, suspect lower respiratory infection causing an acute exacerbation of her asthma -- will obtain stat CXR to rule out pneumonia and treat with medrol pack (listed allergy to prednisone however reports not a true allergy and can take medrol without issue), and augmentin for LRI -- discussed side effects  Otherwise encouraged to stay hydrated, rest, and continue nebulizer/inhaler treatment  Advised ER if symptoms worsen or if she develop any chest pain, sob, trouble breathing -- pt agreeable, will monitor her symptoms closely    Orders:    amoxicillin-clavulanate (AUGMENTIN) 875-125 mg per tablet; Take 1 tablet by mouth every 12 (twelve) hours for 7 days    methylPREDNISolone 4 MG tablet therapy pack; Use as directed on package    XR chest pa and lateral; Future    POCT ECG       History of Present Illness     CC: cough, congestion x 1.5 weeks    Patient with asthma presents for evaluation of sore throat, cough, congestion x 1.5 weeks. Reports cough is productive. She is also experiencing chest tightness. Did use her nebulizer and inhaler which did help.   Took an at home covid test which was negative.   She denies any fevers at home. No known sick contacts.         Review of Systems    Constitutional:  Negative for chills, diaphoresis and fever.   HENT:  Positive for congestion, rhinorrhea and sore throat. Negative for ear pain.    Respiratory:  Positive for cough, chest tightness and wheezing. Negative for shortness of breath.    Cardiovascular:  Negative for chest pain and palpitations.   Neurological:  Negative for light-headedness and headaches.     Medical History Reviewed by provider this encounter:  Tobacco  Allergies  Meds  Problems  Med Hx  Surg Hx  Fam Hx     .  Past Medical History   Past Medical History:   Diagnosis Date    Allergic     Anemia     Anxiety     Arthritis     Asthma     Chlamydia 2014    Unfaithful partner    Depression     Exposure to SARS-associated coronavirus 2020    GERD (gastroesophageal reflux disease) 2010    Gonorrhea 2014    Unfaithful partner    Headache(784.0)     Lumbar herniated disc     Migraine     Pneumonia     Urinary tract infection     Urogenital trichomoniasis     Unfaithful partner    UTI (urinary tract infection)     Varicella 1988    Visual impairment      Past Surgical History:   Procedure Laterality Date    FL INJECTION LEFT KNEE (ARTHROGRAM)  2022    WISDOM TOOTH EXTRACTION       Family History   Problem Relation Age of Onset    BRCA2 Negative Mother     BRCA1 Negative Mother     Breast cancer additional onset Mother         Left Masectomy    Hypertension Mother     Diabetes Mother     Asthma Mother     Cancer Mother         breast    Breast cancer Mother         In 2019, left side. -brca    Migraines Mother     Mental illness Father         family history    Alcohol abuse Father     Hypertension Father     Hyperlipidemia Father     Diabetes Father     Heart attack Father         Assumed. Found  at 63    Colon cancer Maternal Grandmother     Breast cancer additional onset Maternal Grandmother     Diabetes Maternal Grandmother     Cancer Maternal Grandmother         breast, colon    Breast cancer Maternal  Grandmother     Prostate cancer Maternal Grandfather         lung    Cancer Maternal Grandfather         lung (smoker)    Completed Suicide  Maternal Grandfather     Lung cancer Maternal Grandfather     Asthma Brother     Asthma Brother       reports that she has never smoked. She has never been exposed to tobacco smoke. She has never used smokeless tobacco. She reports current alcohol use. She reports that she does not use drugs.  Current Outpatient Medications on File Prior to Visit   Medication Sig Dispense Refill    ADDERALL XR, 20MG, 20 MG 24 hr capsule Take 1 tablet (20 mg) daily on productive days 30 capsule 0    albuterol (PROVENTIL HFA,VENTOLIN HFA) 90 mcg/act inhaler Inhale 2 puffs every 6 (six) hours as needed for wheezing 18 g 3    amphetamine-dextroamphetamine (ADDERALL XR, 10MG,) 10 MG 24 hr capsule Take 1 capsule (10 mg total) by mouth in the morning - Try to take this medication on productive days - Your total daily dose of Adderall XR is 30 mg Max Daily Amount: 10 mg 20 capsule 0    buPROPion (Wellbutrin XL) 150 mg 24 hr tablet Take 1 tablet (150 mg total) by mouth daily - Take with Wellbutrin 300 mg XL for a total daily dose of 450 mg 90 tablet 0    buPROPion (Wellbutrin XL) 300 mg 24 hr tablet Take 1 tablet (300 mg total) by mouth every morning 90 tablet 0    calcium carbonate (OS-BERENICE) 600 MG tablet Take 600 mg by mouth 2 (two) times a day with meals      cholecalciferol (VITAMIN D3) 1,000 units tablet Take 1,000 Units by mouth daily      clotrimazole (MYCELEX) 10 mg mary grace Take 1 tablet (10 mg total) by mouth 4 (four) times a day 70 Mary Grace 0    Dupixent 200 MG/1.14ML SOPN Inject 1 pen ( 200mg total) under the skin once every 14 (fourteen) days. 2 mL 10    EPINEPHrine (EPIPEN) 0.3 mg/0.3 mL SOAJ Inject 0.3 mL (0.3 mg total) into a muscle once for 1 dose 0.6 mL 0    Ferrous Sulfate (RA IRON PO) Take by mouth      guanFACINE HCl ER (INTUNIV) 2 MG TB24 Take 1 tablet (2 mg total) by mouth daily at  bedtime 30 tablet 1    levalbuterol (Xopenex) 0.63 mg/3 mL nebulizer solution Take 3 mL (0.63 mg total) by nebulization 3 (three) times a day 30 mL 1    Magnesium Glycinate 100 MG CAPS Take 200 mg by mouth 2 (two) times a day      Melatonin 5 MG TABS Take 2 tablets (10 mg total) by mouth daily at bedtime as needed (insomnia)      mometasone-formoterol (Dulera) 200-5 MCG/ACT inhaler Inhale 2 puffs 2 (two) times a day Rinse mouth after use. 39 g 2    Prenatal MV-Min-Fe Fum-FA-DHA (PRENATAL+DHA PO) Take by mouth      prochlorperazine (COMPAZINE) 5 mg tablet Take 1 tablet (5 mg total) by mouth every 6 (six) hours as needed for nausea or vomiting 30 tablet 0    rimegepant sulfate (NURTEC) 75 mg TBDP Take 1 tablet (75 mg total) by mouth daily as needed (migraine headaches) 8 tablet 3    tretinoin (RETIN-A) 0.025 % cream Apply topically daily at bedtime 45 g 2    B Complex-C (B-complex with vitamin C) tablet Take 1 tablet by mouth daily (Patient not taking: Reported on 7/11/2024)       No current facility-administered medications on file prior to visit.     Allergies   Allergen Reactions    Sulfa Antibiotics Anaphylaxis    Levofloxacin      Other reaction(s): sensative    Prednisone Other (See Comments)    Tobramycin Other (See Comments)     Other reaction(s): sensative. Ototoxic    Pseudoephedrine Palpitations      Current Outpatient Medications on File Prior to Visit   Medication Sig Dispense Refill    ADDERALL XR, 20MG, 20 MG 24 hr capsule Take 1 tablet (20 mg) daily on productive days 30 capsule 0    albuterol (PROVENTIL HFA,VENTOLIN HFA) 90 mcg/act inhaler Inhale 2 puffs every 6 (six) hours as needed for wheezing 18 g 3    amphetamine-dextroamphetamine (ADDERALL XR, 10MG,) 10 MG 24 hr capsule Take 1 capsule (10 mg total) by mouth in the morning - Try to take this medication on productive days - Your total daily dose of Adderall XR is 30 mg Max Daily Amount: 10 mg 20 capsule 0    buPROPion (Wellbutrin XL) 150 mg 24 hr  tablet Take 1 tablet (150 mg total) by mouth daily - Take with Wellbutrin 300 mg XL for a total daily dose of 450 mg 90 tablet 0    buPROPion (Wellbutrin XL) 300 mg 24 hr tablet Take 1 tablet (300 mg total) by mouth every morning 90 tablet 0    calcium carbonate (OS-BERENICE) 600 MG tablet Take 600 mg by mouth 2 (two) times a day with meals      cholecalciferol (VITAMIN D3) 1,000 units tablet Take 1,000 Units by mouth daily      clotrimazole (MYCELEX) 10 mg mary grace Take 1 tablet (10 mg total) by mouth 4 (four) times a day 70 Mary Grace 0    Dupixent 200 MG/1.14ML SOPN Inject 1 pen ( 200mg total) under the skin once every 14 (fourteen) days. 2 mL 10    EPINEPHrine (EPIPEN) 0.3 mg/0.3 mL SOAJ Inject 0.3 mL (0.3 mg total) into a muscle once for 1 dose 0.6 mL 0    Ferrous Sulfate (RA IRON PO) Take by mouth      guanFACINE HCl ER (INTUNIV) 2 MG TB24 Take 1 tablet (2 mg total) by mouth daily at bedtime 30 tablet 1    levalbuterol (Xopenex) 0.63 mg/3 mL nebulizer solution Take 3 mL (0.63 mg total) by nebulization 3 (three) times a day 30 mL 1    Magnesium Glycinate 100 MG CAPS Take 200 mg by mouth 2 (two) times a day      Melatonin 5 MG TABS Take 2 tablets (10 mg total) by mouth daily at bedtime as needed (insomnia)      mometasone-formoterol (Dulera) 200-5 MCG/ACT inhaler Inhale 2 puffs 2 (two) times a day Rinse mouth after use. 39 g 2    Prenatal MV-Min-Fe Fum-FA-DHA (PRENATAL+DHA PO) Take by mouth      prochlorperazine (COMPAZINE) 5 mg tablet Take 1 tablet (5 mg total) by mouth every 6 (six) hours as needed for nausea or vomiting 30 tablet 0    rimegepant sulfate (NURTEC) 75 mg TBDP Take 1 tablet (75 mg total) by mouth daily as needed (migraine headaches) 8 tablet 3    tretinoin (RETIN-A) 0.025 % cream Apply topically daily at bedtime 45 g 2    B Complex-C (B-complex with vitamin C) tablet Take 1 tablet by mouth daily (Patient not taking: Reported on 7/11/2024)       No current facility-administered medications on file prior to  "visit.      Social History     Tobacco Use    Smoking status: Never     Passive exposure: Never    Smokeless tobacco: Never   Vaping Use    Vaping status: Never Used   Substance and Sexual Activity    Alcohol use: Yes     Comment: ~1 drinks Twice a month    Drug use: Never    Sexual activity: Not Currently     Partners: Male     Birth control/protection: Condom Male        Objective   /70   Pulse 104   Temp (!) 97 °F (36.1 °C)   Ht 5' 6\" (1.676 m)   Wt 73.9 kg (163 lb)   SpO2 100%   BMI 26.31 kg/m²      Physical Exam  Vitals reviewed.   Constitutional:       General: She is not in acute distress.     Appearance: Normal appearance. She is not ill-appearing or diaphoretic.   HENT:      Head: Normocephalic and atraumatic.      Right Ear: Tympanic membrane, ear canal and external ear normal. There is no impacted cerumen.      Left Ear: Tympanic membrane, ear canal and external ear normal. There is no impacted cerumen.      Nose: Congestion present. No rhinorrhea.      Mouth/Throat:      Mouth: Mucous membranes are moist.      Pharynx: Oropharynx is clear. Posterior oropharyngeal erythema present. No oropharyngeal exudate.   Eyes:      General:         Right eye: No discharge.         Left eye: No discharge.      Conjunctiva/sclera: Conjunctivae normal.   Cardiovascular:      Rate and Rhythm: Normal rate and regular rhythm.      Pulses: Normal pulses.      Heart sounds: Normal heart sounds. No murmur heard.  Pulmonary:      Effort: Pulmonary effort is normal. No respiratory distress.      Breath sounds: Wheezing present. No rhonchi or rales.   Musculoskeletal:         General: Normal range of motion.      Cervical back: Normal range of motion.      Right lower leg: No edema.      Left lower leg: No edema.   Skin:     General: Skin is warm.   Neurological:      General: No focal deficit present.      Mental Status: She is alert.      Gait: Gait normal.   Psychiatric:         Mood and Affect: Mood normal. "

## 2024-11-24 ENCOUNTER — RESULTS FOLLOW-UP (OUTPATIENT)
Dept: FAMILY MEDICINE CLINIC | Facility: CLINIC | Age: 39
End: 2024-11-24

## 2024-12-06 ENCOUNTER — OFFICE VISIT (OUTPATIENT)
Dept: FAMILY MEDICINE CLINIC | Facility: CLINIC | Age: 39
End: 2024-12-06
Payer: COMMERCIAL

## 2024-12-06 VITALS
HEIGHT: 66 IN | TEMPERATURE: 97.8 F | OXYGEN SATURATION: 98 % | BODY MASS INDEX: 25.81 KG/M2 | WEIGHT: 160.6 LBS | SYSTOLIC BLOOD PRESSURE: 122 MMHG | HEART RATE: 117 BPM | DIASTOLIC BLOOD PRESSURE: 78 MMHG

## 2024-12-06 DIAGNOSIS — F43.10 PTSD (POST-TRAUMATIC STRESS DISORDER): ICD-10-CM

## 2024-12-06 DIAGNOSIS — G90.A POTS (POSTURAL ORTHOSTATIC TACHYCARDIA SYNDROME): Primary | ICD-10-CM

## 2024-12-06 DIAGNOSIS — G43.829 MENSTRUAL MIGRAINE WITHOUT STATUS MIGRAINOSUS, NOT INTRACTABLE: ICD-10-CM

## 2024-12-06 DIAGNOSIS — F33.1 MODERATE EPISODE OF RECURRENT MAJOR DEPRESSIVE DISORDER (HCC): ICD-10-CM

## 2024-12-06 DIAGNOSIS — H40.003 GLAUCOMA SUSPECT OF BOTH EYES: ICD-10-CM

## 2024-12-06 DIAGNOSIS — J45.50 SEVERE PERSISTENT ASTHMA WITHOUT COMPLICATION: ICD-10-CM

## 2024-12-06 PROBLEM — Z00.00 ANNUAL PHYSICAL EXAM: Status: RESOLVED | Noted: 2024-06-04 | Resolved: 2024-12-06

## 2024-12-06 PROBLEM — R06.02 SHORTNESS OF BREATH: Status: RESOLVED | Noted: 2023-01-27 | Resolved: 2024-12-06

## 2024-12-06 PROCEDURE — 99214 OFFICE O/P EST MOD 30 MIN: CPT | Performed by: NURSE PRACTITIONER

## 2024-12-06 NOTE — PROGRESS NOTES
Name: Linda Hickman      : 1985      MRN: [id number]  Encounter Provider: JULIEN Thomson  Encounter Date: 2024   Encounter department: [facility]  :  Assessment & Plan  POTS (postural orthostatic tachycardia syndrome)  Patient reports that she has had symptoms for the past two years and having episodes of lightheaded and dizziness and in office heart rate elevations from sitting to standing changed from  within a minute discussed with patient options for management including increasing her fluid hydration and wearing stockings     Orders:    TSH, 3rd generation with Free T4 reflex; Future    Comprehensive metabolic panel; Future    CBC and differential; Future    Glaucoma suspect of both eyes  Patient had laser therapy and iridotomies and was cleared for 1 year visit          Menstrual migraine without status migrainosus, not intractable  Nurtec taking and is working well for her          Moderate episode of recurrent major depressive disorder (HCC)  Following with Dr. Eisenberg psychiatry and therapist  and is managed with for the ADHD adderall 20 intuniv at night wellbutrin 450 mg          PTSD (post-traumatic stress disorder)         Severe persistent asthma without complication  Dulera and dupixent every two weeks                 History of Present Illness     Patient here today for follow up and reports that she is doing well. Patient is following with pulmonary and did have two episodes of illness and was having wheezing and was treated with abx and steroids. Patient is due to see pulmonary soon and cummins for PFT. She is having some tachycardia currently and reports that she is thinking she may have POTs and when she was changing positions and feeling weakness and when changing position her heart rate would jump 30 points       Review of Systems   Constitutional:  Negative for activity change, appetite change, chills, diaphoresis, fatigue, fever and unexpected  "weight change.   HENT:  Negative for congestion, ear pain, hearing loss, postnasal drip, sinus pressure, sinus pain, sneezing and sore throat.    Eyes:  Negative for pain, redness and visual disturbance.   Respiratory:  Negative for cough and shortness of breath.    Cardiovascular:  Negative for chest pain and leg swelling.   Gastrointestinal:  Negative for abdominal pain, diarrhea, nausea and vomiting.   Endocrine: Negative.    Genitourinary: Negative.    Musculoskeletal:  Negative for arthralgias.   Skin: Negative.    Allergic/Immunologic: Negative.    Neurological:  Negative for dizziness and light-headedness.   Hematological: Negative.    Psychiatric/Behavioral:  Negative for behavioral problems and dysphoric mood.           Objective   /78 (BP Location: Left arm, Patient Position: Sitting)   Pulse (!) 117   Temp 97.8 °F (36.6 °C) (Temporal)   Ht 5' 6\" (1.676 m)   Wt 72.8 kg (160 lb 9.6 oz)   SpO2 98%   BMI 25.92 kg/m²      Physical Exam  Constitutional:       General: She is not in acute distress.     Appearance: She is well-developed.   HENT:      Head: Normocephalic and atraumatic.      Right Ear: Tympanic membrane normal.      Left Ear: Tympanic membrane normal.      Nose: Nose normal.      Mouth/Throat:      Mouth: Mucous membranes are moist.   Eyes:      Pupils: Pupils are equal, round, and reactive to light.   Neck:      Thyroid: No thyromegaly.   Cardiovascular:      Rate and Rhythm: Normal rate and regular rhythm.      Heart sounds: Normal heart sounds. No murmur heard.  Pulmonary:      Effort: Pulmonary effort is normal. No respiratory distress.      Breath sounds: Normal breath sounds. No wheezing.   Abdominal:      General: Bowel sounds are normal.      Palpations: Abdomen is soft.   Musculoskeletal:         General: Normal range of motion.      Cervical back: Normal range of motion.   Skin:     General: Skin is warm and dry.   Neurological:      General: No focal deficit present.      " Mental Status: She is alert and oriented to person, place, and time.   Psychiatric:         Mood and Affect: Mood normal.

## 2024-12-06 NOTE — ASSESSMENT & PLAN NOTE
Following with Dr. Eisenberg psychiatry and therapist  and is managed with for the ADHD adderall 20 intuniv at night wellbutrin 450 mg

## 2024-12-06 NOTE — ASSESSMENT & PLAN NOTE
Patient reports that she has had symptoms for the past two years and having episodes of lightheaded and dizziness and in office heart rate elevations from sitting to standing changed from  within a minute discussed with patient options for management including increasing her fluid hydration and wearing stockings     Orders:    TSH, 3rd generation with Free T4 reflex; Future    Comprehensive metabolic panel; Future    CBC and differential; Future

## 2024-12-11 ENCOUNTER — OFFICE VISIT (OUTPATIENT)
Dept: URGENT CARE | Facility: CLINIC | Age: 39
End: 2024-12-11
Payer: COMMERCIAL

## 2024-12-11 VITALS
DIASTOLIC BLOOD PRESSURE: 65 MMHG | BODY MASS INDEX: 25.71 KG/M2 | OXYGEN SATURATION: 97 % | HEIGHT: 66 IN | WEIGHT: 160 LBS | RESPIRATION RATE: 20 BRPM | SYSTOLIC BLOOD PRESSURE: 124 MMHG | TEMPERATURE: 97.5 F | HEART RATE: 88 BPM

## 2024-12-11 DIAGNOSIS — J02.9 SORE THROAT: ICD-10-CM

## 2024-12-11 DIAGNOSIS — B37.0 THRUSH: ICD-10-CM

## 2024-12-11 DIAGNOSIS — B37.0 ORAL THRUSH: Primary | ICD-10-CM

## 2024-12-11 DIAGNOSIS — B37.0 THRUSH: Primary | ICD-10-CM

## 2024-12-11 LAB — S PYO AG THROAT QL: NEGATIVE

## 2024-12-11 PROCEDURE — 99213 OFFICE O/P EST LOW 20 MIN: CPT

## 2024-12-11 PROCEDURE — 87070 CULTURE OTHR SPECIMN AEROBIC: CPT

## 2024-12-11 PROCEDURE — 87880 STREP A ASSAY W/OPTIC: CPT

## 2024-12-11 RX ORDER — NYSTATIN 100000 [USP'U]/ML
500000 SUSPENSION ORAL 4 TIMES DAILY
Qty: 473 ML | Refills: 0 | Status: SHIPPED | OUTPATIENT
Start: 2024-12-11 | End: 2024-12-11 | Stop reason: SDUPTHER

## 2024-12-11 RX ORDER — NYSTATIN 100000 [USP'U]/ML
500000 SUSPENSION ORAL 4 TIMES DAILY
Qty: 473 ML | Refills: 0 | Status: SHIPPED | OUTPATIENT
Start: 2024-12-11

## 2024-12-11 NOTE — PROGRESS NOTES
Linda sent a message into the office regarding possible thrush.  She is having white spots on her tonsils and a burning sensation in her throat.  I did advise her to go to urgent care or PCP to have a throat culture to rule out strep.  In the meantime, I did supply a course of nystatin in the event the strep culture is negative.  She should continue nystatin for 48 hours post resolution of symptoms.  If she continues with frequent thrush, may need to reevaluate inhaler therapies at next visit.

## 2024-12-12 ENCOUNTER — TELEMEDICINE (OUTPATIENT)
Dept: PSYCHIATRY | Facility: CLINIC | Age: 39
End: 2024-12-12
Payer: COMMERCIAL

## 2024-12-12 DIAGNOSIS — F43.10 PTSD (POST-TRAUMATIC STRESS DISORDER): ICD-10-CM

## 2024-12-12 DIAGNOSIS — F33.1 MODERATE EPISODE OF RECURRENT MAJOR DEPRESSIVE DISORDER (HCC): Primary | ICD-10-CM

## 2024-12-12 PROCEDURE — 90834 PSYTX W PT 45 MINUTES: CPT

## 2024-12-12 NOTE — PROGRESS NOTES
[facility]    NAME: Linda Hickman is a 39 y.o. female  : 1985    MRN: [id number]  DATE: 2024  TIME: 7:12 PM    Assessment and Plan   Oral thrush [B37.0]  1. Oral thrush        2. Sore throat  POCT rapid ANTIGEN strepA    Throat culture        Tested for strep in office today, results were negative.   Continue supportive care, and educated pt on.   Can try Cepacol throat spray from the pharmacy for symptoms.       Patient Instructions     Your rapid strep was negative.  Continue supportive care with salt water gargles, cough drops, over the counter throat sprays, and warm fluids.  Follow up with PCP in 3-5 days.  Proceed to  ER if symptoms worsen.    Chief Complaint     Chief Complaint   Patient presents with    Sore Throat     Symptoms started yesterday.          History of Present Illness       Presents with sore throat symptoms that began yesterday. Reports she is on dupixent and recently on antibiotics. Concerned for strep versus thrush. Pulmonary sent in nystatin for her.         Review of Systems   Review of Systems   Constitutional:  Negative for chills and fever.   HENT:  Positive for sore throat. Negative for congestion.    Respiratory:  Negative for cough and shortness of breath.    Cardiovascular:  Negative for chest pain.   Gastrointestinal:  Negative for abdominal pain.   Musculoskeletal:  Negative for myalgias.   Psychiatric/Behavioral:  Negative for confusion.          Current Medications       Current Outpatient Medications:     ADDERALL XR, 20MG, 20 MG 24 hr capsule, Take 1 tablet (20 mg) daily on productive days, Disp: 30 capsule, Rfl: 0    albuterol (PROVENTIL HFA,VENTOLIN HFA) 90 mcg/act inhaler, Inhale 2 puffs every 6 (six) hours as needed for wheezing, Disp: 18 g, Rfl: 3    amphetamine-dextroamphetamine (ADDERALL XR, 10MG,) 10 MG 24 hr capsule, Take 1 capsule (10 mg total) by mouth in the morning - Try to take this medication on productive days - Your total daily  dose of Adderall XR is 30 mg Max Daily Amount: 10 mg, Disp: 20 capsule, Rfl: 0    buPROPion (Wellbutrin XL) 150 mg 24 hr tablet, Take 1 tablet (150 mg total) by mouth daily - Take with Wellbutrin 300 mg XL for a total daily dose of 450 mg, Disp: 90 tablet, Rfl: 0    calcium carbonate (OS-BERENICE) 600 MG tablet, Take 600 mg by mouth 2 (two) times a day with meals, Disp: , Rfl:     cholecalciferol (VITAMIN D3) 1,000 units tablet, Take 1,000 Units by mouth daily, Disp: , Rfl:     Dupixent 200 MG/1.14ML SOPN, Inject 1 pen ( 200mg total) under the skin once every 14 (fourteen) days., Disp: 2 mL, Rfl: 10    Ferrous Sulfate (RA IRON PO), Take by mouth, Disp: , Rfl:     guanFACINE HCl ER (INTUNIV) 2 MG TB24, Take 1 tablet (2 mg total) by mouth daily at bedtime, Disp: 30 tablet, Rfl: 1    levalbuterol (Xopenex) 0.63 mg/3 mL nebulizer solution, Take 3 mL (0.63 mg total) by nebulization 3 (three) times a day, Disp: 30 mL, Rfl: 1    Magnesium Glycinate 100 MG CAPS, Take 200 mg by mouth 2 (two) times a day, Disp: , Rfl:     Melatonin 5 MG TABS, Take 2 tablets (10 mg total) by mouth daily at bedtime as needed (insomnia), Disp: , Rfl:     mometasone-formoterol (Dulera) 200-5 MCG/ACT inhaler, Inhale 2 puffs 2 (two) times a day Rinse mouth after use., Disp: 39 g, Rfl: 2    nystatin (MYCOSTATIN) 500,000 units/5 mL suspension, Apply 5 mL (500,000 Units total) to the mouth or throat 4 (four) times a day, Disp: 473 mL, Rfl: 0    prochlorperazine (COMPAZINE) 5 mg tablet, Take 1 tablet (5 mg total) by mouth every 6 (six) hours as needed for nausea or vomiting, Disp: 30 tablet, Rfl: 0    rimegepant sulfate (NURTEC) 75 mg TBDP, Take 1 tablet (75 mg total) by mouth daily as needed (migraine headaches), Disp: 8 tablet, Rfl: 3    tretinoin (RETIN-A) 0.025 % cream, Apply topically daily at bedtime, Disp: 45 g, Rfl: 2    buPROPion (Wellbutrin XL) 300 mg 24 hr tablet, Take 1 tablet (300 mg total) by mouth every morning, Disp: 90 tablet, Rfl: 0     EPINEPHrine (EPIPEN) 0.3 mg/0.3 mL SOAJ, Inject 0.3 mL (0.3 mg total) into a muscle once for 1 dose, Disp: 0.6 mL, Rfl: 0    Current Allergies     Allergies as of 2024 - Reviewed 2024   Allergen Reaction Noted    Sulfa antibiotics Anaphylaxis 08/10/2017    Levofloxacin  08/10/2017    Prednisone Other (See Comments) 2021    Tobramycin Other (See Comments) 08/10/2017    Pseudoephedrine Palpitations 2023            The following portions of the patient's history were reviewed and updated as appropriate: allergies, current medications, past family history, past medical history, past social history, past surgical history and problem list.     Past Medical History:   Diagnosis Date    Allergic     Anemia     Anxiety     Arthritis     Asthma     Chlamydia     Unfaithful partner    Depression     Exposure to SARS-associated coronavirus 2020    GERD (gastroesophageal reflux disease)     Gonorrhea     Unfaithful partner    Headache(784.0)     Lumbar herniated disc     Migraine     Pneumonia     Urinary tract infection     Urogenital trichomoniasis 2015    Unfaithful partner    UTI (urinary tract infection)     Varicella 1988    Visual impairment        Past Surgical History:   Procedure Laterality Date    FL INJECTION LEFT KNEE (ARTHROGRAM)  2022    WISDOM TOOTH EXTRACTION         Family History   Problem Relation Age of Onset    BRCA2 Negative Mother     BRCA1 Negative Mother     Breast cancer additional onset Mother         Left Masectomy    Hypertension Mother     Diabetes Mother     Asthma Mother     Cancer Mother         breast    Breast cancer Mother         In 2019, left side. -brca    Migraines Mother     Mental illness Father         family history    Alcohol abuse Father     Hypertension Father     Hyperlipidemia Father     Diabetes Father     Heart attack Father         Assumed. Found  at 63    Colon cancer Maternal Grandmother     Breast cancer additional  "onset Maternal Grandmother     Diabetes Maternal Grandmother     Cancer Maternal Grandmother         breast, colon    Breast cancer Maternal Grandmother     Prostate cancer Maternal Grandfather         lung    Cancer Maternal Grandfather         lung (smoker)    Completed Suicide  Maternal Grandfather     Lung cancer Maternal Grandfather     Asthma Brother     Asthma Brother          Medications have been verified.        Objective   /65   Pulse 88   Temp 97.5 °F (36.4 °C)   Resp 20   Ht 5' 6\" (1.676 m)   Wt 72.6 kg (160 lb)   SpO2 97%   BMI 25.82 kg/m²        Physical Exam     Physical Exam  Vitals reviewed.   Constitutional:       General: She is not in acute distress.     Appearance: Normal appearance.   HENT:      Right Ear: Tympanic membrane, ear canal and external ear normal.      Left Ear: Tympanic membrane, ear canal and external ear normal.      Nose: Nose normal.      Mouth/Throat:      Mouth: Mucous membranes are moist.      Pharynx: No posterior oropharyngeal erythema.      Comments: White to the posterior pharynx, no exudate on the tonsil area. No clear thrush on the tongue.   Eyes:      Conjunctiva/sclera: Conjunctivae normal.   Cardiovascular:      Rate and Rhythm: Normal rate and regular rhythm.      Pulses: Normal pulses.      Heart sounds: Normal heart sounds. No murmur heard.  Pulmonary:      Effort: Pulmonary effort is normal. No respiratory distress.      Breath sounds: Normal breath sounds.   Skin:     General: Skin is warm and dry.   Neurological:      General: No focal deficit present.      Mental Status: She is alert and oriented to person, place, and time.   Psychiatric:         Mood and Affect: Mood normal.         Behavior: Behavior normal.                 "

## 2024-12-12 NOTE — PSYCH
Virtual Regular Visit    Verification of patient location:    Patient is located at Home in the following state in which I hold an active license [place]      Assessment/Plan:    Problem List Items Addressed This Visit       Moderate episode of recurrent major depressive disorder (HCC) - Primary    PTSD (post-traumatic stress disorder)       Goals addressed in session: Goal 1     Depression Follow-up Plan Completed: Yes    Reason for visit is No chief complaint on file.       Encounter provider Jannet Villatoro LCSW      Recent Visits  Date Type Provider Dept   12/06/24 Office Visit [facility]    Showing recent visits within past 7 days and meeting all other requirements  Today's Visits  Date Type Provider Dept   12/12/24 Telemedicine Jannet Villatoro LCSW [facility]   Showing today's visits and meeting all other requirements  Future Appointments  No visits were found meeting these conditions.  Showing future appointments within next 150 days and meeting all other requirements       The patient was identified by name and date of birth. Linda Hickman was informed that this is a telemedicine visit and that the visit is being conducted throughthe Epic Embedded platform. She agrees to proceed..  My office door was closed. No one else was in the room.  She acknowledged consent and understanding of privacy and security of the video platform. The patient has agreed to participate and understands they can discontinue the visit at any time.    Patient is aware this is a billable service.     Subjective  Linda Hickman is a 39 y.o. female  .      HPI     Past Medical History:   Diagnosis Date    Allergic     Anemia     Anxiety     Arthritis     Asthma     Chlamydia 2014    Unfaithful partner    Depression     Exposure to SARS-associated coronavirus 04/12/2020    GERD (gastroesophageal reflux disease) 2010    Gonorrhea 2014    Unfaithful partner    Headache(784.0)      Lumbar herniated disc     Migraine 2012    Pneumonia     Urinary tract infection     Urogenital trichomoniasis 2015    Unfaithful partner    UTI (urinary tract infection)     Varicella 1988    Visual impairment        Past Surgical History:   Procedure Laterality Date    FL INJECTION LEFT KNEE (ARTHROGRAM)  2/28/2022    WISDOM TOOTH EXTRACTION         Current Outpatient Medications   Medication Sig Dispense Refill    ADDERALL XR, 20MG, 20 MG 24 hr capsule Take 1 tablet (20 mg) daily on productive days 30 capsule 0    albuterol (PROVENTIL HFA,VENTOLIN HFA) 90 mcg/act inhaler Inhale 2 puffs every 6 (six) hours as needed for wheezing 18 g 3    amphetamine-dextroamphetamine (ADDERALL XR, 10MG,) 10 MG 24 hr capsule Take 1 capsule (10 mg total) by mouth in the morning - Try to take this medication on productive days - Your total daily dose of Adderall XR is 30 mg Max Daily Amount: 10 mg 20 capsule 0    buPROPion (Wellbutrin XL) 150 mg 24 hr tablet Take 1 tablet (150 mg total) by mouth daily - Take with Wellbutrin 300 mg XL for a total daily dose of 450 mg 90 tablet 0    buPROPion (Wellbutrin XL) 300 mg 24 hr tablet Take 1 tablet (300 mg total) by mouth every morning 90 tablet 0    calcium carbonate (OS-BERENICE) 600 MG tablet Take 600 mg by mouth 2 (two) times a day with meals      cholecalciferol (VITAMIN D3) 1,000 units tablet Take 1,000 Units by mouth daily      Dupixent 200 MG/1.14ML SOPN Inject 1 pen ( 200mg total) under the skin once every 14 (fourteen) days. 2 mL 10    EPINEPHrine (EPIPEN) 0.3 mg/0.3 mL SOAJ Inject 0.3 mL (0.3 mg total) into a muscle once for 1 dose 0.6 mL 0    Ferrous Sulfate (RA IRON PO) Take by mouth      guanFACINE HCl ER (INTUNIV) 2 MG TB24 Take 1 tablet (2 mg total) by mouth daily at bedtime 30 tablet 1    levalbuterol (Xopenex) 0.63 mg/3 mL nebulizer solution Take 3 mL (0.63 mg total) by nebulization 3 (three) times a day 30 mL 1    Magnesium Glycinate 100 MG CAPS Take 200 mg by mouth 2 (two)  times a day      Melatonin 5 MG TABS Take 2 tablets (10 mg total) by mouth daily at bedtime as needed (insomnia)      mometasone-formoterol (Dulera) 200-5 MCG/ACT inhaler Inhale 2 puffs 2 (two) times a day Rinse mouth after use. 39 g 2    nystatin (MYCOSTATIN) 500,000 units/5 mL suspension Apply 5 mL (500,000 Units total) to the mouth or throat 4 (four) times a day 473 mL 0    prochlorperazine (COMPAZINE) 5 mg tablet Take 1 tablet (5 mg total) by mouth every 6 (six) hours as needed for nausea or vomiting 30 tablet 0    rimegepant sulfate (NURTEC) 75 mg TBDP Take 1 tablet (75 mg total) by mouth daily as needed (migraine headaches) 8 tablet 3    tretinoin (RETIN-A) 0.025 % cream Apply topically daily at bedtime 45 g 2     No current facility-administered medications for this visit.        Allergies   Allergen Reactions    Sulfa Antibiotics Anaphylaxis    Levofloxacin      Other reaction(s): sensative    Prednisone Other (See Comments)    Tobramycin Other (See Comments)     Other reaction(s): sensative. Ototoxic    Pseudoephedrine Palpitations       Review of Systems    Video Exam    There were no vitals filed for this visit.    Physical Exam     Behavioral Health Psychotherapy Progress Note    Psychotherapy Provided: Individual Psychotherapy     1. Moderate episode of recurrent major depressive disorder (HCC)        2. PTSD (post-traumatic stress disorder)            Goals addressed in session: Goal 1     DATA: Linda shared things have been getting better between her and Iraj because he has been trying harder with his son Edmar in terms of being stricter with him.  She talked about feeling physically sick from wheezing and having possible pneumonia.  She feels she has been crankier in the past two weeks due to work pressures and being physically sick and feeling she has not been getting things done including Vera duties, and financial pressure from not having enough money.  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  She took the PHQ9 and scored mild depression at 9.  She shared this is in part because of her adhd and feeling overwhelmed.  She shares she may be feeling some \"midlife crisis\" feelings about where she is in life.  She said she has been comparing herself with how she was years ago to now, and feels that people she used to know would not approve of her now, and she feels bad about it and this adds to her depressed mood.   Therapist encouraged open communication of thoughts and feelings and therapist encouraged her to reframe negative thinking and to refrain from mind reading in terms of her thinking past friends don't want to reach out to her because they don't think well of her.  Therapist encouraged her to reflect on positive accomplishments she has made in order to improve her mood and outlook and thinking.  During this session, this clinician used the following therapeutic modalities: Cognitive Behavioral Therapy and Supportive Psychotherapy    Substance Abuse was not addressed during this session. If the client is diagnosed with a co-occurring substance use disorder, please indicate any changes in the frequency or amount of use: . Stage of change for addressing substance use diagnoses: No substance use/Not applicable    ASSESSMENT:  Linda Hickman presents with a Euthymic/ normal mood.     her affect is Normal range and intensity, which is congruent, with her mood and the content of the session. The client has made progress on their goals.     Linda Hickman presents with a none risk of suicide, none risk of self-harm, and none risk of harm to others.    For any risk assessment that surpasses a \"low\" rating, a safety plan must be developed.    A safety plan was indicated: no  If yes, describe in detail     PLAN: Between sessions, Linda Hickman will utilize cognitive restructuring to improve mood and reduce depression. At the next session, the therapist will use Cognitive " Behavioral Therapy and Supportive Psychotherapy to address depression.    Behavioral Health Treatment Plan and Discharge Planning: Linda Hickman is aware of and agrees to continue to work on their treatment plan. They have identified and are working toward their discharge goals. yes    Depression Follow-up Plan Completed: Yes    Visit start and stop times:    12/12/24  Start Time: 1700  Stop Time: 1752  Total Visit Time: 52 minutes

## 2024-12-13 ENCOUNTER — TELEPHONE (OUTPATIENT)
Dept: PSYCHIATRY | Facility: CLINIC | Age: 39
End: 2024-12-13

## 2024-12-13 DIAGNOSIS — F90.0 ATTENTION DEFICIT HYPERACTIVITY DISORDER, INATTENTIVE TYPE: ICD-10-CM

## 2024-12-13 LAB — BACTERIA THROAT CULT: NORMAL

## 2024-12-13 RX ORDER — DEXTROAMPHETAMINE SACCHARATE, AMPHETAMINE ASPARTATE MONOHYDRATE, DEXTROAMPHETAMINE SULFATE AND AMPHETAMINE SULFATE 7.5; 7.5; 7.5; 7.5 MG/1; MG/1; MG/1; MG/1
30 CAPSULE, EXTENDED RELEASE ORAL EVERY MORNING
Qty: 30 CAPSULE | Refills: 0 | Status: SHIPPED | OUTPATIENT
Start: 2024-12-13

## 2024-12-13 NOTE — TELEPHONE ENCOUNTER
The following italicized information is copied from assessment ant plan 11/13/24:    Following a thorough conversation, Adderall was increased to 30 mg XR daily on productive days for ongoing symptoms of ADHD. Intuniv was increased to 2 mg at bedtime for insomnia as well as for impulsivity in the setting of ADHD. The patient was continued on Wellbutrin 450 mg XL daily for mood as well as for attention and focus.     PDMP website reviewed. Linda has been appropriately adherent to controlled psychotropic medications without evidence of abuse or misuse. As such, will send 30-day refill to pharmacy of choice and follow up as necessary.    amphetamine-dextroamphetamine (ADDERALL XR, 30MG,) 30 MG 24 hr capsule          Take 1 capsule (30 mg total) by mouth every morning - Try to take this medication on productive days     Mika Albarran MD

## 2024-12-19 ENCOUNTER — HOSPITAL ENCOUNTER (OUTPATIENT)
Dept: PULMONOLOGY | Facility: HOSPITAL | Age: 39
End: 2024-12-19
Payer: COMMERCIAL

## 2024-12-19 ENCOUNTER — TELEMEDICINE (OUTPATIENT)
Dept: PSYCHIATRY | Facility: CLINIC | Age: 39
End: 2024-12-19
Payer: COMMERCIAL

## 2024-12-19 DIAGNOSIS — F33.1 MODERATE EPISODE OF RECURRENT MAJOR DEPRESSIVE DISORDER (HCC): Primary | ICD-10-CM

## 2024-12-19 DIAGNOSIS — J45.50 SEVERE PERSISTENT ASTHMA WITHOUT COMPLICATION: ICD-10-CM

## 2024-12-19 DIAGNOSIS — F43.10 PTSD (POST-TRAUMATIC STRESS DISORDER): ICD-10-CM

## 2024-12-19 PROCEDURE — 94726 PLETHYSMOGRAPHY LUNG VOLUMES: CPT

## 2024-12-19 PROCEDURE — 94729 DIFFUSING CAPACITY: CPT | Performed by: INTERNAL MEDICINE

## 2024-12-19 PROCEDURE — 94060 EVALUATION OF WHEEZING: CPT | Performed by: INTERNAL MEDICINE

## 2024-12-19 PROCEDURE — 94618 PULMONARY STRESS TESTING: CPT

## 2024-12-19 PROCEDURE — 94729 DIFFUSING CAPACITY: CPT

## 2024-12-19 PROCEDURE — 94060 EVALUATION OF WHEEZING: CPT

## 2024-12-19 PROCEDURE — 94618 PULMONARY STRESS TESTING: CPT | Performed by: INTERNAL MEDICINE

## 2024-12-19 PROCEDURE — 94726 PLETHYSMOGRAPHY LUNG VOLUMES: CPT | Performed by: INTERNAL MEDICINE

## 2024-12-19 PROCEDURE — 90834 PSYTX W PT 45 MINUTES: CPT

## 2024-12-19 RX ORDER — ALBUTEROL SULFATE 0.83 MG/ML
2.5 SOLUTION RESPIRATORY (INHALATION) ONCE
Status: DISCONTINUED | OUTPATIENT
Start: 2024-12-19 | End: 2024-12-19

## 2024-12-19 RX ORDER — LEVALBUTEROL INHALATION SOLUTION 0.63 MG/3ML
0.63 SOLUTION RESPIRATORY (INHALATION) ONCE
Status: COMPLETED | OUTPATIENT
Start: 2024-12-19 | End: 2024-12-19

## 2024-12-19 RX ADMIN — LEVALBUTEROL HYDROCHLORIDE 0.63 MG: 0.63 SOLUTION RESPIRATORY (INHALATION) at 10:08

## 2024-12-19 NOTE — PSYCH
Virtual Regular Visit    Verification of patient location:    Patient is located at Home in the following state in which I hold an active license [place]      Assessment/Plan:    Problem List Items Addressed This Visit       Moderate episode of recurrent major depressive disorder (HCC) - Primary    PTSD (post-traumatic stress disorder)       Goals addressed in session: Goal 1     Depression Follow-up Plan Completed: Yes    Reason for visit is No chief complaint on file.       Encounter provider Jannet Villatoro LCSW      Recent Visits  Date Type Provider Dept   12/12/24 Telemedicine Jannet Villatoro LCSW [facility]   Showing recent visits within past 7 days and meeting all other requirements  Today's Visits  Date Type Provider Dept   12/19/24 Telemedicine Jannet Villatoro LCSW [facility]   Showing today's visits and meeting all other requirements  Future Appointments  No visits were found meeting these conditions.  Showing future appointments within next 150 days and meeting all other requirements       The patient was identified by name and date of birth. Linda Hickman was informed that this is a telemedicine visit and that the visit is being conducted throughthe [employer] platform. She agrees to proceed..  My office door was closed. No one else was in the room.  She acknowledged consent and understanding of privacy and security of the video platform. The patient has agreed to participate and understands they can discontinue the visit at any time.    Patient is aware this is a billable service.     Subjective  Linda Hickman is a 39 y.o. female  .      HPI     Past Medical History:   Diagnosis Date    Allergic     Anemia     Anxiety     Arthritis     Asthma     Chlamydia 2014    Unfaithful partner    Depression     Exposure to SARS-associated coronavirus 04/12/2020    GERD (gastroesophageal reflux disease) 2010    Gonorrhea 2014    Unfaithful partner     Headache(784.0)     Lumbar herniated disc     Migraine 2012    Pneumonia     Urinary tract infection     Urogenital trichomoniasis 2015    Unfaithful partner    UTI (urinary tract infection)     Varicella 1988    Visual impairment        Past Surgical History:   Procedure Laterality Date    FL INJECTION LEFT KNEE (ARTHROGRAM)  2/28/2022    WISDOM TOOTH EXTRACTION         Current Outpatient Medications   Medication Sig Dispense Refill    albuterol (PROVENTIL HFA,VENTOLIN HFA) 90 mcg/act inhaler Inhale 2 puffs every 6 (six) hours as needed for wheezing 18 g 3    amphetamine-dextroamphetamine (ADDERALL XR, 30MG,) 30 MG 24 hr capsule Take 1 capsule (30 mg total) by mouth every morning - Try to take this medication on productive days Max Daily Amount: 30 mg 30 capsule 0    buPROPion (Wellbutrin XL) 150 mg 24 hr tablet Take 1 tablet (150 mg total) by mouth daily - Take with Wellbutrin 300 mg XL for a total daily dose of 450 mg 90 tablet 0    buPROPion (Wellbutrin XL) 300 mg 24 hr tablet Take 1 tablet (300 mg total) by mouth every morning 90 tablet 0    calcium carbonate (OS-BERENICE) 600 MG tablet Take 600 mg by mouth 2 (two) times a day with meals      cholecalciferol (VITAMIN D3) 1,000 units tablet Take 1,000 Units by mouth daily      Dupixent 200 MG/1.14ML SOPN Inject 1 pen ( 200mg total) under the skin once every 14 (fourteen) days. 2 mL 10    EPINEPHrine (EPIPEN) 0.3 mg/0.3 mL SOAJ Inject 0.3 mL (0.3 mg total) into a muscle once for 1 dose 0.6 mL 0    Ferrous Sulfate (RA IRON PO) Take by mouth      guanFACINE HCl ER (INTUNIV) 2 MG TB24 Take 1 tablet (2 mg total) by mouth daily at bedtime 30 tablet 1    levalbuterol (Xopenex) 0.63 mg/3 mL nebulizer solution Take 3 mL (0.63 mg total) by nebulization 3 (three) times a day 30 mL 1    Magnesium Glycinate 100 MG CAPS Take 200 mg by mouth 2 (two) times a day      Melatonin 5 MG TABS Take 2 tablets (10 mg total) by mouth daily at bedtime as needed (insomnia)       "mometasone-formoterol (Dulera) 200-5 MCG/ACT inhaler Inhale 2 puffs 2 (two) times a day Rinse mouth after use. 39 g 2    nystatin (MYCOSTATIN) 500,000 units/5 mL suspension Apply 5 mL (500,000 Units total) to the mouth or throat 4 (four) times a day 473 mL 0    prochlorperazine (COMPAZINE) 5 mg tablet Take 1 tablet (5 mg total) by mouth every 6 (six) hours as needed for nausea or vomiting 30 tablet 0    rimegepant sulfate (NURTEC) 75 mg TBDP Take 1 tablet (75 mg total) by mouth daily as needed (migraine headaches) 8 tablet 3    tretinoin (RETIN-A) 0.025 % cream Apply topically daily at bedtime 45 g 2     No current facility-administered medications for this visit.        Allergies   Allergen Reactions    Sulfa Antibiotics Anaphylaxis    Levofloxacin      Other reaction(s): sensative    Prednisone Other (See Comments)    Tobramycin Other (See Comments)     Other reaction(s): sensative. Ototoxic    Pseudoephedrine Palpitations       Review of Systems    Video Exam    There were no vitals filed for this visit.    Physical Exam     Behavioral Health Psychotherapy Progress Note    Psychotherapy Provided: Individual Psychotherapy     1. Moderate episode of recurrent major depressive disorder (HCC)        2. PTSD (post-traumatic stress disorder)            Goals addressed in session: Goal 1     DATA: Linda shares she got an offer for a new job and is in salary negotiation.  She shared her brother and sister in law came to spend time with her mother and ended up fixing things and spending money and were upset about it.  She talked about dynamics in her family and that \"nobody is talking to anyone\" and this frustrates her. She shared her brother sent her a jack for her car that doesn't fit her car which ended up fitting but he came across as condescending to her, which she got upset over.  She said her family is not getting along which is upsetting to her right now. She shared things with [employer] have gotten better since " "she has helped with parenting and holding [employer] accountable.  She talked about trying to have a baby in the next year and is going to talk to Iraj about it after Vera.  She has been feeling flatter or dissociated lately and feels like things are not moving along as fast as she wants  including house renovation, having a baby, getting  and getting her [employer] business off the ground.  She shared she feels like an imposter as well as has fear of failure.  She talked about feeling like her life is not real because she did not expect to have things go well with her career.  She shared she has gotten to a point where she doesn't feel her PTSD is as severe, and her mood is stable.  Therapist encouraged her to continue to engage in positive self talk and affirmations as well as to continue to do things that ground her when she feels dissociated.  During this session, this clinician used the following therapeutic modalities: Client-centered Therapy, Cognitive Behavioral Therapy, and Mindfulness-based Strategies    Substance Abuse was not addressed during this session. If the client is diagnosed with a co-occurring substance use disorder, please indicate any changes in the frequency or amount of use: . Stage of change for addressing substance use diagnoses: No substance use/Not applicable    ASSESSMENT:  Linda Hickman presents with a Euthymic/ normal mood.     her affect is Normal range and intensity, which is congruent, with her mood and the content of the session. The client has made progress on their goals.     Linda Hickman presents with a none risk of suicide, none risk of self-harm, and none risk of harm to others.    For any risk assessment that surpasses a \"low\" rating, a safety plan must be developed.    A safety plan was indicated: no  If yes, describe in detail     PLAN: Between sessions, Linda Hickman will utilize grounding and positive self talk to improve mood. At the next session, the " therapist will use Client-centered Therapy, Cognitive Behavioral Therapy, and Mindfulness-based Strategies to address mood stability, PTSD.    Behavioral Health Treatment Plan and Discharge Planning: Linda Hickman is aware of and agrees to continue to work on their treatment plan. They have identified and are working toward their discharge goals. yes    Depression Follow-up Plan Completed: Yes    Visit start and stop times:    12/19/24  Start Time: 1700  Stop Time: 1752  Total Visit Time: 52 minutes

## 2024-12-20 ENCOUNTER — OFFICE VISIT (OUTPATIENT)
Age: 39
End: 2024-12-20
Payer: COMMERCIAL

## 2024-12-20 VITALS
OXYGEN SATURATION: 98 % | TEMPERATURE: 97.8 F | SYSTOLIC BLOOD PRESSURE: 114 MMHG | DIASTOLIC BLOOD PRESSURE: 70 MMHG | BODY MASS INDEX: 26.2 KG/M2 | HEIGHT: 66 IN | WEIGHT: 163 LBS | HEART RATE: 80 BPM

## 2024-12-20 DIAGNOSIS — J45.50 SEVERE PERSISTENT ASTHMA WITHOUT COMPLICATION: Primary | ICD-10-CM

## 2024-12-20 PROCEDURE — 99214 OFFICE O/P EST MOD 30 MIN: CPT | Performed by: INTERNAL MEDICINE

## 2024-12-20 NOTE — PROGRESS NOTES
Pulmonary Follow-up   Linda Hickman 39 y.o. female MRN: [id number]  12/20/2024      Assessment/plan:     39 y.o. female with a history of asthma who originally presented to the pulmonary office for evaluation of shortness of breath related to previous COVID infection in January 2022.     Severe persistent asthma  Dyspnea on exertion  Chronic Cough  Previously controlled but worsened significantly since had COVID infection. Uncontrolled on step 4/5 therapy and high dose ICS/LAMA?LABA, started on biologic. Has had great improvement in terms of her asthma after the addition of Dupixent (June 2023).      Highest eos 120, allergy panel negative, appears to be type 2 low asthma  Currently controlled on Dupixent . Had one exacerbation requiring steroids in 2024 (Nov)     - Continue Dupixent,  - ICS/LABA: continue high dose   - Continue albuterol prn  - rec yearly spirometry at next visit  - continue exercise regimen  - note: Linda and boyfriend will be trying for a baby, Dupixent is Category C but expert opinion rec staying on a biologic that he keeping patient's asthma controlled     # Recurrent oral thrush  Has had 4+ episode of thrush thus year, despite rinsing mouth after using inhaler. Follows up w dentist  - oral fluconazole for suppression not recommended in first trimester or if pts trying to get pregnant    Follow up in 3 months       _________________________________________  Interval Hx    Has several episodes of thrush this year, receinved nystatin in July and again in December  Had an asthma exacerbation in Nov, seems to be triggered by URI, normal CXR, sp Medrol dose pack by PCP and augmentin  Still with mild runny nose    _________________________________________    History of Present Illness   HPI:  Linda Hickman is a 39 y.o. female with a history of asthma who originally presented to the pulmonary office for evaluation of shortness of breath related to previous COVID infection in January 2022.  Patient  was seen in the emergency room on January 17 due to continued chest pain, wheezing, cough and shortness of breath.    She was diagnosed with asthma as a child but it was previously well controlled prior to her episodes of COVID-19 infection.  Previously never required hospitalization or intubation her asthma was historically induced by exercise.    Since that time continues to have sob, wheezing, chest tightness despite use of flovent twice daily. She also uses duonebs and albuterol nebs.     States that she has had covid-19 four times.   Previous medications: flovent, singulair   Triggers exertion: exertion, cold dry air, wildfire smoke, road construction, hospital cleaning solution, spicy pepper cooking in stove  CBC from 1/2023 was normal.   Echo from 4/7 showed no evidence of shunt.     Started on Dupixent in June        Review of Systems   Constitutional:  Positive for appetite change. Negative for chills, fatigue and fever.   HENT:  Negative for congestion, ear pain, nosebleeds, postnasal drip, rhinorrhea, sinus pressure, sneezing, sore throat and trouble swallowing.    Eyes:  Negative for discharge, redness and itching.   Respiratory:  Positive for cough, chest tightness, shortness of breath and wheezing. Negative for choking and stridor.    Cardiovascular:  Negative for chest pain, palpitations and leg swelling.   Gastrointestinal:  Negative for blood in stool.   Genitourinary:  Negative for difficulty urinating and dysuria.   Musculoskeletal:  Negative for arthralgias, joint swelling and myalgias.   Skin:  Negative for color change and rash.   Neurological:  Negative for light-headedness and headaches.   Hematological:  Negative for adenopathy.       Historical Information   Past Medical History:   Diagnosis Date    Allergic     Anemia     Anxiety     Arthritis     Asthma     Chlamydia 2014    Unfaithful partner    Depression     Exposure to SARS-associated coronavirus 04/12/2020    GERD (gastroesophageal  reflux disease) 2010    Gonorrhea 2014    Unfaithful partner    Headache(784.0)     Lumbar herniated disc     Migraine 2012    Pneumonia     Urinary tract infection     Urogenital trichomoniasis 2015    Unfaithful partner    UTI (urinary tract infection)     Varicella 1988    Visual impairment      Past Surgical History:   Procedure Laterality Date    FL INJECTION LEFT KNEE (ARTHROGRAM)  2/28/2022    WISDOM TOOTH EXTRACTION       Social History   Places lived: [place]  Occupation: ER nurse.   Tobacco: none smoker  Illicits:  None   Hobbies:   Pets: dogs       Meds/Allergies     Current Outpatient Medications:     albuterol (PROVENTIL HFA,VENTOLIN HFA) 90 mcg/act inhaler, Inhale 2 puffs every 6 (six) hours as needed for wheezing, Disp: 18 g, Rfl: 3    amphetamine-dextroamphetamine (ADDERALL XR, 30MG,) 30 MG 24 hr capsule, Take 1 capsule (30 mg total) by mouth every morning - Try to take this medication on productive days Max Daily Amount: 30 mg, Disp: 30 capsule, Rfl: 0    buPROPion (Wellbutrin XL) 150 mg 24 hr tablet, Take 1 tablet (150 mg total) by mouth daily - Take with Wellbutrin 300 mg XL for a total daily dose of 450 mg, Disp: 90 tablet, Rfl: 0    buPROPion (Wellbutrin XL) 300 mg 24 hr tablet, Take 1 tablet (300 mg total) by mouth every morning, Disp: 90 tablet, Rfl: 0    calcium carbonate (OS-BERENICE) 600 MG tablet, Take 600 mg by mouth 2 (two) times a day with meals, Disp: , Rfl:     cholecalciferol (VITAMIN D3) 1,000 units tablet, Take 1,000 Units by mouth daily, Disp: , Rfl:     Dupixent 200 MG/1.14ML SOPN, Inject 1 pen ( 200mg total) under the skin once every 14 (fourteen) days., Disp: 2 mL, Rfl: 10    EPINEPHrine (EPIPEN) 0.3 mg/0.3 mL SOAJ, Inject 0.3 mL (0.3 mg total) into a muscle once for 1 dose, Disp: 0.6 mL, Rfl: 0    Ferrous Sulfate (RA IRON PO), Take by mouth, Disp: , Rfl:     guanFACINE HCl ER (INTUNIV) 2 MG TB24, Take 1 tablet (2 mg total) by mouth daily at bedtime, Disp: 30 tablet, Rfl: 1     "levalbuterol (Xopenex) 0.63 mg/3 mL nebulizer solution, Take 3 mL (0.63 mg total) by nebulization 3 (three) times a day, Disp: 30 mL, Rfl: 1    Magnesium Glycinate 100 MG CAPS, Take 200 mg by mouth 2 (two) times a day, Disp: , Rfl:     Melatonin 5 MG TABS, Take 2 tablets (10 mg total) by mouth daily at bedtime as needed (insomnia), Disp: , Rfl:     mometasone-formoterol (Dulera) 200-5 MCG/ACT inhaler, Inhale 2 puffs 2 (two) times a day Rinse mouth after use., Disp: 39 g, Rfl: 2    nystatin (MYCOSTATIN) 500,000 units/5 mL suspension, Apply 5 mL (500,000 Units total) to the mouth or throat 4 (four) times a day, Disp: 473 mL, Rfl: 0    prochlorperazine (COMPAZINE) 5 mg tablet, Take 1 tablet (5 mg total) by mouth every 6 (six) hours as needed for nausea or vomiting, Disp: 30 tablet, Rfl: 0    rimegepant sulfate (NURTEC) 75 mg TBDP, Take 1 tablet (75 mg total) by mouth daily as needed (migraine headaches), Disp: 8 tablet, Rfl: 3    tretinoin (RETIN-A) 0.025 % cream, Apply topically daily at bedtime, Disp: 45 g, Rfl: 2  No current facility-administered medications for this visit.  Allergies   Allergen Reactions    Sulfa Antibiotics Anaphylaxis    Levofloxacin      Other reaction(s): sensative    Prednisone Other (See Comments)    Tobramycin Other (See Comments)     Other reaction(s): sensative. Ototoxic    Pseudoephedrine Palpitations       Vitals:   Visit Vitals  /70   Pulse 80   Temp 97.8 °F (36.6 °C)   Ht 5' 6\" (1.676 m)   Wt 73.9 kg (163 lb)   SpO2 98%   BMI 26.31 kg/m²   OB Status Unknown   Smoking Status Never   BSA 1.83 m²        Exam:   Appearance -- NAD, speaking full sentences  Heart -- RRR, no murmurs  Lungs -- CTAB  Psych -- no obvious depression or hallucination        Labs: I have personally reviewed pertinent lab results.  Lab Results   Component Value Date    WBC 6.91 09/15/2023    HGB 12.6 09/15/2023    HCT 38.6 09/15/2023    MCV 94 09/15/2023     09/15/2023     Lab Results   Component Value " Date    CALCIUM 9.2 01/16/2023    K 3.3 (L) 01/16/2023    CO2 23 01/16/2023     01/16/2023    BUN 12 01/16/2023    CREATININE 1.08 01/16/2023     Lab Results   Component Value Date    IGE 27.9 04/04/2023     Lab Results   Component Value Date    ALT 18 01/16/2023    AST 20 01/16/2023    ALKPHOS 69 01/16/2023       Imaging and other studies: I have personally reviewed pertinent reports.   and I have personally reviewed pertinent films in PACS    CT Chest 2/2023:  LUNGS:  Nothing to suggest interstitial lung disease with no reticulation, traction bronchiectasis/bronchiolectasis, groundglass, or honeycombing.  No significant air trapping on expiration.    CXR 9/2023: normal    /2024: clear lungs     PFTs 1/30/23: No obstruction, + BD response. No restriction, normal DLCO    PFTS 12/2024: No obstruction, no BD response. Mild air trapping

## 2024-12-26 ENCOUNTER — RESULTS FOLLOW-UP (OUTPATIENT)
Dept: PULMONOLOGY | Facility: CLINIC | Age: 39
End: 2024-12-26

## 2025-01-03 ENCOUNTER — TELEMEDICINE (OUTPATIENT)
Dept: PSYCHIATRY | Facility: CLINIC | Age: 40
End: 2025-01-03

## 2025-01-03 DIAGNOSIS — F33.1 MODERATE EPISODE OF RECURRENT MAJOR DEPRESSIVE DISORDER (HCC): Primary | ICD-10-CM

## 2025-01-03 DIAGNOSIS — F43.10 PTSD (POST-TRAUMATIC STRESS DISORDER): ICD-10-CM

## 2025-01-03 DIAGNOSIS — F90.0 ATTENTION DEFICIT HYPERACTIVITY DISORDER, INATTENTIVE TYPE: ICD-10-CM

## 2025-01-03 PROCEDURE — PBNCHG PB NO CHARGE PLACEHOLDER

## 2025-01-03 RX ORDER — BUPROPION HYDROCHLORIDE 150 MG/1
150 TABLET ORAL DAILY
Qty: 90 TABLET | Refills: 0 | Status: SHIPPED | OUTPATIENT
Start: 2025-01-03

## 2025-01-03 RX ORDER — GUANFACINE 3 MG/1
3 TABLET, EXTENDED RELEASE ORAL
Qty: 30 TABLET | Refills: 1 | Status: SHIPPED | OUTPATIENT
Start: 2025-01-03

## 2025-01-03 RX ORDER — DEXTROAMPHETAMINE SACCHARATE, AMPHETAMINE ASPARTATE MONOHYDRATE, DEXTROAMPHETAMINE SULFATE AND AMPHETAMINE SULFATE 7.5; 7.5; 7.5; 7.5 MG/1; MG/1; MG/1; MG/1
30 CAPSULE, EXTENDED RELEASE ORAL EVERY MORNING
Qty: 30 CAPSULE | Refills: 0 | Status: SHIPPED | OUTPATIENT
Start: 2025-01-10

## 2025-01-03 RX ORDER — BUPROPION HYDROCHLORIDE 300 MG/1
300 TABLET ORAL EVERY MORNING
Qty: 90 TABLET | Refills: 0 | Status: SHIPPED | OUTPATIENT
Start: 2025-01-03 | End: 2025-04-03

## 2025-01-03 NOTE — ASSESSMENT & PLAN NOTE
Continue ongoing therapy with therapist Jannet (generally biweekly)  Continue ongoing medication management every 1 to 3 months

## 2025-01-03 NOTE — PSYCH
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 symptoms of depression and mild symptoms of anxiety. At the current moment, Linda is future-oriented, forward-thinking, and demonstrates ability to act in a self-preserving manner as evidenced by volitionally presenting to the clinic today, seeking treatment. At this juncture, inpatient hospitalization is not currently warranted. To mitigate future risk, patient should adhere to the recommendations of this writer, avoid alcohol/illicit substance use, utilize community-based resources and familiar support and prioritize mental health treatment.     Based on today's assessment and clinical criteria, Linda Hickman contracts for safety and is not an imminent risk of harm to self or others. Outpatient level of care is deemed appropriate at this present time. Linda understands that if they are no longer able to contract for safety, they need to call/contact the outpatient office including this writer, call/contact crisis and/orattend to the nearest Emergency Department for immediate evaluation.    Risks/Benefits      Risks, Benefits And Possible Side Effects Of  Medications:    Risks, benefits, and possible side effects of medications explained to Linda and she verbalizes understanding and agreement for treatment.    Controlled Medication Discussion:     Linda has been filling controlled prescriptions on time as prescribed according to [place] Prescription Drug Monitoring Program    Psychotherapy Provided:     Individual psychotherapy provided: Yes  Recent stressors discussed with Linda including relationship stressors, family stressors, job stressors, financial stressors.  Supportive therapy was provided    Treatment Plan:    Completed and signed during the session: Not applicable - Treatment Plan not due at this session    Visit Time    Visit Start Time: 8:00 AM  Visit Stop Time: 8:30 AM  Total Visit Duration:  30 minutes    This note was shared with patient.    Mika Albarran MD 01/03/25    This note has been constructed using a voice recognition system. There may be translation, syntax, or grammatical errors. If you have any questions, please contact the dictating provider.

## 2025-01-03 NOTE — ASSESSMENT & PLAN NOTE
Continue Wellbutrin 450 XL daily for symptoms of depression  PARQ was completed for bupropion (Wellbutrin) including nausea, insomnia, agitation/activation, weight loss, anxiety, palpitations, hypertension, decreased seizure threshold and risk with alcohol withdrawal or electrolyte disturbances.  Patient screened negative for heavy alcohol use, history of seizures, and eating disorders.    Depression Follow-up Plan Completed: Yes

## 2025-01-03 NOTE — PATIENT INSTRUCTIONS
Please present for your previously scheduled appointment approximately 15 minutes prior to appointment time. If you are running late or are unable to attend your appointment, please call our [place] office at [phone] or our [place] office at [phone] if applicable to notify the office of your absence.    If you are in psychological crisis including not limited to suicidal/homicidal thoughts or thoughts of self-injury, do not hesitate to call/contact your County Crisis hotline (see below) or attend to the nearest emergency department.  [facility] Crisis: [phone]  [facility] Crisis: [phone]  [place] [facility] Crisis: [phone]  [facility] Crisis: [phone]  [facility] Crisis: [phone]  [facility] Crisis: [phone]  [facility] Crisis: [phone]  National Suicide Prevention Hotline: 1-691.440.7766

## 2025-01-21 ENCOUNTER — TELEMEDICINE (OUTPATIENT)
Dept: PSYCHIATRY | Facility: CLINIC | Age: 40
End: 2025-01-21

## 2025-01-21 DIAGNOSIS — F43.10 PTSD (POST-TRAUMATIC STRESS DISORDER): ICD-10-CM

## 2025-01-21 DIAGNOSIS — F33.0 MILD EPISODE OF RECURRENT MAJOR DEPRESSIVE DISORDER (HCC): Primary | ICD-10-CM

## 2025-01-21 DIAGNOSIS — F41.1 GAD (GENERALIZED ANXIETY DISORDER): ICD-10-CM

## 2025-01-21 DIAGNOSIS — F90.0 ATTENTION DEFICIT HYPERACTIVITY DISORDER, INATTENTIVE TYPE: ICD-10-CM

## 2025-01-21 PROCEDURE — PBNCHG PB NO CHARGE PLACEHOLDER

## 2025-01-21 RX ORDER — DOXEPIN HYDROCHLORIDE 10 MG/1
10 CAPSULE ORAL 2 TIMES DAILY PRN
Qty: 60 CAPSULE | Refills: 1 | Status: SHIPPED | OUTPATIENT
Start: 2025-01-21

## 2025-01-21 RX ORDER — DEXTROAMPHETAMINE SACCHARATE, AMPHETAMINE ASPARTATE MONOHYDRATE, DEXTROAMPHETAMINE SULFATE AND AMPHETAMINE SULFATE 7.5; 7.5; 7.5; 7.5 MG/1; MG/1; MG/1; MG/1
30 CAPSULE, EXTENDED RELEASE ORAL EVERY MORNING
Qty: 30 CAPSULE | Refills: 0 | Status: SHIPPED | OUTPATIENT
Start: 2025-02-10

## 2025-01-21 NOTE — PATIENT INSTRUCTIONS
Please present for your previously scheduled appointment approximately 15 minutes prior to appointment time. If you are running late or are unable to attend your appointment, please call our [place] office at [phone] or our [place] office at [phone] if applicable to notify the office of your absence.    If you are in psychological crisis including not limited to suicidal/homicidal thoughts or thoughts of self-injury, do not hesitate to call/contact your County Crisis hotline (see below) or attend to the nearest emergency department.  [facility] Crisis: [phone]  [facility] Crisis: [phone]  [place] [facility] Crisis: [phone]  [facility] Crisis: [phone]  [facility] Crisis: [phone]  [facility] Crisis: [phone]  [facility] Crisis: [phone]  National Suicide Prevention Hotline: 1-664.287.5816

## 2025-01-21 NOTE — PSYCH
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 resources and familiar support and prioritize mental health treatment.     Based on today's assessment and clinical criteria, Linda Hickman contracts for safety and is not an imminent risk of harm to self or others. Outpatient level of care is deemed appropriate at this present time. Linda understands that if they are no longer able to contract for safety, they need to call/contact the outpatient office including this writer, call/contact crisis and/orattend to the nearest Emergency Department for immediate evaluation.    Risks/Benefits      Risks, Benefits And Possible Side Effects Of Medications:    Risks, benefits, and possible side effects of medications explained to Linda and she verbalizes understanding and agreement for treatment.    Controlled Medication Discussion:     Linda has been filling controlled prescriptions on time as prescribed according to [place] Prescription Drug Monitoring Program    Psychotherapy Provided:     Individual psychotherapy provided: Yes  Recent stressors discussed with the patient including job stressors, family stressors, medical  stressors    Treatment Plan:    Completed and signed during the session: Not applicable - Treatment Plan not due at this session    Visit Time    Visit Start Time: 1:30 PM  Visit Stop Time: 2:00 PM  Total Visit Duration: 30 minutes    This note was shared with patient.    Mika Albarran MD 01/21/25    This note has been constructed using a voice recognition system. There may be translation, syntax, or grammatical errors. If you have any questions, please contact the dictating provider.

## 2025-01-21 NOTE — BH CRISIS PLAN
Client Name: Linda Hickman       Client YOB: 1985    [employer] Safety Plan      Creation Date: 1/22/24 Update Date: 1/21/25   Created By: Mika Albarran MD Last Updated By: Mika Albarran MD      Step 1: Warning Signs:   Warning Signs   When I feel hopeless, tearful, and that nothing is going right.   When I have vague suicidal thoughts.            Step 2: Internal Coping Strategies:   Internal Coping Strategies   Photography   Drawing   Watching movies   Taking naps            Step 3: People and social settings that provide distraction:   Name Contact Information   Itzel Skinnerudsen [phone]   Jp Chappell [phone]    Places   Patient did not identify any places they can go to.           Step 4: People whom I can ask for help during a crisis:      Name Contact Information    Itzel Skinnerudsen [phone]    Jp Chappell [phone]      Step 5: Professionals or agencies I can contact during a crisis:      [facility]/Agency Name Phone Emergency Contact    Dr. Albarran [phone]       Local Emergency Department Emergency Department Phone Emergency Department Address    [phone]        Crisis Phone Numbers:   Suicide Prevention Lifeline: Call or Text  512 Crisis Text Line: Text HOME to 197-373   Please note: Some [facility] do not have a separate number for Child/Adolescent specific crisis. If your county is not listed under Child/Adolescent, please call the adult number for your county      Adult Crisis Numbers: Child/Adolescent Crisis Numbers   [facility]: [phone] [facility]: [phone]   [facility]: [phone] [facility]: [phone]   [facility]: [phone] [place]: [phone]   [facility]: [phone] [place] [facility]: [phone]   [place] [facility]: [phone]   [facility]: [phone]   [facility]: [phone]   [facility]: [phone] (daytime) [phone]  (after hours, weekends, holidays)      Step 6: Making the environment safer (plan for lethal means safety):   Plan: Patient reports that there are guns in her house, reporting her boyfriend owns a handgun and hunts.  She states they are locked and secured and has no access to them.     Optional: What is most important to me and worth living for?   I want to live for my fiance and my dog.     [employer] Safety Plan. Candy Hughes and Miller Flores. Used with permission of the authors.

## 2025-01-21 NOTE — ASSESSMENT & PLAN NOTE
Started doxepin 10 mg twice daily as needed for anxiety as well as insomnia  PARQ was completed for the tricyclic antidepressant class including GI distress, sedation, dizziness, weight gain, sexual dysfunction, decreased seizure threshold, induction of belinda, serotonin syndrome, and arrhythmia.

## 2025-01-21 NOTE — ASSESSMENT & PLAN NOTE
Continue Wellbutrin 450 XL daily for symptoms of depression as well as attention and concentration  PARQ was completed for bupropion (Wellbutrin) including nausea, insomnia, agitation/activation, weight loss, anxiety, palpitations, hypertension, decreased seizure threshold and risk with alcohol withdrawal or electrolyte disturbances.      Depression Follow-up Plan Completed: Yes

## 2025-01-23 ENCOUNTER — TELEMEDICINE (OUTPATIENT)
Dept: PSYCHIATRY | Facility: CLINIC | Age: 40
End: 2025-01-23
Payer: COMMERCIAL

## 2025-01-23 DIAGNOSIS — F33.1 MODERATE EPISODE OF RECURRENT MAJOR DEPRESSIVE DISORDER (HCC): Primary | ICD-10-CM

## 2025-01-23 DIAGNOSIS — F43.10 PTSD (POST-TRAUMATIC STRESS DISORDER): ICD-10-CM

## 2025-01-23 PROCEDURE — 90834 PSYTX W PT 45 MINUTES: CPT

## 2025-01-23 NOTE — PSYCH
Virtual Regular Visit    Verification of patient location:    Patient is located at Home in the following state in which I hold an active license [place]      Assessment/Plan:    Problem List Items Addressed This Visit     Moderate episode of recurrent major depressive disorder (HCC) - Primary    PTSD (post-traumatic stress disorder)       Goals addressed in session: Goal 1     Depression Follow-up Plan Completed: Not applicable    Reason for visit is   Chief Complaint   Patient presents with   • Virtual Regular Visit          Encounter provider Jannet Villatoro LCSW      Recent Visits  No visits were found meeting these conditions.  Showing recent visits within past 7 days and meeting all other requirements  Today's Visits  Date Type Provider Dept   01/23/25 Telemedicine Jannet Villatoro LCSW [facility]   Showing today's visits and meeting all other requirements  Future Appointments  No visits were found meeting these conditions.  Showing future appointments within next 150 days and meeting all other requirements       The patient was identified by name and date of birth. Linda Hickman was informed that this is a telemedicine visit and that the visit is being conducted throughthe Epic Embedded platform. She agrees to proceed..  My office door was closed. No one else was in the room.  She acknowledged consent and understanding of privacy and security of the video platform. The patient has agreed to participate and understands they can discontinue the visit at any time.    Patient is aware this is a billable service.     Subjective  Linda Hickman is a 39 y.o. female  .      HPI     Past Medical History:   Diagnosis Date   • Allergic    • Anemia    • Anxiety    • Arthritis    • Asthma    • Chlamydia 2014    Unfaithful partner   • Depression    • Exposure to SARS-associated coronavirus 04/12/2020   • GERD (gastroesophageal reflux disease) 2010   • Gonorrhea 2014    Unfaithful partner   •  Headache(784.0)    • Lumbar herniated disc    • Migraine 2012   • Pneumonia    • Urinary tract infection    • Urogenital trichomoniasis 2015    Unfaithful partner   • UTI (urinary tract infection)    • Varicella 1988   • Visual impairment        Past Surgical History:   Procedure Laterality Date   • FL INJECTION LEFT KNEE (ARTHROGRAM)  2/28/2022   • WISDOM TOOTH EXTRACTION         Current Outpatient Medications   Medication Sig Dispense Refill   • albuterol (PROVENTIL HFA,VENTOLIN HFA) 90 mcg/act inhaler Inhale 2 puffs every 6 (six) hours as needed for wheezing 18 g 3   • [START ON 2/10/2025] amphetamine-dextroamphetamine (ADDERALL XR, 30MG,) 30 MG 24 hr capsule Take 1 capsule (30 mg total) by mouth every morning - Try to take this medication on productive days Max Daily Amount: 30 mg Do not start before February 10, 2025. 30 capsule 0   • buPROPion (Wellbutrin XL) 150 mg 24 hr tablet Take 1 tablet (150 mg total) by mouth daily - Take with Wellbutrin 300 mg XL for a total daily dose of 450 mg 90 tablet 0   • buPROPion (Wellbutrin XL) 300 mg 24 hr tablet Take 1 tablet (300 mg total) by mouth every morning 90 tablet 0   • calcium carbonate (OS-BERENICE) 600 MG tablet Take 600 mg by mouth 2 (two) times a day with meals     • cholecalciferol (VITAMIN D3) 1,000 units tablet Take 1,000 Units by mouth daily     • doxepin (SINEquan) 10 mg capsule Take 1 capsule (10 mg total) by mouth 2 (two) times a day as needed for sleep (and anxiety) 60 capsule 1   • Dupixent 200 MG/1.14ML SOPN Inject 1 pen ( 200mg total) under the skin once every 14 (fourteen) days. 2 mL 10   • EPINEPHrine (EPIPEN) 0.3 mg/0.3 mL SOAJ Inject 0.3 mL (0.3 mg total) into a muscle once for 1 dose 0.6 mL 0   • Ferrous Sulfate (RA IRON PO) Take by mouth     • levalbuterol (Xopenex) 0.63 mg/3 mL nebulizer solution Take 3 mL (0.63 mg total) by nebulization 3 (three) times a day 30 mL 1   • Magnesium Glycinate 100 MG CAPS Take 200 mg by mouth 2 (two) times a day     •  "Melatonin 5 MG TABS Take 2 tablets (10 mg total) by mouth daily at bedtime as needed (insomnia)     • mometasone-formoterol (Dulera) 200-5 MCG/ACT inhaler Inhale 2 puffs 2 (two) times a day Rinse mouth after use. 39 g 2   • nystatin (MYCOSTATIN) 500,000 units/5 mL suspension Apply 5 mL (500,000 Units total) to the mouth or throat 4 (four) times a day 473 mL 0   • prochlorperazine (COMPAZINE) 5 mg tablet Take 1 tablet (5 mg total) by mouth every 6 (six) hours as needed for nausea or vomiting 30 tablet 0   • rimegepant sulfate (NURTEC) 75 mg TBDP Take 1 tablet (75 mg total) by mouth daily as needed (migraine headaches) 8 tablet 3   • tretinoin (RETIN-A) 0.025 % cream Apply topically daily at bedtime 45 g 2     No current facility-administered medications for this visit.        Allergies   Allergen Reactions   • Sulfa Antibiotics Anaphylaxis   • Levofloxacin      Other reaction(s): sensative   • Prednisone Other (See Comments)   • Tobramycin Other (See Comments)     Other reaction(s): sensative. Ototoxic   • Pseudoephedrine Palpitations       Review of Systems    Video Exam    There were no vitals filed for this visit.    Physical Exam     Behavioral Health Psychotherapy Progress Note    Psychotherapy Provided: Individual Psychotherapy     1. Moderate episode of recurrent major depressive disorder (HCC)        2. 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  She worries she will not be \"good enough\" for the job and that she will potentially cause harm.  She has been feeling very anxious in the last 3-4 weeks and has not been sleeping in about a month.  She is on a sleeping medication which is helpful.  She has some anxiety at night and is worried about screwing up at the new job but is coming up with a plan to learn for her new job.  Also good friends are splitting up \"out of nowhere\" which is stressful for Linda because " "she feels stuck in the middle between them. Linda proposed to Iraj on Vera eve and he said yes.  He put off telling his family about it especially Confucianism's mother.  She expressed feeling frustrated with Iraj over differences in parenting practices with her stepson Confucianism.  Therapist encouraged open communication of thoughts and feelings about this situation.    During this session, this clinician used the following therapeutic modalities: Client-centered Therapy and Cognitive Behavioral Therapy    Substance Abuse was not addressed during this session. If the client is diagnosed with a co-occurring substance use disorder, please indicate any changes in the frequency or amount of use: . Stage of change for addressing substance use diagnoses: No substance use/Not applicable    ASSESSMENT:  Linda Hickman presents with a Euthymic/ normal mood.     her affect is Normal range and intensity, which is congruent, with her mood and the content of the session. The client has made progress on their goals.     Linda Hickman presents with a none risk of suicide, none risk of self-harm, and none risk of harm to others.    For any risk assessment that surpasses a \"low\" rating, a safety plan must be developed.    A safety plan was indicated: no  If yes, describe in detail     PLAN: Between sessions, Linda Hickman will utilize open communication in her relationship. At the next session, the therapist will use Client-centered Therapy and Dialectical Behavior Therapy to address relationship conflict.    Behavioral Health Treatment Plan and Discharge Planning: Linda Hickman is aware of and agrees to continue to work on their treatment plan. They have identified and are working toward their discharge goals. yes    Depression Follow-up Plan Completed: Not applicable    Visit start and stop times:    01/23/25  Start Time: 1700  Stop Time: 1752  Total Visit Time: 52 minutes        "

## 2025-01-29 ENCOUNTER — TELEMEDICINE (OUTPATIENT)
Dept: PSYCHIATRY | Facility: CLINIC | Age: 40
End: 2025-01-29
Payer: COMMERCIAL

## 2025-01-29 ENCOUNTER — TELEPHONE (OUTPATIENT)
Dept: PULMONOLOGY | Facility: CLINIC | Age: 40
End: 2025-01-29

## 2025-01-29 DIAGNOSIS — F33.1 MODERATE EPISODE OF RECURRENT MAJOR DEPRESSIVE DISORDER (HCC): Primary | ICD-10-CM

## 2025-01-29 DIAGNOSIS — F43.10 PTSD (POST-TRAUMATIC STRESS DISORDER): ICD-10-CM

## 2025-01-29 PROCEDURE — 90834 PSYTX W PT 45 MINUTES: CPT

## 2025-01-29 NOTE — TELEPHONE ENCOUNTER
Received fax from [facility] to renew auth for Dupixent 200mg. Submitted to [employer]. Key: [id number]

## 2025-01-29 NOTE — TELEPHONE ENCOUNTER
Received approval for Dupixent 200mg on CMM.    Case ID: [id number]  Approved 1/29/25-1/29/26  [facility]

## 2025-01-29 NOTE — PSYCH
"Behavioral Health Psychotherapy Progress Note    Psychotherapy Provided: Individual Psychotherapy     1. Moderate episode of recurrent major depressive disorder (HCC)        2. PTSD (post-traumatic stress disorder)            Goals addressed in session: Goal 1     DATA: Linda and therapist identified an EMDR target of people keeping secrets.  She was able to process this target and identify negative beliefs related to it of not liking lies and that lies equal secrets, and positive belief of that she deserves others to be honest with her.  Therapist encouraged her to continue to process this target in next session.    During this session, this clinician used the following therapeutic modalities: EMDR (or other form of bilateral Stimulation)    Substance Abuse was not addressed during this session. If the client is diagnosed with a co-occurring substance use disorder, please indicate any changes in the frequency or amount of use: . Stage of change for addressing substance use diagnoses: No substance use/Not applicable    ASSESSMENT:  Linda Hickman presents with a Euthymic/ normal mood.     her affect is Normal range and intensity, which is congruent, with her mood and the content of the session. The client has made progress on their goals.     Linda Hickman presents with a none risk of suicide, none risk of self-harm, and none risk of harm to others.    For any risk assessment that surpasses a \"low\" rating, a safety plan must be developed.    A safety plan was indicated: no  If yes, describe in detail     PLAN: Between sessions, Linda Hickman will utilize flashback management skills when she is triggered by others possibly lying. At the next session, the therapist will use EMDR (or other form of bilateral Stimulation) to address PTSD.    Behavioral Health Treatment Plan and Discharge Planning: Linda Hickman is aware of and agrees to continue to work on their treatment plan. They have identified and are working " toward their discharge goals. yes    Depression Follow-up Plan Completed: Not applicable    Visit start and stop times:    01/29/25  Start Time: 1700  Stop Time: 1752  Total Visit Time: 52 minutes  Virtual Regular Visit    Verification of patient location:    Patient is located at Home in the following state in which I hold an active license [place]      Assessment/Plan:    Problem List Items Addressed This Visit     Moderate episode of recurrent major depressive disorder (HCC) - Primary    PTSD (post-traumatic stress disorder)       Goals addressed in session: Goal 1     Depression Follow-up Plan Completed: Not applicable    Reason for visit is   Chief Complaint   Patient presents with   • Virtual Regular Visit          Encounter provider Jannet Villatoro LCSW      Recent Visits  Date Type Provider Dept   01/23/25 Telemedicine Jannet Villatoro LCSW [facility]   Showing recent visits within past 7 days and meeting all other requirements  Today's Visits  Date Type Provider Dept   01/29/25 Telemedicine Jannet Villatoro LCSW [facility]   Showing today's visits and meeting all other requirements  Future Appointments  No visits were found meeting these conditions.  Showing future appointments within next 150 days and meeting all other requirements       The patient was identified by name and date of birth. Linda Hickman was informed that this is a telemedicine visit and that the visit is being conducted throughthe [employer] platform. She agrees to proceed..  My office door was closed. No one else was in the room.  She acknowledged consent and understanding of privacy and security of the video platform. The patient has agreed to participate and understands they can discontinue the visit at any time.    Patient is aware this is a billable service.     Subjective  Linda Hickman is a 39 y.o. female  .      HPI     Past Medical History:   Diagnosis Date   • Allergic    •  Anemia    • Anxiety    • Arthritis    • Asthma    • Chlamydia 2014    Unfaithful partner   • Depression    • Exposure to SARS-associated coronavirus 04/12/2020   • GERD (gastroesophageal reflux disease) 2010   • Gonorrhea 2014    Unfaithful partner   • Headache(784.0)    • Lumbar herniated disc    • Migraine 2012   • Pneumonia    • Urinary tract infection    • Urogenital trichomoniasis 2015    Unfaithful partner   • UTI (urinary tract infection)    • Varicella 1988   • Visual impairment        Past Surgical History:   Procedure Laterality Date   • FL INJECTION LEFT KNEE (ARTHROGRAM)  2/28/2022   • WISDOM TOOTH EXTRACTION         Current Outpatient Medications   Medication Sig Dispense Refill   • albuterol (PROVENTIL HFA,VENTOLIN HFA) 90 mcg/act inhaler Inhale 2 puffs every 6 (six) hours as needed for wheezing 18 g 3   • [START ON 2/10/2025] amphetamine-dextroamphetamine (ADDERALL XR, 30MG,) 30 MG 24 hr capsule Take 1 capsule (30 mg total) by mouth every morning - Try to take this medication on productive days Max Daily Amount: 30 mg Do not start before February 10, 2025. 30 capsule 0   • buPROPion (Wellbutrin XL) 150 mg 24 hr tablet Take 1 tablet (150 mg total) by mouth daily - Take with Wellbutrin 300 mg XL for a total daily dose of 450 mg 90 tablet 0   • buPROPion (Wellbutrin XL) 300 mg 24 hr tablet Take 1 tablet (300 mg total) by mouth every morning 90 tablet 0   • calcium carbonate (OS-BERENICE) 600 MG tablet Take 600 mg by mouth 2 (two) times a day with meals     • cholecalciferol (VITAMIN D3) 1,000 units tablet Take 1,000 Units by mouth daily     • doxepin (SINEquan) 10 mg capsule Take 1 capsule (10 mg total) by mouth 2 (two) times a day as needed for sleep (and anxiety) 60 capsule 1   • Dupixent 200 MG/1.14ML SOPN Inject 1 pen ( 200mg total) under the skin once every 14 (fourteen) days. 2 mL 10   • EPINEPHrine (EPIPEN) 0.3 mg/0.3 mL SOAJ Inject 0.3 mL (0.3 mg total) into a muscle once for 1 dose 0.6 mL 0   •  Ferrous Sulfate (RA IRON PO) Take by mouth     • levalbuterol (Xopenex) 0.63 mg/3 mL nebulizer solution Take 3 mL (0.63 mg total) by nebulization 3 (three) times a day 30 mL 1   • Magnesium Glycinate 100 MG CAPS Take 200 mg by mouth 2 (two) times a day     • Melatonin 5 MG TABS Take 2 tablets (10 mg total) by mouth daily at bedtime as needed (insomnia)     • mometasone-formoterol (Dulera) 200-5 MCG/ACT inhaler Inhale 2 puffs 2 (two) times a day Rinse mouth after use. 39 g 2   • nystatin (MYCOSTATIN) 500,000 units/5 mL suspension Apply 5 mL (500,000 Units total) to the mouth or throat 4 (four) times a day 473 mL 0   • prochlorperazine (COMPAZINE) 5 mg tablet Take 1 tablet (5 mg total) by mouth every 6 (six) hours as needed for nausea or vomiting 30 tablet 0   • rimegepant sulfate (NURTEC) 75 mg TBDP Take 1 tablet (75 mg total) by mouth daily as needed (migraine headaches) 8 tablet 3   • tretinoin (RETIN-A) 0.025 % cream Apply topically daily at bedtime 45 g 2     No current facility-administered medications for this visit.        Allergies   Allergen Reactions   • Sulfa Antibiotics Anaphylaxis   • Levofloxacin      Other reaction(s): sensative   • Prednisone Other (See Comments)   • Tobramycin Other (See Comments)     Other reaction(s): sensative. Ototoxic   • Pseudoephedrine Palpitations       Review of Systems    Video Exam    There were no vitals filed for this visit.    Physical Exam

## 2025-02-04 ENCOUNTER — NURSE TRIAGE (OUTPATIENT)
Age: 40
End: 2025-02-04

## 2025-02-04 ENCOUNTER — OFFICE VISIT (OUTPATIENT)
Dept: FAMILY MEDICINE CLINIC | Facility: CLINIC | Age: 40
End: 2025-02-04
Payer: COMMERCIAL

## 2025-02-04 VITALS
HEIGHT: 66 IN | OXYGEN SATURATION: 98 % | DIASTOLIC BLOOD PRESSURE: 88 MMHG | TEMPERATURE: 97.9 F | HEART RATE: 101 BPM | BODY MASS INDEX: 24.75 KG/M2 | SYSTOLIC BLOOD PRESSURE: 120 MMHG | WEIGHT: 154 LBS

## 2025-02-04 DIAGNOSIS — J39.2 THROAT IRRITATION: Primary | ICD-10-CM

## 2025-02-04 PROCEDURE — 99213 OFFICE O/P EST LOW 20 MIN: CPT | Performed by: NURSE PRACTITIONER

## 2025-02-04 NOTE — TELEPHONE ENCOUNTER
"pt to gather further information of her s/s.  Pt states neck discomfort started yesterday.  States not related to pain.  Boyfriend is a paramedic and palpated her neck and states her discomfort is on the right side of her neck but almost over cricoid.  No visualized bruising.  No swallowing issues.  Pt states she had a migraine over the weekend and thought it was related but it doesn't appear to be.  Pt states discomfort is a 2-3/10, enough to know its there.    Pt scheduled to see PCP today 2/4/25 at 2:20pm for further evaluation.    Reason for Disposition   Tenderness in front of neck over windpipe    Answer Assessment - Initial Assessment Questions  1. ONSET: \"When did the pain begin?\"       Yesterday    2. LOCATION: \"Where does it hurt?\"       Right side of throat/neck    3. PATTERN \"Does the pain come and go, or has it been constant since it started?\"       Constant    4. SEVERITY: \"How bad is the pain?\"  (Scale 0-10; or none or slight stiffness, mild, moderate, severe)      2-3/10  know its there    5. RADIATION: \"Does the pain go anywhere else, shoot into your arms?\"      Denies    6. CORD SYMPTOMS: \"Any weakness or numbness of the arms or legs?\"      Denies    7. CAUSE: \"What do you think is causing the neck pain?\"      Unsure    8. NECK OVERUSE: \"Any recent activities that involved turning or twisting the neck?\"      Denies    9. OTHER SYMPTOMS: \"Do you have any other symptoms?\" (e.g., headache, fever, chest pain, difficulty breathing, neck swelling)      Denies    10. PREGNANCY: \"Is there any chance you are pregnant?\" \"When was your last menstrual period?\"        Denies    Protocols used: Neck Pain or Stiffness-Adult-[place]    "

## 2025-02-04 NOTE — PROGRESS NOTES
"Name: Linda Hickman      : 1985      MRN: [id number]  Encounter Provider: JULIEN Thomson  Encounter Date: 2025   Encounter department: [facility]  :  Assessment & Plan  Throat irritation  Dw patient appearing to have a minor throat irritation and will monitor and let us know if any new or worsening symptoms               History of Present Illness   Patient here today and reports that she noticed yesterday morning she had a tenderness in her throat and having some tenderness above her thyroid and feeling a soreness right side of her neck tenderness and woke up feeling the symptoms yesterday morning. Slight tenderness with swallowing but is better than yesterday and has not ever had a thyroid issue and no autoimmune diseases. She has been having some irregularities with her period and having spotting on 25       Review of Systems   Constitutional:  Negative for activity change, appetite change, chills, diaphoresis, fatigue, fever and unexpected weight change.   HENT:  Negative for congestion, ear pain, hearing loss, postnasal drip, sinus pressure, sinus pain, sneezing, sore throat, trouble swallowing and voice change.    Eyes:  Negative for pain, redness and visual disturbance.   Respiratory:  Negative for cough and shortness of breath.    Cardiovascular:  Negative for chest pain and leg swelling.   Gastrointestinal:  Negative for abdominal pain, diarrhea, nausea and vomiting.   Endocrine: Negative.    Genitourinary:  Positive for menstrual problem.   Musculoskeletal:  Negative for arthralgias.   Skin: Negative.    Allergic/Immunologic: Negative.    Neurological:  Negative for dizziness and light-headedness.   Hematological: Negative.    Psychiatric/Behavioral:  Negative for behavioral problems and dysphoric mood.        Objective   /88 (BP Location: Left arm, Patient Position: Sitting, Cuff Size: Adult)   Pulse 101   Temp 97.9 °F (36.6 °C)   Ht 5' 6\" " (1.676 m)   Wt 69.9 kg (154 lb)   SpO2 98%   BMI 24.86 kg/m²      Physical Exam  Vitals and nursing note reviewed.   Constitutional:       General: She is not in acute distress.     Appearance: She is well-developed.   HENT:      Head: Normocephalic and atraumatic.      Right Ear: Tympanic membrane normal.      Left Ear: Tympanic membrane normal.      Nose: Nose normal.      Mouth/Throat:      Mouth: Mucous membranes are moist.   Eyes:      Pupils: Pupils are equal, round, and reactive to light.   Neck:      Thyroid: No thyroid mass or thyromegaly.      Trachea: Trachea normal.     Cardiovascular:      Rate and Rhythm: Normal rate and regular rhythm.      Heart sounds: Normal heart sounds. No murmur heard.  Pulmonary:      Effort: Pulmonary effort is normal. No respiratory distress.      Breath sounds: Normal breath sounds. No wheezing.   Abdominal:      General: Bowel sounds are normal.      Palpations: Abdomen is soft.   Musculoskeletal:         General: Normal range of motion.      Cervical back: Normal range of motion.   Lymphadenopathy:      Cervical: No cervical adenopathy.      Right cervical: No superficial cervical adenopathy.     Left cervical: No superficial cervical adenopathy.   Skin:     General: Skin is warm and dry.   Neurological:      General: No focal deficit present.      Mental Status: She is alert and oriented to person, place, and time.   Psychiatric:         Mood and Affect: Mood normal.

## 2025-02-04 NOTE — ASSESSMENT & PLAN NOTE
Dw patient appearing to have a minor throat irritation and will monitor and let us know if any new or worsening symptoms

## 2025-02-04 NOTE — TELEPHONE ENCOUNTER
----- Message from Gaby BRAVO sent at 2/4/2025  9:38 AM EST -----  Linda Hickman to  [facility] (supporting JULIEN Thomson)         2/3/25  9:50 PM  I noticed right sided throat/neck tenderness (feels like a bruise) this morning,  seems to be above the thyroid? It doesn't seem swollen, I can't feel any nodules/ bumps, no fevers or other systemic that I can tell. It's a bit sore w/ range of motion.   Don't recall any injury to area, last time I was ill was before Christmas.

## 2025-02-05 DIAGNOSIS — F90.0 ATTENTION DEFICIT HYPERACTIVITY DISORDER, INATTENTIVE TYPE: ICD-10-CM

## 2025-02-05 NOTE — TELEPHONE ENCOUNTER
Reason for call:   [x] Refill   [] Prior Auth  [x] Other: Patients states pharmacy will not fill script until 2/10/25 nut she doesn't have any medication left so they need a new prescription sent     Office:   [] PCP/Provider -   [x] Specialty/Provider - [facility]     Medication: amphetamine-dextroamphetamine (ADDERALL XR, 30MG,) 30 MG 24 hr cap     Dose/Frequency: 30 mg every morning     Quantity: 30    Pharmacy: [facility]     Does the patient have enough for 3 days?   [] Yes   [x] No - Send as HP to POD

## 2025-02-05 NOTE — TELEPHONE ENCOUNTER
[id number] 12/13/2024 12/13/2024 Mixed Amphetamine Salts (Capsule, Extended Release) 30.0 30 30 MG NA [facility] Commercial [place] 0 / 0 [place]   1 [id number] 11/13/2024 11/13/2024 Mixed Amphetamine Salts (Capsule, Extended Release) 20.0 20 10 MG NA [facility] Commercial [place] 0 / 0 [place]   1 [id number] 10/11/2024 10/11/2024 Adderall Xr (Capsule, Extended Release) 30.0 30 20 MG NA [facility] Commercial [place] 0 / 0 [place]   1 [id number] 08/13/2024 08/09/2024 Mixed Amphetamine Salts (Capsule, Extended Release) 60.0 30 10 MG NA [facility] Commercial Insurance 0 / 0 [place]   1 [id number] 05/29/2024 05/29/2024 Mixed Amphetamine Salt (Capsule, Extended Release) 30.0 30 20 MG NA [facility] Commercial [place] 0 / 0 [place]   1 [id number] 04/19/2024 04/08/2024 Mixed Amphetamine Salt (Capsule, Extended Release) 30.0 30 20 MG NA [facility]AL [facility] Commercial Insurance 0 / 0 [place]   1 [id number] 03/25/2024 03/22/2024 Mixed Amphetamine Sal

## 2025-02-06 ENCOUNTER — TELEMEDICINE (OUTPATIENT)
Dept: PSYCHIATRY | Facility: CLINIC | Age: 40
End: 2025-02-06
Payer: COMMERCIAL

## 2025-02-06 DIAGNOSIS — F43.10 PTSD (POST-TRAUMATIC STRESS DISORDER): ICD-10-CM

## 2025-02-06 DIAGNOSIS — F33.1 MODERATE EPISODE OF RECURRENT MAJOR DEPRESSIVE DISORDER (HCC): Primary | ICD-10-CM

## 2025-02-06 PROCEDURE — 90834 PSYTX W PT 45 MINUTES: CPT

## 2025-02-06 RX ORDER — DEXTROAMPHETAMINE SACCHARATE, AMPHETAMINE ASPARTATE MONOHYDRATE, DEXTROAMPHETAMINE SULFATE AND AMPHETAMINE SULFATE 7.5; 7.5; 7.5; 7.5 MG/1; MG/1; MG/1; MG/1
30 CAPSULE, EXTENDED RELEASE ORAL EVERY MORNING
Qty: 30 CAPSULE | Refills: 0 | Status: SHIPPED | OUTPATIENT
Start: 2025-02-10

## 2025-02-06 NOTE — TELEPHONE ENCOUNTER
PDMP website reviewed. Linda has been appropriately adherent to controlled psychotropic medications without evidence of abuse or misuse. As such, will send 30-day refill to pharmacy of choice and follow up as necessary.    amphetamine-dextroamphetamine (ADDERALL XR, 30MG,) 30 MG 24 hr capsule          Take 1 capsule (30 mg total) by mouth every morning - Try to take this medication on productive days     Mika Albarran MD

## 2025-02-06 NOTE — PSYCH
Virtual Regular Visit    Verification of patient location:    Patient is located at Home in the following state in which I hold an active license [place]      Assessment/Plan:    Problem List Items Addressed This Visit     Moderate episode of recurrent major depressive disorder (HCC) - Primary    PTSD (post-traumatic stress disorder)       Goals addressed in session: Goal 1     Depression Follow-up Plan Completed: Not applicable    Reason for visit is No chief complaint on file.       Encounter provider Jannet Villatoro LCSW      Recent Visits  Date Type Provider Dept   02/04/25 Office Visit JULIEN Thomson [facility]    Showing recent visits within past 7 days and meeting all other requirements  Today's Visits  Date Type Provider Dept   02/06/25 Telemedicine Jannet Villatoro LCSW [facility]   Showing today's visits and meeting all other requirements  Future Appointments  No visits were found meeting these conditions.  Showing future appointments within next 150 days and meeting all other requirements       The patient was identified by name and date of birth. Linda Hickman was informed that this is a telemedicine visit and that the visit is being conducted throughthe real5D platform. She agrees to proceed..  My office door was closed. No one else was in the room.  She acknowledged consent and understanding of privacy and security of the video platform. The patient has agreed to participate and understands they can discontinue the visit at any time.    Patient is aware this is a billable service.     Subjective  Linda Hickman is a 39 y.o. female  .      HPI     Past Medical History:   Diagnosis Date   • Allergic    • Anemia    • Anxiety    • Arthritis    • Asthma    • Chlamydia 2014    Unfaithful partner   • Depression    • Exposure to SARS-associated coronavirus 04/12/2020   • GERD (gastroesophageal reflux disease) 2010   • Gonorrhea 2014    Unfaithful partner    • Headache(784.0)    • Lumbar herniated disc    • Migraine 2012   • Pneumonia    • Urinary tract infection    • Urogenital trichomoniasis 2015    Unfaithful partner   • UTI (urinary tract infection)    • Varicella 1988   • Visual impairment        Past Surgical History:   Procedure Laterality Date   • FL INJECTION LEFT KNEE (ARTHROGRAM)  2/28/2022   • WISDOM TOOTH EXTRACTION         Current Outpatient Medications   Medication Sig Dispense Refill   • albuterol (PROVENTIL HFA,VENTOLIN HFA) 90 mcg/act inhaler Inhale 2 puffs every 6 (six) hours as needed for wheezing 18 g 3   • [START ON 2/10/2025] amphetamine-dextroamphetamine (ADDERALL XR, 30MG,) 30 MG 24 hr capsule Take 1 capsule (30 mg total) by mouth every morning - Try to take this medication on productive days Max Daily Amount: 30 mg Do not start before February 10, 2025. 30 capsule 0   • buPROPion (Wellbutrin XL) 150 mg 24 hr tablet Take 1 tablet (150 mg total) by mouth daily - Take with Wellbutrin 300 mg XL for a total daily dose of 450 mg 90 tablet 0   • buPROPion (Wellbutrin XL) 300 mg 24 hr tablet Take 1 tablet (300 mg total) by mouth every morning 90 tablet 0   • calcium carbonate (OS-BERENICE) 600 MG tablet Take 600 mg by mouth 2 (two) times a day with meals     • cholecalciferol (VITAMIN D3) 1,000 units tablet Take 1,000 Units by mouth daily     • doxepin (SINEquan) 10 mg capsule Take 1 capsule (10 mg total) by mouth 2 (two) times a day as needed for sleep (and anxiety) 60 capsule 1   • Dupixent 200 MG/1.14ML SOPN Inject 1 pen ( 200mg total) under the skin once every 14 (fourteen) days. 2 mL 10   • EPINEPHrine (EPIPEN) 0.3 mg/0.3 mL SOAJ Inject 0.3 mL (0.3 mg total) into a muscle once for 1 dose 0.6 mL 0   • Ferrous Sulfate (RA IRON PO) Take by mouth     • levalbuterol (Xopenex) 0.63 mg/3 mL nebulizer solution Take 3 mL (0.63 mg total) by nebulization 3 (three) times a day 30 mL 1   • Magnesium Glycinate 100 MG CAPS Take 200 mg by mouth 2 (two) times a day      • Melatonin 5 MG TABS Take 2 tablets (10 mg total) by mouth daily at bedtime as needed (insomnia)     • mometasone-formoterol (Dulera) 200-5 MCG/ACT inhaler Inhale 2 puffs 2 (two) times a day Rinse mouth after use. 39 g 2   • nystatin (MYCOSTATIN) 500,000 units/5 mL suspension Apply 5 mL (500,000 Units total) to the mouth or throat 4 (four) times a day 473 mL 0   • prochlorperazine (COMPAZINE) 5 mg tablet Take 1 tablet (5 mg total) by mouth every 6 (six) hours as needed for nausea or vomiting 30 tablet 0   • rimegepant sulfate (NURTEC) 75 mg TBDP Take 1 tablet (75 mg total) by mouth daily as needed (migraine headaches) 8 tablet 3   • tretinoin (RETIN-A) 0.025 % cream Apply topically daily at bedtime 45 g 2     No current facility-administered medications for this visit.        Allergies   Allergen Reactions   • Sulfa Antibiotics Anaphylaxis   • Levofloxacin      Other reaction(s): sensative   • Prednisone Other (See Comments)   • Tobramycin Other (See Comments)     Other reaction(s): sensative. Ototoxic   • Pseudoephedrine Palpitations       Review of Systems    Video Exam    There were no vitals filed for this visit.    Physical Exam     Behavioral Health Psychotherapy Progress Note    Psychotherapy Provided: Individual Psychotherapy     1. Moderate episode of recurrent major depressive disorder (HCC)        2. PTSD (post-traumatic stress disorder)            Goals addressed in session: Goal 1     DATA: Linda and therapist identified an EMDR memory, targeting how her ex had cheated on her.   She processed thoughts and feelings about this memory and was able to remember many memories and feelings from this time.  She shared she believed her POC to a higher level.  Her TANNA level was still at a 7-8 and she said she would utilize voice journaling to calm herself after session.  During this session, this clinician used the following therapeutic modalities: EMDR (or other form of bilateral Stimulation)    Substance  "Abuse was not addressed during this session. If the client is diagnosed with a co-occurring substance use disorder, please indicate any changes in the frequency or amount of use: . Stage of change for addressing substance use diagnoses: No substance use/Not applicable    ASSESSMENT:  Linda Hickman presents with a Euthymic/ normal mood.     her affect is Normal range and intensity, which is congruent, with her mood and the content of the session. The client has made progress on their goals.     Linda Hickman presents with a none risk of suicide, none risk of self-harm, and none risk of harm to others.    For any risk assessment that surpasses a \"low\" rating, a safety plan must be developed.    A safety plan was indicated: no  If yes, describe in detail     PLAN: Between sessions, Linda Hickman will utilize grounding skills including journaling. At the next session, the therapist will use EMDR (or other form of bilateral Stimulation) to address PTSD.    Behavioral Health Treatment Plan and Discharge Planning: Linda Hickman is aware of and agrees to continue to work on their treatment plan. They have identified and are working toward their discharge goals. yes    Depression Follow-up Plan Completed: no    Visit start and stop times:    02/06/25  Start Time: 1700  Stop Time: 1752  Total Visit Time: 52 minutes        "

## 2025-02-07 ENCOUNTER — TELEPHONE (OUTPATIENT)
Dept: PSYCHIATRY | Facility: CLINIC | Age: 40
End: 2025-02-07

## 2025-02-07 DIAGNOSIS — F43.10 PTSD (POST-TRAUMATIC STRESS DISORDER): Primary | ICD-10-CM

## 2025-02-07 NOTE — TELEPHONE ENCOUNTER
Patient called the RX Refill Line. Message is being forwarded to the office.     Patient wanted to send message to provider. She never picked up the January supply of Adderall. Last time she picked up the medicine was on 12/13/25. Script was dated for 2/10/25. She said at this point she isn't taking it until Monday so it is ok but wanted to clarify.     She also wanted to let provider know that the doxepin is not really working for her.     Please contact patient at [phone]

## 2025-02-10 RX ORDER — TRAZODONE HYDROCHLORIDE 50 MG/1
25 TABLET, FILM COATED ORAL
Qty: 30 TABLET | Refills: 1 | Status: SHIPPED | OUTPATIENT
Start: 2025-02-10

## 2025-02-10 NOTE — TELEPHONE ENCOUNTER
I called Linda Hickman today 2/10/2025 at 12:45 PM.    Today, Linda reports that doxepin (10 mg up to 2 times daily as needed for anxiety and insomnia) was not effective to help with insomnia symptoms.  She reports that there was a brief period during the first week where she had some mildly increased sleep, however this did not last and she reports that she found herself waking up multiple times throughout the night subsequently after that annie period of 1 week.     Linda reports that this time she has currently been alternating melatonin 10 mg over-the-counter at bedtime and Benadryl 50 mg over-the-counter at bedtime. She reports sleeping approximately 7 hours on this over-the-counter medication regimen.    Following a thorough conversation, doxepin was discontinued.  Linda was in agreement with trialing low-dose trazodone for sleep and was started on trazodone 25 mg daily at bedtime as needed for sleep.    Mika Albarran MD

## 2025-02-20 ENCOUNTER — TELEMEDICINE (OUTPATIENT)
Dept: PSYCHIATRY | Facility: CLINIC | Age: 40
End: 2025-02-20
Payer: COMMERCIAL

## 2025-02-20 DIAGNOSIS — F43.10 PTSD (POST-TRAUMATIC STRESS DISORDER): ICD-10-CM

## 2025-02-20 DIAGNOSIS — F33.1 MODERATE EPISODE OF RECURRENT MAJOR DEPRESSIVE DISORDER (HCC): Primary | ICD-10-CM

## 2025-02-20 PROCEDURE — 90834 PSYTX W PT 45 MINUTES: CPT

## 2025-02-20 NOTE — PSYCH
Virtual Regular VisitName: Linda Hickman      : 1985      MRN: [id number]  Encounter Provider: Jannet Villatoro LCSW  Encounter Date: 2025   Encounter department: [facility]  :  Assessment & Plan  Moderate episode of recurrent major depressive disorder (HCC)         PTSD (post-traumatic stress disorder)             Goals addressed in session: Goal 1     Depression Follow-up Plan Completed: Not applicable    Reason for visit is No chief complaint on file.     Recent Visits  No visits were found meeting these conditions.  Showing recent visits within past 7 days and meeting all other requirements  Today's Visits  Date Type Provider Dept   25 Telemedicine Jannet Villatoro LCSW  [facility]   Showing today's visits and meeting all other requirements  Future Appointments  No visits were found meeting these conditions.  Showing future appointments within next 150 days and meeting all other requirements     History of Present Illness     Linda Hickman is a 39 y.o. female  .  HPI    Past Medical History:   Diagnosis Date   • Allergic    • Anemia    • Anxiety    • Arthritis    • Asthma    • Chlamydia     Unfaithful partner   • Depression    • Exposure to SARS-associated coronavirus 2020   • GERD (gastroesophageal reflux disease)    • Gonorrhea     Unfaithful partner   • Headache(784.0)    • Lumbar herniated disc    • Migraine    • Pneumonia    • Urinary tract infection    • Urogenital trichomoniasis     Unfaithful partner   • UTI (urinary tract infection)    • Varicella    • Visual impairment      Past Surgical History:   Procedure Laterality Date   • FL INJECTION LEFT KNEE (ARTHROGRAM)  2022   • WISDOM TOOTH EXTRACTION       Current Outpatient Medications   Medication Instructions   • albuterol (PROVENTIL HFA,VENTOLIN HFA) 90 mcg/act inhaler 2 puffs, Inhalation, Every 6 hours PRN   •  amphetamine-dextroamphetamine (ADDERALL XR, 30MG,) 30 MG 24 hr capsule 30 mg, Oral, Every morning, - Try to take this medication on productive days   • buPROPion (WELLBUTRIN XL) 300 mg, Oral, Every morning   • buPROPion (WELLBUTRIN XL) 150 mg, Oral, Daily, - Take with Wellbutrin 300 mg XL for a total daily dose of 450 mg   • calcium carbonate (OS-BERENICE) 600 mg, 2 times daily with meals   • cholecalciferol (VITAMIN D3) 1,000 Units, Daily   • Dupixent 200 MG/1.14ML SOPN Inject 1 pen ( 200mg total) under the skin once every 14 (fourteen) days.   • EPINEPHrine (EPIPEN) 0.3 mg, Intramuscular, Once   • Ferrous Sulfate (RA IRON PO) Take by mouth   • levalbuterol (XOPENEX) 0.63 mg, Nebulization, 3 times daily   • Magnesium Glycinate 200 mg, 2 times daily   • Melatonin 10 mg, Oral, Daily at bedtime PRN   • mometasone-formoterol (Dulera) 200-5 MCG/ACT inhaler 2 puffs, Inhalation, 2 times daily, Rinse mouth after use.   • nystatin (MYCOSTATIN) 500,000 Units, Mouth/Throat, 4 times daily   • prochlorperazine (COMPAZINE) 5 mg, Oral, Every 6 hours PRN   • rimegepant sulfate (NURTEC) 75 mg, Oral, Daily PRN   • traZODone (DESYREL) 25 mg, Oral, Daily at bedtime PRN   • tretinoin (RETIN-A) 0.025 % cream Topical, Daily at bedtime     Allergies   Allergen Reactions   • Sulfa Antibiotics Anaphylaxis   • Levofloxacin      Other reaction(s): sensative   • Prednisone Other (See Comments)   • Tobramycin Other (See Comments)     Other reaction(s): sensative. Ototoxic   • Pseudoephedrine Palpitations       Review of Systems    Objective   There were no vitals taken for this visit.    Video Exam  Physical Exam     Administrative Statements   Encounter provider Jannet Villatoro LCSW    The Patient is located at Home and in the following state in which I hold an active license [place].    The patient was identified by name and date of birth. Linda Hickman was informed that this is a telemedicine visit and that the visit is being conducted through the  "[employer] Now platform. She agrees to proceed..  My office door was closed. No one else was in the room.  She acknowledged consent and understanding of privacy and security of the video platform. The patient has agreed to participate and understands they can discontinue the visit at any time.      Behavioral Health Psychotherapy Progress Note    Psychotherapy Provided: Individual Psychotherapy     1. Moderate episode of recurrent major depressive disorder (HCC)        2. PTSD (post-traumatic stress disorder)            Goals addressed in session: Goal 1     DATA: Linda talked about being activated by a friend who is cheating in the marriage and has stopped talking to his friends.  She said she is aware of a part of herself that is hyper vigilant and protective.  She worked with her parts including talking to her inner chid and reassuring it that she can protect it.  She fleshed out a protector part that is a \"black sprite\" that is 8 years old and was able to talk about how it got its job, and that she needs this part as a protector.    During this session, this clinician used the following therapeutic modalities: Client-centered Therapy and Gestalt Therapy Techniques    Substance Abuse was not addressed during this session. If the client is diagnosed with a co-occurring substance use disorder, please indicate any changes in the frequency or amount of use: . Stage of change for addressing substance use diagnoses: No substance use/Not applicable    ASSESSMENT:  Linda Hickman presents with a Euthymic/ normal mood.     her affect is Normal range and intensity, which is congruent, with her mood and the content of the session. The client has made progress on their goals.     Linda Hickman presents with a none risk of suicide, none risk of self-harm, and none risk of harm to others.    For any risk assessment that surpasses a \"low\" rating, a safety plan must be developed.    A safety plan was indicated: no  If yes, describe " in detail     PLAN: Between sessions, Linda Hickman will utilize inner parts dialogue to help her with her inner peace. At the next session, the therapist will use Gestalt Therapy Techniques and Mindfulness-based Strategies to address PTSD, depression.    Behavioral Health Treatment Plan and Discharge Planning: Linda Hickman is aware of and agrees to continue to work on their treatment plan. They have identified and are working toward their discharge goals. yes    Depression Follow-up Plan Completed: Not applicable    Visit start and stop times:    02/20/25  Start Time: 1700  Stop Time: 1752  Total Visit Time: 52 minutes

## 2025-03-07 ENCOUNTER — PATIENT MESSAGE (OUTPATIENT)
Age: 40
End: 2025-03-07

## 2025-03-07 DIAGNOSIS — G43.709 CHRONIC MIGRAINE WITHOUT AURA WITHOUT STATUS MIGRAINOSUS, NOT INTRACTABLE: ICD-10-CM

## 2025-03-07 DIAGNOSIS — G43.709 CHRONIC MIGRAINE WITHOUT AURA WITHOUT STATUS MIGRAINOSUS, NOT INTRACTABLE: Primary | ICD-10-CM

## 2025-03-07 RX ORDER — RIMEGEPANT SULFATE 75 MG/75MG
TABLET, ORALLY DISINTEGRATING ORAL
Qty: 8 TABLET | Refills: 0 | Status: SHIPPED | OUTPATIENT
Start: 2025-03-07

## 2025-03-10 RX ORDER — KETOROLAC TROMETHAMINE 10 MG/1
10 TABLET, FILM COATED ORAL EVERY 6 HOURS PRN
Qty: 10 TABLET | Refills: 0 | Status: SHIPPED | OUTPATIENT
Start: 2025-03-10

## 2025-03-10 RX ORDER — PROCHLORPERAZINE MALEATE 10 MG
10 TABLET ORAL EVERY 6 HOURS PRN
Qty: 30 TABLET | Refills: 0 | Status: SHIPPED | OUTPATIENT
Start: 2025-03-10 | End: 2025-03-17 | Stop reason: DRUGHIGH

## 2025-03-10 RX ORDER — DIVALPROEX SODIUM 500 MG/1
500 TABLET, DELAYED RELEASE ORAL EVERY 6 HOURS PRN
Qty: 10 TABLET | Refills: 0 | Status: SHIPPED | OUTPATIENT
Start: 2025-03-10 | End: 2025-03-17

## 2025-03-10 RX ORDER — DIPHENHYDRAMINE HCL 25 MG
25 CAPSULE ORAL EVERY 6 HOURS PRN
Qty: 30 CAPSULE | Refills: 0 | Status: SHIPPED | OUTPATIENT
Start: 2025-03-10

## 2025-03-10 RX ORDER — DEXAMETHASONE 2 MG/1
TABLET ORAL
Qty: 16 TABLET | Refills: 0 | Status: SHIPPED | OUTPATIENT
Start: 2025-03-10

## 2025-03-11 ENCOUNTER — TELEPHONE (OUTPATIENT)
Age: 40
End: 2025-03-11

## 2025-03-17 ENCOUNTER — TELEMEDICINE (OUTPATIENT)
Age: 40
End: 2025-03-17
Payer: COMMERCIAL

## 2025-03-17 VITALS — HEIGHT: 66 IN | BODY MASS INDEX: 24.75 KG/M2 | WEIGHT: 154 LBS

## 2025-03-17 DIAGNOSIS — G43.709 CHRONIC MIGRAINE WITHOUT AURA WITHOUT STATUS MIGRAINOSUS, NOT INTRACTABLE: Primary | ICD-10-CM

## 2025-03-17 PROCEDURE — 99215 OFFICE O/P EST HI 40 MIN: CPT | Performed by: PSYCHIATRY & NEUROLOGY

## 2025-03-17 RX ORDER — RIZATRIPTAN BENZOATE 10 MG/1
10 TABLET, ORALLY DISINTEGRATING ORAL AS NEEDED
Qty: 18 TABLET | Refills: 1 | Status: SHIPPED | OUTPATIENT
Start: 2025-03-17

## 2025-03-17 RX ORDER — DUPILUMAB 200 MG/1.14ML
INJECTION, SOLUTION SUBCUTANEOUS
COMMUNITY
Start: 2025-02-26

## 2025-03-17 NOTE — PATIENT INSTRUCTIONS
Topamax/Topiramate is an oral pill which is used for migraine headaches: We typical start it at 25 mg and then increase it slowly over the course of several weeks. Can cause following side effects including numbness, tingling, weight loss, no recommended if you are trying to get pregnant, specific kidney stone issues.     Elavil/Pamelor is an oral pill. It is used for migraine and tension type headaches. We typical start it at a low dose of 10 mg and increase it to 20 mg. Common side effects include Fatigue-Dry mouth-Weight gain-Constipation- Drowsiness. It can also help with mood.     Propranolol/Beta Blockers is an oral pill.  It is started around 40 mg every day and increased accordingly. Common side effects not used if you have underlying asthma. Can cause low pulse and blood pressure.     Depakote is a pill which is used for migraine headaches and seizures. Usually once or twice a day. Common side effects include the following Nausea--Tiredness--Tremor--Dizziness--Weight gain--Hair loss--Birth defects    QULIPTA is an oral prescription medicine used for the preventive treatment of migraine in adults-Common side effects include, allergic reaction. If you have any kidney problems, liver problems, or plan to get pregnant than it is not recommended.       Ajovy is an injection, which is given once a month under you skin: AJOVY may cause allergic reactions, including itching, rash, and hives that can happen within hours and up to 1 month after receiving AJOVY. Call your healthcare provider or get emergency medical help right away if you have any symptoms of an allergic reaction: swelling of your face, mouth, tongue, throat, or if you have trouble breathing. Talk to your doctor about stopping AJOVY if you have an allergic reaction.    Aimovig is an injection, which is given once a month under you skin. The common side effects include Allergic reactions, including rash or swelling can happen after receiving Aimovig®.  This can happen within hours to days after using Aimovig®. Call your HCP or get emergency medical help right away if you have any of the following symptoms of an allergic reaction: swelling of the face, mouth, tongue or throat, or trouble breathing. Constipation with serious complications. Severe constipation can happen after receiving Aimovig®. In some cases people have been hospitalized or needed surgery. Contact your HCP if you have severe constipation or constipation associated with symptoms such as severe or constant belly pain, vomiting, swelling of belly or bloating.High blood pressure. High blood pressure or worsening of high blood pressure can happen after receiving Aimovig®. Contact your healthcare provider if you have an increase in blood pressure.    Emgality is an injection, which is given once a month under you skin. The initial does is 2 injections and then it is one injection a month there after. Common side effects include allergic reactions, such as itching, rash, hives, and trouble breathing. Allergic reactions can happen days after using Emgality. Call your healthcare provider or get emergency medical help right away if you have any of the following symptoms, which may be part of an allergic reaction: swelling of your face, mouth, tongue, or throat, or trouble breathing. Common side effects--The most common side effects of Emgality are injection site reactions.         Vitamins for headache  Mg 400 mg daily for headache  RiboFlavin 400 mg daily for headache          Migravent

## 2025-03-17 NOTE — PROGRESS NOTES
Neurology Virtual Visit Regular- Follow up Visit Note    Name: Linda Hickman       : 1985       MRN: [id number]   Encounter Provider: Jennifer Plascencia MD   Encounter Date: 3/17/2025  Encounter department: [facility] [place]    History of Present Illness         INTERIM HISTORY:  Linda Hickman is a 39 y.o. female presenting with Migraine and Virtual Regular Visit    Ms. Hickman experiences headaches with increasing frequency since her last appointment in September, occurring every five to seven days, sometimes extending to every two weeks, with episodes lasting three to four days. The intensity varies, and headaches occasionally switch sides. An episode from  to  woke her at 4:30 AM. A recent severe week-long episode significantly impacted her ability to work, requiring environmental modifications like turning off lights and wearing sunglasses indoors.    She uses Nurtec for headache management with mixed results, sometimes effective, partially effective, or not effective at all. She has tried combining it with other medications, but this approach is not always successful. She has previously tried Maxalt, which was effective but caused side effects. She is cautious about adding new treatments due to potential interactions with her current medications, which include Wellbutrin 450 mg, trazodone, and Adderall.    She is concerned about the increased frequency and duration of headaches, speculating it might be related to hormonal changes as she is likely in perimenopause. She mentions poor sleep patterns earlier in the year, which she attributes to hormonal changes. She is trying to conceive, influencing her medication choices, particularly avoiding Topamax and Depakote due to potential risks during pregnancy.    She has asthma and POTS, which limits her options for certain medications like propranolol.    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 She lives in [place] and commutes to [place] for work.        PRIOR NOTES:  Frequency of headaches days : 4-5 headache days a month--since her last clinic visit, she had another headache about 6 weeks later. She was in the heat and taking working . 4-5 days long--She took the Triptan (made it dull but never took away). She does feel that the headaches are better.      Intensity of the headaches days: intensity is less as compared      Medicine for headaches: Nurtec (has not used it yet) , Tylenol, Mg seems to be helping.      Side effects from the medications.NA     Failed medications:  Maxalt did not help much with her headaches.   Denied by her insurance: Qulipta     3-4 liters of water every day.      Any imaging including CTH or MRI of the brain: MRI brain was showing non specific T 2 spots likely from her migraine headaches.            Frequency of headaches days : low grade annoying headaches.--every 2-3 weeks she would get one headaches.   Intensity of the headaches days: Last night was 5/10 and she had a bad headaches about 8/10. She took a gram of tylenol and Ibuprofen.      She has not taken more fluids and she feels that might be contributing towards her headaches.      Medicine for headaches: Maxalt (she had a few break through headaches)      Side effects from the medications. She did mentioned that she had some numbness and tingling on her face after she took the Maxalt.      Failed medications: none     Any imaging including CTH or MRI of the brain: MRI brain was showing non specific T 2 spots likely from her migraine headaches.      Works as a  for the ER.      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    Quality of the pain: throbbing  Intensity of the headache: At present: 3/10;  At its worst:  10/10  Duration of the headaches:hour(s)-- if she takes medicine the migraine gets better. Sometimes they can last for days  Frequency of the headaches:about 1-2 days per week--variable in terms of migraine   Presence of aura:  No  Associated symptoms with headaches: +nausea, +vomiting , +light sensitivity , and +sound sensitivitySometime she had the facial numbness, pain, eye pain   Triggers:Yes, dehydration makes it worse, lack of sleep, hormone changes   Positional component: No   Alleviating factors: Yes, over the counter cocktail, Tylenol, motrin and zofran works. Mg helps. Water and caffeine helps.    Any specific time of the day when get the headaches: no particular time of day     Family history of migraine headaches: Yes  History of neck and head trauma: Yes  Smoking status: No  Oral contraceptive use: No     Life style factors:  -Hydration: adequate  -Sleep: adequate  -Caffeine intake: 2 cups of caffeinated coffee per day(s)  -Alcohol intake: socially      Impact of headches:  -Number of headaches in past 30 days: 12-15/30 days.   -Number of days missed per month because of headache: None in the last 30 days.   -Number of ER visits for her headaches: None  in the last 30 days.         Treatment:  -Over the counter medications tried for headaches:   Tylenol been helpful and Ibuprofen been helpful      -Prescription medications:  Abortive or Rescue Medications used:   No rescue medicine tried in the past       Preventative or daily medications used for headaches:   No prophylactic medicine tried in the past          Other significant co morbidities:  Asthma   Plan to get pregnant      Red Flags for headache:  Age <5 or >50: No  First worst headaches: No  Maximal at intensity: No  Change in pattern: Yes  New headache in pregnancy, cancer or immunocompromised patient: No  Loss of consciousness or  "convulsions: No  Headache triggered by exertion, Valsalva maneuver or sexual activity: No  Abnormal exam: please see below in Neurological examination.               Objective   Ht 5' 6\" (1.676 m)   Wt 69.9 kg (154 lb)   LMP 02/27/2025 (Exact Date)   BMI 24.86 kg/m²        Neurological examination: Unable to assess as this is a telemedicine visit      Pertinent diagnostic testing:  Labs:  Lab Results   Component Value Date    WBC 6.91 09/15/2023     Lab Results   Component Value Date    HGB 12.6 09/15/2023     Lab Results   Component Value Date     09/15/2023       No results found for: \"FOLATE\"  No results found for: \"JAZQJMSQ61\"  No results found for: \"COPPER\"  No results found for: \"METHYLMALON\"     Lab Results   Component Value Date    HEPBSAG Non-reactive 11/12/2018    HEPBSAB 137.54 11/12/2018    HEPCAB Non-reactive 06/24/2019            Neuroimaging:   Results for orders placed during the hospital encounter of 01/25/24    MRI brain wo contrast    Impression  No acute abnormality.    Few nonspecific foci of frontal white matter signal abnormality which can be seen in patients with complicated migraines..    Resident: STEVE BASS I, the attending radiologist, have reviewed the images and agree with the final report above.    Workstation performed: [id number]                           Assessment & Plan  Chronic migraine without aura without status migrainosus, not intractable  Ms. Hickman is a very pleasant 39 y.o. female presenting with migraine headaches. Increased frequency and duration of migraines, occurring every 5 to 7 days, sometimes lasting several days, severe enough to require environmental modifications at work. Nurtec used as abortive treatment with variable success. Increased frequency possibly related to perimenopause. Preventative treatment options considered: Nurtec every other day, Topamax, amitriptyline, propranolol. Propranolol contraindicated due to asthma and POTS, " Topamax not recommended due to pregnancy concerns. Amitriptyline considered for sleep and headaches, but potential interactions with Wellbutrin and trazodone. Effexor discussed as potential future option if Wellbutrin becomes ineffective.    - Prescribe Nurtec every other day as preventative treatment, 16 tablets per month.  - Prescribe Maxalt ODT as rescue medication.  - Send note to Dr. Albarran for coordination of care.  - Discuss possibility of using amitriptyline if Nurtec is not effective.  - Consider supplements such as magnesium, riboflavin, or Migravent for headache management.            Ms. Hickman will Return in about 6 months (around 9/17/2025), or Telemedicine.    Administrative Statements     Encounter Provider:   Jennifer Marie MD.   Staff Neurologist.          The management plan was discussed in detail with the patient and all questions were answered.     Ms. Hickman was encouraged to contact our office with any questions or concerns and to contact the clinic or go to the nearest emergency room if symptoms change or worsen.     The Patient is located at Home and in the following state in which I hold an active license [place].    The patient was identified by name and date of birth. Linda Hickman was informed that this is a telemedicine visit and that the visit is being conducted through the Epic Embedded platform. She agrees to proceed..  My office door was closed. No one else was in the room.. she acknowledged consent and understanding of privacy and security of the video platform. The patient has agreed to participate and understands they can discontinue the visit at any time.    I have spent a total time of 41 minutes in caring for this patient on the day of the visit/encounter including Diagnostic results, Prognosis, Risks and benefits of tx options, Instructions for management, Patient and family education, Importance of tx compliance, Risk factor reductions, Impressions, Counseling /  Coordination of care, Documenting in the medical record, Reviewing/placing orders in the medical record (including tests, medications, and/or procedures), and Obtaining or reviewing history  .             This report has been created through the use of voice recognition/text compilation software.  Typographical and content errors may occur with this process.  While efforts are made to detect and correct such errors, in some cases errors will persist.  For this reason, wording in this document should be considered in the proper context and not strictly verbatim.  If, when reviewing the document, an error is discovered, please let the office know at [phone]

## 2025-03-17 NOTE — LETTER
2025     Mika Albarran MD  [address] [place] [id number]    Patient: Linda Hickman   YOB: 1985   Date of Visit: 3/17/2025       Dear Dr. Albarran:    Thank you for referring Linda Hickman to me for evaluation. Below are my notes for this consultation.    If you have questions, please do not hesitate to call me. I look forward to following your patient along with you.         Sincerely,        Jennifer Plascencia MD        CC: No Recipients    Jennifer Plascencia MD  3/18/2025  9:19 AM  Sign when Signing Visit  Neurology Virtual Visit Regular- Follow up Visit Note    Name: Linda Hickman       : 1985       MRN: [id number]   Encounter Provider: Jennifer Plascencia MD   Encounter Date: 3/17/2025  Encounter department: [facility]    History of Present Illness        INTERIM HISTORY:  Linda Hickman is a 39 y.o. female presenting with Migraine and Virtual Regular Visit    Ms. Hickman experiences headaches with increasing frequency since her last appointment in September, occurring every five to seven days, sometimes extending to every two weeks, with episodes lasting three to four days. The intensity varies, and headaches occasionally switch sides. An episode from  to  woke her at 4:30 AM. A recent severe week-long episode significantly impacted her ability to work, requiring environmental modifications like turning off lights and wearing sunglasses indoors.    She uses Nurtec for headache management with mixed results, sometimes effective, partially effective, or not effective at all. She has tried combining it with other medications, but this approach is not always successful. She has previously tried Maxalt, which was effective but caused side effects. She is cautious about adding new treatments due to potential interactions with her current medications, which include Wellbutrin 450 mg, trazodone, and  Adderall.    She is concerned about the increased frequency and duration of headaches, speculating it might be related to hormonal changes as she is likely in perimenopause. She mentions poor sleep patterns earlier in the year, which she attributes to hormonal changes. She is trying to conceive, influencing her medication choices, particularly avoiding Topamax and Depakote due to potential risks during pregnancy.    She has asthma and POTS, which limits her options for certain medications like propranolol.    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 She lives in [place] and commutes to [place] for work.        PRIOR NOTES:  Frequency of headaches days : 4-5 headache days a month--since her last clinic visit, she had another headache about 6 weeks later. She was in the heat and taking working . 4-5 days long--She took the Triptan (made it dull but never took away). She does feel that the headaches are better.      Intensity of the headaches days: intensity is less as compared      Medicine for headaches: Nurtec (has not used it yet) , Tylenol, Mg seems to be helping.      Side effects from the medications.NA     Failed medications:  Maxalt did not help much with her headaches.   Denied by her insurance: [employer]     3-4 liters of water every day.      Any imaging including CTH or MRI of the brain: MRI brain was showing non specific T 2 spots likely from her migraine headaches.            Frequency of headaches days : low grade annoying headaches.--every 2-3 weeks she would get one headaches.   Intensity of the headaches days: Last night was 5/10 and she had a bad headaches about 8/10. She took a gram of tylenol and Ibuprofen.      She has not taken more fluids and she feels that might be contributing towards her headaches.      Medicine for headaches: Maxalt (she had a few break through headaches)      Side effects from the medications.  She did mentioned that she had some numbness and tingling on her face after she took the Maxalt.      Failed medications: none     Any imaging including CTH or MRI of the brain: MRI brain was showing non specific T 2 spots likely from her migraine headaches.      Works as a  for the ER.      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    Quality of the pain: throbbing  Intensity of the headache: At present: 3/10;  At its worst:  10/10  Duration of the headaches:hour(s)-- if she takes medicine the migraine gets better. Sometimes they can last for days  Frequency of the headaches:about 1-2 days per week--variable in terms of migraine   Presence of aura:  No  Associated symptoms with headaches: +nausea, +vomiting , +light sensitivity , and +sound sensitivitySometime she had the facial numbness, pain, eye pain   Triggers:Yes, dehydration makes it worse, lack of sleep, hormone changes   Positional component: No   Alleviating factors: Yes, over the counter cocktail, Tylenol, motrin and zofran works. Mg helps. Water and caffeine helps.    Any specific time of the day when get the headaches: no particular time of day     Family history of migraine headaches: Yes  History of neck and head trauma: Yes  Smoking status: No  Oral contraceptive use: No     Life style factors:  -Hydration: adequate  -Sleep: adequate  -Caffeine intake: 2 cups of caffeinated coffee per day(s)  -Alcohol intake: socially      Impact of headches:  -Number of headaches in past 30 days: 12-15/30 days.   -Number of days missed per month because of headache: None in the last 30 days.   -Number of ER visits for her headaches: None  in the last 30 days.         Treatment:  -Over the counter medications tried for  "headaches:   Tylenol been helpful and Ibuprofen been helpful      -Prescription medications:  Abortive or Rescue Medications used:   No rescue medicine tried in the past       Preventative or daily medications used for headaches:   No prophylactic medicine tried in the past          Other significant co morbidities:  Asthma   Plan to get pregnant      Red Flags for headache:  Age <5 or >50: No  First worst headaches: No  Maximal at intensity: No  Change in pattern: Yes  New headache in pregnancy, cancer or immunocompromised patient: No  Loss of consciousness or convulsions: No  Headache triggered by exertion, Valsalva maneuver or sexual activity: No  Abnormal exam: please see below in Neurological examination.               Objective  Ht 5' 6\" (1.676 m)   Wt 69.9 kg (154 lb)   LMP 02/27/2025 (Exact Date)   BMI 24.86 kg/m²        Neurological examination: Unable to assess as this is a telemedicine visit      Pertinent diagnostic testing:  Labs:  Lab Results   Component Value Date    WBC 6.91 09/15/2023     Lab Results   Component Value Date    HGB 12.6 09/15/2023     Lab Results   Component Value Date     09/15/2023       No results found for: \"FOLATE\"  No results found for: \"NLMZWSYD96\"  No results found for: \"COPPER\"  No results found for: \"METHYLMALON\"     Lab Results   Component Value Date    HEPBSAG Non-reactive 11/12/2018    HEPBSAB 137.54 11/12/2018    HEPCAB Non-reactive 06/24/2019            Neuroimaging:   Results for orders placed during the hospital encounter of 01/25/24    MRI brain wo contrast    Impression  No acute abnormality.    Few nonspecific foci of frontal white matter signal abnormality which can be seen in patients with complicated migraines..    Resident: STEVE BASS I, the attending radiologist, have reviewed the images and agree with the final report above.    Workstation performed: [id number]                           Assessment & Plan  Chronic migraine without aura " without status migrainosus, not intractable  Ms. Hickman is a very pleasant 39 y.o. female presenting with migraine headaches. Increased frequency and duration of migraines, occurring every 5 to 7 days, sometimes lasting several days, severe enough to require environmental modifications at work. Nurtec used as abortive treatment with variable success. Increased frequency possibly related to perimenopause. Preventative treatment options considered: Nurtec every other day, Topamax, amitriptyline, propranolol. Propranolol contraindicated due to asthma and POTS, Topamax not recommended due to pregnancy concerns. Amitriptyline considered for sleep and headaches, but potential interactions with Wellbutrin and trazodone. Effexor discussed as potential future option if Wellbutrin becomes ineffective.    - Prescribe Nurtec every other day as preventative treatment, 16 tablets per month.  - Prescribe Maxalt ODT as rescue medication.  - Send note to Dr. Albarrna for coordination of care.  - Discuss possibility of using amitriptyline if Nurtec is not effective.  - Consider supplements such as magnesium, riboflavin, or Migravent for headache management.            Ms. Hickman will Return in about 6 months (around 9/17/2025), or Telemedicine.    Administrative Statements    Encounter Provider:   Jennifer Marie MD.   Staff Neurologist.          The management plan was discussed in detail with the patient and all questions were answered.     Ms. Hickman was encouraged to contact our office with any questions or concerns and to contact the clinic or go to the nearest emergency room if symptoms change or worsen.     The Patient is located at Home and in the following state in which I hold an active license [place].    The patient was identified by name and date of birth. Linda Hickman was informed that this is a telemedicine visit and that the visit is being conducted through the Epic Embedded platform. She agrees to  proceed..  My office door was closed. No one else was in the room.. she acknowledged consent and understanding of privacy and security of the video platform. The patient has agreed to participate and understands they can discontinue the visit at any time.    I have spent a total time of 41 minutes in caring for this patient on the day of the visit/encounter including Diagnostic results, Prognosis, Risks and benefits of tx options, Instructions for management, Patient and family education, Importance of tx compliance, Risk factor reductions, Impressions, Counseling / Coordination of care, Documenting in the medical record, Reviewing/placing orders in the medical record (including tests, medications, and/or procedures), and Obtaining or reviewing history  .             This report has been created through the use of voice recognition/text compilation software.  Typographical and content errors may occur with this process.  While efforts are made to detect and correct such errors, in some cases errors will persist.  For this reason, wording in this document should be considered in the proper context and not strictly verbatim.  If, when reviewing the document, an error is discovered, please let the office know at [phone]

## 2025-03-18 ENCOUNTER — TELEMEDICINE (OUTPATIENT)
Dept: PSYCHIATRY | Facility: CLINIC | Age: 40
End: 2025-03-18

## 2025-03-18 DIAGNOSIS — F90.0 ATTENTION DEFICIT HYPERACTIVITY DISORDER, INATTENTIVE TYPE: ICD-10-CM

## 2025-03-18 DIAGNOSIS — F33.0 MILD EPISODE OF RECURRENT MAJOR DEPRESSIVE DISORDER (HCC): Primary | ICD-10-CM

## 2025-03-18 DIAGNOSIS — F43.10 PTSD (POST-TRAUMATIC STRESS DISORDER): ICD-10-CM

## 2025-03-18 PROCEDURE — PBNCHG PB NO CHARGE PLACEHOLDER

## 2025-03-18 RX ORDER — BUPROPION HYDROCHLORIDE 300 MG/1
300 TABLET ORAL EVERY MORNING
Qty: 90 TABLET | Refills: 0 | Status: SHIPPED | OUTPATIENT
Start: 2025-03-18 | End: 2025-06-16

## 2025-03-18 RX ORDER — DEXTROAMPHETAMINE SACCHARATE, AMPHETAMINE ASPARTATE MONOHYDRATE, DEXTROAMPHETAMINE SULFATE AND AMPHETAMINE SULFATE 7.5; 7.5; 7.5; 7.5 MG/1; MG/1; MG/1; MG/1
30 CAPSULE, EXTENDED RELEASE ORAL EVERY MORNING
Qty: 30 CAPSULE | Refills: 0 | Status: SHIPPED | OUTPATIENT
Start: 2025-03-18

## 2025-03-18 RX ORDER — TRAZODONE HYDROCHLORIDE 50 MG/1
50 TABLET ORAL
Qty: 90 TABLET | Refills: 0 | Status: SHIPPED | OUTPATIENT
Start: 2025-03-18

## 2025-03-18 RX ORDER — BUPROPION HYDROCHLORIDE 150 MG/1
150 TABLET ORAL DAILY
Qty: 90 TABLET | Refills: 0 | Status: SHIPPED | OUTPATIENT
Start: 2025-03-18

## 2025-03-18 NOTE — BH TREATMENT PLAN
TREATMENT PLAN (Medication Management Only)        [facility]    Name and Date of Birth:  Linda Hickman 39 y.o. 1985  MRN: [id number]  Date of Treatment Plan: March 18, 2025  Diagnosis/Diagnoses:    1. Moderate episode of recurrent major depressive disorder (HCC)    2. Attention deficit hyperactivity disorder, inattentive type    3. PTSD (post-traumatic stress disorder)      Strengths/Personal Resources for Self-Care: Supportive family, supportive friends, taking med occasions as prescribed, ability to communicate needs, ability to communicate well, ability to understand psychiatric illness, general fund of knowledge, good understanding of illness, motivation for treatment, being resourceful, self-reliance, stable employment, well educated, willingness to work on problems, work skills.  Area/Areas of need (in own words): Continue to improve depression and anxiety symptoms.  Continue to improve attention and concentration.  1. Long Term Goal:   Continue to improve depression and anxiety symptoms.  Continue to improve attention and concentration..  Target Date:6 months - 9/18/2025  Person/Persons responsible for completion of goal: Dr. Deion Mckeon  2.  Short Term Objective (s) - How will we reach this goal?:   A.  Provider new recommended medication/dosage changes and/or continue medication(s): Continue Adderall XR 30 mg daily on productive days, Wellbutrin 450 XL daily, trazodone 50 mg at bedtime as needed for sleep.  Take medications as prescribed  Attend psychiatry appointments regularly  Continue psychotherapy regularly  Practice coping skills  Try sleep hygiene techniques  Avoid alcohol   Avoid drugs   Eat a healthy diet   Exercise regularly  Try relaxation techniques  Target Date:3 months - 6/18/2025  Person/Persons Responsible for Completion of Goal: Linda  Progress Towards Goals: stable, continuing treatment  Treatment Modality: Medication management  every 1 to 3 months  Review due 180 days from date of this plan: 6 months - 9/18/2025  Expected length of service: maintenance unless revised  My Physician/PA/NP and I have developed this plan together and I agree to work on the goals and objectives. I understand the treatment goals that were developed for my treatment.   Electronic Signatures: on file (unless signed below)    Mika Albarran MD 03/18/25

## 2025-03-18 NOTE — PSYCH
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 alcohol withdrawal or electrolyte disturbances.      Continue Adderall XR 30 mg daily on productive days for symptoms of ADHD  PARQ completed for the class of stimulant medications including anxiety/irritability, insomnia, appetite suppression/weight loss, abuse potential, elevated heart rate and blood pressure, seizures, activation/induction of belinda, unmasking of tic's, growth suppression in children, interactions with other medications, and risk of sudden death.   PTSD (post-traumatic stress disorder)  Adjusted trazodone to 50 mg daily at bedtime as needed for insomnia  PARQ was completed for trazodone including sedation, dizziness, headache, GI distress, priapism, suicidal thoughts in patients under 25 years old, and serotonin syndrome.     At the time of interview, the topic of discontinuing trazodone and instead utilizing amitriptyline (starting at 10 mg at bedtime and subsequently increasing to 25 mg at bedtime after a period of 2 weeks) for the purpose of migraine prophylaxis as well as to promote sleep in the setting of insomnia was discussed in detail.  The topic of also utilizing Effexor  for symptoms of depression and anxiety as well as migraine prophylaxis was discussed as well (initially starting at 75 mg daily and titrating to efficacy).  Linda states that she will consider switching trazodone to amitriptyline, however first wants to see if her current regimen of Nurtec is sufficient to provide migraine control.      Continue ongoing medication management every 1 to 3 months  Continue ongoing psychotherapy with therapist Jannet on a generally biweekly basis  Aware of need to follow up with family physician for medical issues  Aware of 24 hour and weekend coverage for urgent situations accessed by calling [facility] main practice number    Although patient's acute lethality risk is low, long-term/chronic lethality risk is mildly elevated in the presence of mild symptoms of depression and anxiety. At the current moment, Linda is future-oriented, forward-thinking, and demonstrates ability to act in a self-preserving manner as evidenced by volitionally presenting to the clinic today, seeking treatment. At this juncture, inpatient hospitalization is not currently warranted. To mitigate future risk, patient should adhere to the recommendations of this writer, avoid alcohol/illicit substance use, utilize community-based resources and familiar support and prioritize mental health treatment.     Based on today's assessment and clinical criteria, Linda Hickman contracts for safety and is not an imminent risk of harm to self or others. Outpatient level of care is deemed appropriate at this present time. Linda understands that if they are no longer able to contract for safety, they need to call/contact the outpatient office including this writer, call/contact crisis and/orattend to the nearest Emergency Department for immediate evaluation.    Risks/Benefits      Risks, Benefits And Possible Side Effects Of Medications:    Risks, benefits, and possible side effects of medications  explained to Linda and she verbalizes understanding and agreement for treatment.    Controlled Medication Discussion:     Linda has been filling controlled prescriptions on time as prescribed according to [place] Prescription Drug Monitoring Program    Psychotherapy Provided:     Individual psychotherapy provided: Yes  Recent stressors discussed with Linda including medical stressors, job stressors, family stressors.  Supportive therapy provided.     Treatment Plan:    Completed and signed during the session: Yes - Treatment Plan done but not signed at time of office visit due to:  Plan reviewed by video and verbal consent given due to virtual visit. Treatment Plan sent to patient via [employer] for signature.    Visit Time    Visit Start Time: 1:30 PM  Visit Stop Time: 2:00 PM  Total Visit Duration: 30 minutes    This note was shared with patient.    Mika Albarran MD 03/18/25    This note has been constructed using a voice recognition system. There may be translation, syntax, or grammatical errors. If you have any questions, please contact the dictating provider.

## 2025-03-19 NOTE — ASSESSMENT & PLAN NOTE
Adjusted trazodone to 50 mg daily at bedtime as needed for insomnia  PARQ was completed for trazodone including sedation, dizziness, headache, GI distress, priapism, suicidal thoughts in patients under 25 years old, and serotonin syndrome.     At the time of interview, the topic of discontinuing trazodone and instead utilizing amitriptyline (starting at 10 mg at bedtime and subsequently increasing to 25 mg at bedtime after a period of 2 weeks) for the purpose of migraine prophylaxis as well as to promote sleep in the setting of insomnia was discussed in detail.  The topic of also utilizing Effexor for symptoms of depression and anxiety as well as migraine prophylaxis was discussed as well (initially starting at 75 mg daily and titrating to efficacy).  Linda states that she will consider switching trazodone to amitriptyline, however first wants to see if her current regimen of Nurtec is sufficient to provide migraine control.

## 2025-03-19 NOTE — PATIENT INSTRUCTIONS
Please present for your previously scheduled appointment approximately 15 minutes prior to appointment time. If you are running late or are unable to attend your appointment, please call our [place] office at [phone] or our [place] office at [phone] if applicable to notify the office of your absence.    If you are in psychological crisis including not limited to suicidal/homicidal thoughts or thoughts of self-injury, do not hesitate to call/contact your County Crisis hotline (see below) or attend to the nearest emergency department.  [facility] Crisis: [phone]  [facility] Crisis: [phone]  [facility] Crisis: [phone]  [facility] Crisis: [phone]  [facility] Crisis: [phone]  [facility] Crisis: [phone]  [facility] Crisis: [phone]  National Suicide Prevention Hotline: 1-963.558.9476

## 2025-03-19 NOTE — ASSESSMENT & PLAN NOTE
Continue Wellbutrin 450 mg XL daily for symptoms of depression and for attention and concentration  PARQ was completed for bupropion (Wellbutrin) including nausea, insomnia, agitation/activation, weight loss, anxiety, palpitations, hypertension, decreased seizure threshold and risk with alcohol withdrawal or electrolyte disturbances.  Patient screened negative for heavy alcohol use, history of seizures, and eating disorders.

## 2025-03-20 ENCOUNTER — TELEMEDICINE (OUTPATIENT)
Dept: PSYCHIATRY | Facility: CLINIC | Age: 40
End: 2025-03-20
Payer: COMMERCIAL

## 2025-03-20 DIAGNOSIS — F43.10 PTSD (POST-TRAUMATIC STRESS DISORDER): ICD-10-CM

## 2025-03-20 DIAGNOSIS — F33.1 MODERATE EPISODE OF RECURRENT MAJOR DEPRESSIVE DISORDER (HCC): Primary | ICD-10-CM

## 2025-03-20 PROCEDURE — 90834 PSYTX W PT 45 MINUTES: CPT

## 2025-03-20 NOTE — PSYCH
"Virtual Regular VisitName: Linda Hickman      : 1985      MRN: [id number]  Encounter Provider: Jannet Villatoro LCSW  Encounter Date: 3/20/2025   Encounter department: [facility]ERAPY[facility]  :  Assessment & Plan  Moderate episode of recurrent major depressive disorder (HCC)         PTSD (post-traumatic stress disorder)             Goals addressed in session: Goal 1     DATA: Linda shared she is moving forward with wedding plans and date for getting  in the future.  She said she has been sleeping better but migraines are worse and she doesn't want to be on so many medications.  She feels she is being \"self absorbed\" lately and wants to pay attention to others more but is not sure how.  She talked about how she interacts with others and that she is not sure how she comes across to others.  She shares she feels she is being assertive with others , productive and is a good listener but it's brought up to her by Iraj that she's not keeping up with chores as much as he is and is not listening to what others need.  She talked about feeling she does not  on social cues and this causes her to have anxiety and feel bad about herself.  Therapist encouraged her to think positively about herself in terms of her worldview and herself as well as ask others for their feedback into her social interactions so she can possibly change them.    During this session, this clinician used the following therapeutic modalities: Client-centered Therapy and Supportive Psychotherapy    Substance Abuse was not addressed during this session. If the client is diagnosed with a co-occurring substance use disorder, please indicate any changes in the frequency or amount of use: . Stage of change for addressing substance use diagnoses: No substance use/Not applicable    ASSESSMENT:  Linda presents with a Euthymic/ normal mood. Linda's affect is Normal range and intensity, which is congruent, with " "their mood and the content of the session. The client has made progress on their goals as evidenced by open communication of thoughts and feelings and gaining insight into her own behaviors and talking about changing them.    Linda presents with a none risk of suicide, none risk of self-harm, and none risk of harm to others.    For any risk assessment that surpasses a \"low\" rating, a safety plan must be developed.    A safety plan was indicated: no  If yes, describe in detail     PLAN: Between sessions, Linad will utilize positive self talk and self observation to improve her social interactions. At the next session, the therapist will use Client-centered Therapy to address depression, anxiety, social interactions.    Behavioral Health Treatment Plan [facility]: Diagnosis and Treatment Plan explained to Linda, Linda relates understanding diagnosis and is agreeable to Treatment Plan. Yes     Depression Follow-up Plan Completed: Not applicable     Reason for visit is   Chief Complaint   Patient presents with   • Virtual Regular Visit      Recent Visits  No visits were found meeting these conditions.  Showing recent visits within past 7 days and meeting all other requirements  Today's Visits  Date Type Provider Dept   03/20/25 Telemedicine Jannet Villatoro LCSW [facility]   Showing today's visits and meeting all other requirements  Future Appointments  No visits were found meeting these conditions.  Showing future appointments within next 150 days and meeting all other requirements     History of Present Illness     HPI    Past Medical History   Past Medical History:   Diagnosis Date   • Allergic    • Anemia    • Anxiety    • Arthritis    • Asthma    • Chlamydia 2014    Unfaithful partner   • Depression    • Exposure to SARS-associated coronavirus 04/12/2020   • GERD (gastroesophageal reflux disease) 2010   • Gonorrhea 2014    Unfaithful partner   • Headache(784.0)    • Lumbar herniated disc  "   • Migraine 2012   • Pneumonia    • Urinary tract infection    • Urogenital trichomoniasis 2015    Unfaithful partner   • UTI (urinary tract infection)    • Varicella 1988   • Visual impairment      Past Surgical History:   Procedure Laterality Date   • FL INJECTION LEFT KNEE (ARTHROGRAM)  2/28/2022   • WISDOM TOOTH EXTRACTION       Current Outpatient Medications   Medication Instructions   • albuterol (PROVENTIL HFA,VENTOLIN HFA) 90 mcg/act inhaler 2 puffs, Inhalation, Every 6 hours PRN   • amphetamine-dextroamphetamine (ADDERALL XR, 30MG,) 30 MG 24 hr capsule 30 mg, Oral, Every morning, - Try to take this medication on productive days   • buPROPion (WELLBUTRIN XL) 150 mg, Oral, Daily, - Take with Wellbutrin 300 mg XL for a total daily dose of 450 mg   • buPROPion (WELLBUTRIN XL) 300 mg, Oral, Every morning   • calcium carbonate (OS-BERENICE) 600 mg, 2 times daily with meals   • cholecalciferol (VITAMIN D3) 1,000 Units, Daily   • dexamethasone (DECADRON) 2 mg tablet Take 4 mg (2 tabs) for 2 days, then decrease it to 2 mg (1 tab) for 2 days, then decrease it to 1 mg (0.5 tab) for 2 days and then stop the medicine.   • diphenhydrAMINE (BENADRYL ALLERGY) 25 mg, Oral, Every 6 hours PRN   • Dupixent 200 MG/1.14ML SOAJ    • Dupixent 200 MG/1.14ML SOPN Inject 1 pen ( 200mg total) under the skin once every 14 (fourteen) days.   • EPINEPHrine (EPIPEN) 0.3 mg, Intramuscular, Once   • Ferrous Sulfate (RA IRON PO) Take by mouth   • ketorolac (TORADOL) 10 mg, Oral, Every 6 hours PRN   • levalbuterol (XOPENEX) 0.63 mg, Nebulization, 3 times daily   • Magnesium Glycinate 200 mg, 2 times daily   • Melatonin 10 mg, Oral, Daily at bedtime PRN   • mometasone-formoterol (Dulera) 200-5 MCG/ACT inhaler 2 puffs, Inhalation, 2 times daily, Rinse mouth after use.   • nystatin (MYCOSTATIN) 500,000 Units, Mouth/Throat, 4 times daily   • prochlorperazine (COMPAZINE) 5 mg, Oral, Every 6 hours PRN   • rimegepant sulfate (NURTEC) 75 mg, Oral, Every  other day   • rizatriptan (MAXALT-MLT) 10 mg, Oral, As needed, Take at the onset of migraine; if symptoms continue or return, may take another dose at least 2 hours after first dose. Take no more than 2 doses in a day.   • traZODone (DESYREL) 50 mg, Oral, Daily at bedtime PRN   • tretinoin (RETIN-A) 0.025 % cream Topical, Daily at bedtime     Allergies   Allergen Reactions   • Sulfa Antibiotics Anaphylaxis   • Levofloxacin      Other reaction(s): sensative   • Prednisone Other (See Comments)   • Tobramycin Other (See Comments)     Other reaction(s): sensative. Ototoxic   • Pseudoephedrine Palpitations       Objective   LMP 02/27/2025 (Exact Date)     Video Exam  Physical Exam     Administrative Statements   Encounter provider Jannet Villatoro LCSW    The Patient is located at Home and in the following state in which I hold an active license [place].    The patient was identified by name and date of birth. Linda Hickman was informed that this is a telemedicine visit and that the visit is being conducted through the [employer] platform. She agrees to proceed..  My office door was closed. No one else was in the room.  She acknowledged consent and understanding of privacy and security of the video platform. The patient has agreed to participate and understands they can discontinue the visit at any time.    connection.    Visit Time  Start Time: 1700  Stop Time: 1752  Total Visit Time: 52 minutes

## 2025-04-01 DIAGNOSIS — J45.50 SEVERE PERSISTENT ASTHMA WITHOUT COMPLICATION: Primary | ICD-10-CM

## 2025-04-01 RX ORDER — DUPILUMAB 200 MG/1.14ML
INJECTION, SOLUTION SUBCUTANEOUS
Qty: 2 ML | Refills: 10 | Status: SHIPPED | OUTPATIENT
Start: 2025-04-01

## 2025-04-02 ENCOUNTER — TELEMEDICINE (OUTPATIENT)
Dept: PSYCHIATRY | Facility: CLINIC | Age: 40
End: 2025-04-02
Payer: COMMERCIAL

## 2025-04-02 DIAGNOSIS — F33.1 MODERATE EPISODE OF RECURRENT MAJOR DEPRESSIVE DISORDER (HCC): Primary | ICD-10-CM

## 2025-04-02 DIAGNOSIS — F43.10 PTSD (POST-TRAUMATIC STRESS DISORDER): ICD-10-CM

## 2025-04-02 PROCEDURE — 90834 PSYTX W PT 45 MINUTES: CPT
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
## 2025-04-04 ENCOUNTER — OFFICE VISIT (OUTPATIENT)
Age: 40
End: 2025-04-04
Payer: COMMERCIAL

## 2025-04-04 VITALS
SYSTOLIC BLOOD PRESSURE: 106 MMHG | HEIGHT: 66 IN | TEMPERATURE: 97.9 F | OXYGEN SATURATION: 99 % | WEIGHT: 160 LBS | BODY MASS INDEX: 25.71 KG/M2 | HEART RATE: 86 BPM | DIASTOLIC BLOOD PRESSURE: 74 MMHG

## 2025-04-04 DIAGNOSIS — J45.50 SEVERE PERSISTENT ASTHMA WITHOUT COMPLICATION: ICD-10-CM

## 2025-04-04 PROCEDURE — 99214 OFFICE O/P EST MOD 30 MIN: CPT | Performed by: INTERNAL MEDICINE

## 2025-04-04 RX ORDER — MOMETASONE FUROATE AND FORMOTEROL FUMARATE DIHYDRATE 200; 5 UG/1; UG/1
2 AEROSOL RESPIRATORY (INHALATION) 2 TIMES DAILY
Qty: 39 G | Refills: 5 | Status: SHIPPED | OUTPATIENT
Start: 2025-04-04 | End: 2025-05-04

## 2025-04-04 NOTE — PROGRESS NOTES
Pulmonary Follow-up   Linda Hickman 39 y.o. female MRN: [id number]  4/4/2025      Assessment/plan:    39 y.o. female with a history of asthma who originally presented to the pulmonary office for evaluation of shortness of breath related to previous COVID infection in January 2022.     Severe persistent asthma  Dyspnea on exertion  Chronic Cough  Previously controlled but worsened significantly since had COVID infection. Uncontrolled on step 4/5 therapy and high dose ICS/LAMA?LABA, started on biologic. Has had great improvement in terms of her asthma after the addition of Dupixent (June 2023).      Highest eos 120, allergy panel negative, appears to be type 2 low asthma  Currently controlled on Dupixent.   Last exacerbation Nov 2024. No exacerbations since December.  Breathing and exercise tolerance slowly improving     - Continue Dupixent  - ICS/LABA: continue high dose Dulera  - Continue albuterol prn  - rec yearly spirometry at next visit  - continue exercise regimen  - note: Linda and boyfriend will be trying for a baby, Dupixent is Category C but expert opinion rec staying on a biologic that he keeping patient's asthma controlled     # Recurrent oral thrush - resolved  Has had 4+ episode of thrush thus year, despite rinsing mouth after using inhaler. Follows up w dentist  - listerine mouthwash working    Follow up in  6-8 weeks     _________________________________________  Interval Hx  Reports breathing a bit better, continues Dulera BID high dose  Albuterol  Hasn't using nebulizer and albuterol  Able to do 2-3 flights without dyspnea  Using listerine mouthwash BID and no further thrush infections  Does have some POTS symptoms, drinking frequent electrolytes like Gatorade and feeling better      _________________________________________    History of Present Illness   HPI:  Linda Hickman is a 39 y.o. female with a history of asthma, migraines, who originally presented to the pulmonary office for evaluation of  shortness of breath related to previous COVID infection in January 2022.  Patient was seen in the emergency room on January 17 due to continued chest pain, wheezing, cough and shortness of breath.    She was diagnosed with asthma as a child but it was previously well controlled prior to her episodes of COVID-19 infection.  Previously never required hospitalization or intubation her asthma was historically induced by exercise.    Since that time continues to have sob, wheezing, chest tightness despite use of flovent twice daily. She also uses duonebs and albuterol nebs.     States that she has had covid-19 four times.   Previous medications: flovent, singulair   Triggers exertion: exertion, cold dry air, wildfire smoke, road construction, hospital cleaning solution, spicy pepper cooking in stove  CBC from 1/2023 was normal.   Echo from 4/7 showed no evidence of shunt.     Started on Dupixent in June      Historical Information   Past Medical History:   Diagnosis Date    Allergic     Anemia     Anxiety     Arthritis     Asthma     Chlamydia 2014    Unfaithful partner    Depression     Exposure to SARS-associated coronavirus 04/12/2020    GERD (gastroesophageal reflux disease) 2010    Gonorrhea 2014    Unfaithful partner    Headache(784.0)     Lumbar herniated disc     Migraine 2012    Pneumonia     Urinary tract infection     Urogenital trichomoniasis 2015    Unfaithful partner    UTI (urinary tract infection)     Varicella 1988    Visual impairment      Past Surgical History:   Procedure Laterality Date    FL INJECTION LEFT KNEE (ARTHROGRAM)  2/28/2022    WISDOM TOOTH EXTRACTION       Social History   [place]aces lived[place]  Occupation: ER nurse.   Tobacco: none smoker  Illicits:  None   Hobbies:   Pets: dogs       Meds/Allergies     Current Outpatient Medications:     albuterol (PROVENTIL HFA,VENTOLIN HFA) 90 mcg/act inhaler, Inhale 2 puffs every 6 (six) hours as needed for wheezing, Disp: 18 g, Rfl: 3     amphetamine-dextroamphetamine (ADDERALL XR, 30MG,) 30 MG 24 hr capsule, Take 1 capsule (30 mg total) by mouth every morning - Try to take this medication on productive days Max Daily Amount: 30 mg, Disp: 30 capsule, Rfl: 0    buPROPion (Wellbutrin XL) 150 mg 24 hr tablet, Take 1 tablet (150 mg total) by mouth daily - Take with Wellbutrin 300 mg XL for a total daily dose of 450 mg, Disp: 90 tablet, Rfl: 0    buPROPion (Wellbutrin XL) 300 mg 24 hr tablet, Take 1 tablet (300 mg total) by mouth every morning, Disp: 90 tablet, Rfl: 0    calcium carbonate (OS-BERENICE) 600 MG tablet, Take 600 mg by mouth 2 (two) times a day with meals, Disp: , Rfl:     cholecalciferol (VITAMIN D3) 1,000 units tablet, Take 1,000 Units by mouth daily, Disp: , Rfl:     diphenhydrAMINE (Benadryl Allergy) 25 mg capsule, Take 1 capsule (25 mg total) by mouth every 6 (six) hours as needed for itching, Disp: 30 capsule, Rfl: 0    Dupilumab (Dupixent) 200 MG/1.14ML SOAJ, Inject 1 pen (200 mg total) under the skin once every 14 (fourteen) days., Disp: 2 mL, Rfl: 10    Dupixent 200 MG/1.14ML SOPN, Inject 1 pen ( 200mg total) under the skin once every 14 (fourteen) days., Disp: 2 mL, Rfl: 10    Ferrous Sulfate (RA IRON PO), Take by mouth, Disp: , Rfl:     ketorolac (TORADOL) 10 mg tablet, Take 1 tablet (10 mg total) by mouth every 6 (six) hours as needed for moderate pain or severe pain, Disp: 10 tablet, Rfl: 0    Magnesium Glycinate 100 MG CAPS, Take 200 mg by mouth 2 (two) times a day, Disp: , Rfl:     Melatonin 5 MG TABS, Take 2 tablets (10 mg total) by mouth daily at bedtime as needed (insomnia), Disp: , Rfl:     mometasone-formoterol (Dulera) 200-5 MCG/ACT inhaler, Inhale 2 puffs 2 (two) times a day Rinse mouth after use., Disp: 39 g, Rfl: 2    prochlorperazine (COMPAZINE) 5 mg tablet, Take 1 tablet (5 mg total) by mouth every 6 (six) hours as needed for nausea or vomiting, Disp: 30 tablet, Rfl: 0    rimegepant sulfate (NURTEC) 75 mg TBDP, Take 1  "tablet (75 mg total) by mouth every other day, Disp: 16 tablet, Rfl: 3    rizatriptan (MAXALT-MLT) 10 mg disintegrating tablet, Take 1 tablet (10 mg total) by mouth as needed for migraine Take at the onset of migraine; if symptoms continue or return, may take another dose at least 2 hours after first dose. Take no more than 2 doses in a day., Disp: 18 tablet, Rfl: 1    traZODone (DESYREL) 50 mg tablet, Take 1 tablet (50 mg total) by mouth daily at bedtime as needed for sleep, Disp: 90 tablet, Rfl: 0    dexamethasone (DECADRON) 2 mg tablet, Take 4 mg (2 tabs) for 2 days, then decrease it to 2 mg (1 tab) for 2 days, then decrease it to 1 mg (0.5 tab) for 2 days and then stop the medicine. (Patient not taking: Reported on 4/4/2025), Disp: 16 tablet, Rfl: 0    EPINEPHrine (EPIPEN) 0.3 mg/0.3 mL SOAJ, Inject 0.3 mL (0.3 mg total) into a muscle once for 1 dose (Patient not taking: Reported on 3/17/2025), Disp: 0.6 mL, Rfl: 0    levalbuterol (Xopenex) 0.63 mg/3 mL nebulizer solution, Take 3 mL (0.63 mg total) by nebulization 3 (three) times a day (Patient not taking: Reported on 4/4/2025), Disp: 30 mL, Rfl: 1    nystatin (MYCOSTATIN) 500,000 units/5 mL suspension, Apply 5 mL (500,000 Units total) to the mouth or throat 4 (four) times a day (Patient not taking: Reported on 4/4/2025), Disp: 473 mL, Rfl: 0    tretinoin (RETIN-A) 0.025 % cream, Apply topically daily at bedtime (Patient not taking: Reported on 3/17/2025), Disp: 45 g, Rfl: 2  Allergies   Allergen Reactions    Sulfa Antibiotics Anaphylaxis    Levofloxacin      Other reaction(s): sensative    Prednisone Other (See Comments)    Tobramycin Other (See Comments)     Other reaction(s): sensative. Ototoxic    Pseudoephedrine Palpitations       Vitals:   Visit Vitals  /74 (BP Location: Right arm, Patient Position: Sitting, Cuff Size: Standard)   Pulse 86   Temp 97.9 °F (36.6 °C) (Oral)   Ht 5' 6\" (1.676 m)   Wt 72.6 kg (160 lb)   LMP 02/27/2025 (Exact Date)   SpO2 " 99%   BMI 25.82 kg/m²   OB Status Having periods   Smoking Status Never   BSA 1.82 m²        Exam:   Appearance -- NAD, speaking full sentences  Heart -- RRR, no murmurs  Lungs -- CTAB  Psych -- no obvious depression or hallucination        Labs: I have personally reviewed pertinent lab results.  Lab Results   Component Value Date    WBC 6.91 09/15/2023    HGB 12.6 09/15/2023    HCT 38.6 09/15/2023    MCV 94 09/15/2023     09/15/2023     Lab Results   Component Value Date    CALCIUM 9.2 01/16/2023    K 3.3 (L) 01/16/2023    CO2 23 01/16/2023     01/16/2023    BUN 12 01/16/2023    CREATININE 1.08 01/16/2023     Lab Results   Component Value Date    IGE 27.9 04/04/2023     Lab Results   Component Value Date    ALT 18 01/16/2023    AST 20 01/16/2023    ALKPHOS 69 01/16/2023       Imaging and other studies: I have personally reviewed pertinent reports.   and I have personally reviewed pertinent films in PACS    CT Chest 2/2023:  LUNGS:  Nothing to suggest interstitial lung disease with no reticulation, traction bronchiectasis/bronchiolectasis, groundglass, or honeycombing.  No significant air trapping on expiration.    CXR 9/2023: normal    /2024: clear lungs     PFTs 1/30/23: No obstruction, + BD response. No restriction, normal DLCO    PFTS 12/2024: No obstruction, no BD response. Mild air trapping

## 2025-04-16 ENCOUNTER — TELEMEDICINE (OUTPATIENT)
Dept: PSYCHIATRY | Facility: CLINIC | Age: 40
End: 2025-04-16
Payer: COMMERCIAL

## 2025-04-16 DIAGNOSIS — F33.1 MODERATE EPISODE OF RECURRENT MAJOR DEPRESSIVE DISORDER (HCC): Primary | ICD-10-CM

## 2025-04-16 DIAGNOSIS — F43.10 PTSD (POST-TRAUMATIC STRESS DISORDER): ICD-10-CM

## 2025-04-16 PROCEDURE — 90834 PSYTX W PT 45 MINUTES: CPT

## 2025-04-16 NOTE — PSYCH
Virtual Regular VisitName: Linda Hickman      : 1985      MRN: [id number]  Encounter Provider: Jannet Villatoro LCSW  Encounter Date: 2025   Encounter department: [facility]  :  Assessment & Plan  Moderate episode of recurrent major depressive disorder (HCC)         PTSD (post-traumatic stress disorder)             Goals addressed in session: Goal 1     DATA: Linda shared she is feeling overwhelmed by the job but that she likes the people she works with.  She notices that some people have psychosomatic issues with neurology which is frustrating.  Because the job is overwhelming she feels she hasn't done some of the things she wants to do.  She did sign up for a self defense class which she is excited about.   She said some past memories have come up for her including an issue with her friend who cheated.  She has a hard time remaining friends with the man who cheated.  He is a good friend but she has a hard time maintaining friendship with him.  Therapist encouraged her to go through flashback management to figure out her feelings and how much of her feelings are a trauma response from Ray.  She was able to do so  During this session, this clinician used the following therapeutic modalities: Client-centered Therapy and Mindfulness-based Strategies    Substance Abuse was not addressed during this session. If the client is diagnosed with a co-occurring substance use disorder, please indicate any changes in the frequency or amount of use: . Stage of change for addressing substance use diagnoses: No substance use/Not applicable    ASSESSMENT:  Linda presents with a Euthymic/ normal mood. Linda's affect is Normal range and intensity, which is congruent, with their mood and the content of the session. The client has made progress on their goals as evidenced by using flashback management and open communication for thoughts and feelings.    Linda presents with a none  "risk of suicide, none risk of self-harm, and none risk of harm to others.    For any risk assessment that surpasses a \"low\" rating, a safety plan must be developed.    A safety plan was indicated: no  If yes, describe in detail     PLAN: Between sessions, Linda will utilize flashback management as needed for trauma response. At the next session, the therapist will use Client-centered Therapy and Mindfulness-based Strategies to address PTSD, depression.    Behavioral Health Treatment Plan [facility]: Diagnosis and Treatment Plan explained to Linda Mckeon relates understanding diagnosis and is agreeable to Treatment Plan. Yes     Depression Follow-up Plan Completed: Not applicable     Reason for visit is No chief complaint on file.     Recent Visits  No visits were found meeting these conditions.  Showing recent visits within past 7 days and meeting all other requirements  Today's Visits  Date Type Provider Dept   04/16/25 Telemedicine Jannet Villatoro LCSW [facility]   Showing today's visits and meeting all other requirements  Future Appointments  No visits were found meeting these conditions.  Showing future appointments within next 150 days and meeting all other requirements     History of Present Illness     HPI    Past Medical History   Past Medical History:   Diagnosis Date   • Allergic    • Anemia    • Anxiety    • Arthritis    • Asthma    • Chlamydia 2014    Unfaithful partner   • Depression    • Exposure to SARS-associated coronavirus 04/12/2020   • GERD (gastroesophageal reflux disease) 2010   • Gonorrhea 2014    Unfaithful partner   • Headache(784.0)    • Lumbar herniated disc    • Migraine 2012   • Pneumonia    • Urinary tract infection    • Urogenital trichomoniasis 2015    Unfaithful partner   • UTI (urinary tract infection)    • Varicella 1988   • Visual impairment      Past Surgical History:   Procedure Laterality Date   • FL INJECTION LEFT KNEE (ARTHROGRAM)  2/28/2022   • " WISDOM TOOTH EXTRACTION       Current Outpatient Medications   Medication Instructions   • albuterol (PROVENTIL HFA,VENTOLIN HFA) 90 mcg/act inhaler 2 puffs, Inhalation, Every 6 hours PRN   • amphetamine-dextroamphetamine (ADDERALL XR, 30MG,) 30 MG 24 hr capsule 30 mg, Oral, Every morning, - Try to take this medication on productive days   • buPROPion (WELLBUTRIN XL) 150 mg, Oral, Daily, - Take with Wellbutrin 300 mg XL for a total daily dose of 450 mg   • buPROPion (WELLBUTRIN XL) 300 mg, Oral, Every morning   • calcium carbonate (OS-BERENICE) 600 mg, 2 times daily with meals   • cholecalciferol (VITAMIN D3) 1,000 Units, Daily   • diphenhydrAMINE (BENADRYL ALLERGY) 25 mg, Oral, Every 6 hours PRN   • Dupilumab (Dupixent) 200 MG/1.14ML SOAJ Inject 1 pen (200 mg total) under the skin once every 14 (fourteen) days.   • Dupixent 200 MG/1.14ML SOPN Inject 1 pen ( 200mg total) under the skin once every 14 (fourteen) days.   • EPINEPHrine (EPIPEN) 0.3 mg, Intramuscular, Once   • Ferrous Sulfate (RA IRON PO) Take by mouth   • ketorolac (TORADOL) 10 mg, Oral, Every 6 hours PRN   • levalbuterol (XOPENEX) 0.63 mg, Nebulization, 3 times daily   • Magnesium Glycinate 200 mg, 2 times daily   • Melatonin 10 mg, Oral, Daily at bedtime PRN   • mometasone-formoterol (Dulera) 200-5 MCG/ACT inhaler 2 puffs, Inhalation, 2 times daily, Rinse mouth after use.   • nystatin (MYCOSTATIN) 500,000 Units, Mouth/Throat, 4 times daily   • prochlorperazine (COMPAZINE) 5 mg, Oral, Every 6 hours PRN   • rimegepant sulfate (NURTEC) 75 mg, Oral, Every other day   • rizatriptan (MAXALT-MLT) 10 mg, Oral, As needed, Take at the onset of migraine; if symptoms continue or return, may take another dose at least 2 hours after first dose. Take no more than 2 doses in a day.   • traZODone (DESYREL) 50 mg, Oral, Daily at bedtime PRN   • tretinoin (RETIN-A) 0.025 % cream Topical, Daily at bedtime     Allergies   Allergen Reactions   • Sulfa Antibiotics Anaphylaxis    • Levofloxacin      Other reaction(s): sensative   • Prednisone Other (See Comments)   • Tobramycin Other (See Comments)     Other reaction(s): sensative. Ototoxic   • Pseudoephedrine Palpitations       Objective   LMP 02/27/2025 (Exact Date)     Video Exam  Physical Exam     Administrative Statements   Encounter provider Jannet Villatoro LCSW    The Patient is located at Home and in the following state in which I hold an active license [place].    The patient was identified by name and date of birth. Linda Hickman was informed that this is a telemedicine visit and that the visit is being conducted through the [employer] platform. She agrees to proceed..  My office door was closed. No one else was in the room.  She acknowledged consent and understanding of privacy and security of the video platform. The patient has agreed to participate and understands they can discontinue the visit at any time.    connection.    Visit Time  Start Time: 1800  Stop Time: 1852  Total Visit Time: 52 minutes

## 2025-04-29 ENCOUNTER — TELEMEDICINE (OUTPATIENT)
Dept: PSYCHIATRY | Facility: CLINIC | Age: 40
End: 2025-04-29

## 2025-04-29 DIAGNOSIS — F90.0 ATTENTION DEFICIT HYPERACTIVITY DISORDER, INATTENTIVE TYPE: ICD-10-CM

## 2025-04-29 DIAGNOSIS — F33.0 MILD EPISODE OF RECURRENT MAJOR DEPRESSIVE DISORDER (HCC): Primary | ICD-10-CM

## 2025-04-29 DIAGNOSIS — F41.1 GAD (GENERALIZED ANXIETY DISORDER): ICD-10-CM

## 2025-04-29 DIAGNOSIS — Z13.228 ENCOUNTER FOR SCREENING FOR METABOLIC DISORDER: ICD-10-CM

## 2025-04-29 DIAGNOSIS — F43.9 TRAUMA AND STRESSOR-RELATED DISORDER: ICD-10-CM

## 2025-04-29 PROCEDURE — PBNCHG PB NO CHARGE PLACEHOLDER

## 2025-04-29 RX ORDER — DEXTROAMPHETAMINE SACCHARATE, AMPHETAMINE ASPARTATE MONOHYDRATE, DEXTROAMPHETAMINE SULFATE AND AMPHETAMINE SULFATE 7.5; 7.5; 7.5; 7.5 MG/1; MG/1; MG/1; MG/1
30 CAPSULE, EXTENDED RELEASE ORAL EVERY MORNING
Qty: 30 CAPSULE | Refills: 0 | Status: SHIPPED | OUTPATIENT
Start: 2025-04-29

## 2025-04-30 ENCOUNTER — TELEMEDICINE (OUTPATIENT)
Dept: PSYCHIATRY | Facility: CLINIC | Age: 40
End: 2025-04-30
Payer: COMMERCIAL

## 2025-04-30 DIAGNOSIS — F43.10 PTSD (POST-TRAUMATIC STRESS DISORDER): ICD-10-CM

## 2025-04-30 DIAGNOSIS — F33.1 MODERATE EPISODE OF RECURRENT MAJOR DEPRESSIVE DISORDER (HCC): Primary | ICD-10-CM

## 2025-04-30 PROCEDURE — 90834 PSYTX W PT 45 MINUTES: CPT

## 2025-04-30 NOTE — PSYCH
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  She said that she is having difficulty with Iraj in trying to have a kid because he may be anxious about it.  She expressed thoughts and feelings about this situation and how she might handle it if he ends up not wanting a child.  Therapist encouraged open communication and encouraged assertive communication for Linda with her significant other about things that are important to her.  During this session, this clinician used the following therapeutic modalities: Client-centered Therapy and Dialectical Behavior Therapy    Substance Abuse was not addressed during this session. If the client is diagnosed with a co-occurring substance use disorder, please indicate any changes in the frequency or amount of use: . Stage of change for addressing substance use diagnoses: No substance use/Not applicable    ASSESSMENT:  Linda presents with a Euthymic/ normal mood. Linda's affect is Normal range and intensity, which is congruent, with their mood and the content of the session. The client has made progress on their goals as evidenced by open communication; reviewing assertiveness skills.    Linda presents with a none risk of suicide, none risk of self-harm, and none risk of harm to others.    For any risk assessment that surpasses a \"low\" rating, a safety plan must be developed.    A safety plan was indicated: no  If yes, describe in detail     PLAN: Between sessions, Linda will utilize " assertiveness with partner. At the next session, the therapist will use Client-centered Therapy and Dialectical Behavior Therapy to address depression, anxiety.    Behavioral Health Treatment Plan [facility]: Diagnosis and Treatment Plan explained to Linda, Linda relates understanding diagnosis and is agreeable to Treatment Plan. Yes     Depression Follow-up Plan Completed: Not applicable     Reason for visit is   Chief Complaint   Patient presents with   • Virtual Regular Visit        Recent Visits  No visits were found meeting these conditions.  Showing recent visits within past 7 days and meeting all other requirements  Today's Visits  Date Type Provider Dept   04/30/25 Telemedicine Jannet Villatoro LCSW [facility]   Showing today's visits and meeting all other requirements  Future Appointments  No visits were found meeting these conditions.  Showing future appointments within next 150 days and meeting all other requirements     History of Present Illness     HPI    Past Medical History   Past Medical History:   Diagnosis Date   • Allergic    • Anemia    • Anxiety    • Arthritis    • Asthma    • Chlamydia 2014    Unfaithful partner   • Depression    • Exposure to SARS-associated coronavirus 04/12/2020   • GERD (gastroesophageal reflux disease) 2010   • Gonorrhea 2014    Unfaithful partner   • Headache(784.0)    • Lumbar herniated disc    • Migraine 2012   • Pneumonia    • Urinary tract infection    • Urogenital trichomoniasis 2015    Unfaithful partner   • UTI (urinary tract infection)    • Varicella 1988   • Visual impairment      Past Surgical History:   Procedure Laterality Date   • FL INJECTION LEFT KNEE (ARTHROGRAM)  2/28/2022   • WISDOM TOOTH EXTRACTION       Current Outpatient Medications   Medication Instructions   • albuterol (PROVENTIL HFA,VENTOLIN HFA) 90 mcg/act inhaler 2 puffs, Inhalation, Every 6 hours PRN   • amphetamine-dextroamphetamine (ADDERALL XR, 30MG,) 30 MG 24 hr  capsule 30 mg, Oral, Every morning, - Try to take this medication on productive days   • buPROPion (WELLBUTRIN XL) 150 mg, Oral, Daily, - Take with Wellbutrin 300 mg XL for a total daily dose of 450 mg   • buPROPion (WELLBUTRIN XL) 300 mg, Oral, Every morning   • calcium carbonate (OS-BERENICE) 600 mg, 2 times daily with meals   • cholecalciferol (VITAMIN D3) 1,000 Units, Daily   • diphenhydrAMINE (BENADRYL ALLERGY) 25 mg, Oral, Every 6 hours PRN   • Dupilumab (Dupixent) 200 MG/1.14ML SOAJ Inject 1 pen (200 mg total) under the skin once every 14 (fourteen) days.   • Dupixent 200 MG/1.14ML SOPN Inject 1 pen ( 200mg total) under the skin once every 14 (fourteen) days.   • EPINEPHrine (EPIPEN) 0.3 mg, Intramuscular, Once   • Ferrous Sulfate (RA IRON PO) Take by mouth   • ketorolac (TORADOL) 10 mg, Oral, Every 6 hours PRN   • levalbuterol (XOPENEX) 0.63 mg, Nebulization, 3 times daily   • Magnesium Glycinate 200 mg, 2 times daily   • Melatonin 10 mg, Oral, Daily at bedtime PRN   • mometasone-formoterol (Dulera) 200-5 MCG/ACT inhaler 2 puffs, Inhalation, 2 times daily, Rinse mouth after use.   • nystatin (MYCOSTATIN) 500,000 Units, Mouth/Throat, 4 times daily   • prochlorperazine (COMPAZINE) 5 mg, Oral, Every 6 hours PRN   • rimegepant sulfate (NURTEC) 75 mg, Oral, Every other day   • rizatriptan (MAXALT-MLT) 10 mg, Oral, As needed, Take at the onset of migraine; if symptoms continue or return, may take another dose at least 2 hours after first dose. Take no more than 2 doses in a day.   • traZODone (DESYREL) 50 mg, Oral, Daily at bedtime PRN   • tretinoin (RETIN-A) 0.025 % cream Topical, Daily at bedtime     Allergies   Allergen Reactions   • Sulfa Antibiotics Anaphylaxis   • Levofloxacin      Other reaction(s): sensative   • Prednisone Other (See Comments)   • Tobramycin Other (See Comments)     Other reaction(s): sensative. Ototoxic   • Pseudoephedrine Palpitations       Objective   There were no vitals taken for this  visit.    Video Exam  Physical Exam     Administrative Statements   Encounter provider Jannet Villatoro LCSW    The Patient is located at Home and in the following state in which I hold an active license [place].    The patient was identified by name and date of birth. Linda Hickman was informed that this is a telemedicine visit and that the visit is being conducted through the Epic Embedded platform. She agrees to proceed..  My office door was closed. No one else was in the room.  She acknowledged consent and understanding of privacy and security of the video platform. The patient has agreed to participate and understands they can discontinue the visit at any time.    connection.    Visit Time  Start Time: 1803  Stop Time: 1854  Total Visit Time: 51 minutes

## 2025-05-12 ENCOUNTER — PATIENT MESSAGE (OUTPATIENT)
Dept: PULMONOLOGY | Facility: CLINIC | Age: 40
End: 2025-05-12

## 2025-05-13 NOTE — PATIENT COMMUNICATION
Patient called to reschedule missed appointment. She is now schedule with  in [place] on 5/16 at 8am. Thank You

## 2025-05-27 ENCOUNTER — TELEMEDICINE (OUTPATIENT)
Dept: PSYCHIATRY | Facility: CLINIC | Age: 40
End: 2025-05-27

## 2025-05-27 DIAGNOSIS — F33.0 MILD EPISODE OF RECURRENT MAJOR DEPRESSIVE DISORDER (HCC): Primary | ICD-10-CM

## 2025-05-27 DIAGNOSIS — F90.0 ATTENTION DEFICIT HYPERACTIVITY DISORDER, INATTENTIVE TYPE: ICD-10-CM

## 2025-05-27 DIAGNOSIS — F43.9 TRAUMA AND STRESSOR-RELATED DISORDER: ICD-10-CM

## 2025-05-27 PROCEDURE — PBNCHG PB NO CHARGE PLACEHOLDER

## 2025-05-27 RX ORDER — BUPROPION HYDROCHLORIDE 300 MG/1
300 TABLET ORAL EVERY MORNING
Qty: 90 TABLET | Refills: 0 | Status: SHIPPED | OUTPATIENT
Start: 2025-05-27 | End: 2025-08-25

## 2025-05-27 NOTE — PATIENT INSTRUCTIONS
Please present for your previously scheduled appointment approximately 15 minutes prior to appointment time. If you are running late or are unable to attend your appointment, please call our [place] office at [phone] or our [place] office at [phone] if applicable to notify the office of your absence.    If you are in psychological crisis including not limited to suicidal/homicidal thoughts or thoughts of self-injury, do not hesitate to call/contact your County Crisis hotline (see below) or attend to the nearest emergency department.  [facility] Crisis: [phone]  [facility] Crisis: [phone]  [facility] Crisis: [phone]  [facility] Crisis: [phone]  [facility] Crisis: [phone]  [facility] Crisis: [phone]  [facility] Crisis: [phone]  National Suicide Prevention Hotline: 1-816.878.5697

## 2025-05-27 NOTE — PSYCH
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 history of seizures, and eating disorders.  Attention deficit hyperactivity disorder, inattentive type  Started Qelbree 200 mg daily for attention and concentration, with a plan to increase to 400 mg daily after a period of 2 weeks has elapsed  Common side effects of Qelbree were discussed including sleep related issues such as insomnia, gastrointestinal symptoms, appetite and weight changes, headaches or dizziness, and irritability.  Trauma and stressor-related disorder  Continue trazodone 50 mg at bedtime for insomnia in the setting of trauma and stressor related disorder  PARQ was completed for trazodone including sedation, dizziness, headache, GI distress, priapism, suicidal thoughts in patients under 25 years old, and serotonin syndrome.     Continue ongoing medication management every 1 to 3 months  Aware of need to follow up with family physician for medical issues  Aware of 24 hour and weekend coverage for urgent situations accessed by calling [facility] main practice number    Although patient's acute lethality risk is low, long-term/chronic  lethality risk is mildly elevated in the presence of mild symptoms of depression and anxiety. At the current moment, Linda is future-oriented, forward-thinking, and demonstrates ability to act in a self-preserving manner as evidenced by volitionally presenting to the clinic today, seeking treatment. At this juncture, inpatient hospitalization is not currently warranted. To mitigate future risk, patient should adhere to the recommendations of this writer, avoid alcohol/illicit substance use, utilize community-based resources and familiar support and prioritize mental health treatment.     Based on today's assessment and clinical criteria, Linda Hickman contracts for safety and is not an imminent risk of harm to self or others. Outpatient level of care is deemed appropriate at this present time. Linda understands that if they are no longer able to contract for safety, they need to call/contact the outpatient office including this writer, call/contact crisis and/orattend to the nearest Emergency Department for immediate evaluation.    Risks/Benefits      Risks, Benefits And Possible Side Effects Of Medications:    Risks, benefits, and possible side effects of medications explained to Linda and she verbalizes understanding and agreement for treatment.    Controlled Medication Discussion:     Linda has been filling controlled prescriptions on time as prescribed according to [place] Prescription Drug Monitoring Program    Psychotherapy Provided:     Individual psychotherapy provided: Yes  Recent stressor including job stressors, parenting stressors discussed with Linda.   Supportive therapy provided.     Treatment Plan:    Completed and signed during the session: Not applicable - Treatment Plan not due at this session    Visit Time    Visit Start Time: 11:00 AM  Visit Stop Time: 11:30 AM  Total Visit Duration: 30 minutes    This note was shared with patient.    Mika Albarran MD 05/27/25    This note has  been constructed using a voice recognition system. There may be translation, syntax, or grammatical errors. If you have any questions, please contact the dictating provider.       [1]   Past Medical History:  Diagnosis Date    Allergic     Anemia     Anxiety     Arthritis     Asthma     Chlamydia 2014    Unfaithful partner    Depression     Exposure to SARS-associated coronavirus 04/12/2020    GERD (gastroesophageal reflux disease) 2010    Gonorrhea 2014    Unfaithful partner    Headache(784.0)     Lumbar herniated disc     Migraine 2012    Pneumonia     Urinary tract infection     Urogenital trichomoniasis 2015    Unfaithful partner    UTI (urinary tract infection)     Varicella 1988    Visual impairment    [2]   Allergies  Allergen Reactions    Sulfa Antibiotics Anaphylaxis    Levofloxacin      Other reaction(s): sensative    Prednisone Other (See Comments)    Tobramycin Other (See Comments)     Other reaction(s): sensative. Ototoxic    Pseudoephedrine Palpitations

## 2025-05-28 ENCOUNTER — TELEMEDICINE (OUTPATIENT)
Dept: PSYCHIATRY | Facility: CLINIC | Age: 40
End: 2025-05-28
Payer: COMMERCIAL

## 2025-05-28 DIAGNOSIS — F43.10 PTSD (POST-TRAUMATIC STRESS DISORDER): ICD-10-CM

## 2025-05-28 DIAGNOSIS — F33.1 MODERATE EPISODE OF RECURRENT MAJOR DEPRESSIVE DISORDER (HCC): Primary | ICD-10-CM

## 2025-05-28 PROCEDURE — 90834 PSYTX W PT 45 MINUTES: CPT

## 2025-05-28 NOTE — PSYCH
"Virtual Regular VisitName: Linda Hickman      : 1985      MRN: [id number]  Encounter Provider: Jannet Villatoro LCSW  Encounter Date: 2025   Encounter department: [facility]  :  Assessment & Plan  Moderate episode of recurrent major depressive disorder (HCC)         PTSD (post-traumatic stress disorder)             Goals addressed in session: Goal 1     DATA: Linda shared she is stressed out by being new at work and about her relationship.  She talked about feeling upset over how Iraj is acting distant with her and how his son Edmar is acting in an angry manner.  She openly communicated thoughts and feelings about her relationship in terms of the effort she puts in to coparenting, trying to have a child, and being social.  She shared that her boyfriend tends to be \"passive\" about things which ends up being frustrating and lonesome for her.  Therapist gave some feedback on the relationship dynamics and what she has control over versus what she does not have control over.  Linda was able to reflect on this.  During this session, this clinician used the following therapeutic modalities: Client-centered Therapy    Substance Abuse was not addressed during this session. If the client is diagnosed with a co-occurring substance use disorder, please indicate any changes in the frequency or amount of use: . Stage of change for addressing substance use diagnoses: No substance use/Not applicable    ASSESSMENT:  Linda presents with a Euthymic/ normal mood. Lenos affect is Normal range and intensity, which is congruent, with their mood and the content of the session. The client has made progress on their goals as evidenced by open communication.    Linda presents with a none risk of suicide, none risk of self-harm, and none risk of harm to others.    For any risk assessment that surpasses a \"low\" rating, a safety plan must be developed.    A safety plan was indicated: " no  If yes, describe in detail     PLAN: Between sessions, Linda will think about what she has control over in her relationships and possibly pull back on her coparenting role. At the next session, the therapist will use Client-centered Therapy to address interpersonal effectiveness.    Behavioral Health Treatment Plan [facility]: Diagnosis and Treatment Plan explained to Linda, Linda relates understanding diagnosis and is agreeable to Treatment Plan. Yes     Depression Follow-up Plan Completed: Not applicable     Reason for visit is No chief complaint on file.     Recent Visits  No visits were found meeting these conditions.  Showing recent visits within past 7 days and meeting all other requirements  Today's Visits  Date Type Provider Dept   05/28/25 Telemedicine Jannet Villatoro LCSW [facility]   Showing today's visits and meeting all other requirements  Future Appointments  No visits were found meeting these conditions.  Showing future appointments within next 150 days and meeting all other requirements     History of Present Illness     HPI    Past Medical History   Past Medical History[1]  Past Surgical History[2]  Current Outpatient Medications   Medication Instructions   • albuterol (PROVENTIL HFA,VENTOLIN HFA) 90 mcg/act inhaler 2 puffs, Inhalation, Every 6 hours PRN   • amphetamine-dextroamphetamine (ADDERALL XR, 30MG,) 30 MG 24 hr capsule 30 mg, Oral, Every morning, - Try to take this medication on productive days   • buPROPion (WELLBUTRIN XL) 300 mg, Oral, Every morning   • calcium carbonate (OS-BERENICE) 600 mg, 2 times daily with meals   • cholecalciferol (VITAMIN D3) 1,000 Units, Daily   • diphenhydrAMINE (BENADRYL ALLERGY) 25 mg, Oral, Every 6 hours PRN   • Dupilumab (Dupixent) 200 MG/1.14ML SOAJ Inject 1 pen (200 mg total) under the skin once every 14 (fourteen) days.   • Dupixent 200 MG/1.14ML SOPN Inject 1 pen ( 200mg total) under the skin once every 14 (fourteen) days.   •  EPINEPHrine (EPIPEN) 0.3 mg, Intramuscular, Once   • Ferrous Sulfate (RA IRON PO) Take by mouth   • ketorolac (TORADOL) 10 mg, Oral, Every 6 hours PRN   • levalbuterol (XOPENEX) 0.63 mg, Nebulization, 3 times daily   • Magnesium Glycinate 200 mg, 2 times daily   • Melatonin 10 mg, Oral, Daily at bedtime PRN   • mometasone-formoterol (Dulera) 200-5 MCG/ACT inhaler 2 puffs, Inhalation, 2 times daily, Rinse mouth after use.   • nystatin (MYCOSTATIN) 500,000 Units, Mouth/Throat, 4 times daily   • prochlorperazine (COMPAZINE) 5 mg, Oral, Every 6 hours PRN   • rimegepant sulfate (NURTEC) 75 mg, Oral, Every other day   • rizatriptan (MAXALT-MLT) 10 mg, Oral, As needed, Take at the onset of migraine; if symptoms continue or return, may take another dose at least 2 hours after first dose. Take no more than 2 doses in a day.   • traZODone (DESYREL) 50 mg, Oral, Daily at bedtime PRN   • tretinoin (RETIN-A) 0.025 % cream Topical, Daily at bedtime   • Viloxazine HCl  MG CP24 Take 200 mg by mouth in the morning for 14 days, THEN 400 mg in the morning.     Allergies[3]    Objective   There were no vitals taken for this visit.    Video Exam  Physical Exam     Administrative Statements   Encounter provider Jannet Villatoro LCSW    The Patient is located at Home and in the following state in which I hold an active license [place].    The patient was identified by name and date of birth. Linda Hickman was informed that this is a telemedicine visit and that the visit is being conducted through the Epic Embedded platform. She agrees to proceed..  My office door was closed. No one else was in the room.  She acknowledged consent and understanding of privacy and security of the video platform. The patient has agreed to participate and understands they can discontinue the visit at any time.      Visit Time  Start Time: 1800  Stop Time: 1852  Total Visit Time: 52 minutes         [1]  Past Medical History:  Diagnosis Date   • Allergic     • Anemia    • Anxiety    • Arthritis    • Asthma    • Chlamydia 2014    Unfaithful partner   • Depression    • Exposure to SARS-associated coronavirus 04/12/2020   • GERD (gastroesophageal reflux disease) 2010   • Gonorrhea 2014    Unfaithful partner   • Headache(784.0)    • Lumbar herniated disc    • Migraine 2012   • Pneumonia    • Urinary tract infection    • Urogenital trichomoniasis 2015    Unfaithful partner   • UTI (urinary tract infection)    • Varicella 1988   • Visual impairment    [2]  Past Surgical History:  Procedure Laterality Date   • FL INJECTION LEFT KNEE (ARTHROGRAM)  2/28/2022   • WISDOM TOOTH EXTRACTION     [3]  Allergies  Allergen Reactions   • Sulfa Antibiotics Anaphylaxis   • Levofloxacin      Other reaction(s): sensative   • Prednisone Other (See Comments)   • Tobramycin Other (See Comments)     Other reaction(s): sensative. Ototoxic   • Pseudoephedrine Palpitations

## 2025-06-03 ENCOUNTER — TELEPHONE (OUTPATIENT)
Age: 40
End: 2025-06-03

## 2025-06-03 NOTE — TELEPHONE ENCOUNTER
PA for Viloxazine HCl  mg capsule SUBMITTED to [facility]     via    []CMM-KEY:   [x]Surescripts-Case ID # [id number]   []Availity-Auth ID # NDC #   []Faxed to plan   []Other website   []Phone call Case ID #     []PA sent as URGENT    All office notes, labs and other pertaining documents and studies sent. Clinical questions answered. Awaiting determination from insurance company.     Turnaround time for your insurance to make a decision on your Prior Authorization can take 7-21 business days.

## 2025-06-03 NOTE — TELEPHONE ENCOUNTER
Patient called and reported that provider prescribed her a new medication but that it needs a pre auth. It is the   Viloxazine HCl  MG CP24 Take 200 mg by mouth in the morning for 14 days, THEN 400 mg in the morning.

## 2025-06-05 ENCOUNTER — ANNUAL EXAM (OUTPATIENT)
Age: 40
End: 2025-06-05
Payer: COMMERCIAL

## 2025-06-05 VITALS — WEIGHT: 160 LBS | BODY MASS INDEX: 25.82 KG/M2 | DIASTOLIC BLOOD PRESSURE: 72 MMHG | SYSTOLIC BLOOD PRESSURE: 118 MMHG

## 2025-06-05 DIAGNOSIS — Z12.31 ENCOUNTER FOR SCREENING MAMMOGRAM FOR MALIGNANT NEOPLASM OF BREAST: ICD-10-CM

## 2025-06-05 DIAGNOSIS — Z80.3 FAMILY HISTORY OF BREAST CANCER IN MOTHER: ICD-10-CM

## 2025-06-05 DIAGNOSIS — R92.343 EXTREMELY DENSE TISSUE OF BOTH BREASTS ON MAMMOGRAPHY: ICD-10-CM

## 2025-06-05 DIAGNOSIS — Z12.39 ENCOUNTER FOR BREAST CANCER SCREENING USING NON-MAMMOGRAM MODALITY: ICD-10-CM

## 2025-06-05 DIAGNOSIS — Z01.419 ENCOUNTER FOR ANNUAL ROUTINE GYNECOLOGICAL EXAMINATION: Primary | ICD-10-CM

## 2025-06-05 PROBLEM — F90.9 ADHD: Status: ACTIVE | Noted: 2025-06-05

## 2025-06-05 PROBLEM — J39.2 THROAT IRRITATION: Status: RESOLVED | Noted: 2025-02-04 | Resolved: 2025-06-05

## 2025-06-05 PROCEDURE — S0612 ANNUAL GYNECOLOGICAL EXAMINA: HCPCS | Performed by: NURSE PRACTITIONER

## 2025-06-05 NOTE — PATIENT INSTRUCTIONS
Patient Education     Lowering Your Risk of Breast Cancer   About this topic   Breast cancer is a serious illness. Breast cancer is when abnormal cells grow and divide more quickly in your breast. These cells form a growth or tumor. The abnormal cells may enter nearby tissue and spread to other parts of the body. It is the type of cancer most often seen in women. Men can have breast cancer, but it is a rare condition.  General   Some things in your life may increase your risk of breast cancer. You may not be able to change some of these. Others you can control.  You are more likely to get breast cancer if you:  Have a mother, sister, or daughter who has had breast cancer  Have used hormones for menopause for more than 5 years  Have had radiation therapy to the breast or chest in the past  Are overweight or do not exercise  Had your first menstrual period before you were 11 years old  Went through menopause after age 55  Have never been pregnant or had your first child after age 35  Have had breast cancer before  Drink alcohol in any form  Have dense breasts  Are older in age  There is no certain way to prevent breast cancer. There are things you can do to lower your chances of having breast cancer.  Keep a healthy weight. Lose weight if you are overweight. Being overweight raises your chances of having breast cancer.  Eat a healthy diet to maintain a healthy weight, such as more fruits, vegetables, and lean cuts of meat. Decrease the amount of saturated fat in your diet.  Exercise. Being active helps you keep a healthy weight.  Limit your alcohol intake or do not drink alcohol. The more alcohol you drink, the higher your risk.  Do not smoke cigarettes. Smoking can increase your risk of many types of cancer.  Breastfeed your baby. This may help protect you. The longer you breastfeed, the more protection you have.  Talk with your doctor about:  Limiting or stopping hormone therapy.  Taking certain drugs to prevent  breast cancer. For women at high risk of having breast cancer, there are a few drugs that may lower your risk.  Surgery to prevent you from having breast cancer if you are very high risk.  When do I need to call the doctor?   Changes in your breasts  A lump or area in your breast that feels different  Discharge from your nipple  Skin on your breast is dimpled or indented  You have questions or concerns about your breasts  Helpful tips   Talk to your doctor about the best kind of breast cancer screening for you.  If you want to do self breast exams, have your doctor show you the right way to do them.  Tell your doctor of any abnormal finding.  Last Reviewed Date   2021-10-04  Consumer Information Use and Disclaimer   This generalized information is a limited summary of diagnosis, treatment, and/or medication information. It is not meant to be comprehensive and should be used as a tool to help the user understand and/or assess potential diagnostic and treatment options. It does NOT include all information about conditions, treatments, medications, side effects, or risks that may apply to a specific patient. It is not intended to be medical advice or a substitute for the medical advice, diagnosis, or treatment of a health care provider based on the health care provider's examination and assessment of a patient’s specific and unique circumstances. Patients must speak with a health care provider for complete information about their health, medical questions, and treatment options, including any risks or benefits regarding use of medications. This information does not endorse any treatments or medications as safe, effective, or approved for treating a specific patient. UpToDate, Inc. and its affiliates disclaim any warranty or liability relating to this information or the use thereof. The use of this information is governed by the Terms of Use, available at  "https://www.[employer].com/en/know/clinical-effectiveness-terms   Copyright   Copyright © 2024 UpToDate, Inc. and its affiliates and/or licensors. All rights reserved.  Patient Education     How to plan and prepare for a healthy pregnancy   The Basics   Written by the doctors and editors at [facility]   Should I see a doctor before I try to get pregnant? -- Yes. Before you start trying, it's important to see your doctor or nurse for a \"pre-pregnancy check-up.\" They will ask you about things that could affect your pregnancy. For example, they might ask about your diet, lifestyle, use of birth control, past pregnancies, and medicines. They might also ask about any health conditions you have or that run in your family.  There are several things you can do to make sure that your pregnancy is as healthy as possible. Do these things before you try to get pregnant, if possible:   Talk to your doctor or nurse about any medicines or herbal drugs you take. They can tell you if you need to make any changes.   Discuss whether you are up to date on your vaccines.   Start taking a multivitamin that has folic acid (also called folate).   Know which foods you should avoid and which foods are best.   Stop smoking, drinking alcohol, or taking drugs that are not prescribed to you by a doctor.   Understand the risks to you and your baby if:   You have any medical conditions.   There are diseases that run in your family or your partner's family.   Discuss whether there are any harmful substances in your home or work.   Try to keep a healthy weight.  Each of these issues is explained in more detail below.  Ask if your medicines are safe -- If you take any medicines, supplements, or herbal drugs, ask your doctor if it is safe to keep taking them while you are pregnant or trying to get pregnant. Some medicines take a long time to leave your body completely, so it's important to plan ahead. In some cases, your doctor or nurse will want you " "to switch to different medicines that are safer for the baby.  It can be harmful to stop taking some medicines suddenly. Your doctor or nurse might need to slowly reduce your dose. This is especially important for if you take certain medicines used to treat seizures, high blood pressure, lupus, or rheumatoid arthritis.  Check if you need any vaccines -- If you want to get pregnant, it's important to be up to date on your vaccines. This includes vaccines against measles, mumps, rubella, tetanus, diphtheria, polio, chickenpox (also called varicella), and possibly hepatitis. Many people got these vaccines as children. Still, it is important to check that you have gotten all of the vaccines you need. If not, you could get sick with the diseases the vaccines protect against, which could cause problems for you or your baby. It's also important to get the COVID-19 vaccine, as well as a flu shot every year.  Some vaccines cannot be given during pregnancy or in the month before pregnancy. It's important to get these vaccines more than a month before you start trying to get pregnant.  Start taking a multivitamin -- If you want to get pregnant, take a \"prenatal\" multivitamin every day. It should have at least 400 micrograms of folic acid. This helps prevent some of the problems a baby can be born with. Start taking the multivitamin at least a month before you start trying to get pregnant. That's because by the time you find out that you are pregnant, your baby has already formed many body parts that rely on folic acid and other vitamins to develop normally.  Do not to take too much of any vitamin during pregnancy, especially vitamin A. Show your doctor or nurse the vitamins you plan to take. They can make sure that the doses are safe for you and your baby.  Check your diet -- In general, try to eat a balanced diet with lots of fruits, vegetables, and whole grains.  Some foods are not safe to eat during pregnancy. If you are " "trying to get pregnant, these tips can help you stay safe:   Do not eat raw or undercooked meat. Avoid eating shark, swordfish, idris mackerel, or tilefish because they can have high levels of mercury. Check with your doctor or nurse about the safety of fish caught in local rivers and lakes.   Limit the amount of caffeine you have to no more than 1 or 2 cups of coffee each day. Tea and cola also contain caffeine, but usually not as much as coffee.   Avoid germs in your food that could make you sick. Wash your hands with soap and water before preparing or touching food (figure 1). Wash or peel fruits and vegetables before eating them. Pregnant people should also avoid certain foods that can carry germs. These include deli meats and milk, cheese, or juice that has not been pasteurized (also called \"unpasteurized\").  Stop smoking, drinking alcohol, and taking illegal drugs -- If you smoke, drink alcohol, or take drugs not prescribed for you by a doctor, it is very important that you stop, now more than ever. Even small amounts of these substances during pregnancy can harm a baby. This includes electronic cigarettes (\"vaping\") and marijuana.  It's not enough to stop as soon as you find out that you are pregnant. By then, the baby has already begun to form and could be harmed by smoking, alcohol, or drugs. If you need help quitting, talk with your doctor or nurse. There are treatments that can help.  Ask about risks -- Ask your doctor what the risks to you and your baby might be if:   You have any medical conditions - If you have a medical problem, it could cause problems for you or your baby during pregnancy. People who have certain medical conditions should work with their doctor to get their conditions under control before they get pregnant. This includes people with diabetes, high blood pressure, asthma, thyroid conditions, seizure disorders, HIV infection, and other problems. If you have one of these conditions and " it is not well controlled, it can cause problems for both you and your baby during pregnancy.   You or your partner has a family history of a medical condition - If you or your partner has a history of a condition that could be passed on to your baby, your doctor might recommend genetic counseling. Genetic counseling can help you understand the chances that your baby will also have the condition. A genetic counselor can also talk to you about your options if your baby does have a genetic problem.  Check your home and work for harmful substances -- People often have chemicals or substances in their home or work that could hurt an unborn baby. Dealing with these substances can sometimes be complicated and time consuming, so it's important to plan ahead. For instance, people who live in homes built before 1978 often have lead paint on their walls or woodwork. Lead in chips or dust from this paint could harm a baby. Ask your doctor or nurse how to deal with lead and other harmful substances that might be around you.  Try to keep a healthy weight -- Weighing too little or too much can make it harder to get pregnant and lead to problems during pregnancy. Try to reach a healthy weight before you try to get pregnant.  Is there anything my partner can do? -- Yes. Some things they can do:   They should stop smoking and using drugs, and limit alcohol. The doctor or nurse might ask questions about their health and medical history, too.   It can help for your partner to support you. For example, they can make healthy changes to their diet along with you.   When you are ready to start trying to get pregnant, your doctor or nurse can talk to you about how to increase your chances. For pregnancy to happen through sex, your partner's sperm needs to fertilize your egg (figure 2). Having sex at certain times during your monthly cycle can increase the chances of pregnancy. To make sure that your partner's sperm are as healthy as  "possible, it can also help to have sex every 2 or 3 days, and for them to avoid ejaculating in between these times.  If you do not have a male partner and are planning to get pregnant in another way (for example, with a sperm donor), it can still be helpful to have the support of your partner, family, or friends. If you do not feel like you have support, or if you are struggling with relationships or feeling unsafe in any way, talk with your doctor or nurse. They can help.  All topics are updated as new evidence becomes available and our peer review process is complete.  This topic retrieved from [employer] on: Feb 26, 2024.  Topic 25435 Version 13.0  Release: 32.2.4 - C32.56  © 2024 UpToDate, Inc. and/or its affiliates. All rights reserved.  figure 1: How to wash your hands     Wet your hands with clean water, and apply a small amount of soap. Lather and rub hands together for at least 20 seconds. Clean your wrists, palms, backs of your hands, between your fingers, tips of your fingers, thumbs, and under and around your nails. Rinse well, and dry your hands using a clean towel.  Graphic 570033 Version 7.0  figure 2: How pregnancy happens     When pregnancy happens through sex, the following steps must happen:  An egg is released from the ovary. This is called \"ovulation.\"  The partner's sperm swim up the vagina, into the uterus, and up the fallopian tubes. (These are the tubes that connect the ovaries to the uterus.)  When the sperm reach the egg, at least 1 sperm must get through the outer casing of the egg and make it inside. This is called \"fertilization.\"  The newly fertilized egg travels down to the uterus.  The egg secures itself to the wall of the uterus. This is called \"implantation.\"  Graphic 69524 Version 6.0  Consumer Information Use and Disclaimer   Disclaimer: This generalized information is a limited summary of diagnosis, treatment, and/or medication information. It is not meant to be comprehensive and " should be used as a tool to help the user understand and/or assess potential diagnostic and treatment options. It does NOT include all information about conditions, treatments, medications, side effects, or risks that may apply to a specific patient. It is not intended to be medical advice or a substitute for the medical advice, diagnosis, or treatment of a health care provider based on the health care provider's examination and assessment of a patient's specific and unique circumstances. Patients must speak with a health care provider for complete information about their health, medical questions, and treatment options, including any risks or benefits regarding use of medications. This information does not endorse any treatments or medications as safe, effective, or approved for treating a specific patient. UpToDate, Inc. and its affiliates disclaim any warranty or liability relating to this information or the use thereof.The use of this information is governed by the Terms of Use, available at https://www.[employer].com/en/know/clinical-effectiveness-terms. 2024© UpToDate, Inc. and its affiliates and/or licensors. All rights reserved.  Copyright   © 2024 UpToDate, Inc. and/or its affiliates. All rights reserved.

## 2025-06-05 NOTE — PROGRESS NOTES
Name: Linda Hickman      : 1985      MRN: [id number]  Encounter Provider: JULIEN Suggs  Encounter Date: 2025   Encounter department: [facility] [place]  :  Assessment & Plan  Encounter for annual routine gynecological examination  Calcium 1200-1500mg (in divided doses-max 600 mg at one time) + 600-1000 IU Vit D daily. PNV advised  Exercise 150-300 minutes per week minimum including weight bearing exercises.   Pap with high risk HPV Q 5 years, if normal.    Call your insurance company to verify coverage prior to completing any ordered tests.   Annual mammogram ordered and monthly breast self exam recommended.    Kegels 20 times twice daily.   Vaginal moisturizers such as coconut oil as needed.   Return to office in one year or sooner, if needed.          Extremely dense tissue of both breasts on mammography    Orders:    US breast screening bilateral complete (ABUS); Future    MRI breast bilateral w and wo contrast w cad; Future    Family history of breast cancer in mother    Orders:    US breast screening bilateral complete (ABUS); Future    MRI breast bilateral w and wo contrast w cad; Future    Encounter for screening mammogram for malignant neoplasm of breast    Orders:    Mammo screening bilateral w 3d and cad; Future    Encounter for breast cancer screening using non-mammogram modality    Orders:    US breast screening bilateral complete (ABUS); Future    MRI breast bilateral w and wo contrast w cad; Future        History of Present Illness   HPI  Linda Hickman is a 40 y.o. female who presents yearly examination. She denies any issues with heavy bleeding or her menses. She reports regular monthly cycles every 23-25 days. They last 4-5 days. Denies history of abnormal pap smears. Last Pap done on 2022 was neg/neg, a pap was NOT done today. She denies vaginal issues. She denies pelvic pain. She denies dyspareunia. She is sexually active.  Mom  was diagnosed with breast cancer at age 62 and MGM at age 40. She states it was estrogen driven and BRCA negative.  She declines a BCM. She is pursuing pregnancy and will be seeing Infertility soon ([facility]).    Works [employer] now.  She has a boyfriend for the past 7 years, he has a son     LMP 6/02/2025  Vasomotor symptoms Yes  Sexually active yes  Monogamous   Birth control or HRT No  History of abnormal pap: No  Last pap 3/17/2022 , Findings NILM/HPV neg , Next pap: 2027   Self breast exam yes  40+ Mammogram 07/17/2024 [facility]: 35.8% BI RAD 2 , Next Mammogram  2025    HPV vaccine series completed: No     Hereditary Cancer Screening  FH Breast/Ovarian cancer: Yes  FH Uterine cancer: Denies  FH Colon cancer: Yes  Cancer-related family history includes Breast cancer in her maternal grandmother and mother; Cancer in her maternal grandfather, maternal grandmother, and mother; Colon cancer in her maternal grandmother; Lung cancer in her maternal grandfather; Prostate cancer in her maternal grandfather.         Review of Systems   Constitutional:  Positive for fatigue. Negative for chills, fever and unexpected weight change.   Respiratory:  Positive for shortness of breath (Long COVID).    Gastrointestinal:  Negative for anal bleeding, blood in stool, constipation and diarrhea.   Genitourinary:  Negative for difficulty urinating, dysuria and hematuria.   Neurological:  Negative for weakness.   Psychiatric/Behavioral:  Negative for agitation, behavioral problems and confusion.           Objective   /72 (BP Location: Left arm, Patient Position: Sitting, Cuff Size: Standard)   Wt 72.6 kg (160 lb)   BMI 25.82 kg/m²      Physical Exam  Constitutional:       General: She is not in acute distress.     Appearance: She is well-developed.   HENT:      Head: Normocephalic.   Pulmonary:      Effort: Pulmonary effort is normal.   Chest:   Breasts:     Breasts are symmetrical.      Right: No inverted  nipple, mass, nipple discharge, skin change or tenderness.      Left: No inverted nipple, mass, nipple discharge, skin change or tenderness.   Abdominal:      Palpations: Abdomen is soft.   Genitourinary:     Exam position: Supine.      Labia:         Right: No rash, tenderness, lesion or injury.         Left: No rash, tenderness, lesion or injury.       Vagina: No signs of injury and foreign body. No vaginal discharge, erythema or tenderness.      Cervix: No cervical motion tenderness, discharge or friability.      Uterus: Not deviated, not enlarged, not fixed and not tender.       Adnexa:         Right: No mass, tenderness or fullness.          Left: No mass, tenderness or fullness.       Musculoskeletal:      Cervical back: Normal range of motion and neck supple.

## 2025-06-05 NOTE — ASSESSMENT & PLAN NOTE
Orders:    US breast screening bilateral complete (ABUS); Future    MRI breast bilateral w and wo contrast w cad; Future

## 2025-06-05 NOTE — TELEPHONE ENCOUNTER
PA for Viloxazine HCl  mg capsule  APPROVED     Date(s) approved 6/3/2025-6/3/2026        Case #: [id number]    Patient advised by          [x][facility] Message  []Phone call   []LMOM  []L/M to call office as no active Communication consent on file  []Unable to leave detailed message as VM not approved on Communication consent       Pharmacy advised by    []Fax  [x]Phone call  []Secure Chat    Specialty Pharmacy    []     Approval letter scanned into Media No

## 2025-06-09 ENCOUNTER — TELEMEDICINE (OUTPATIENT)
Dept: PSYCHIATRY | Facility: CLINIC | Age: 40
End: 2025-06-09
Payer: COMMERCIAL

## 2025-06-09 DIAGNOSIS — F43.10 PTSD (POST-TRAUMATIC STRESS DISORDER): ICD-10-CM

## 2025-06-09 DIAGNOSIS — F90.9 ATTENTION DEFICIT HYPERACTIVITY DISORDER (ADHD), UNSPECIFIED ADHD TYPE: ICD-10-CM

## 2025-06-09 DIAGNOSIS — F33.1 MODERATE EPISODE OF RECURRENT MAJOR DEPRESSIVE DISORDER (HCC): Primary | ICD-10-CM

## 2025-06-09 PROCEDURE — 90834 PSYTX W PT 45 MINUTES: CPT

## 2025-06-09 NOTE — PSYCH
Virtual Regular VisitName: Linda Hickman      : 1985      MRN: [id number]  Encounter Provider: Jannet Villatoro LCSW  Encounter Date: 2025   Encounter department: [facility]  :  Assessment & Plan  Moderate episode of recurrent major depressive disorder (HCC)         PTSD (post-traumatic stress disorder)         Attention deficit hyperactivity disorder (ADHD), unspecified ADHD type             Goals addressed in session: Goal 1     DATA: Linda talked about her fertility and that she has an appt with a fertility specialist in a couple weeks to figure out what's going on with her fertility.  She said she found a wedding dress which she's happy about.  She shared her work is getting better recently because of doing some studying at home.  She said her prescriber prescribed something for adhd.  She has been having increased inattentiveness recently which has triggered some increased depression and low self esteem.  Therapist asked about possible EMDR targets in terms of feeling resentment of Ray's mistress and the wedding itself.  She also shared feelings about her mother being surprisingly interested in her wedding.  She does not know how to feel about it.  During this session, this clinician used the following therapeutic modalities: Client-centered Therapy    Substance Abuse was not addressed during this session. If the client is diagnosed with a co-occurring substance use disorder, please indicate any changes in the frequency or amount of use: . Stage of change for addressing substance use diagnoses: No substance use/Not applicable    ASSESSMENT:  Linda presents with a Euthymic/ normal mood. Linda's affect is Normal range and intensity, which is congruent, with their mood and the content of the session. The client has made progress on their goals as evidenced by open communication.    Linda presents with a none risk of suicide, none risk of self-harm, and none  "risk of harm to others.    For any risk assessment that surpasses a \"low\" rating, a safety plan must be developed.    A safety plan was indicated: no  If yes, describe in detail     PLAN: Between sessions, Linda will utilize open communicaiton, self compassion . At the next session, the therapist will use Client-centered Therapy, Dialectical Behavior Therapy, and Supportive Psychotherapy to address depression, stressors.    Behavioral Health Treatment Plan [facility]: Diagnosis and Treatment Plan explained to Linda Mckeon relates understanding diagnosis and is agreeable to Treatment Plan. Yes     Depression Follow-up Plan Completed: Not applicable     Reason for visit is No chief complaint on file.     Recent Visits  No visits were found meeting these conditions.  Showing recent visits within past 7 days and meeting all other requirements  Today's Visits  Date Type Provider Dept   06/09/25 Telemedicine Jannet Villatoro LCSW [facility]   Showing today's visits and meeting all other requirements  Future Appointments  No visits were found meeting these conditions.  Showing future appointments within next 150 days and meeting all other requirements     History of Present Illness     HPI    Past Medical History   Past Medical History[1]  Past Surgical History[2]  Current Outpatient Medications   Medication Instructions   • albuterol (PROVENTIL HFA,VENTOLIN HFA) 90 mcg/act inhaler 2 puffs, Inhalation, Every 6 hours PRN   • amphetamine-dextroamphetamine (ADDERALL XR, 30MG,) 30 MG 24 hr capsule 30 mg, Oral, Every morning, - Try to take this medication on productive days   • buPROPion (WELLBUTRIN XL) 300 mg, Oral, Every morning   • calcium carbonate (OS-BERENICE) 600 mg, 2 times daily with meals   • cholecalciferol (VITAMIN D3) 1,000 Units, Daily   • Dupilumab (Dupixent) 200 MG/1.14ML SOAJ Inject 1 pen (200 mg total) under the skin once every 14 (fourteen) days.   • Dupixent 200 MG/1.14ML SOPN Inject 1 " pen ( 200mg total) under the skin once every 14 (fourteen) days.   • EPINEPHrine (EPIPEN) 0.3 mg, Intramuscular, Once   • Ferrous Sulfate (RA IRON PO) Take by mouth   • levalbuterol (XOPENEX) 0.63 mg, Nebulization, 3 times daily   • Magnesium Glycinate 200 mg, 2 times daily   • Melatonin 10 mg, Oral, Daily at bedtime PRN   • mometasone-formoterol (Dulera) 200-5 MCG/ACT inhaler 2 puffs, Inhalation, 2 times daily, Rinse mouth after use.   • prochlorperazine (COMPAZINE) 5 mg, Oral, Every 6 hours PRN   • rimegepant sulfate (NURTEC) 75 mg, Oral, Every other day   • rizatriptan (MAXALT-MLT) 10 mg, Oral, As needed, Take at the onset of migraine; if symptoms continue or return, may take another dose at least 2 hours after first dose. Take no more than 2 doses in a day.   • traZODone (DESYREL) 50 mg, Oral, Daily at bedtime PRN   • tretinoin (RETIN-A) 0.025 % cream Topical, Daily at bedtime   • Viloxazine HCl  MG CP24 Take 200 mg by mouth in the morning for 14 days, THEN 400 mg in the morning.     Allergies[3]    Objective   There were no vitals taken for this visit.    Video Exam  Physical Exam     Administrative Statements   Encounter provider Jannet Villatoro LCSW    The Patient is located at Home and in the following state in which I hold an active license [place].    The patient was identified by name and date of birth. Linda Hickman was informed that this is a telemedicine visit and that the visit is being conducted through the Epic Embedded platform. She agrees to proceed..  My office door was closed. No one else was in the room.  She acknowledged consent and understanding of privacy and security of the video platform. The patient has agreed to participate and understands they can discontinue the visit at any time.        Visit Time  Start Time: 0900  Stop Time: 0952  Total Visit Time: 52 minutes         [1]  Past Medical History:  Diagnosis Date   • Acne 1998    Cystic, hormonal   • ADHD (attention deficit  hyperactivity disorder)    • Allergic    • Anemia    • Anxiety    • Arthritis    • Asthma    • Autism spectrum disorder    • Chlamydia 2014    Unfaithful partner   • Depression    • Exposure to SARS-associated coronavirus 04/12/2020   • GERD (gastroesophageal reflux disease) 2010   • Gonorrhea 2014    Unfaithful partner   • Headache(784.0)    • Lumbar herniated disc    • Migraine 2012   • Pneumonia    • PTSD (post-traumatic stress disorder)    • Sleep difficulties    • Urinary tract infection    • Urogenital trichomoniasis 2015    Unfaithful partner   • UTI (urinary tract infection)    • Varicella 1988   • Visual impairment    [2]  Past Surgical History:  Procedure Laterality Date   • DENTAL SURGERY     • FL INJECTION LEFT KNEE (ARTHROGRAM)  02/28/2022   • WISDOM TOOTH EXTRACTION     [3]  Allergies  Allergen Reactions   • Sulfa Antibiotics Anaphylaxis   • Levofloxacin      Other reaction(s): sensative   • Prednisone Other (See Comments)   • Tobramycin Other (See Comments)     Other reaction(s): sensative. Ototoxic   • Pseudoephedrine Palpitations

## 2025-06-17 ENCOUNTER — TELEMEDICINE (OUTPATIENT)
Dept: PSYCHIATRY | Facility: CLINIC | Age: 40
End: 2025-06-17

## 2025-06-17 DIAGNOSIS — F33.0 MILD EPISODE OF RECURRENT MAJOR DEPRESSIVE DISORDER (HCC): Primary | ICD-10-CM

## 2025-06-17 DIAGNOSIS — F90.0 ATTENTION DEFICIT HYPERACTIVITY DISORDER, INATTENTIVE TYPE: ICD-10-CM

## 2025-06-17 PROCEDURE — PBNCHG PB NO CHARGE PLACEHOLDER

## 2025-06-17 RX ORDER — BUPROPION HYDROCHLORIDE 150 MG/1
150 TABLET ORAL EVERY MORNING
Qty: 7 TABLET | Refills: 0 | Status: SHIPPED | OUTPATIENT
Start: 2025-06-17 | End: 2025-06-18

## 2025-06-17 NOTE — PSYCH
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 need to follow up with family physician for medical issues  Aware of 24 hour and weekend coverage for urgent situations accessed by calling [facility] main practice number    Although patient's acute lethality risk is low, long-term/chronic lethality risk is mildly elevated in the presence of mild symptoms of depression and moderate symptoms of anxiety. At the current moment, Linda is future-oriented, forward-thinking, and demonstrates ability to act in a self-preserving manner as evidenced by volitionally presenting to the clinic today, seeking treatment. At this juncture, inpatient hospitalization is not currently warranted. To mitigate future risk, patient should adhere to the recommendations of this writer, avoid alcohol/illicit substance use, utilize community-based resources and familiar support and prioritize mental health treatment.     Based on today's assessment and clinical criteria, Linda Hickman contracts for safety and is not an imminent risk of harm to self or others. Outpatient level of care is deemed  appropriate at this present time. Linda understands that if they are no longer able to contract for safety, they need to call/contact the outpatient office including this writer, call/contact crisis and/orattend to the nearest Emergency Department for immediate evaluation.    Risks/Benefits      Risks, Benefits And Possible Side Effects Of Medications:    Risks, benefits, and possible side effects of medications explained to Linda and she verbalizes understanding and agreement for treatment.    Controlled Medication Discussion:     Linda has been filling controlled prescriptions on time as prescribed according to [place] Prescription Drug Monitoring Program    Psychotherapy Provided:     Individual psychotherapy provided: Yes  Recent stressor including job stressors, family stressors discussed with Linda.   Supportive therapy provided.     Treatment Plan:    Completed and signed during the session: Not applicable - Treatment Plan not due at this session    Visit Time    Visit Start Time: 5:00 PM  Visit Stop Time: 5:30 PM  Total Visit Duration: 30 minutes    This note was shared with patient.    Mika Albarran MD 06/17/25    This note has been constructed using a voice recognition system. There may be translation, syntax, or grammatical errors. If you have any questions, please contact the dictating provider.       [1]   Past Medical History:  Diagnosis Date    Acne 1998    Cystic, hormonal    ADHD (attention deficit hyperactivity disorder)     Allergic     Anemia     Anxiety     Arthritis     Asthma     Autism spectrum disorder     Chlamydia 2014    Unfaithful partner    Depression     Exposure to SARS-associated coronavirus 04/12/2020    GERD (gastroesophageal reflux disease) 2010    Gonorrhea 2014    Unfaithful partner    Headache(784.0)     Lumbar herniated disc     Migraine 2012    Pneumonia     PTSD (post-traumatic stress disorder)     Sleep difficulties     Urinary tract infection      Urogenital trichomoniasis 2015    Unfaithful partner    UTI (urinary tract infection)     Varicella 1988    Visual impairment    [2]   Allergies  Allergen Reactions    Sulfa Antibiotics Anaphylaxis    Levofloxacin      Other reaction(s): sensative    Prednisone Other (See Comments)    Tobramycin Other (See Comments)     Other reaction(s): sensative. Ototoxic    Pseudoephedrine Palpitations

## 2025-06-18 ENCOUNTER — TELEMEDICINE (OUTPATIENT)
Dept: PSYCHIATRY | Facility: CLINIC | Age: 40
End: 2025-06-18
Payer: COMMERCIAL

## 2025-06-18 DIAGNOSIS — F33.1 MODERATE EPISODE OF RECURRENT MAJOR DEPRESSIVE DISORDER (HCC): ICD-10-CM

## 2025-06-18 DIAGNOSIS — F90.2 ATTENTION DEFICIT HYPERACTIVITY DISORDER (ADHD), COMBINED TYPE: ICD-10-CM

## 2025-06-18 DIAGNOSIS — F43.10 PTSD (POST-TRAUMATIC STRESS DISORDER): Primary | ICD-10-CM

## 2025-06-18 PROCEDURE — 90837 PSYTX W PT 60 MINUTES: CPT

## 2025-06-18 RX ORDER — BUPROPION HYDROCHLORIDE 150 MG/1
150 TABLET ORAL EVERY MORNING
Start: 2025-06-18 | End: 2025-07-18

## 2025-06-18 NOTE — PSYCH
Virtual Regular VisitName: Linda Hickman      : 1985      MRN: [id number]  Encounter Provider: Jannet Villatoro LCSW  Encounter Date: 2025   Encounter department: [facility]  :  Assessment & Plan  PTSD (post-traumatic stress disorder)         Moderate episode of recurrent major depressive disorder (HCC)         Attention deficit hyperactivity disorder (ADHD), combined type             Goals addressed in session: Goal 1     DATA: Linda identified an EMDR target of feeling resentful of Ray's mistress when she was lying  to her about cheating with her ex .  She processed thoughts and feelings about it and her VOC went from 4 to 5 and TANNA stayed the same.  Therapist encouraged narrative therapy in terms of imagining a different outcome to the situation where she wanted to confront the mistress at the time.  Therapist had her imagine she was doing that, and getting a response from the mistress that she would have wanted to hear.  Linda was able to do this, and identified some targets for next session including going through fertility treatments and feeling that she is worried she will repeat the mistake of marrying again when part of her is unsure.  During this session, this clinician used the following therapeutic modalities: EMDR (or other form of bilateral Stimulation)    Substance Abuse was not addressed during this session. If the client is diagnosed with a co-occurring substance use disorder, please indicate any changes in the frequency or amount of use: . Stage of change for addressing substance use diagnoses: No substance use/Not applicable    ASSESSMENT:  Linda presents with a Euthymic/ normal mood. Lenos affect is Normal range and intensity, which is congruent, with their mood and the content of the session. The client has made progress on their goals as evidenced by processing EMDR target.    Linda presents with a none risk of suicide, none risk  "of self-harm, and none risk of harm to others.    For any risk assessment that surpasses a \"low\" rating, a safety plan must be developed.    A safety plan was indicated: no  If yes, describe in detail     PLAN: Between sessions, Linda will utilize positive self talk, process further targets next session. At the next session, the therapist will use EMDR (or other form of bilateral Stimulation) to address PTSD, anxiety.    Behavioral Health Treatment Plan [facility]: Diagnosis and Treatment Plan explained to Linda, Linda relates understanding diagnosis and is agreeable to Treatment Plan. Yes     Depression Follow-up Plan Completed: Not applicable     Reason for visit is   Chief Complaint   Patient presents with   • Virtual Regular Visit      Recent Visits  No visits were found meeting these conditions.  Showing recent visits within past 7 days and meeting all other requirements  Today's Visits  Date Type Provider Dept   06/18/25 Telemedicine Jannet Villatoro LCSW [facility]   Showing today's visits and meeting all other requirements  Future Appointments  No visits were found meeting these conditions.  Showing future appointments within next 150 days and meeting all other requirements     History of Present Illness     HPI    Past Medical History   Past Medical History[1]  Past Surgical History[2]  Current Outpatient Medications   Medication Instructions   • albuterol (PROVENTIL HFA,VENTOLIN HFA) 90 mcg/act inhaler 2 puffs, Inhalation, Every 6 hours PRN   • amphetamine-dextroamphetamine (ADDERALL XR, 30MG,) 30 MG 24 hr capsule 30 mg, Oral, Every morning, - Try to take this medication on productive days   • buPROPion (WELLBUTRIN XL) 150 mg, Oral, Every morning, - Stop this medication after one month   • calcium carbonate (OS-BERENICE) 600 mg, 2 times daily with meals   • cholecalciferol (VITAMIN D3) 1,000 Units, Daily   • Dupilumab (Dupixent) 200 MG/1.14ML SOAJ Inject 1 pen (200 mg total) under the " skin once every 14 (fourteen) days.   • Dupixent 200 MG/1.14ML SOPN Inject 1 pen ( 200mg total) under the skin once every 14 (fourteen) days.   • EPINEPHrine (EPIPEN) 0.3 mg, Intramuscular, Once   • Ferrous Sulfate (RA IRON PO) Take by mouth   • levalbuterol (XOPENEX) 0.63 mg, Nebulization, 3 times daily   • Magnesium Glycinate 200 mg, 2 times daily   • Melatonin 10 mg, Oral, Daily at bedtime PRN   • mometasone-formoterol (Dulera) 200-5 MCG/ACT inhaler 2 puffs, Inhalation, 2 times daily, Rinse mouth after use.   • prochlorperazine (COMPAZINE) 5 mg, Oral, Every 6 hours PRN   • rimegepant sulfate (NURTEC) 75 mg, Oral, Every other day   • rizatriptan (MAXALT-MLT) 10 mg, Oral, As needed, Take at the onset of migraine; if symptoms continue or return, may take another dose at least 2 hours after first dose. Take no more than 2 doses in a day.   • traZODone (DESYREL) 50 mg, Oral, Daily at bedtime PRN   • tretinoin (RETIN-A) 0.025 % cream Topical, Daily at bedtime   • Viloxazine HCl  mg, Oral, Daily, - Increase to 400 mg on 7/17/25   • [START ON 7/17/2025] Viloxazine HCl  mg, Oral, Daily     Allergies[3]    Objective   There were no vitals taken for this visit.    Video Exam  Physical Exam     Administrative Statements   Encounter provider Jannet Villatoro LCSW    The Patient is located at Other and in the following state in which I hold an active license [place].    The patient was identified by name and date of birth. Linda Hickman was informed that this is a telemedicine visit and that the visit is being conducted through the [employer] platform. She agrees to proceed..  My office door was closed. No one else was in the room.  She acknowledged consent and understanding of privacy and security of the video platform. The patient has agreed to participate and understands they can discontinue the visit at any time.      Visit Time  Start Time: 1500  Stop Time: 1555  Total Visit Time: 55 minutes         [1]  Past  Medical History:  Diagnosis Date   • Acne 1998    Cystic, hormonal   • ADHD (attention deficit hyperactivity disorder)    • Allergic    • Anemia    • Anxiety    • Arthritis    • Asthma    • Autism spectrum disorder    • Chlamydia 2014    Unfaithful partner   • Depression    • Exposure to SARS-associated coronavirus 04/12/2020   • GERD (gastroesophageal reflux disease) 2010   • Gonorrhea 2014    Unfaithful partner   • Headache(784.0)    • Lumbar herniated disc    • Migraine 2012   • Pneumonia    • PTSD (post-traumatic stress disorder)    • Sleep difficulties    • Urinary tract infection    • Urogenital trichomoniasis 2015    Unfaithful partner   • UTI (urinary tract infection)    • Varicella 1988   • Visual impairment    [2]  Past Surgical History:  Procedure Laterality Date   • DENTAL SURGERY     • FL INJECTION LEFT KNEE (ARTHROGRAM)  02/28/2022   • WISDOM TOOTH EXTRACTION     [3]  Allergies  Allergen Reactions   • Sulfa Antibiotics Anaphylaxis   • Levofloxacin      Other reaction(s): sensative   • Prednisone Other (See Comments)   • Tobramycin Other (See Comments)     Other reaction(s): sensative. Ototoxic   • Pseudoephedrine Palpitations

## 2025-06-25 ENCOUNTER — TELEPHONE (OUTPATIENT)
Age: 40
End: 2025-06-25

## 2025-06-26 ENCOUNTER — TELEMEDICINE (OUTPATIENT)
Dept: PSYCHIATRY | Facility: CLINIC | Age: 40
End: 2025-06-26
Payer: COMMERCIAL

## 2025-06-26 DIAGNOSIS — F33.1 MODERATE EPISODE OF RECURRENT MAJOR DEPRESSIVE DISORDER (HCC): Primary | ICD-10-CM

## 2025-06-26 DIAGNOSIS — F43.10 PTSD (POST-TRAUMATIC STRESS DISORDER): ICD-10-CM

## 2025-06-26 DIAGNOSIS — F90.9 ATTENTION DEFICIT HYPERACTIVITY DISORDER (ADHD), UNSPECIFIED ADHD TYPE: ICD-10-CM

## 2025-06-26 PROCEDURE — 90837 PSYTX W PT 60 MINUTES: CPT

## 2025-06-26 NOTE — PSYCH
"Virtual Regular VisitName: Linda Hickman      : 1985      MRN: [id number]  Encounter Provider: Jannet Villatoro LCSW  Encounter Date: 2025   Encounter department: [facility]  :  Assessment & Plan  Moderate episode of recurrent major depressive disorder (HCC)         PTSD (post-traumatic stress disorder)         Attention deficit hyperactivity disorder (ADHD), unspecified ADHD type             Goals addressed in session: Goal 1     DATA: Linda shared her fertility appt went pretty well in terms of the plan going forward.  She expressed thoughts and feelings about the process.  She identified an EMDR target of fertility issues identifying NC as \"I'm not worthy\" and PC as \"I'm worthy.\"  She was able to process thoughts and feelings related to her fertility issues and her past with Ray.  She did body scan and light stream exercise for grounding at the end of the session.  During this session, this clinician used the following therapeutic modalities: Client-centered Therapy and EMDR (or other form of bilateral Stimulation)    Substance Abuse was not addressed during this session. If the client is diagnosed with a co-occurring substance use disorder, please indicate any changes in the frequency or amount of use: . Stage of change for addressing substance use diagnoses: No substance use/Not applicable    ASSESSMENT:  Linda presents with a Euthymic/ normal mood. Linda's affect is Normal range and intensity, which is congruent, with their mood and the content of the session. The client has made progress on their goals as evidenced by processing EMDR target.    Linda presents with a none risk of suicide, none risk of self-harm, and none risk of harm to others.    For any risk assessment that surpasses a \"low\" rating, a safety plan must be developed.    A safety plan was indicated: no  If yes, describe in detail     PLAN: Between sessions, Linda will utilize light stream " and calm breathing as grounding. At the next session, the therapist will use Client-centered Therapy and EMDR (or other form of bilateral Stimulation) to address PTSD, depression.    Behavioral Health Treatment Plan [facility]: Diagnosis and Treatment Plan explained to Linda, Linda relates understanding diagnosis and is agreeable to Treatment Plan. Yes     Depression Follow-up Plan Completed: Not applicable     Reason for visit is   Chief Complaint   Patient presents with   • Virtual Regular Visit      Recent Visits  No visits were found meeting these conditions.  Showing recent visits within past 7 days and meeting all other requirements  Today's Visits  Date Type Provider Dept   06/26/25 Telemedicine Jannet Villatoro LCSW [facility]   Showing today's visits and meeting all other requirements  Future Appointments  No visits were found meeting these conditions.  Showing future appointments within next 150 days and meeting all other requirements     History of Present Illness     HPI    Past Medical History   Past Medical History[1]  Past Surgical History[2]  Current Outpatient Medications   Medication Instructions   • albuterol (PROVENTIL HFA,VENTOLIN HFA) 90 mcg/act inhaler 2 puffs, Inhalation, Every 6 hours PRN   • amphetamine-dextroamphetamine (ADDERALL XR, 30MG,) 30 MG 24 hr capsule 30 mg, Oral, Every morning, - Try to take this medication on productive days   • buPROPion (WELLBUTRIN XL) 150 mg, Oral, Every morning, - Stop this medication after one week   • calcium carbonate (OS-BERENICE) 600 mg, 2 times daily with meals   • cholecalciferol (VITAMIN D3) 1,000 Units, Daily   • Dupilumab (Dupixent) 200 MG/1.14ML SOAJ Inject 1 pen (200 mg total) under the skin once every 14 (fourteen) days.   • Dupixent 200 MG/1.14ML SOPN Inject 1 pen ( 200mg total) under the skin once every 14 (fourteen) days.   • EPINEPHrine (EPIPEN) 0.3 mg, Intramuscular, Once   • Ferrous Sulfate (RA IRON PO) Take by mouth    • levalbuterol (XOPENEX) 0.63 mg, Nebulization, 3 times daily   • Magnesium Glycinate 200 mg, 2 times daily   • Melatonin 10 mg, Oral, Daily at bedtime PRN   • mometasone-formoterol (Dulera) 200-5 MCG/ACT inhaler 2 puffs, Inhalation, 2 times daily, Rinse mouth after use.   • prochlorperazine (COMPAZINE) 5 mg, Oral, Every 6 hours PRN   • rimegepant sulfate (NURTEC) 75 mg, Oral, Every other day   • rizatriptan (MAXALT-MLT) 10 mg, Oral, As needed, Take at the onset of migraine; if symptoms continue or return, may take another dose at least 2 hours after first dose. Take no more than 2 doses in a day.   • traZODone (DESYREL) 50 mg, Oral, Daily at bedtime PRN   • tretinoin (RETIN-A) 0.025 % cream Topical, Daily at bedtime   • Viloxazine HCl  mg, Oral, Daily, - Increase to 400 mg on 7/17/25   • [START ON 7/17/2025] Viloxazine HCl  mg, Oral, Daily     Allergies[3]    Objective   There were no vitals taken for this visit.    Video Exam  Physical Exam     Administrative Statements   Encounter provider Jannet Villatoro LCSW    The Patient is located at Home and in the following state in which I hold an active license [place].    The patient was identified by name and date of birth. Linda Hickman was informed that this is a telemedicine visit and that the visit is being conducted through the [employer] Emb[employer] platform. She agrees to proceed..  My office door was closed. No one else was in the room.  She acknowledged consent and understanding of privacy and security of the video platform. The patient has agreed to participate and understands they can discontinue the visit at any time.        Visit Time  Start Time: 1100  Stop Time: 1157  Total Visit Time: 57 minutes         [1]  Past Medical History:  Diagnosis Date   • Acne 1998    Cystic, hormonal   • ADHD (attention deficit hyperactivity disorder)    • Allergic    • Anemia    • Anxiety    • Arthritis    • Asthma    • Autism spectrum disorder    • Chlamydia 2014     Unfaithful partner   • Depression    • Exposure to SARS-associated coronavirus 04/12/2020   • GERD (gastroesophageal reflux disease) 2010   • Gonorrhea 2014    Unfaithful partner   • Headache(784.0)    • Lumbar herniated disc    • Migraine 2012   • Pneumonia    • PTSD (post-traumatic stress disorder)    • Sleep difficulties    • Urinary tract infection    • Urogenital trichomoniasis 2015    Unfaithful partner   • UTI (urinary tract infection)    • Varicella 1988   • Visual impairment    [2]  Past Surgical History:  Procedure Laterality Date   • DENTAL SURGERY     • FL INJECTION LEFT KNEE (ARTHROGRAM)  02/28/2022   • WISDOM TOOTH EXTRACTION     [3]  Allergies  Allergen Reactions   • Sulfa Antibiotics Anaphylaxis   • Levofloxacin      Other reaction(s): sensative   • Prednisone Other (See Comments)   • Tobramycin Other (See Comments)     Other reaction(s): sensative. Ototoxic   • Pseudoephedrine Palpitations

## 2025-06-27 ENCOUNTER — OFFICE VISIT (OUTPATIENT)
Age: 40
End: 2025-06-27
Payer: COMMERCIAL

## 2025-06-27 VITALS
SYSTOLIC BLOOD PRESSURE: 120 MMHG | WEIGHT: 160.8 LBS | BODY MASS INDEX: 25.84 KG/M2 | DIASTOLIC BLOOD PRESSURE: 70 MMHG | HEART RATE: 97 BPM | OXYGEN SATURATION: 99 % | HEIGHT: 66 IN | TEMPERATURE: 97.8 F

## 2025-06-27 DIAGNOSIS — J45.50 SEVERE PERSISTENT ASTHMA WITHOUT COMPLICATION: Primary | ICD-10-CM

## 2025-06-27 PROCEDURE — 99213 OFFICE O/P EST LOW 20 MIN: CPT | Performed by: PHYSICIAN ASSISTANT

## 2025-06-27 PROCEDURE — 94010 BREATHING CAPACITY TEST: CPT | Performed by: PHYSICIAN ASSISTANT

## 2025-06-27 RX ORDER — MOMETASONE FUROATE AND FORMOTEROL FUMARATE DIHYDRATE 100; 5 UG/1; UG/1
2 AEROSOL RESPIRATORY (INHALATION) 2 TIMES DAILY
Qty: 13 G | Refills: 3 | Status: SHIPPED | OUTPATIENT
Start: 2025-06-27

## 2025-06-27 NOTE — PROGRESS NOTES
"Follow-up  Visit - Pulmonary Medicine   Name: Linda Hickman      : 1985      MRN: [id number]  Encounter Provider: Zaki Ruffin PA-C  Encounter Date: 2025   Encounter department: [facility]  :  Assessment & Plan  Severe persistent asthma without complication  Patient will continue with Dulera twice per day, albuterol 4 times per day as needed  Continue Dupixent injections  Given much better control of her asthma, will decrease the steroid dose in the Dulera to 100/5  Orders:    POCT spirometry    mometasone-formoterol (Dulera) 100-5 MCG/ACT inhaler; Inhale 2 puffs 2 (two) times a day Rinse mouth after use.      Return in about 6 months (around 2025).    History of Present Illness   Linda Hickman is a 40 y.o. female with past medical history of severe persistent asthma who presents for follow-up.  She continues to do well with her breathing.  Has not been as consistent with her Dulera recently.  Not much need for her rescue medication.    Review of Systems   Constitutional: Negative.    HENT: Negative.     Respiratory: Negative.     Cardiovascular: Negative.    Gastrointestinal: Negative.    Genitourinary: Negative.    Musculoskeletal: Negative.    Skin: Negative.    Allergic/Immunologic: Negative.    Neurological: Negative.    Psychiatric/Behavioral: Negative.         Aside from what is mentioned in the HPI, ROS is otherwise negative    Medications Ordered Prior to Encounter[1]      Medical History Reviewed by provider this encounter:     .    Objective   /70 (BP Location: Left arm, Patient Position: Sitting, Cuff Size: Standard)   Pulse 97   Temp 97.8 °F (36.6 °C) (Temporal)   Ht 5' 6\" (1.676 m)   Wt 72.9 kg (160 lb 12.8 oz)   SpO2 99%   BMI 25.95 kg/m²     Physical Exam  Vitals reviewed.   Constitutional:       General: She is not in acute distress.     Appearance: Normal appearance. She is well-developed. She is not ill-appearing.   HENT: " "     Head: Normocephalic and atraumatic.      Mouth/Throat:      Pharynx: Oropharynx is clear.     Eyes:      Pupils: Pupils are equal, round, and reactive to light.       Cardiovascular:      Rate and Rhythm: Normal rate and regular rhythm.   Pulmonary:      Effort: Pulmonary effort is normal. No respiratory distress.      Breath sounds: Normal breath sounds. No decreased breath sounds, wheezing, rhonchi or rales.   Abdominal:      General: Abdomen is flat. There is no distension.     Musculoskeletal:         General: Normal range of motion.      Cervical back: Normal range of motion.      Right lower leg: No edema.      Left lower leg: No edema.     Skin:     General: Skin is warm and dry.      Findings: No rash.     Neurological:      Mental Status: She is alert and oriented to person, place, and time.     Psychiatric:         Mood and Affect: Mood normal.         Behavior: Behavior normal.           Diagnostic Data:  Labs: I personally reviewed the most recent laboratory data pertinent to today's visit.      Radiology results:  Radiology Results Review : No pertinent imaging studies reviewed.      PFT/spirometry results:  No results found for: \"FEV1\", \"FVC\", \"AZJ8AGC\", \"TLC\", \"DLCO\"   Normal spirometry    Oximetry testing:      Other studies:      Zaki Ruffin PA-C           [1]   Current Outpatient Medications on File Prior to Visit   Medication Sig Dispense Refill    albuterol (PROVENTIL HFA,VENTOLIN HFA) 90 mcg/act inhaler Inhale 2 puffs every 6 (six) hours as needed for wheezing 18 g 3    amphetamine-dextroamphetamine (ADDERALL XR, 30MG,) 30 MG 24 hr capsule Take 1 capsule (30 mg total) by mouth every morning - Try to take this medication on productive days Max Daily Amount: 30 mg 30 capsule 0    calcium carbonate (OS-BERENICE) 600 MG tablet Take 600 mg by mouth in the morning and 600 mg in the evening. Take with meals.      cholecalciferol (VITAMIN D3) 1,000 units tablet Take 1,000 Units by mouth in the " morning.      Dupilumab (Dupixent) 200 MG/1.14ML SOAJ Inject 1 pen (200 mg total) under the skin once every 14 (fourteen) days. 2 mL 10    EPINEPHrine (EPIPEN) 0.3 mg/0.3 mL SOAJ Inject 0.3 mL (0.3 mg total) into a muscle once for 1 dose (Patient taking differently: Inject 0.3 mg into a muscle as needed) 0.6 mL 0    Ferrous Sulfate (RA IRON PO) Take by mouth      levalbuterol (Xopenex) 0.63 mg/3 mL nebulizer solution Take 3 mL (0.63 mg total) by nebulization 3 (three) times a day 30 mL 1    Magnesium Glycinate 100 MG CAPS Take 200 mg by mouth in the morning and 200 mg in the evening.      Melatonin 5 MG TABS Take 2 tablets (10 mg total) by mouth daily at bedtime as needed (insomnia)      prochlorperazine (COMPAZINE) 5 mg tablet Take 1 tablet (5 mg total) by mouth every 6 (six) hours as needed for nausea or vomiting 30 tablet 0    rimegepant sulfate (NURTEC) 75 mg TBDP Take 1 tablet (75 mg total) by mouth every other day 16 tablet 3    rizatriptan (MAXALT-MLT) 10 mg disintegrating tablet Take 1 tablet (10 mg total) by mouth as needed for migraine Take at the onset of migraine; if symptoms continue or return, may take another dose at least 2 hours after first dose. Take no more than 2 doses in a day. 18 tablet 1    traZODone (DESYREL) 50 mg tablet Take 1 tablet (50 mg total) by mouth daily at bedtime as needed for sleep 90 tablet 0    tretinoin (RETIN-A) 0.025 % cream Apply topically daily at bedtime 45 g 2    Viloxazine HCl  MG CP24 Take 200 mg by mouth in the morning - Increase to 400 mg on 7/17/25      [DISCONTINUED] mometasone-formoterol (Dulera) 200-5 MCG/ACT inhaler Inhale 2 puffs 2 (two) times a day Rinse mouth after use. 39 g 5    buPROPion (Wellbutrin XL) 150 mg 24 hr tablet Take 1 tablet (150 mg total) by mouth every morning - Stop this medication after one week (Patient not taking: Reported on 6/27/2025)      [START ON 7/17/2025] Viloxazine HCl  MG CP24 Take 400 mg by mouth in the morning Do not  start before July 17, 2025. 120 capsule 0    [DISCONTINUED] Dupixent 200 MG/1.14ML SOPN Inject 1 pen ( 200mg total) under the skin once every 14 (fourteen) days. 2 mL 10     No current facility-administered medications on file prior to visit.

## 2025-06-27 NOTE — ASSESSMENT & PLAN NOTE
Patient will continue with Dulera twice per day, albuterol 4 times per day as needed  Continue Dupixent injections  Given much better control of her asthma, will decrease the steroid dose in the Dulera to 100/5  Orders:    POCT spirometry    mometasone-formoterol (Dulera) 100-5 MCG/ACT inhaler; Inhale 2 puffs 2 (two) times a day Rinse mouth after use.

## 2025-06-30 NOTE — TELEPHONE ENCOUNTER
PA Nurtec Renewal submitted via [facility] Key Code:  [id number]      if [facility] has not replied within 72 hours for urgent requests (24 hours for Medicare) and up to 15 days for standard requests (30 days for Medicare), please contact [facility] at [phone].

## 2025-07-03 NOTE — TELEPHONE ENCOUNTER
Nurtec approved from 25-26    Per dispensing records this was last dispensed on  and previous PA  on

## 2025-07-09 ENCOUNTER — APPOINTMENT (OUTPATIENT)
Age: 40
End: 2025-07-09
Attending: NURSE PRACTITIONER
Payer: COMMERCIAL

## 2025-07-09 ENCOUNTER — OFFICE VISIT (OUTPATIENT)
Age: 40
End: 2025-07-09
Payer: COMMERCIAL

## 2025-07-09 VITALS
TEMPERATURE: 97.1 F | HEART RATE: 61 BPM | HEIGHT: 66 IN | WEIGHT: 164 LBS | SYSTOLIC BLOOD PRESSURE: 120 MMHG | BODY MASS INDEX: 26.36 KG/M2 | DIASTOLIC BLOOD PRESSURE: 76 MMHG | OXYGEN SATURATION: 100 %

## 2025-07-09 DIAGNOSIS — L70.0 ACNE VULGARIS: Primary | ICD-10-CM

## 2025-07-09 DIAGNOSIS — Z13.228 ENCOUNTER FOR SCREENING FOR METABOLIC DISORDER: ICD-10-CM

## 2025-07-09 DIAGNOSIS — G90.A POTS (POSTURAL ORTHOSTATIC TACHYCARDIA SYNDROME): ICD-10-CM

## 2025-07-09 DIAGNOSIS — L21.9 SEBORRHEIC DERMATITIS: ICD-10-CM

## 2025-07-09 DIAGNOSIS — D17.9 LIPOMA, UNSPECIFIED SITE: ICD-10-CM

## 2025-07-09 LAB
ALBUMIN SERPL BCG-MCNC: 4.3 G/DL (ref 3.5–5)
ALP SERPL-CCNC: 53 U/L (ref 34–104)
ALT SERPL W P-5'-P-CCNC: 14 U/L (ref 7–52)
ANION GAP SERPL CALCULATED.3IONS-SCNC: 5 MMOL/L (ref 4–13)
AST SERPL W P-5'-P-CCNC: 18 U/L (ref 13–39)
BASOPHILS # BLD AUTO: 0.03 THOUSANDS/ÂΜL (ref 0–0.1)
BASOPHILS NFR BLD AUTO: 1 % (ref 0–1)
BILIRUB SERPL-MCNC: 0.59 MG/DL (ref 0.2–1)
BUN SERPL-MCNC: 8 MG/DL (ref 5–25)
CALCIUM SERPL-MCNC: 9 MG/DL (ref 8.4–10.2)
CHLORIDE SERPL-SCNC: 105 MMOL/L (ref 96–108)
CHOLEST SERPL-MCNC: 144 MG/DL (ref ?–200)
CO2 SERPL-SCNC: 30 MMOL/L (ref 21–32)
CREAT SERPL-MCNC: 0.94 MG/DL (ref 0.6–1.3)
EOSINOPHIL # BLD AUTO: 0.03 THOUSAND/ÂΜL (ref 0–0.61)
EOSINOPHIL NFR BLD AUTO: 1 % (ref 0–6)
ERYTHROCYTE [DISTWIDTH] IN BLOOD BY AUTOMATED COUNT: 12.4 % (ref 11.6–15.1)
EST. AVERAGE GLUCOSE BLD GHB EST-MCNC: 103 MG/DL
GFR SERPL CREATININE-BSD FRML MDRD: 76 ML/MIN/1.73SQ M
GLUCOSE P FAST SERPL-MCNC: 86 MG/DL (ref 65–99)
HBA1C MFR BLD: 5.2 %
HCT VFR BLD AUTO: 39.3 % (ref 34.8–46.1)
HDLC SERPL-MCNC: 58 MG/DL
HGB BLD-MCNC: 13.4 G/DL (ref 11.5–15.4)
IMM GRANULOCYTES # BLD AUTO: 0.02 THOUSAND/UL (ref 0–0.2)
IMM GRANULOCYTES NFR BLD AUTO: 0 % (ref 0–2)
LDLC SERPL CALC-MCNC: 76 MG/DL (ref 0–100)
LYMPHOCYTES # BLD AUTO: 1.88 THOUSANDS/ÂΜL (ref 0.6–4.47)
LYMPHOCYTES NFR BLD AUTO: 33 % (ref 14–44)
MCH RBC QN AUTO: 31.5 PG (ref 26.8–34.3)
MCHC RBC AUTO-ENTMCNC: 34.1 G/DL (ref 31.4–37.4)
MCV RBC AUTO: 92 FL (ref 82–98)
MONOCYTES # BLD AUTO: 0.44 THOUSAND/ÂΜL (ref 0.17–1.22)
MONOCYTES NFR BLD AUTO: 8 % (ref 4–12)
NEUTROPHILS # BLD AUTO: 3.28 THOUSANDS/ÂΜL (ref 1.85–7.62)
NEUTS SEG NFR BLD AUTO: 57 % (ref 43–75)
NONHDLC SERPL-MCNC: 86 MG/DL
NRBC BLD AUTO-RTO: 0 /100 WBCS
PLATELET # BLD AUTO: 313 THOUSANDS/UL (ref 149–390)
PMV BLD AUTO: 9.9 FL (ref 8.9–12.7)
POTASSIUM SERPL-SCNC: 4 MMOL/L (ref 3.5–5.3)
PROT SERPL-MCNC: 6.6 G/DL (ref 6.4–8.4)
RBC # BLD AUTO: 4.26 MILLION/UL (ref 3.81–5.12)
SODIUM SERPL-SCNC: 140 MMOL/L (ref 135–147)
TRIGL SERPL-MCNC: 52 MG/DL (ref ?–150)
TSH SERPL DL<=0.05 MIU/L-ACNC: 2.5 UIU/ML (ref 0.45–4.5)
WBC # BLD AUTO: 5.68 THOUSAND/UL (ref 4.31–10.16)

## 2025-07-09 PROCEDURE — 80053 COMPREHEN METABOLIC PANEL: CPT

## 2025-07-09 PROCEDURE — 36415 COLL VENOUS BLD VENIPUNCTURE: CPT

## 2025-07-09 PROCEDURE — 84443 ASSAY THYROID STIM HORMONE: CPT

## 2025-07-09 PROCEDURE — 99214 OFFICE O/P EST MOD 30 MIN: CPT | Performed by: STUDENT IN AN ORGANIZED HEALTH CARE EDUCATION/TRAINING PROGRAM

## 2025-07-09 PROCEDURE — 83036 HEMOGLOBIN GLYCOSYLATED A1C: CPT

## 2025-07-09 PROCEDURE — 85025 COMPLETE CBC W/AUTO DIFF WBC: CPT

## 2025-07-09 PROCEDURE — 80061 LIPID PANEL: CPT

## 2025-07-09 RX ORDER — ADAPALENE 0.1 G/100G
CREAM TOPICAL
Qty: 45 G | Refills: 1 | Status: SHIPPED | OUTPATIENT
Start: 2025-07-09

## 2025-07-09 RX ORDER — KETOCONAZOLE 20 MG/ML
1 SHAMPOO, SUSPENSION TOPICAL 2 TIMES WEEKLY
Qty: 120 ML | Refills: 3 | Status: SHIPPED | OUTPATIENT
Start: 2025-07-10

## 2025-07-09 NOTE — PROGRESS NOTES
"[facility] Dermatology Clinic Note     Patient Name: Linda Hickman  Encounter Date: 7/9/25       Have you been cared for by a [facility] Dermatologist in the last 3 years and, if so, which description applies to you? Yes. I have been here within the last 3 years, and my medical history has NOT changed since that time. I am not of child-bearing potential.     REVIEW OF SYSTEMS:  Have you recently had or currently have any of the following? No changes in my recent health.   PAST MEDICAL HISTORY:  Have you personally ever had or currently have any of the following?  If \"YES,\" then please provide more detail. No changes in my medical history.   HISTORY OF IMMUNOSUPPRESSION: Do you have a history of any of the following:  Systemic Immunosuppression such as Diabetes, Biologic or Immunotherapy, Chemotherapy, Organ Transplantation, Bone Marrow Transplantation or Prednisone?  No     Answering \"YES\" requires the addition of the dotphrase \"IMMUNOSUPPRESSED\" as the first diagnosis of the patient's visit.   FAMILY HISTORY:  Any \"first degree relatives\" (parent, brother, sister, or child) with the following?    No changes in my family's known health.   PATIENT EXPERIENCE:    Do you want the Dermatologist to perform a COMPLETE skin exam today including a clinical examination under the \"bra and underwear\" areas?  Yes  If necessary, do we have your permission to call and leave a detailed message on your Preferred Phone number that includes your specific medical information?  Yes      Allergies[1] Current Medications[2]              Whom besides the patient is providing clinical information about today's encounter?   NO ADDITIONAL HISTORIAN (patient alone provided history)    Physical Exam and Assessment/Plan by Diagnosis:      ACNE VULGARIS    Physical Exam:  Anatomic Locations Involved: Face  Global Assessment: ALMOST CLEAR: A few scattered comedones and a few small inflammatory papules.   Scarring Present? Yes; Atrophic scarring: " " ALMOST CLEAR  Pertinent Positives:  Pertinent Negatives:    Additional History of Present Condition:  patient stopped using tretinoin 0.025% due to dryness.         TODAY'S PLAN:     PRESCRIPTION MANAGEMENT:  Several treatment options were discussed including topical retinoids and their side effects.     Skin Hygiene:      Wash affected areas (face, chest, and back) TWICE A DAY with a mild cleanser such as cetaphil or cera-ve.  Use only mild cleansers (hypoallergenic and without fragrances) and fragrance free detergent (not \"unscented\" products which contain a masking agent); we discussed avoiding irritants/fragranced products.  Apply a good oil-free facial moisturizer AT LEAST TWO TIMES A DAY \" such as dove sensitive.  Minimize the application of oils and cosmetics to the affected skin.  This includes HAIR PRODUCTS such as \"leave in\" conditioners.  Unless the product specifically states that it \"won't cause acne,\" \"won't clog pores,\" and/or \"is non-comedogenic\" then it may actually CAUSE acne.  If you smoke, STOP. Nicotine increases sebum retention and increased scale within the follicles, forming comedones (blackheads and whiteheads).  Abrasive treatments such as dermabrasion and spa facials may aggravate inflammatory acne.  Do NOT scratch or pick your acne bumps.  The evidence that diet directly affects acne remains weak.  However, diet does affect your overall health.  Eat plenty of fresh fruit and vegetables.  Avoid protein or amino acid supplements, particularly if they contain leucine. Consider a low-glycemic, low-protein and low-dairy diet.  Be mindful that certain medications may cause of aggravate acne.  Make sure to tell your Dermatologist if you start a new prescription, nutritional supplement, and/or herbal remedy.      MORNING Topical Regimen:      NONE.  Azelaic Acid 15% gel IN THE MORNING:  After gently washing and drying your skin, apply this TOPICAL medication evenly over your entire face, " "avoiding the eyes and corners of the mouth      EVENING Topical Regimen:      Adapalene 0.1% cream AT LEAST 1 HOUR BEFORE BEDTIME:  Evenly spread a SINGLE pea-sized amount of this medication over your entire face, avoiding the eyes and corners of the mouth.      SYSTEMIC Strategies:      NONE        MEDICAL DECISION MAKING  Treatment Goal:  Resolution of the CHRONIC condition.       Chronic condition is NOT at treatment goal.  It is progressing along its expected course OR is poorly-controlled.          SEBORRHEIC DERMATITIS    Physical Exam:  Anatomic Location Affected:  scalp  Morphological Description:  flaking and itching patches  Pertinent Positives:  Pertinent Negatives:    Additional History of Present Condition:  present on exam     Assessment and Plan:  Based on a thorough discussion of this condition and the management approach to it (including a comprehensive discussion of the known risks, side effects and potential benefits of treatment), the patient (family) agrees to implement the following specific plan:  Ketoconazole shampoo 2% twice weekly.      Seborrheic Dermatitis   Seborrheic dermatitis is a common, chronic or relapsing form of eczema/dermatitis that mainly affects the sebaceous, gland-rich regions of the scalp, face, and trunk.  There are infantile and adult forms of seborrhoeic dermatitis. It is sometimes associated with psoriasis and, in that clinical scenario, may be referred to as \"sebo-psoriasis.\"  Seborrheic dermatitis is also known as \"seborrheic eczema.\"  Dandruff (also called \"pityriasis capitis\") is an uninflamed form of seborrhoeic dermatitis. Dandruff presents as bran-like scaly patches scattered within hair-bearing areas of the scalp.  In an infant, this condition may be referred to as \"cradle cap.\"  The cause of seborrheic dermatitis is not completely understood. It is associated with proliferation of various species of the skin commensal Malassezia, in its yeast (non-pathogenic) " "form. Its metabolites (such as the fatty acids oleic acid, malssezin, and indole-3-carbaldehyde) may cause an inflammatory reaction. Differences in skin barrier lipid content and function may account for individual presentations.    Infantile Seborrheic Dermatitis  Infantile seborrheic dermatitis affects babies under the age of 3 months and usually resolves by 6-12 months of age.  Infantile seborrheic dermatitis causes \"cradle cap\" (diffuse, greasy scaling on scalp). The rash may spread to affect armpit and groin folds (a type of \"napkin dermatitis\").  There may be associated salmon-pink colored patches that may flake or peel.  The rash in this case is usually not especially itchy, so the baby often appears undisturbed by the rash, even when more generalized.    Adult Seborrheic Dermatitis  Adult seborrheic dermatitis tends to begin in late adolescence; prevalence is greatest in young adults and in the elderly. It is more common in males than in females.    The following factors are sometimes associated with severe adult seborrheic dermatitis:  Oily skin  Familial tendency to seborrhoeic dermatitis or a family history of psoriasis  Immunosuppression: organ transplant recipient, human immunodeficiency virus (HIV) infection and patients with lymphoma  Neurological and psychiatric diseases: Parkinson disease, tardive dyskinesia, depression, epilepsy, facial nerve palsy, spinal cord injury and congenital disorders such as Down syndrome  Treatment for psoriasis with psoralen and ultraviolet A (PUVA) therapy  Lack of sleep  Stressful events.    In adults, seborrheic dermatitis may typically affect the scalp, face (creases around the nose, behind ears, within eyebrows) and upper trunk. Typical clinical features include:  Winter flares, improving in summer following sun exposure  Minimal itch most of the time  Combination oily and dry mid-facial skin  Ill-defined localized scaly patches or diffuse scale in the " scalp  Blepharitis; scaly red eyelid margins  [place]-pink, thin, scaly, and ill-defined plaques in skin folds on both sides of the face  Petal or ring-shaped flaky patches on hair-line and on anterior chest  Rash in armpits, under the breasts, in the groin folds and genital creases  Superficial folliculitis (inflamed hair follicles) on cheeks and upper trunk    Seborrheic dermatitis is diagnosed by its clinical appearance and behavior. Skin biopsy may be helpful but is rarely necessary to make this diagnosis.    LIPOMA    Physical Exam:  Anatomic Location Affected:  abdomen  Morphological Description:  cystic nodule   Pertinent Positives:  Pertinent Negatives:    Additional History of Present Condition:  present on exam     Assessment and Plan:  Based on a thorough discussion of this condition and the management approach to it (including a comprehensive discussion of the known risks, side effects and potential benefits of treatment), the patient (family) agrees to implement the following specific plan:  Recommended consulting with general surgery due to size of lipoma.     Lipoma  A lipoma is a non-cancerous tumor that is made up of fat cells. It slowly grows under the skin in the subcutaneous tissue. A person may have a single lipoma or may have many lipomas. They are very common.  Lipomas can occur in people of all ages, however, they tend to develop in adulthood and are most noticeable during middle age. They affect both sexes equally, although solitary lipomas are more common in women whilst multiple lipomas occur more frequently in men.    The cause of lipomas is unknown. It is possible there may be genetic involvement as many patients with lipomas come from a family with a history of these tumors. Sometimes an injury such as a blunt blow to part of the body may trigger growth of a lipoma.  People are often unaware of lipomas until they have grown large enough to become visible and palpable. This growth occurs  slowly over several years. Some features of lipomas include:  A dome-shaped or egg-shaped lump about 2-10 cm in diameter (some may grow even larger)   It feels soft and smooth and is easily moved under the skin with the fingers   Some have a rubbery or doughy consistency   They are most common on the shoulders, neck, trunk and arms, but they can occur anywhere on the body where fat tissue is present.    Most lipomas are symptomless, but some are painful on applying pressure. Lipomas that are tender or painful are usually angiolipomas. This means the lipoma has an increased number of small blood vessels. Painful lipomas are also a feature of adiposis dolorosa or Dercum disease.  Diagnosis of lipoma is usually made clinically by finding a soft lump under the skin. However, if there is any doubt, a deep skin biopsy can be performed which will show typical histopathological features of lipoma and its variants.    Most lipomas require no treatment. Most lipomas eventually stop growing and remain indefinitely without causing any problems. Occasionally, lipomas that interfere with the movement of adjacent muscles may require surgical removal. Several methods are available:  Simple surgical excision   Squeeze technique (a small incision is made over the lipoma and the fatty tissue is squeezed through the hole)   Liposuction      Scribe Attestation    I,:  Mckenna Ambriz am acting as a scribe while in the presence of the attending physician.:       I,:  Nicholas Min DO personally performed the services described in this documentation    as scribed in my presence.:                  [1]  Allergies  Allergen Reactions   • Sulfa Antibiotics Anaphylaxis   • Levofloxacin      Other reaction(s): sensative   • Prednisone Other (See Comments)   • Tobramycin Other (See Comments)     Other reaction(s): sensative. Ototoxic   • Pseudoephedrine Palpitations   [2]    Current Outpatient Medications:   •  adapalene (DIFFERIN) 0.1 % cream,  Apply topically daily at bedtime, Disp: 45 g, Rfl: 1  •  albuterol (PROVENTIL HFA,VENTOLIN HFA) 90 mcg/act inhaler, Inhale 2 puffs every 6 (six) hours as needed for wheezing, Disp: 18 g, Rfl: 3  •  calcium carbonate (OS-BERENICE) 600 MG tablet, Take 600 mg by mouth in the morning and 600 mg in the evening. Take with meals., Disp: , Rfl:   •  cholecalciferol (VITAMIN D3) 1,000 units tablet, Take 1,000 Units by mouth in the morning., Disp: , Rfl:   •  Dupilumab (Dupixent) 200 MG/1.14ML SOAJ, Inject 1 pen (200 mg total) under the skin once every 14 (fourteen) days., Disp: 2 mL, Rfl: 10  •  Ferrous Sulfate (RA IRON PO), Take by mouth, Disp: , Rfl:   •  [START ON 7/10/2025] ketoconazole (NIZORAL) 2 % shampoo, Apply 1 Application topically 2 (two) times a week, Disp: 120 mL, Rfl: 3  •  Magnesium Glycinate 100 MG CAPS, Take 200 mg by mouth in the morning and 200 mg in the evening., Disp: , Rfl:   •  Melatonin 5 MG TABS, Take 2 tablets (10 mg total) by mouth daily at bedtime as needed (insomnia), Disp: , Rfl:   •  mometasone-formoterol (Dulera) 100-5 MCG/ACT inhaler, Inhale 2 puffs 2 (two) times a day Rinse mouth after use., Disp: 13 g, Rfl: 3  •  prochlorperazine (COMPAZINE) 5 mg tablet, Take 1 tablet (5 mg total) by mouth every 6 (six) hours as needed for nausea or vomiting, Disp: 30 tablet, Rfl: 0  •  rimegepant sulfate (NURTEC) 75 mg TBDP, Take 1 tablet (75 mg total) by mouth every other day, Disp: 16 tablet, Rfl: 3  •  rizatriptan (MAXALT-MLT) 10 mg disintegrating tablet, Take 1 tablet (10 mg total) by mouth as needed for migraine Take at the onset of migraine; if symptoms continue or return, may take another dose at least 2 hours after first dose. Take no more than 2 doses in a day., Disp: 18 tablet, Rfl: 1  •  traZODone (DESYREL) 50 mg tablet, Take 1 tablet (50 mg total) by mouth daily at bedtime as needed for sleep, Disp: 90 tablet, Rfl: 0  •  Viloxazine HCl  MG CP24, Take 200 mg by mouth in the morning - Increase  to 400 mg on 7/17/25, Disp: , Rfl:   •  [START ON 7/17/2025] Viloxazine HCl  MG CP24, Take 400 mg by mouth in the morning Do not start before July 17, 2025., Disp: 120 capsule, Rfl: 0  •  amphetamine-dextroamphetamine (ADDERALL XR, 30MG,) 30 MG 24 hr capsule, Take 1 capsule (30 mg total) by mouth every morning - Try to take this medication on productive days Max Daily Amount: 30 mg (Patient not taking: Reported on 7/9/2025), Disp: 30 capsule, Rfl: 0  •  buPROPion (Wellbutrin XL) 150 mg 24 hr tablet, Take 1 tablet (150 mg total) by mouth every morning - Stop this medication after one week (Patient not taking: Reported on 7/9/2025), Disp: , Rfl:   •  EPINEPHrine (EPIPEN) 0.3 mg/0.3 mL SOAJ, Inject 0.3 mL (0.3 mg total) into a muscle once for 1 dose (Patient taking differently: Inject 0.3 mg into a muscle as needed), Disp: 0.6 mL, Rfl: 0  •  levalbuterol (Xopenex) 0.63 mg/3 mL nebulizer solution, Take 3 mL (0.63 mg total) by nebulization 3 (three) times a day (Patient not taking: Reported on 7/9/2025), Disp: 30 mL, Rfl: 1  •  tretinoin (RETIN-A) 0.025 % cream, Apply topically daily at bedtime (Patient not taking: Reported on 7/9/2025), Disp: 45 g, Rfl: 2

## 2025-07-09 NOTE — PATIENT INSTRUCTIONS
"ACNE VULGARIS  TODAY'S PLAN:    PRESCRIPTION MANAGEMENT:  Several treatment options were discussed including topical retinoids and their side effects.    Skin Hygiene:     Wash affected areas (face, chest, and back) TWICE A DAY with a mild cleanser such as cetaphil or cera-ve.  Use only mild cleansers (hypoallergenic and without fragrances) and fragrance free detergent (not \"unscented\" products which contain a masking agent); we discussed avoiding irritants/fragranced products.  Apply a good oil-free facial moisturizer AT LEAST TWO TIMES A DAY \" such as dove sensitive.  Minimize the application of oils and cosmetics to the affected skin.  This includes HAIR PRODUCTS such as \"leave in\" conditioners.  Unless the product specifically states that it \"won't cause acne,\" \"won't clog pores,\" and/or \"is non-comedogenic\" then it may actually CAUSE acne.  If you smoke, STOP. Nicotine increases sebum retention and increased scale within the follicles, forming comedones (blackheads and whiteheads).  Abrasive treatments such as dermabrasion and spa facials may aggravate inflammatory acne.  Do NOT scratch or pick your acne bumps.  The evidence that diet directly affects acne remains weak.  However, diet does affect your overall health.  Eat plenty of fresh fruit and vegetables.  Avoid protein or amino acid supplements, particularly if they contain leucine. Consider a low-glycemic, low-protein and low-dairy diet.  Be mindful that certain medications may cause of aggravate acne.  Make sure to tell your Dermatologist if you start a new prescription, nutritional supplement, and/or herbal remedy.     MORNING Topical Regimen:     NONE.  Azelaic Acid 15% gel IN THE MORNING over the counter:  After gently washing and drying your skin, apply this TOPICAL medication evenly over your entire face, avoiding the eyes and corners of the mouth     EVENING Topical Regimen:     Adapalene 0.1% cream AT LEAST 1 HOUR BEFORE BEDTIME:  Evenly spread a " SINGLE pea-sized amount of this medication over your entire face, avoiding the eyes and corners of the mouth.     SYSTEMIC Strategies:     NONE     LIPOMA  Assessment and Plan:  Based on a thorough discussion of this condition and the management approach to it (including a comprehensive discussion of the known risks, side effects and potential benefits of treatment), the patient (family) agrees to implement the following specific plan:  Recommended consulting with general surgery due to size of lipoma.     Lipoma  A lipoma is a non-cancerous tumor that is made up of fat cells. It slowly grows under the skin in the subcutaneous tissue. A person may have a single lipoma or may have many lipomas. They are very common.  Lipomas can occur in people of all ages, however, they tend to develop in adulthood and are most noticeable during middle age. They affect both sexes equally, although solitary lipomas are more common in women whilst multiple lipomas occur more frequently in men.    The cause of lipomas is unknown. It is possible there may be genetic involvement as many patients with lipomas come from a family with a history of these tumors. Sometimes an injury such as a blunt blow to part of the body may trigger growth of a lipoma.  People are often unaware of lipomas until they have grown large enough to become visible and palpable. This growth occurs slowly over several years. Some features of lipomas include:  A dome-shaped or egg-shaped lump about 2-10 cm in diameter (some may grow even larger)   It feels soft and smooth and is easily moved under the skin with the fingers   Some have a rubbery or doughy consistency   They are most common on the shoulders, neck, trunk and arms, but they can occur anywhere on the body where fat tissue is present.    Most lipomas are symptomless, but some are painful on applying pressure. Lipomas that are tender or painful are usually angiolipomas. This means the lipoma has an  "increased number of small blood vessels. Painful lipomas are also a feature of adiposis dolorosa or Dercum disease.  Diagnosis of lipoma is usually made clinically by finding a soft lump under the skin. However, if there is any doubt, a deep skin biopsy can be performed which will show typical histopathological features of lipoma and its variants.    Most lipomas require no treatment. Most lipomas eventually stop growing and remain indefinitely without causing any problems. Occasionally, lipomas that interfere with the movement of adjacent muscles may require surgical removal. Several methods are available:  Simple surgical excision   Squeeze technique (a small incision is made over the lipoma and the fatty tissue is squeezed through the hole)   Liposuction    SEBORRHEIC DERMATITIS  Assessment and Plan:  Based on a thorough discussion of this condition and the management approach to it (including a comprehensive discussion of the known risks, side effects and potential benefits of treatment), the patient (family) agrees to implement the following specific plan:  Ketoconazole shampoo 2% twice weekly.      Seborrheic Dermatitis   Seborrheic dermatitis is a common, chronic or relapsing form of eczema/dermatitis that mainly affects the sebaceous, gland-rich regions of the scalp, face, and trunk.  There are infantile and adult forms of seborrhoeic dermatitis. It is sometimes associated with psoriasis and, in that clinical scenario, may be referred to as \"sebo-psoriasis.\"  Seborrheic dermatitis is also known as \"seborrheic eczema.\"  Dandruff (also called \"pityriasis capitis\") is an uninflamed form of seborrhoeic dermatitis. Dandruff presents as bran-like scaly patches scattered within hair-bearing areas of the scalp.  In an infant, this condition may be referred to as \"cradle cap.\"  The cause of seborrheic dermatitis is not completely understood. It is associated with proliferation of various species of the skin commensal " "Malassezia, in its yeast (non-pathogenic) form. Its metabolites (such as the fatty acids oleic acid, malssezin, and indole-3-carbaldehyde) may cause an inflammatory reaction. Differences in skin barrier lipid content and function may account for individual presentations.    Infantile Seborrheic Dermatitis  Infantile seborrheic dermatitis affects babies under the age of 3 months and usually resolves by 6-12 months of age.  Infantile seborrheic dermatitis causes \"cradle cap\" (diffuse, greasy scaling on scalp). The rash may spread to affect armpit and groin folds (a type of \"napkin dermatitis\").  There may be associated salmon-pink colored patches that may flake or peel.  The rash in this case is usually not especially itchy, so the baby often appears undisturbed by the rash, even when more generalized.    Adult Seborrheic Dermatitis  Adult seborrheic dermatitis tends to begin in late adolescence; prevalence is greatest in young adults and in the elderly. It is more common in males than in females.    The following factors are sometimes associated with severe adult seborrheic dermatitis:  Oily skin  Familial tendency to seborrhoeic dermatitis or a family history of psoriasis  Immunosuppression: organ transplant recipient, human immunodeficiency virus (HIV) infection and patients with lymphoma  Neurological and psychiatric diseases: Parkinson disease, tardive dyskinesia, depression, epilepsy, facial nerve palsy, spinal cord injury and congenital disorders such as Down syndrome  Treatment for psoriasis with psoralen and ultraviolet A (PUVA) therapy  Lack of sleep  Stressful events.    In adults, seborrheic dermatitis may typically affect the scalp, face (creases around the nose, behind ears, within eyebrows) and upper trunk. Typical clinical features include:  Winter flares, improving in summer following sun exposure  Minimal itch most of the time  Combination oily and dry mid-facial skin  Ill-defined localized scaly " patches or diffuse scale in the scalp  Blepharitis; scaly red eyelid margins  [place]-pink, thin, scaly, and ill-defined plaques in skin folds on both sides of the face  Petal or ring-shaped flaky patches on hair-line and on anterior chest  Rash in armpits, under the breasts, in the groin folds and genital creases  Superficial folliculitis (inflamed hair follicles) on cheeks and upper trunk    Seborrheic dermatitis is diagnosed by its clinical appearance and behavior. Skin biopsy may be helpful but is rarely necessary to make this diagnosis.

## 2025-07-10 ENCOUNTER — TELEPHONE (OUTPATIENT)
Age: 40
End: 2025-07-10

## 2025-07-10 NOTE — TELEPHONE ENCOUNTER
PA for Adapalene 0.1% gel  APPROVED     Date(s) approved until 07/10/2026    Case #[id number]     Patient advised by          []Encryp[employer]ixhart Message  [x]Phone call   []LMOM  []L/M to call office as no active Communication consent on file  []Unable to leave detailed message as VM not approved on Communication consent       Pharmacy advised by    []Fax  [x]Phone call  []Secure Chat    Specialty Pharmacy    []     Approval letter scanned into Media No

## 2025-07-10 NOTE — TELEPHONE ENCOUNTER
PA for Adapalene 0.1% gel SUBMITTED to [employer]     via    []CMM-KEY:   [x]Surescripts-Case ID # [id number]   []Availity-Auth ID # NDC #   []Faxed to plan   []Other website   []Phone call Case ID #     [x]PA sent as URGENT    All office notes, labs and other pertaining documents and studies sent. Clinical questions answered. Awaiting determination from insurance company.     Turnaround time for your insurance to make a decision on your Prior Authorization can take 7-21 business days.

## 2025-07-28 ENCOUNTER — OFFICE VISIT (OUTPATIENT)
Dept: PSYCHIATRY | Facility: CLINIC | Age: 40
End: 2025-07-28
Payer: COMMERCIAL

## 2025-07-28 DIAGNOSIS — F90.0 ATTENTION DEFICIT HYPERACTIVITY DISORDER, INATTENTIVE TYPE: Primary | ICD-10-CM

## 2025-07-28 DIAGNOSIS — F33.42 RECURRENT MAJOR DEPRESSIVE DISORDER, IN FULL REMISSION (HCC): ICD-10-CM

## 2025-07-28 DIAGNOSIS — G47.00 INSOMNIA, UNSPECIFIED TYPE: ICD-10-CM

## 2025-07-28 PROCEDURE — 90792 PSYCH DIAG EVAL W/MED SRVCS: CPT | Performed by: STUDENT IN AN ORGANIZED HEALTH CARE EDUCATION/TRAINING PROGRAM

## 2025-07-28 RX ORDER — TRAZODONE HYDROCHLORIDE 50 MG/1
50-100 TABLET ORAL
Qty: 90 TABLET | Refills: 0 | Status: SHIPPED | OUTPATIENT
Start: 2025-07-28

## 2025-08-06 ENCOUNTER — PREP FOR PROCEDURE (OUTPATIENT)
Dept: SURGERY | Facility: CLINIC | Age: 40
End: 2025-08-06

## 2025-08-06 ENCOUNTER — CONSULT (OUTPATIENT)
Dept: SURGERY | Facility: CLINIC | Age: 40
End: 2025-08-06
Attending: STUDENT IN AN ORGANIZED HEALTH CARE EDUCATION/TRAINING PROGRAM
Payer: COMMERCIAL

## 2025-08-06 ENCOUNTER — TELEMEDICINE (OUTPATIENT)
Dept: PSYCHIATRY | Facility: CLINIC | Age: 40
End: 2025-08-06
Payer: COMMERCIAL

## 2025-08-06 VITALS
HEIGHT: 66 IN | RESPIRATION RATE: 18 BRPM | OXYGEN SATURATION: 98 % | DIASTOLIC BLOOD PRESSURE: 83 MMHG | WEIGHT: 163.6 LBS | TEMPERATURE: 97.5 F | BODY MASS INDEX: 26.29 KG/M2 | SYSTOLIC BLOOD PRESSURE: 120 MMHG | HEART RATE: 88 BPM

## 2025-08-06 DIAGNOSIS — F90.9 ATTENTION DEFICIT HYPERACTIVITY DISORDER (ADHD), UNSPECIFIED ADHD TYPE: ICD-10-CM

## 2025-08-06 DIAGNOSIS — F33.1 MODERATE EPISODE OF RECURRENT MAJOR DEPRESSIVE DISORDER (HCC): Primary | ICD-10-CM

## 2025-08-06 DIAGNOSIS — D17.9 LIPOMA, UNSPECIFIED SITE: ICD-10-CM

## 2025-08-06 PROCEDURE — 90837 PSYTX W PT 60 MINUTES: CPT

## 2025-08-06 PROCEDURE — 99244 OFF/OP CNSLTJ NEW/EST MOD 40: CPT | Performed by: STUDENT IN AN ORGANIZED HEALTH CARE EDUCATION/TRAINING PROGRAM

## 2025-08-06 RX ORDER — HEPARIN SODIUM 5000 [USP'U]/ML
5000 INJECTION, SOLUTION INTRAVENOUS; SUBCUTANEOUS ONCE
Status: CANCELLED | OUTPATIENT
Start: 2025-08-06 | End: 2025-08-06

## 2025-08-06 RX ORDER — CHLORHEXIDINE GLUCONATE 40 MG/ML
SOLUTION TOPICAL DAILY PRN
Status: CANCELLED | OUTPATIENT
Start: 2025-08-06

## 2025-08-11 ENCOUNTER — TELEPHONE (OUTPATIENT)
Age: 40
End: 2025-08-11

## 2025-08-11 ENCOUNTER — HOSPITAL ENCOUNTER (OUTPATIENT)
Age: 40
Discharge: HOME | End: 2025-08-11
Attending: NURSE PRACTITIONER
Payer: COMMERCIAL

## 2025-08-13 ENCOUNTER — TELEMEDICINE (OUTPATIENT)
Dept: PSYCHIATRY | Facility: CLINIC | Age: 40
End: 2025-08-13
Payer: COMMERCIAL

## 2025-08-15 ENCOUNTER — ANESTHESIA EVENT (OUTPATIENT)
Dept: PERIOP | Facility: HOSPITAL | Age: 40
End: 2025-08-15
Payer: COMMERCIAL

## 2025-08-15 ENCOUNTER — ANESTHESIA (OUTPATIENT)
Dept: PERIOP | Facility: HOSPITAL | Age: 40
End: 2025-08-15
Payer: COMMERCIAL

## 2025-08-15 ENCOUNTER — HOSPITAL ENCOUNTER (OUTPATIENT)
Facility: HOSPITAL | Age: 40
Setting detail: OUTPATIENT SURGERY
Discharge: HOME | End: 2025-08-15
Attending: STUDENT IN AN ORGANIZED HEALTH CARE EDUCATION/TRAINING PROGRAM | Admitting: STUDENT IN AN ORGANIZED HEALTH CARE EDUCATION/TRAINING PROGRAM
Payer: COMMERCIAL

## 2025-08-15 RX ORDER — PROPOFOL 10 MG/ML
INJECTION, EMULSION INTRAVENOUS AS NEEDED
Status: DISCONTINUED | OUTPATIENT
Start: 2025-08-15 | End: 2025-08-15

## 2025-08-15 RX ORDER — PROPOFOL 10 MG/ML
INJECTION, EMULSION INTRAVENOUS CONTINUOUS PRN
Status: DISCONTINUED | OUTPATIENT
Start: 2025-08-15 | End: 2025-08-15

## 2025-08-15 RX ORDER — FENTANYL CITRATE 50 UG/ML
INJECTION, SOLUTION INTRAMUSCULAR; INTRAVENOUS AS NEEDED
Status: DISCONTINUED | OUTPATIENT
Start: 2025-08-15 | End: 2025-08-15

## 2025-08-15 RX ORDER — ONDANSETRON 2 MG/ML
INJECTION INTRAMUSCULAR; INTRAVENOUS AS NEEDED
Status: DISCONTINUED | OUTPATIENT
Start: 2025-08-15 | End: 2025-08-15

## 2025-08-15 RX ORDER — MIDAZOLAM HYDROCHLORIDE 2 MG/2ML
INJECTION, SOLUTION INTRAMUSCULAR; INTRAVENOUS AS NEEDED
Status: DISCONTINUED | OUTPATIENT
Start: 2025-08-15 | End: 2025-08-15

## 2025-08-15 RX ADMIN — FENTANYL CITRATE 50 MCG: 50 INJECTION, SOLUTION INTRAMUSCULAR; INTRAVENOUS at 10:57

## 2025-08-15 RX ADMIN — PROPOFOL 100 MG: 10 INJECTION, EMULSION INTRAVENOUS at 10:44

## 2025-08-15 RX ADMIN — PROPOFOL 150 MCG/KG/MIN: 10 INJECTION, EMULSION INTRAVENOUS at 10:44

## 2025-08-15 RX ADMIN — ONDANSETRON 4 MG: 2 INJECTION INTRAMUSCULAR; INTRAVENOUS at 10:51

## 2025-08-15 RX ADMIN — MIDAZOLAM HYDROCHLORIDE 2 MG: 1 INJECTION, SOLUTION INTRAMUSCULAR; INTRAVENOUS at 10:44

## 2025-08-15 RX ADMIN — CEFAZOLIN SODIUM 2000 MG: 2 SOLUTION INTRAVENOUS at 10:46

## 2025-08-15 RX ADMIN — FENTANYL CITRATE 25 MCG: 50 INJECTION, SOLUTION INTRAMUSCULAR; INTRAVENOUS at 10:47

## 2025-08-15 RX ADMIN — FENTANYL CITRATE 25 MCG: 50 INJECTION, SOLUTION INTRAMUSCULAR; INTRAVENOUS at 10:51
